# Patient Record
Sex: FEMALE | Race: WHITE | NOT HISPANIC OR LATINO | Employment: OTHER | URBAN - METROPOLITAN AREA
[De-identification: names, ages, dates, MRNs, and addresses within clinical notes are randomized per-mention and may not be internally consistent; named-entity substitution may affect disease eponyms.]

---

## 2017-03-30 ENCOUNTER — HOSPITAL ENCOUNTER (OUTPATIENT)
Dept: FAMILY MEDICINE CLINIC | Facility: CLINIC | Age: 67
Setting detail: SPECIMEN
Discharge: HOME | End: 2017-03-30
Attending: FAMILY MEDICINE | Admitting: FAMILY MEDICINE

## 2017-03-30 ENCOUNTER — HOSPITAL ENCOUNTER (OUTPATIENT)
Dept: RESPIRATORY THERAPY | Facility: HOSPITAL | Age: 67
Discharge: HOME | End: 2017-03-30
Attending: NURSE PRACTITIONER | Admitting: NURSE PRACTITIONER

## 2017-03-30 LAB
ALBUMIN SERPL-MCNC: 4 G/DL (ref 3.5–4.8)
ALBUMIN/GLOB SERPL: 1.3 {RATIO} (ref 1–1.7)
ALP SERPL-CCNC: 93 IU/L (ref 32–91)
ALT SERPL-CCNC: 33 IU/L (ref 14–54)
ANION GAP SERPL CALC-SCNC: 14.3 MMOL/L (ref 10–20)
AST SERPL-CCNC: 28 IU/L (ref 15–41)
BILIRUB SERPL-MCNC: 0.7 MG/DL (ref 0.3–1.2)
BUN SERPL-MCNC: 12 MG/DL (ref 8–20)
BUN/CREAT SERPL: 10 (ref 5.4–26.2)
CALCIUM SERPL-MCNC: 9.6 MG/DL (ref 8.9–10.3)
CHLORIDE SERPL-SCNC: 105 MMOL/L (ref 101–111)
CHOLEST SERPL-MCNC: 171 MG/DL
CHOLEST/HDLC SERPL: 2.5 {RATIO}
CONV CO2: 29 MMOL/L (ref 22–32)
CONV LDL CHOLESTEROL DIRECT: 86 MG/DL (ref 0–100)
CONV TOTAL PROTEIN: 7.1 G/DL (ref 6.1–7.9)
CREAT UR-MCNC: 1.2 MG/DL (ref 0.4–1)
GLOBULIN UR ELPH-MCNC: 3.1 G/DL (ref 2.5–3.8)
GLUCOSE SERPL-MCNC: 99 MG/DL (ref 65–99)
HDLC SERPL-MCNC: 69 MG/DL
LDLC/HDLC SERPL: 1.2 {RATIO}
LIPID INTERPRETATION: NORMAL
POTASSIUM SERPL-SCNC: 4.3 MMOL/L (ref 3.6–5.1)
SODIUM SERPL-SCNC: 144 MMOL/L (ref 136–144)
TRIGL SERPL-MCNC: 84 MG/DL
VLDLC SERPL CALC-MCNC: 16.2 MG/DL

## 2017-04-05 ENCOUNTER — HOSPITAL ENCOUNTER (OUTPATIENT)
Dept: MAMMOGRAPHY | Facility: HOSPITAL | Age: 67
Discharge: HOME | End: 2017-04-05
Attending: FAMILY MEDICINE | Admitting: FAMILY MEDICINE

## 2017-05-05 ENCOUNTER — ON CAMPUS - OUTPATIENT (AMBULATORY)
Dept: URBAN - METROPOLITAN AREA HOSPITAL 2 | Facility: HOSPITAL | Age: 67
End: 2017-05-05
Payer: COMMERCIAL

## 2017-05-05 ENCOUNTER — OFFICE (AMBULATORY)
Dept: URBAN - METROPOLITAN AREA CLINIC 64 | Facility: CLINIC | Age: 67
End: 2017-05-05
Payer: COMMERCIAL

## 2017-05-05 ENCOUNTER — HOSPITAL ENCOUNTER (OUTPATIENT)
Dept: OTHER | Facility: HOSPITAL | Age: 67
Setting detail: SPECIMEN
Discharge: HOME | End: 2017-05-05
Attending: INTERNAL MEDICINE | Admitting: INTERNAL MEDICINE

## 2017-05-05 VITALS
HEART RATE: 119 BPM | DIASTOLIC BLOOD PRESSURE: 54 MMHG | WEIGHT: 190 LBS | SYSTOLIC BLOOD PRESSURE: 96 MMHG | OXYGEN SATURATION: 100 % | DIASTOLIC BLOOD PRESSURE: 91 MMHG | SYSTOLIC BLOOD PRESSURE: 124 MMHG | OXYGEN SATURATION: 98 % | RESPIRATION RATE: 18 BRPM | DIASTOLIC BLOOD PRESSURE: 70 MMHG | HEART RATE: 92 BPM | OXYGEN SATURATION: 93 % | SYSTOLIC BLOOD PRESSURE: 131 MMHG | HEART RATE: 68 BPM | SYSTOLIC BLOOD PRESSURE: 144 MMHG | DIASTOLIC BLOOD PRESSURE: 83 MMHG | SYSTOLIC BLOOD PRESSURE: 125 MMHG | TEMPERATURE: 97.6 F | HEART RATE: 89 BPM | HEART RATE: 78 BPM | SYSTOLIC BLOOD PRESSURE: 149 MMHG | HEART RATE: 66 BPM | HEART RATE: 103 BPM | SYSTOLIC BLOOD PRESSURE: 129 MMHG | RESPIRATION RATE: 16 BRPM | HEART RATE: 121 BPM | SYSTOLIC BLOOD PRESSURE: 132 MMHG | RESPIRATION RATE: 15 BRPM | DIASTOLIC BLOOD PRESSURE: 68 MMHG | DIASTOLIC BLOOD PRESSURE: 102 MMHG | OXYGEN SATURATION: 95 % | SYSTOLIC BLOOD PRESSURE: 155 MMHG | DIASTOLIC BLOOD PRESSURE: 89 MMHG | DIASTOLIC BLOOD PRESSURE: 81 MMHG | HEART RATE: 101 BPM | HEIGHT: 59 IN | DIASTOLIC BLOOD PRESSURE: 115 MMHG | RESPIRATION RATE: 14 BRPM | OXYGEN SATURATION: 96 %

## 2017-05-05 DIAGNOSIS — D12.3 BENIGN NEOPLASM OF TRANSVERSE COLON: ICD-10-CM

## 2017-05-05 DIAGNOSIS — K64.1 SECOND DEGREE HEMORRHOIDS: ICD-10-CM

## 2017-05-05 DIAGNOSIS — R13.10 DYSPHAGIA, UNSPECIFIED: ICD-10-CM

## 2017-05-05 DIAGNOSIS — K22.2 ESOPHAGEAL OBSTRUCTION: ICD-10-CM

## 2017-05-05 DIAGNOSIS — Z12.11 ENCOUNTER FOR SCREENING FOR MALIGNANT NEOPLASM OF COLON: ICD-10-CM

## 2017-05-05 DIAGNOSIS — K25.9 GASTRIC ULCER, UNSPECIFIED AS ACUTE OR CHRONIC, WITHOUT HEMO: ICD-10-CM

## 2017-05-05 DIAGNOSIS — K44.9 DIAPHRAGMATIC HERNIA WITHOUT OBSTRUCTION OR GANGRENE: ICD-10-CM

## 2017-05-05 LAB
GI HISTOLOGY: A. UNSPECIFIED: (no result)
GI HISTOLOGY: PDF REPORT: (no result)

## 2017-05-05 PROCEDURE — 45385 COLONOSCOPY W/LESION REMOVAL: CPT | Mod: PT | Performed by: INTERNAL MEDICINE

## 2017-05-05 PROCEDURE — 43248 EGD GUIDE WIRE INSERTION: CPT | Performed by: INTERNAL MEDICINE

## 2017-05-05 PROCEDURE — 88305 TISSUE EXAM BY PATHOLOGIST: CPT | Mod: 26 | Performed by: INTERNAL MEDICINE

## 2017-05-05 RX ORDER — LANSOPRAZOLE 30 MG/1
CAPSULE, DELAYED RELEASE ORAL
Qty: 90 | Refills: 3 | Status: COMPLETED
End: 2022-12-06

## 2017-05-05 RX ADMIN — PROPOFOL: 10 INJECTION, EMULSION INTRAVENOUS at 14:45

## 2017-05-18 ENCOUNTER — HOSPITAL ENCOUNTER (OUTPATIENT)
Dept: CARDIOLOGY | Facility: HOSPITAL | Age: 67
Discharge: HOME | End: 2017-05-18
Attending: INTERNAL MEDICINE | Admitting: INTERNAL MEDICINE

## 2017-06-25 ENCOUNTER — HOSPITAL ENCOUNTER (OUTPATIENT)
Dept: OTHER | Facility: HOSPITAL | Age: 67
Setting detail: SPECIMEN
Discharge: HOME | End: 2017-06-25
Attending: ORTHOPAEDIC SURGERY | Admitting: ORTHOPAEDIC SURGERY

## 2017-06-26 LAB
ANION GAP SERPL CALC-SCNC: 12.3 MMOL/L (ref 10–20)
BUN SERPL-MCNC: 7 MG/DL (ref 8–20)
BUN/CREAT SERPL: 7 (ref 5.4–26.2)
CALCIUM SERPL-MCNC: 8.1 MG/DL (ref 8.9–10.3)
CHLORIDE SERPL-SCNC: 97 MMOL/L (ref 101–111)
CONV CO2: 28 MMOL/L (ref 22–32)
CREAT UR-MCNC: 1 MG/DL (ref 0.4–1)
GLUCOSE SERPL-MCNC: 114 MG/DL (ref 65–99)
POTASSIUM SERPL-SCNC: 4.3 MMOL/L (ref 3.6–5.1)
SODIUM SERPL-SCNC: 133 MMOL/L (ref 136–144)

## 2017-06-28 ENCOUNTER — HOSPITAL ENCOUNTER (OUTPATIENT)
Dept: LAB | Facility: HOSPITAL | Age: 67
Discharge: HOME | End: 2017-06-28
Attending: ORTHOPAEDIC SURGERY | Admitting: ORTHOPAEDIC SURGERY

## 2017-06-28 LAB
INR PPP: 3.7 (ref 2–3)
PROTHROMBIN TIME: 41.9 SEC (ref 19.4–28.5)

## 2017-07-19 ENCOUNTER — HOSPITAL ENCOUNTER (OUTPATIENT)
Dept: FAMILY MEDICINE CLINIC | Facility: CLINIC | Age: 67
Setting detail: SPECIMEN
Discharge: HOME | End: 2017-07-19
Attending: FAMILY MEDICINE | Admitting: FAMILY MEDICINE

## 2017-07-19 LAB
ALBUMIN SERPL-MCNC: 3.7 G/DL (ref 3.5–4.8)
ALBUMIN/GLOB SERPL: 1.2 {RATIO} (ref 1–1.7)
ALP SERPL-CCNC: 93 IU/L (ref 32–91)
ALT SERPL-CCNC: 14 IU/L (ref 14–54)
ANION GAP SERPL CALC-SCNC: 10.9 MMOL/L (ref 10–20)
AST SERPL-CCNC: 18 IU/L (ref 15–41)
BASOPHILS # BLD AUTO: 0 10*3/UL (ref 0–0.2)
BASOPHILS NFR BLD AUTO: 1 % (ref 0–2)
BILIRUB SERPL-MCNC: 0.7 MG/DL (ref 0.3–1.2)
BUN SERPL-MCNC: 9 MG/DL (ref 8–20)
BUN/CREAT SERPL: 10 (ref 5.4–26.2)
CALCIUM SERPL-MCNC: 9.2 MG/DL (ref 8.9–10.3)
CHLORIDE SERPL-SCNC: 103 MMOL/L (ref 101–111)
CONV CO2: 29 MMOL/L (ref 22–32)
CONV TOTAL PROTEIN: 6.7 G/DL (ref 6.1–7.9)
CREAT UR-MCNC: 0.9 MG/DL (ref 0.4–1)
DIFFERENTIAL METHOD BLD: (no result)
EOSINOPHIL # BLD AUTO: 0.2 10*3/UL (ref 0–0.3)
EOSINOPHIL # BLD AUTO: 3 % (ref 0–3)
ERYTHROCYTE [DISTWIDTH] IN BLOOD BY AUTOMATED COUNT: 12.9 % (ref 11.5–14.5)
GLOBULIN UR ELPH-MCNC: 3 G/DL (ref 2.5–3.8)
GLUCOSE SERPL-MCNC: 90 MG/DL (ref 65–99)
HCT VFR BLD AUTO: 37 % (ref 35–49)
HGB BLD-MCNC: 12.3 G/DL (ref 12–15)
LYMPHOCYTES # BLD AUTO: 1.7 10*3/UL (ref 0.8–4.8)
LYMPHOCYTES NFR BLD AUTO: 27 % (ref 18–42)
MCH RBC QN AUTO: 28.9 PG (ref 26–32)
MCHC RBC AUTO-ENTMCNC: 33.2 G/DL (ref 32–36)
MCV RBC AUTO: 87.2 FL (ref 80–94)
MONOCYTES # BLD AUTO: 0.4 10*3/UL (ref 0.1–1.3)
MONOCYTES NFR BLD AUTO: 6 % (ref 2–11)
NEUTROPHILS # BLD AUTO: 4 10*3/UL (ref 2.3–8.6)
NEUTROPHILS NFR BLD AUTO: 63 % (ref 50–75)
NRBC BLD AUTO-RTO: 0 /100{WBCS}
NRBC/RBC NFR BLD MANUAL: 0 10*3/UL
PLATELET # BLD AUTO: 190 10*3/UL (ref 150–450)
PMV BLD AUTO: 8.2 FL (ref 7.4–10.4)
POTASSIUM SERPL-SCNC: 3.9 MMOL/L (ref 3.6–5.1)
RBC # BLD AUTO: 4.24 10*6/UL (ref 4–5.4)
SODIUM SERPL-SCNC: 139 MMOL/L (ref 136–144)
TSH SERPL-ACNC: 2.16 UIU/ML (ref 0.34–5.6)
WBC # BLD AUTO: 6.4 10*3/UL (ref 4.5–11.5)

## 2017-08-10 ENCOUNTER — HOSPITAL ENCOUNTER (OUTPATIENT)
Dept: OTHER | Facility: HOSPITAL | Age: 67
Setting detail: RECURRING SERIES
Discharge: HOME | End: 2017-09-15
Attending: ORTHOPAEDIC SURGERY | Admitting: ORTHOPAEDIC SURGERY

## 2018-01-25 ENCOUNTER — HOSPITAL ENCOUNTER (OUTPATIENT)
Dept: GENERAL RADIOLOGY | Facility: HOSPITAL | Age: 68
Discharge: HOME | End: 2018-01-25
Attending: NURSE PRACTITIONER | Admitting: NURSE PRACTITIONER

## 2018-02-13 ENCOUNTER — CONVERSION ENCOUNTER (OUTPATIENT)
Dept: FAMILY MEDICINE CLINIC | Facility: CLINIC | Age: 68
End: 2018-02-13

## 2018-02-15 ENCOUNTER — HOSPITAL ENCOUNTER (OUTPATIENT)
Dept: ORTHOPEDIC SURGERY | Facility: CLINIC | Age: 68
Setting detail: SPECIMEN
Discharge: HOME | End: 2018-02-15
Attending: PHYSICIAN ASSISTANT | Admitting: PHYSICIAN ASSISTANT

## 2018-02-15 LAB
ALBUMIN SERPL-MCNC: 3.5 G/DL (ref 3.5–4.8)
ALBUMIN/GLOB SERPL: 1.5 {RATIO} (ref 1–1.7)
ALP SERPL-CCNC: 59 IU/L (ref 32–91)
ALT SERPL-CCNC: 14 IU/L (ref 14–54)
ANION GAP SERPL CALC-SCNC: 12.4 MMOL/L (ref 10–20)
AST SERPL-CCNC: 19 IU/L (ref 15–41)
BILIRUB SERPL-MCNC: 0.6 MG/DL (ref 0.3–1.2)
BUN SERPL-MCNC: 14 MG/DL (ref 8–20)
BUN/CREAT SERPL: 11.7 (ref 5.4–26.2)
CALCIUM SERPL-MCNC: 8.4 MG/DL (ref 8.9–10.3)
CHLORIDE SERPL-SCNC: 102 MMOL/L (ref 101–111)
CONV CO2: 28 MMOL/L (ref 22–32)
CONV TOTAL PROTEIN: 5.9 G/DL (ref 6.1–7.9)
CREAT UR-MCNC: 1.2 MG/DL (ref 0.4–1)
GLOBULIN UR ELPH-MCNC: 2.4 G/DL (ref 2.5–3.8)
GLUCOSE SERPL-MCNC: 99 MG/DL (ref 65–99)
POTASSIUM SERPL-SCNC: 3.4 MMOL/L (ref 3.6–5.1)
SODIUM SERPL-SCNC: 139 MMOL/L (ref 136–144)

## 2018-03-10 ENCOUNTER — HOSPITAL ENCOUNTER (OUTPATIENT)
Dept: LAB | Facility: HOSPITAL | Age: 68
Discharge: HOME | End: 2018-03-10
Attending: PHYSICIAN ASSISTANT | Admitting: PHYSICIAN ASSISTANT

## 2018-05-24 ENCOUNTER — HOSPITAL ENCOUNTER (OUTPATIENT)
Dept: FAMILY MEDICINE CLINIC | Facility: CLINIC | Age: 68
Setting detail: SPECIMEN
Discharge: HOME | End: 2018-05-24
Attending: FAMILY MEDICINE | Admitting: FAMILY MEDICINE

## 2018-05-24 LAB
BASOPHILS # BLD AUTO: 0 10*3/UL (ref 0–0.2)
BASOPHILS NFR BLD AUTO: 1 % (ref 0–2)
CHOLEST SERPL-MCNC: 271 MG/DL
CHOLEST/HDLC SERPL: 4.9 {RATIO}
CONV LDL CHOLESTEROL DIRECT: 195 MG/DL (ref 0–100)
DIFFERENTIAL METHOD BLD: (no result)
EOSINOPHIL # BLD AUTO: 0.2 10*3/UL (ref 0–0.3)
EOSINOPHIL # BLD AUTO: 3 % (ref 0–3)
ERYTHROCYTE [DISTWIDTH] IN BLOOD BY AUTOMATED COUNT: 13.1 % (ref 11.5–14.5)
HCT VFR BLD AUTO: 41.1 % (ref 35–49)
HDLC SERPL-MCNC: 56 MG/DL
HGB BLD-MCNC: 13.6 G/DL (ref 12–15)
LDLC/HDLC SERPL: 3.5 {RATIO}
LIPID INTERPRETATION: ABNORMAL
LYMPHOCYTES # BLD AUTO: 2.3 10*3/UL (ref 0.8–4.8)
LYMPHOCYTES NFR BLD AUTO: 37 % (ref 18–42)
MCH RBC QN AUTO: 28.1 PG (ref 26–32)
MCHC RBC AUTO-ENTMCNC: 33.1 G/DL (ref 32–36)
MCV RBC AUTO: 85 FL (ref 80–94)
MONOCYTES # BLD AUTO: 0.5 10*3/UL (ref 0.1–1.3)
MONOCYTES NFR BLD AUTO: 7 % (ref 2–11)
NEUTROPHILS # BLD AUTO: 3.2 10*3/UL (ref 2.3–8.6)
NEUTROPHILS NFR BLD AUTO: 52 % (ref 50–75)
NRBC BLD AUTO-RTO: 0 /100{WBCS}
NRBC/RBC NFR BLD MANUAL: 0 10*3/UL
PLATELET # BLD AUTO: 239 10*3/UL (ref 150–450)
PMV BLD AUTO: 8.1 FL (ref 7.4–10.4)
RBC # BLD AUTO: 4.83 10*6/UL (ref 4–5.4)
TRIGL SERPL-MCNC: 123 MG/DL
VLDLC SERPL CALC-MCNC: 20.1 MG/DL
WBC # BLD AUTO: 6.2 10*3/UL (ref 4.5–11.5)

## 2018-06-22 ENCOUNTER — HOSPITAL ENCOUNTER (OUTPATIENT)
Dept: LAB | Facility: HOSPITAL | Age: 68
Discharge: HOME | End: 2018-06-22
Attending: ORTHOPAEDIC SURGERY | Admitting: ORTHOPAEDIC SURGERY

## 2018-06-22 LAB
CRP SERPL-MCNC: 0.42 MG/DL (ref 0–0.7)
ERYTHROCYTE [SEDIMENTATION RATE] IN BLOOD BY WESTERGREN METHOD: 21 MM/HR (ref 0–30)

## 2018-08-16 ENCOUNTER — EPISODE CHANGES (OUTPATIENT)
Dept: CASE MANAGEMENT | Facility: OTHER | Age: 68
End: 2018-08-16

## 2018-09-11 ENCOUNTER — EPISODE CHANGES (OUTPATIENT)
Dept: CASE MANAGEMENT | Facility: OTHER | Age: 68
End: 2018-09-11

## 2018-11-14 ENCOUNTER — HOSPITAL ENCOUNTER (OUTPATIENT)
Dept: FAMILY MEDICINE CLINIC | Facility: CLINIC | Age: 68
Setting detail: SPECIMEN
Discharge: HOME | End: 2018-11-14
Attending: FAMILY MEDICINE | Admitting: FAMILY MEDICINE

## 2018-11-14 LAB
ALBUMIN SERPL-MCNC: 4.1 G/DL (ref 3.5–4.8)
ALBUMIN/GLOB SERPL: 1.4 {RATIO} (ref 1–1.7)
ALP SERPL-CCNC: 65 IU/L (ref 32–91)
ALT SERPL-CCNC: 17 IU/L (ref 14–54)
ANION GAP SERPL CALC-SCNC: 18.8 MMOL/L (ref 10–20)
AST SERPL-CCNC: 23 IU/L (ref 15–41)
BILIRUB SERPL-MCNC: 1 MG/DL (ref 0.3–1.2)
BUN SERPL-MCNC: 13 MG/DL (ref 8–20)
BUN/CREAT SERPL: 13 (ref 5.4–26.2)
CALCIUM SERPL-MCNC: 9.2 MG/DL (ref 8.9–10.3)
CHLORIDE SERPL-SCNC: 99 MMOL/L (ref 101–111)
CHOLEST SERPL-MCNC: 134 MG/DL
CHOLEST/HDLC SERPL: 2.6 {RATIO}
CONV CO2: 25 MMOL/L (ref 22–32)
CONV LDL CHOLESTEROL DIRECT: 74 MG/DL (ref 0–100)
CONV TOTAL PROTEIN: 7.1 G/DL (ref 6.1–7.9)
CREAT UR-MCNC: 1 MG/DL (ref 0.4–1)
GLOBULIN UR ELPH-MCNC: 3 G/DL (ref 2.5–3.8)
GLUCOSE SERPL-MCNC: 87 MG/DL (ref 65–99)
HDLC SERPL-MCNC: 51 MG/DL
LDLC/HDLC SERPL: 1.5 {RATIO}
LIPID INTERPRETATION: NORMAL
POTASSIUM SERPL-SCNC: 3.8 MMOL/L (ref 3.6–5.1)
SODIUM SERPL-SCNC: 139 MMOL/L (ref 136–144)
TRIGL SERPL-MCNC: 75 MG/DL
VLDLC SERPL CALC-MCNC: 9.2 MG/DL

## 2018-11-20 ENCOUNTER — CONVERSION ENCOUNTER (OUTPATIENT)
Dept: FAMILY MEDICINE CLINIC | Facility: CLINIC | Age: 68
End: 2018-11-20

## 2019-02-22 ENCOUNTER — HOSPITAL ENCOUNTER (OUTPATIENT)
Dept: MAMMOGRAPHY | Facility: HOSPITAL | Age: 69
Discharge: HOME | End: 2019-02-22
Attending: PHYSICIAN ASSISTANT | Admitting: PHYSICIAN ASSISTANT

## 2019-03-14 ENCOUNTER — HOSPITAL ENCOUNTER (OUTPATIENT)
Dept: ORTHOPEDIC SURGERY | Facility: CLINIC | Age: 69
Discharge: HOME | End: 2019-03-14
Attending: PHYSICIAN ASSISTANT | Admitting: PHYSICIAN ASSISTANT

## 2019-04-08 ENCOUNTER — HOSPITAL ENCOUNTER (OUTPATIENT)
Dept: MAMMOGRAPHY | Facility: HOSPITAL | Age: 69
Discharge: HOME | End: 2019-04-08
Attending: PHYSICIAN ASSISTANT | Admitting: PHYSICIAN ASSISTANT

## 2019-05-22 ENCOUNTER — CONVERSION ENCOUNTER (OUTPATIENT)
Dept: FAMILY MEDICINE CLINIC | Facility: CLINIC | Age: 69
End: 2019-05-22

## 2019-06-03 VITALS
HEIGHT: 60 IN | SYSTOLIC BLOOD PRESSURE: 165 MMHG | BODY MASS INDEX: 38.47 KG/M2 | DIASTOLIC BLOOD PRESSURE: 91 MMHG | SYSTOLIC BLOOD PRESSURE: 120 MMHG | DIASTOLIC BLOOD PRESSURE: 77 MMHG | HEART RATE: 66 BPM

## 2019-06-04 VITALS — HEART RATE: 59 BPM | DIASTOLIC BLOOD PRESSURE: 81 MMHG | SYSTOLIC BLOOD PRESSURE: 122 MMHG

## 2019-08-04 RX ORDER — OXYBUTYNIN CHLORIDE 5 MG/1
TABLET ORAL
Qty: 180 TABLET | Refills: 0 | Status: SHIPPED | OUTPATIENT
Start: 2019-08-04 | End: 2020-06-04

## 2019-09-16 RX ORDER — MONTELUKAST SODIUM 10 MG/1
TABLET ORAL
Qty: 90 TABLET | Refills: 0 | Status: SHIPPED | OUTPATIENT
Start: 2019-09-16 | End: 2019-12-27

## 2019-11-13 ENCOUNTER — TELEPHONE (OUTPATIENT)
Dept: ORTHOPEDIC SURGERY | Facility: CLINIC | Age: 69
End: 2019-11-13

## 2019-11-13 DIAGNOSIS — M81.0 AGE-RELATED OSTEOPOROSIS WITHOUT CURRENT PATHOLOGICAL FRACTURE: ICD-10-CM

## 2019-11-13 DIAGNOSIS — Z79.899 MEDICATION MANAGEMENT: Primary | ICD-10-CM

## 2019-11-13 NOTE — TELEPHONE ENCOUNTER
Called Pt and she has not gotten any labs  Done recently so order was put in for lab and pt was informed she is due for her Prolia injection on 11/22/2019

## 2019-11-18 ENCOUNTER — LAB (OUTPATIENT)
Dept: LAB | Facility: HOSPITAL | Age: 69
End: 2019-11-18

## 2019-11-18 DIAGNOSIS — Z79.899 MEDICATION MANAGEMENT: ICD-10-CM

## 2019-11-18 DIAGNOSIS — M81.0 AGE-RELATED OSTEOPOROSIS WITHOUT CURRENT PATHOLOGICAL FRACTURE: ICD-10-CM

## 2019-11-18 LAB
25(OH)D3 SERPL-MCNC: 53.9 NG/ML (ref 30–100)
ALBUMIN SERPL-MCNC: 4 G/DL (ref 3.5–5.2)
ALBUMIN/GLOB SERPL: 1.2 G/DL
ALP SERPL-CCNC: 51 U/L (ref 39–117)
ALT SERPL W P-5'-P-CCNC: 10 U/L (ref 1–33)
ANION GAP SERPL CALCULATED.3IONS-SCNC: 9.1 MMOL/L (ref 5–15)
AST SERPL-CCNC: 21 U/L (ref 1–32)
BILIRUB SERPL-MCNC: 0.3 MG/DL (ref 0.2–1.2)
BUN BLD-MCNC: 13 MG/DL (ref 8–23)
BUN/CREAT SERPL: 14.1 (ref 7–25)
CALCIUM SPEC-SCNC: 9.4 MG/DL (ref 8.6–10.5)
CHLORIDE SERPL-SCNC: 104 MMOL/L (ref 98–107)
CO2 SERPL-SCNC: 28.9 MMOL/L (ref 22–29)
CREAT BLD-MCNC: 0.92 MG/DL (ref 0.57–1)
GFR SERPL CREATININE-BSD FRML MDRD: 61 ML/MIN/1.73
GLOBULIN UR ELPH-MCNC: 3.4 GM/DL
GLUCOSE BLD-MCNC: 82 MG/DL (ref 65–99)
POTASSIUM BLD-SCNC: 5.2 MMOL/L (ref 3.5–5.2)
PROT SERPL-MCNC: 7.4 G/DL (ref 6–8.5)
SODIUM BLD-SCNC: 142 MMOL/L (ref 136–145)

## 2019-11-18 PROCEDURE — 80053 COMPREHEN METABOLIC PANEL: CPT

## 2019-11-18 PROCEDURE — 82306 VITAMIN D 25 HYDROXY: CPT

## 2019-11-18 PROCEDURE — 36415 COLL VENOUS BLD VENIPUNCTURE: CPT

## 2019-11-19 ENCOUNTER — TELEPHONE (OUTPATIENT)
Dept: NEUROSURGERY | Facility: CLINIC | Age: 69
End: 2019-11-19

## 2019-11-19 NOTE — TELEPHONE ENCOUNTER
Called Pt to let her know that her labs came back and she is ok to have her Prolia injection.  [employer] benefits ran and Benefits are at 100%. She is due for her injection on 11/22/2019 transferred to  to schedule appt

## 2019-11-24 RX ORDER — GABAPENTIN 400 MG/1
CAPSULE ORAL
Qty: 90 CAPSULE | Refills: 0 | Status: SHIPPED | OUTPATIENT
Start: 2019-11-24 | End: 2020-06-04

## 2019-11-25 ENCOUNTER — CLINICAL SUPPORT (OUTPATIENT)
Dept: ORTHOPEDIC SURGERY | Facility: CLINIC | Age: 69
End: 2019-11-25

## 2019-11-25 DIAGNOSIS — M81.0 AGE-RELATED OSTEOPOROSIS WITHOUT CURRENT PATHOLOGICAL FRACTURE: Primary | ICD-10-CM

## 2019-11-25 PROCEDURE — 96372 THER/PROPH/DIAG INJ SC/IM: CPT | Performed by: PHYSICIAN ASSISTANT

## 2019-12-27 RX ORDER — MONTELUKAST SODIUM 10 MG/1
TABLET ORAL
Qty: 90 TABLET | Refills: 0 | Status: SHIPPED | OUTPATIENT
Start: 2019-12-27 | End: 2020-06-04

## 2020-02-13 ENCOUNTER — OFFICE VISIT (OUTPATIENT)
Dept: ORTHOPEDIC SURGERY | Facility: CLINIC | Age: 70
End: 2020-02-13

## 2020-02-13 VITALS
BODY MASS INDEX: 37.7 KG/M2 | HEART RATE: 56 BPM | WEIGHT: 187 LBS | SYSTOLIC BLOOD PRESSURE: 178 MMHG | HEIGHT: 59 IN | DIASTOLIC BLOOD PRESSURE: 75 MMHG

## 2020-02-13 DIAGNOSIS — G47.33 OBSTRUCTIVE SLEEP APNEA: ICD-10-CM

## 2020-02-13 DIAGNOSIS — Z82.62 FAMILY HISTORY OF OSTEOPOROSIS: ICD-10-CM

## 2020-02-13 DIAGNOSIS — M81.0 AGE-RELATED OSTEOPOROSIS WITHOUT CURRENT PATHOLOGICAL FRACTURE: Primary | ICD-10-CM

## 2020-02-13 DIAGNOSIS — E55.9 VITAMIN D DEFICIENCY: ICD-10-CM

## 2020-02-13 DIAGNOSIS — Z51.81 ENCOUNTER FOR THERAPEUTIC DRUG MONITORING: ICD-10-CM

## 2020-02-13 DIAGNOSIS — K22.70 BARRETT'S ESOPHAGUS WITHOUT DYSPLASIA: ICD-10-CM

## 2020-02-13 PROBLEM — Z78.0 POSTMENOPAUSAL STATUS: Status: ACTIVE | Noted: 2017-03-30

## 2020-02-13 PROBLEM — R60.0 PEDAL EDEMA: Status: ACTIVE | Noted: 2018-10-22

## 2020-02-13 PROBLEM — J45.909 ASTHMA: Status: ACTIVE | Noted: 2020-02-13

## 2020-02-13 PROBLEM — N39.41 URGE INCONTINENCE: Status: ACTIVE | Noted: 2018-08-27

## 2020-02-13 PROBLEM — M19.90 ARTHRITIS: Status: ACTIVE | Noted: 2020-02-13

## 2020-02-13 PROBLEM — E78.5 HYPERLIPIDEMIA: Status: ACTIVE | Noted: 2017-03-30

## 2020-02-13 PROBLEM — Z96.652 PRESENCE OF LEFT ARTIFICIAL KNEE JOINT: Status: ACTIVE | Noted: 2019-03-14

## 2020-02-13 PROBLEM — F32.A DEPRESSION: Status: ACTIVE | Noted: 2018-08-27

## 2020-02-13 PROBLEM — I21.3 ST ELEVATION (STEMI) MYOCARDIAL INFARCTION: Status: ACTIVE | Noted: 2020-02-13

## 2020-02-13 PROBLEM — M10.9 GOUT: Status: ACTIVE | Noted: 2020-02-13

## 2020-02-13 PROCEDURE — 99214 OFFICE O/P EST MOD 30 MIN: CPT | Performed by: PHYSICIAN ASSISTANT

## 2020-02-13 RX ORDER — METOPROLOL SUCCINATE 25 MG/1
TABLET, EXTENDED RELEASE ORAL EVERY 24 HOURS
COMMUNITY
Start: 2018-10-04 | End: 2020-02-20 | Stop reason: SDUPTHER

## 2020-02-13 RX ORDER — BUSPIRONE HYDROCHLORIDE 7.5 MG/1
7.5 TABLET ORAL 3 TIMES DAILY
COMMUNITY
Start: 2015-01-20 | End: 2020-05-26 | Stop reason: ALTCHOICE

## 2020-02-13 RX ORDER — LANSOPRAZOLE 30 MG/1
30 CAPSULE, DELAYED RELEASE ORAL DAILY
COMMUNITY
Start: 2018-08-28 | End: 2020-10-29

## 2020-02-13 RX ORDER — DENOSUMAB 60 MG/ML
INJECTION SUBCUTANEOUS
COMMUNITY
Start: 2019-02-15 | End: 2021-06-24

## 2020-02-13 RX ORDER — ALBUTEROL SULFATE 90 UG/1
1 AEROSOL, METERED RESPIRATORY (INHALATION) EVERY 6 HOURS PRN
COMMUNITY
Start: 2014-10-02 | End: 2023-03-09 | Stop reason: SDUPTHER

## 2020-02-13 RX ORDER — NYSTATIN 100000 U/G
CREAM TOPICAL
COMMUNITY
Start: 2018-05-24 | End: 2020-12-10

## 2020-02-13 NOTE — PATIENT INSTRUCTIONS
Osteoporosis    Osteoporosis happens when your bones get thin and weak. This can cause your bones to break (fracture) more easily. You can do things at home to make your bones stronger.  Follow these instructions at home:    Activity  · Exercise as told by your doctor. Ask your doctor what activities are safe for you. You should do:  ? Exercises that make your muscles work to hold your body weight up (weight-bearing exercises). These include alba chi, yoga, and walking.  ? Exercises to make your muscles stronger. One example is lifting weights.  Lifestyle  · Limit alcohol intake to no more than 1 drink a day for nonpregnant women and 2 drinks a day for men. One drink equals 12 oz of beer, 5 oz of wine, or 1½ oz of hard liquor.  · Do not use any products that have nicotine or tobacco in them. These include cigarettes and e-cigarettes. If you need help quitting, ask your doctor.  Preventing falls  · Use tools to help you move around (mobility aids) as needed. These include canes, walkers, scooters, and crutches.  · Keep rooms well-lit and free of clutter.  · Put away things that could make you trip. These include cords and rugs.  · Install safety rails on stairs. Install grab bars in bathrooms.  · Use rubber mats in slippery areas, like bathrooms.  · Wear shoes that:  ? Fit you well.  ? Support your feet.  ? Have closed toes.  ? Have rubber soles or low heels.  · Tell your doctor about all of the medicines you are taking. Some medicines can make you more likely to fall.  General instructions  · Eat plenty of calcium and vitamin D. These nutrients are good for your bones. Good sources of calcium and vitamin D include:  ? Some fatty fish, such as salmon and tuna.  ? Foods that have calcium and vitamin D added to them (fortified foods). For example, some breakfast cereals are fortified with calcium and vitamin D.  ? Egg yolks.  ? Cheese.  ? Liver.  · Take over-the-counter and prescription medicines only as told by your  doctor.  · Keep all follow-up visits as told by your doctor. This is important.  Contact a doctor if:  · You have not been tested (screened) for osteoporosis and you are:  ? A woman who is age 65 or older.  ? A man who is age 70 or older.  Get help right away if:  · You fall.  · You get hurt.  Summary  · Osteoporosis happens when your bones get thin and weak.  · Weak bones can break (fracture) more easily.  · Eat plenty of calcium and vitamin D. These nutrients are good for your bones.  · Tell your doctor about all of the medicines that you take.  This information is not intended to replace advice given to you by your health care provider. Make sure you discuss any questions you have with your health care provider.  Document Released: 03/11/2013 Document Revised: 10/12/2018 Document Reviewed: 10/12/2018  [employer] Interactive Patient Education © 2019 [employer]

## 2020-02-14 RX ORDER — BUSPIRONE HYDROCHLORIDE 7.5 MG/1
TABLET ORAL
Qty: 30 TABLET | Refills: 0 | OUTPATIENT
Start: 2020-02-14

## 2020-02-14 NOTE — TELEPHONE ENCOUNTER
Pharmacy says she has not filled this (taking it once a day) since Nov 2018  She has not been since in office since then  She needs to be seen

## 2020-02-14 NOTE — PROGRESS NOTES
ORTHO FOLLOW UP       Subjective:    HPI:   Charleen Barnett is a 69 y.o. female who presents in follow-up for osteoporosis.  She is currently on Prolia and reports that she is doing well with no noticeable side effects.  She continues calcium through her diet, and also continues her vitamin D, although she is not sure how much.  She denies any new fractures since last office visit.  She is able to get some physical activity approximately 1 time per week.  She denies any falls since last office visit, although she is at risk for falling scoring 8 out of 14 on the STEADI risk for falling questionnaire.  She is having some increased left shoulder pain.  She does have cataracts.  Recent labs were reviewed.  She did have a DEXA scan on 4/8/2019 at [facility], which revealed a T score of -3.8 in the one third radius.  This did show an increase in bone density of 3.6% in the spine and a decrease in bone density of 4.3% in the hips when compared to previous imaging.    Past Medical History:   Diagnosis Date   • Anxiety    • GERD (gastroesophageal reflux disease)    • Joint pain    • Osteoporosis     DEXA 4/8/2019 ([facility])- fosamax(SE), on prolia(2018)   • Seasonal allergies    • Tachycardia        Past Surgical History:   Procedure Laterality Date   • CHOLECYSTECTOMY     • HYSTERECTOMY     • KNEE SURGERY         Social History     Occupational History   • Not on file   Tobacco Use   • Smoking status: Never Smoker   • Smokeless tobacco: Never Used   Substance and Sexual Activity   • Alcohol use: Not Currently   • Drug use: Never   • Sexual activity: Defer        Medications:    Current Outpatient Medications:   •  albuterol sulfate HFA (VENTOLIN HFA) 108 (90 Base) MCG/ACT inhaler, VENTOLIN  (90 Base) MCG/ACT AERS, Disp: , Rfl:   •  aspirin 81 MG tablet, Take 81 mg by mouth Daily., Disp: , Rfl:   •  busPIRone (BUSPAR) 7.5 MG tablet, Take 7.5 mg by mouth 3 (Three) Times a Day., Disp: , Rfl:   •  calcium  "citrate-vitamin d (CALCITRATE) 315-250 MG-UNIT tablet tablet, Take  by mouth., Disp: , Rfl:   •  denosumab (PROLIA) 60 MG/ML solution prefilled syringe syringe, PROLIA 60 MG/ML SOSY, Disp: , Rfl:   •  gabapentin (NEURONTIN) 400 MG capsule, TAKE ONE CAPSULE BY MOUTH AT BEDTIME, Disp: 90 capsule, Rfl: 0  •  lansoprazole (PREVACID) 30 MG capsule, Take 30 mg by mouth Daily., Disp: , Rfl:   •  metoprolol succinate XL (TOPROL XL) 25 MG 24 hr tablet, Daily., Disp: , Rfl:   •  montelukast (SINGULAIR) 10 MG tablet, TAKE ONE TABLET BY MOUTH EVERY DAY, Disp: 90 tablet, Rfl: 0  •  nystatin (MYCOSTATIN) 510593 UNIT/GM cream, NYSTATIN 187119 UNIT/GM CREA, Disp: , Rfl:   •  oxybutynin (DITROPAN) 5 MG tablet, TAKE ONE TABLET BY MOUTH TWICE DAILY, Disp: 180 tablet, Rfl: 0    Allergies:  Allergies   Allergen Reactions   • Chlorpheniramine-Phenylephrine Rash   • Penicillins Rash   • Sulfa Antibiotics Hives and Rash   • Tetracycline Rash   • Warfarin Rash       Review of Systems:  Gen -no fever, chills , sweats, headache   Eyes - no irritation or discharge   ENT -  no ear pain , runny nose , sore throat , difficulty swallowing   Resp - no cough , congestion , excessive expectoration   CVS - no chest pain , palpitations.   Abd - no pain , nausea , vomiting , diarrhea   Skin - no rash , lesions.   Neuro - no dizziness    Please see HPI for any other pertinent positives.  All other systems were reviewed and are negative.       Objective   Objective:    /75 (BP Location: Left arm, Patient Position: Sitting, Cuff Size: Adult)   Pulse 56   Ht 149.9 cm (59\")   Wt 84.8 kg (187 lb)   BMI 37.77 kg/m²     Physical Examination:  Obese individual in no acute distress, patient is alert and cooperative with the exam, appears to have normal mood and affect with a normal attention span and concentration, ambulating unassisted  normocephalic, atraumatic, extraocular movements intact, conjunctiva and sclera are clear without nystagmus, normal " canals with grossly normal hearing, no nasal deformity, discharge, inflammation, or lesions, no mouth deformity or lesions  no lymphadenopathy or thyromegaly  normal respiratory effort  abdomen is nontender, without guarding or rebound and nondistended  no gross joint abnormalities  pulses normal in all 4 extremities, no clubbing, cyanosis, or edema noted  cranial nerves II-XII grossly intact with no focal defects  skin intact without lesions or rashes visible             Imaging:  no diagnostic testing performed this visit            Assessment:  1. Age-related osteoporosis without current pathological fracture    2. Vitamin D deficiency    3. Family history of osteoporosis    4. Obstructive sleep apnea    5. Ramos's esophagus without dysplasia    6. Encounter for therapeutic drug monitoring       Currently on Prolia since 2018          Plan:  She should continue her calcium and vitamin D.  I am recommending that she continue her Prolia injections every 6 months.  She has only been on Prolia for 1 year and her decrease in bone density in bilateral hips may not be significant.  She will try to increase her physical activity and weightbearing exercise.  We have discussed ways to decrease her risk for falling, including using a cane or walker, seeing a podiatrist for decreased sensation in her feet, and medication usage.  She will be scheduled for next Prolia injection due on May 25, 2020, and I will plan to see her back in 1 year for recheck.  She should call with any questions or concerns.             LEVI Gee  02/14/20  11:25 AM

## 2020-02-19 ENCOUNTER — OFFICE VISIT (OUTPATIENT)
Dept: ORTHOPEDIC SURGERY | Facility: CLINIC | Age: 70
End: 2020-02-19

## 2020-02-19 VITALS — WEIGHT: 185 LBS | HEIGHT: 59 IN | BODY MASS INDEX: 37.29 KG/M2

## 2020-02-19 DIAGNOSIS — M25.512 ACUTE PAIN OF LEFT SHOULDER: Primary | ICD-10-CM

## 2020-02-19 PROCEDURE — 20610 DRAIN/INJ JOINT/BURSA W/O US: CPT | Performed by: ORTHOPAEDIC SURGERY

## 2020-02-19 PROCEDURE — 99213 OFFICE O/P EST LOW 20 MIN: CPT | Performed by: ORTHOPAEDIC SURGERY

## 2020-02-19 RX ORDER — TRIAMCINOLONE ACETONIDE 40 MG/ML
80 INJECTION, SUSPENSION INTRA-ARTICULAR; INTRAMUSCULAR ONCE
Status: COMPLETED | OUTPATIENT
Start: 2020-02-19 | End: 2020-02-19

## 2020-02-19 RX ADMIN — TRIAMCINOLONE ACETONIDE 80 MG: 40 INJECTION, SUSPENSION INTRA-ARTICULAR; INTRAMUSCULAR at 12:57

## 2020-02-19 NOTE — PROGRESS NOTES
"     Patient ID: Charleen Barnett is a 69 y.o. female.    Chief Complaint:    Chief Complaint   Patient presents with   • Left Shoulder - Consult       HPI:  Charleen is a 69-year-old female here with several months of left shoulder pain.  There is no injury.  She has pain deep in the shoulder that is worse when reaching out away from her body.  There is minimal pain at night.  Pain is dull and a 2/10.  No treatment to date  Past Medical History:   Diagnosis Date   • Anxiety    • GERD (gastroesophageal reflux disease)    • Joint pain    • Osteoporosis     DEXA 4/8/2019 ([facility])- fosamax(SE), on prolia(2018)   • Seasonal allergies    • Tachycardia        Past Surgical History:   Procedure Laterality Date   • BLADDER SURGERY     • CHOLECYSTECTOMY     • HYSTERECTOMY     • KNEE SURGERY     • OTHER SURGICAL HISTORY      STENT       Family History   Problem Relation Age of Onset   • Heart attack Father    • Cancer Father           Social History     Occupational History   • Not on file   Tobacco Use   • Smoking status: Never Smoker   • Smokeless tobacco: Never Used   Substance and Sexual Activity   • Alcohol use: Not Currently   • Drug use: Never   • Sexual activity: Defer      Review of Systems   Cardiovascular: Negative for chest pain.   Musculoskeletal: Positive for arthralgias.       Objective:    Ht 149.9 cm (59\")   Wt 83.9 kg (185 lb)   BMI 37.37 kg/m²     Physical Examination:  She is a pleasant female in no distress. She is alert and oriented x3 and appears her stated age.  Left shoulder demonstrates no scars and no atrophy.  There are no areas of tenderness.  Passive elevation 170 degrees abduction 130 degrees external rotation 40 degrees rotation is S1.  Speed, [place], and supraspinatus are negative.  Belly press and liftoff are 5/5 with mildly positive impingement signs.Sensory and motor exam are intact all distributions. Radial pulse is palpable and capillary refill is less than two seconds to all " digits    Imaging:  X-rays demonstrate mild degenerative joint disease of the shoulder    Assessment:  Mild degenerative joint disease  Plan:  Treatment options discussed.I recommend injection after today's evaluation. Risks and benefits of the injection were discussed. Under sterile technique and written consent I injected 80mg of Kenalog and 2cc of 1% Lidocaine in the shoulder and subacromial space. It was well tolerated  No better call to obtain MRI          Disclaimer: Please note that areas of this note were completed with computer voice recognition software.  Quite often unanticipated grammatical, syntax, homophones, and other interpretive errors are inadvertently transcribed by the computer software. Please excuse any errors that have escaped final proofreading.

## 2020-02-20 ENCOUNTER — OFFICE VISIT (OUTPATIENT)
Dept: CARDIOLOGY | Facility: CLINIC | Age: 70
End: 2020-02-20

## 2020-02-20 VITALS
OXYGEN SATURATION: 97 % | HEIGHT: 59 IN | BODY MASS INDEX: 37.5 KG/M2 | HEART RATE: 62 BPM | SYSTOLIC BLOOD PRESSURE: 150 MMHG | WEIGHT: 186 LBS | DIASTOLIC BLOOD PRESSURE: 88 MMHG

## 2020-02-20 DIAGNOSIS — I10 ESSENTIAL HYPERTENSION: ICD-10-CM

## 2020-02-20 DIAGNOSIS — Z98.61 STATUS POST PERCUTANEOUS TRANSLUMINAL CORONARY ANGIOPLASTY: ICD-10-CM

## 2020-02-20 DIAGNOSIS — E78.2 MIXED HYPERLIPIDEMIA: Primary | ICD-10-CM

## 2020-02-20 PROCEDURE — 93000 ELECTROCARDIOGRAM COMPLETE: CPT | Performed by: INTERNAL MEDICINE

## 2020-02-20 PROCEDURE — 99213 OFFICE O/P EST LOW 20 MIN: CPT | Performed by: INTERNAL MEDICINE

## 2020-02-20 RX ORDER — METOPROLOL SUCCINATE 25 MG/1
25 TABLET, EXTENDED RELEASE ORAL DAILY
Qty: 90 TABLET | Refills: 3 | Status: SHIPPED | OUTPATIENT
Start: 2020-02-20 | End: 2021-05-17

## 2020-02-20 NOTE — PROGRESS NOTES
Encounter Date:02/20/2020  Last seen 8/28/2018      Patient ID: Charleen Barnett is a 69 y.o. female.    Chief Complaint:  Status post stent  Hypertension  Dyslipidemia  History of palpitations    History of Present Illness  Occasional sharp substernal discomfort-nonexertional nonradiating.  Occasional lightheadedness  Since I have last seen, the patient has been without any, unusual shortness of breath, palpitations, dizziness or syncope.  Denies having any headache ,abdominal pain ,nausea, vomiting , diarrhea constipation, loss of weight or loss of appetite.  Denies having any excessive bruising ,hematuria or blood in the stool.    Review of all systems negative except as indicated    Assessment and Plan       ////////////////////////  Impression  ==================   - status post stent to LAD 06/10/2014     negative stress Cardiolite test at [facility] August 2018.    -palpitations likely SVT.  -improved on atenolol    Echocardiogram showed mild aortic regurgitation with normal left ventricular function.  5/18/2017     - hypertension dyslipidemia sleep apnea asthma fibromyalgia GERD and gout.     - family history of coronary artery disease     - status post subendocardial myocardial infarction prior to stent placement     - allergy to penicillin sulfa and tetracycline     ===========================  Plan  ==================  Patient is not having any angina pectoris or congestive heart failure  EKG showed sinus rhythm nonspecific ST-T wave changes  Palpitations-better.  Continue metoprolol succinate 25 mg a day  Medications were reviewed and updated.   followup in the office in 6 months.  Further plan will depend on patient's progress  //////////////////////////////            Diagnosis Plan   1. Mixed hyperlipidemia     2. Essential hypertension     3. Status post percutaneous transluminal coronary angioplasty     LAB RESULTS (LAST 7 DAYS)    CBC        BMP        CMP         BNP        TROPONIN         CoAg        Creatinine Clearance  CrCl cannot be calculated (Patient's most recent lab result is older than the maximum 30 days allowed.).    ABG        Radiology  Xr Shoulder 2+ View Left    Result Date: 2/19/2020  1.Mild glenohumeral joint arthritis. 2.Subchondral cystic change greater tuberosity. This is nonspecific but commonly associated with chronic rotator cuff pathology. 3.Severe cardiomegaly.  Electronically Signed By-Luzma Jessica MD On:2/19/2020 1:03 PM This report was finalized on [id number] by  Luzma Jessica MD.                  The following portions of the patient's history were reviewed and updated as appropriate: allergies, current medications, past family history, past medical history, past social history, past surgical history and problem list.    Review of Systems   Constitution: Negative for chills, fever and malaise/fatigue.   Cardiovascular: Negative for chest pain and syncope.   Skin: Negative for rash.   Neurological: Positive for dizziness and light-headedness. Negative for numbness.         Current Outpatient Medications:   •  albuterol sulfate HFA (VENTOLIN HFA) 108 (90 Base) MCG/ACT inhaler, VENTOLIN  (90 Base) MCG/ACT AERS, Disp: , Rfl:   •  aspirin 81 MG tablet, Take 81 mg by mouth Daily., Disp: , Rfl:   •  busPIRone (BUSPAR) 7.5 MG tablet, Take 7.5 mg by mouth 3 (Three) Times a Day., Disp: , Rfl:   •  calcium citrate-vitamin d (CALCITRATE) 315-250 MG-UNIT tablet tablet, Take  by mouth., Disp: , Rfl:   •  denosumab (PROLIA) 60 MG/ML solution prefilled syringe syringe, PROLIA 60 MG/ML SOSY, Disp: , Rfl:   •  gabapentin (NEURONTIN) 400 MG capsule, TAKE ONE CAPSULE BY MOUTH AT BEDTIME, Disp: 90 capsule, Rfl: 0  •  lansoprazole (PREVACID) 30 MG capsule, Take 30 mg by mouth Daily. 1/2 tablet daily, Disp: , Rfl:   •  metoprolol succinate XL (TOPROL XL) 25 MG 24 hr tablet, Take 1 tablet by mouth Daily., Disp: 90 tablet, Rfl: 3  •  montelukast (SINGULAIR) 10 MG tablet, TAKE ONE  "TABLET BY MOUTH EVERY DAY, Disp: 90 tablet, Rfl: 0  •  nystatin (MYCOSTATIN) 274319 UNIT/GM cream, NYSTATIN 104588 UNIT/GM CREA, Disp: , Rfl:   •  oxybutynin (DITROPAN) 5 MG tablet, TAKE ONE TABLET BY MOUTH TWICE DAILY, Disp: 180 tablet, Rfl: 0    Allergies   Allergen Reactions   • Chlorpheniramine-Phenylephrine Rash   • Penicillins Rash   • Sulfa Antibiotics Hives and Rash   • Tetracycline Rash   • Warfarin Rash       Family History   Problem Relation Age of Onset   • Heart attack Father    • Cancer Father        Past Surgical History:   Procedure Laterality Date   • BLADDER SURGERY     • CHOLECYSTECTOMY     • HYSTERECTOMY     • KNEE SURGERY     • OTHER SURGICAL HISTORY      STENT       Past Medical History:   Diagnosis Date   • Anxiety    • GERD (gastroesophageal reflux disease)    • Joint pain    • Osteoporosis     DEXA 4/8/2019 ([facility])- fosamax(), on prolia(2018)   • Seasonal allergies    • Tachycardia        Family History   Problem Relation Age of Onset   • Heart attack Father    • Cancer Father        Social History     Socioeconomic History   • Marital status:      Spouse name: Not on file   • Number of children: Not on file   • Years of education: Not on file   • Highest education level: Not on file   Tobacco Use   • Smoking status: Never Smoker   • Smokeless tobacco: Never Used   Substance and Sexual Activity   • Alcohol use: Not Currently   • Drug use: Never   • Sexual activity: Defer           ECG 12 Lead  Date/Time: 2/20/2020 4:18 PM  Performed by: Yazmin Matta MD  Authorized by: Yazmin Matta MD   Comparison: compared with previous ECG   Similar to previous ECG  Comments: Normal sinus rhythm nonspecific ST-T wave changes normal axis normal intervals no ectopy no change from 8/15/2018              Objective:       Physical Exam    /88 (BP Location: Left arm, Patient Position: Sitting, Cuff Size: Adult)   Pulse 62   Ht 149.9 cm (59\")   Wt 84.4 kg (186 lb)   SpO2 97%   BMI 37.57 " kg/m²   The patient is alert, oriented and in no distress.    Vital signs as noted above.    Head and neck revealed no carotid bruits or jugular venous distension.  No thyromegaly or lymphadenopathy is present.    Lungs clear.  No wheezing.  Breath sounds are normal bilaterally.    Heart normal first and second heart sounds.  No murmur..  No pericardial rub is present.  No gallop is present.    Abdomen soft and nontender.  No organomegaly is present.    Extremities revealed good peripheral pulses without any pedal edema.    Skin warm and dry.    Musculoskeletal system is grossly normal.    CNS grossly normal.

## 2020-03-21 ENCOUNTER — APPOINTMENT (OUTPATIENT)
Dept: GENERAL RADIOLOGY | Facility: HOSPITAL | Age: 70
End: 2020-03-21

## 2020-03-21 ENCOUNTER — HOSPITAL ENCOUNTER (EMERGENCY)
Facility: HOSPITAL | Age: 70
Discharge: HOME | End: 2020-03-21
Admitting: EMERGENCY MEDICINE

## 2020-03-21 VITALS
OXYGEN SATURATION: 98 % | TEMPERATURE: 98.4 F | BODY MASS INDEX: 37.64 KG/M2 | SYSTOLIC BLOOD PRESSURE: 152 MMHG | RESPIRATION RATE: 16 BRPM | DIASTOLIC BLOOD PRESSURE: 87 MMHG | HEIGHT: 59 IN | HEART RATE: 50 BPM | WEIGHT: 186.73 LBS

## 2020-03-21 DIAGNOSIS — S62.609A CLOSED NONDISPLACED FRACTURE OF PHALANX OF FINGER, UNSPECIFIED FINGER, UNSPECIFIED PHALANX, INITIAL ENCOUNTER: ICD-10-CM
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
## 2020-03-21 DIAGNOSIS — S00.81XA ABRASION OF FACE, INITIAL ENCOUNTER: ICD-10-CM

## 2020-03-21 PROCEDURE — 99283 EMERGENCY DEPT VISIT LOW MDM: CPT

## 2020-03-21 PROCEDURE — 73130 X-RAY EXAM OF HAND: CPT

## 2020-03-21 RX ORDER — ACETAMINOPHEN 500 MG
1000 TABLET ORAL ONCE
Status: COMPLETED | OUTPATIENT
Start: 2020-03-21 | End: 2020-03-21

## 2020-03-21 RX ADMIN — ACETAMINOPHEN 1000 MG: 500 TABLET, FILM COATED ORAL at 15:22

## 2020-05-26 ENCOUNTER — CLINICAL SUPPORT (OUTPATIENT)
Dept: ORTHOPEDIC SURGERY | Facility: CLINIC | Age: 70
End: 2020-05-26

## 2020-05-26 VITALS
WEIGHT: 191 LBS | SYSTOLIC BLOOD PRESSURE: 164 MMHG | HEIGHT: 59 IN | BODY MASS INDEX: 38.51 KG/M2 | HEART RATE: 62 BPM | DIASTOLIC BLOOD PRESSURE: 88 MMHG

## 2020-05-26 DIAGNOSIS — M81.0 AGE-RELATED OSTEOPOROSIS WITHOUT CURRENT PATHOLOGICAL FRACTURE: Primary | ICD-10-CM

## 2020-05-26 PROCEDURE — 96372 THER/PROPH/DIAG INJ SC/IM: CPT | Performed by: FAMILY MEDICINE

## 2020-05-26 RX ORDER — HYDROCODONE BITARTRATE AND ACETAMINOPHEN 5; 325 MG/1; MG/1
1 TABLET ORAL EVERY 6 HOURS PRN
COMMUNITY
Start: 2020-03-27 | End: 2020-10-29

## 2020-05-26 NOTE — PROGRESS NOTES
Patient presents for Prolia injection. Patient had no issues with the last Prolia injection. Patient last Calcium was 9.4mg/dL. Injection administered and patient tolerated without complication.

## 2020-06-04 RX ORDER — GABAPENTIN 400 MG/1
CAPSULE ORAL
Qty: 90 CAPSULE | Refills: 0 | Status: SHIPPED | OUTPATIENT
Start: 2020-06-04 | End: 2022-01-13 | Stop reason: SDUPTHER

## 2020-06-04 RX ORDER — OXYBUTYNIN CHLORIDE 5 MG/1
5 TABLET ORAL 2 TIMES DAILY
Qty: 180 TABLET | Refills: 0 | Status: SHIPPED | OUTPATIENT
Start: 2020-06-04 | End: 2020-10-11

## 2020-06-04 RX ORDER — MONTELUKAST SODIUM 10 MG/1
10 TABLET ORAL DAILY
Qty: 90 TABLET | Refills: 0 | Status: SHIPPED | OUTPATIENT
Start: 2020-06-04 | End: 2020-09-21

## 2020-06-10 ENCOUNTER — OFFICE VISIT (OUTPATIENT)
Dept: FAMILY MEDICINE CLINIC | Facility: CLINIC | Age: 70
End: 2020-06-10

## 2020-06-10 ENCOUNTER — LAB (OUTPATIENT)
Dept: FAMILY MEDICINE CLINIC | Facility: CLINIC | Age: 70
End: 2020-06-10

## 2020-06-10 ENCOUNTER — HOSPITAL ENCOUNTER (OUTPATIENT)
Dept: GENERAL RADIOLOGY | Facility: HOSPITAL | Age: 70
Discharge: HOME | End: 2020-06-10
Admitting: FAMILY MEDICINE

## 2020-06-10 VITALS
BODY MASS INDEX: 38.3 KG/M2 | WEIGHT: 190 LBS | OXYGEN SATURATION: 95 % | SYSTOLIC BLOOD PRESSURE: 110 MMHG | HEIGHT: 59 IN | HEART RATE: 68 BPM | DIASTOLIC BLOOD PRESSURE: 76 MMHG | TEMPERATURE: 98.4 F

## 2020-06-10 DIAGNOSIS — R53.83 FATIGUE, UNSPECIFIED TYPE: ICD-10-CM

## 2020-06-10 DIAGNOSIS — R73.9 HYPERGLYCEMIA: ICD-10-CM

## 2020-06-10 DIAGNOSIS — I10 ESSENTIAL HYPERTENSION: ICD-10-CM

## 2020-06-10 DIAGNOSIS — E55.9 VITAMIN D DEFICIENCY: ICD-10-CM

## 2020-06-10 DIAGNOSIS — R06.02 SHORTNESS OF BREATH: ICD-10-CM

## 2020-06-10 DIAGNOSIS — R60.0 PEDAL EDEMA: ICD-10-CM

## 2020-06-10 DIAGNOSIS — R42 DIZZINESS: ICD-10-CM

## 2020-06-10 DIAGNOSIS — F33.1 MODERATE EPISODE OF RECURRENT MAJOR DEPRESSIVE DISORDER (HCC): ICD-10-CM

## 2020-06-10 DIAGNOSIS — E78.2 MIXED HYPERLIPIDEMIA: ICD-10-CM

## 2020-06-10 DIAGNOSIS — E78.2 MIXED HYPERLIPIDEMIA: Primary | ICD-10-CM

## 2020-06-10 DIAGNOSIS — Z11.59 NEED FOR HEPATITIS C SCREENING TEST: ICD-10-CM

## 2020-06-10 DIAGNOSIS — R07.9 CHEST PAIN, UNSPECIFIED TYPE: ICD-10-CM

## 2020-06-10 DIAGNOSIS — E66.01 MORBIDLY OBESE (HCC): ICD-10-CM

## 2020-06-10 LAB
25(OH)D3 SERPL-MCNC: 27 NG/ML (ref 30–100)
ALBUMIN SERPL-MCNC: 4.2 G/DL (ref 3.5–5.2)
ALBUMIN/GLOB SERPL: 1.6 G/DL
ALP SERPL-CCNC: 58 U/L (ref 39–117)
ALT SERPL W P-5'-P-CCNC: 23 U/L (ref 1–33)
ANION GAP SERPL CALCULATED.3IONS-SCNC: 10.4 MMOL/L (ref 5–15)
ARTICHOKE IGE QN: 127 MG/DL (ref 0–100)
AST SERPL-CCNC: 24 U/L (ref 1–32)
BASOPHILS # BLD AUTO: 0.04 10*3/MM3 (ref 0–0.2)
BASOPHILS NFR BLD AUTO: 0.5 % (ref 0–1.5)
BILIRUB SERPL-MCNC: 0.3 MG/DL (ref 0.2–1.2)
BUN BLD-MCNC: 11 MG/DL (ref 8–23)
BUN/CREAT SERPL: 12.4 (ref 7–25)
CALCIUM SPEC-SCNC: 9.5 MG/DL (ref 8.6–10.5)
CHLORIDE SERPL-SCNC: 102 MMOL/L (ref 98–107)
CHOLEST SERPL-MCNC: 214 MG/DL (ref 0–200)
CO2 SERPL-SCNC: 27.6 MMOL/L (ref 22–29)
CREAT BLD-MCNC: 0.89 MG/DL (ref 0.57–1)
DEPRECATED RDW RBC AUTO: 40.2 FL (ref 37–54)
EOSINOPHIL # BLD AUTO: 0.22 10*3/MM3 (ref 0–0.4)
EOSINOPHIL NFR BLD AUTO: 2.9 % (ref 0.3–6.2)
ERYTHROCYTE [DISTWIDTH] IN BLOOD BY AUTOMATED COUNT: 12.5 % (ref 12.3–15.4)
GFR SERPL CREATININE-BSD FRML MDRD: 63 ML/MIN/1.73
GLOBULIN UR ELPH-MCNC: 2.7 GM/DL
GLUCOSE BLD-MCNC: 94 MG/DL (ref 65–99)
HCT VFR BLD AUTO: 40.1 % (ref 34–46.6)
HCV AB SER DONR QL: NORMAL
HDLC SERPL-MCNC: 48 MG/DL (ref 40–60)
HGB BLD-MCNC: 13.7 G/DL (ref 12–15.9)
IMM GRANULOCYTES # BLD AUTO: 0.03 10*3/MM3 (ref 0–0.05)
IMM GRANULOCYTES NFR BLD AUTO: 0.4 % (ref 0–0.5)
LDLC SERPL CALC-MCNC: ABNORMAL MG/DL
LDLC/HDLC SERPL: ABNORMAL {RATIO}
LYMPHOCYTES # BLD AUTO: 2.16 10*3/MM3 (ref 0.7–3.1)
LYMPHOCYTES NFR BLD AUTO: 28.1 % (ref 19.6–45.3)
MCH RBC QN AUTO: 30.1 PG (ref 26.6–33)
MCHC RBC AUTO-ENTMCNC: 34.2 G/DL (ref 31.5–35.7)
MCV RBC AUTO: 88.1 FL (ref 79–97)
MONOCYTES # BLD AUTO: 0.63 10*3/MM3 (ref 0.1–0.9)
MONOCYTES NFR BLD AUTO: 8.2 % (ref 5–12)
NEUTROPHILS # BLD AUTO: 4.62 10*3/MM3 (ref 1.7–7)
NEUTROPHILS NFR BLD AUTO: 59.9 % (ref 42.7–76)
NRBC BLD AUTO-RTO: 0 /100 WBC (ref 0–0.2)
PLATELET # BLD AUTO: 253 10*3/MM3 (ref 140–450)
PMV BLD AUTO: 10.5 FL (ref 6–12)
POTASSIUM BLD-SCNC: 4.6 MMOL/L (ref 3.5–5.2)
PROT SERPL-MCNC: 6.9 G/DL (ref 6–8.5)
RBC # BLD AUTO: 4.55 10*6/MM3 (ref 3.77–5.28)
SODIUM BLD-SCNC: 140 MMOL/L (ref 136–145)
TRIGL SERPL-MCNC: 492 MG/DL (ref 0–150)
TSH SERPL DL<=0.05 MIU/L-ACNC: 2.36 UIU/ML (ref 0.27–4.2)
VLDLC SERPL-MCNC: ABNORMAL MG/DL
WBC NRBC COR # BLD: 7.7 10*3/MM3 (ref 3.4–10.8)

## 2020-06-10 PROCEDURE — 99215 OFFICE O/P EST HI 40 MIN: CPT | Performed by: FAMILY MEDICINE

## 2020-06-10 PROCEDURE — 71046 X-RAY EXAM CHEST 2 VIEWS: CPT

## 2020-06-10 PROCEDURE — 80053 COMPREHEN METABOLIC PANEL: CPT | Performed by: FAMILY MEDICINE

## 2020-06-10 PROCEDURE — 84443 ASSAY THYROID STIM HORMONE: CPT | Performed by: FAMILY MEDICINE

## 2020-06-10 PROCEDURE — 82306 VITAMIN D 25 HYDROXY: CPT | Performed by: FAMILY MEDICINE

## 2020-06-10 PROCEDURE — 80061 LIPID PANEL: CPT | Performed by: FAMILY MEDICINE

## 2020-06-10 PROCEDURE — 86803 HEPATITIS C AB TEST: CPT | Performed by: FAMILY MEDICINE

## 2020-06-10 PROCEDURE — 85025 COMPLETE CBC W/AUTO DIFF WBC: CPT | Performed by: FAMILY MEDICINE

## 2020-06-10 PROCEDURE — 36415 COLL VENOUS BLD VENIPUNCTURE: CPT | Performed by: FAMILY MEDICINE

## 2020-06-10 PROCEDURE — 83721 ASSAY OF BLOOD LIPOPROTEIN: CPT

## 2020-06-10 RX ORDER — CITALOPRAM 20 MG/1
20 TABLET ORAL DAILY
Qty: 90 TABLET | Refills: 3 | Status: SHIPPED | OUTPATIENT
Start: 2020-06-10 | End: 2021-06-15

## 2020-06-10 RX ORDER — BUSPIRONE HYDROCHLORIDE 7.5 MG/1
7.5 TABLET ORAL 2 TIMES DAILY
Qty: 180 TABLET | Refills: 3 | Status: SHIPPED | OUTPATIENT
Start: 2020-06-10 | End: 2021-06-15
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
## 2020-06-10 NOTE — PATIENT INSTRUCTIONS
Go to ER if the symptoms increase in severity  No sudden movements  Drink more water  Limit salt  Elevate feet when possible  Make an appt and see Dr. Matta soon  Do labs today

## 2020-06-11 ENCOUNTER — TELEPHONE (OUTPATIENT)
Dept: CARDIOLOGY | Facility: CLINIC | Age: 70
End: 2020-06-11

## 2020-06-11 PROBLEM — E66.01 MORBIDLY OBESE (HCC): Status: ACTIVE | Noted: 2020-06-11

## 2020-06-11 PROCEDURE — 93000 ELECTROCARDIOGRAM COMPLETE: CPT | Performed by: FAMILY MEDICINE

## 2020-06-11 RX ORDER — ERGOCALCIFEROL 1.25 MG/1
50000 CAPSULE ORAL WEEKLY
Qty: 13 CAPSULE | Refills: 1 | Status: SHIPPED | OUTPATIENT
Start: 2020-06-11 | End: 2020-12-07

## 2020-06-11 RX ORDER — ATORVASTATIN CALCIUM 10 MG/1
10 TABLET, FILM COATED ORAL DAILY
Qty: 90 TABLET | Refills: 3 | Status: SHIPPED | OUTPATIENT
Start: 2020-06-11 | End: 2021-08-02

## 2020-06-11 RX ORDER — LEVOFLOXACIN 500 MG/1
500 TABLET, FILM COATED ORAL DAILY
Qty: 10 TABLET | Refills: 0 | Status: SHIPPED | OUTPATIENT
Start: 2020-06-11 | End: 2020-10-29

## 2020-06-11 NOTE — TELEPHONE ENCOUNTER
Spoke with pt - advised no need for sooner appointment.   Pt says she found out she has Pneumonia

## 2020-06-11 NOTE — PROGRESS NOTES
Patient notified. She has not been taking her atorvastatin. She will need you to send some in so she can start. She states she is only willing to take this short term.

## 2020-06-11 NOTE — TELEPHONE ENCOUNTER
PATIENT HAD EKG YESTERDAY @ DR. RUEDA OFFICE. IT SHOWED CHANGES FROM LAST EKG AND ADVISED TO CALL DR. VILLATORO. HAS APT 8/20. ASKING IF DR. VILLATORO CAN LOOK AT EKG, AND LET HER KNOW IF SHE NEEDS SOONER APT.

## 2020-09-21 RX ORDER — MONTELUKAST SODIUM 10 MG/1
10 TABLET ORAL DAILY
Qty: 90 TABLET | Refills: 2 | Status: SHIPPED | OUTPATIENT
Start: 2020-09-21 | End: 2021-08-02

## 2020-10-11 RX ORDER — OXYBUTYNIN CHLORIDE 5 MG/1
TABLET ORAL
Qty: 180 TABLET | Refills: 0 | Status: SHIPPED | OUTPATIENT
Start: 2020-10-11 | End: 2021-06-24

## 2020-10-29 ENCOUNTER — OFFICE VISIT (OUTPATIENT)
Dept: ORTHOPEDIC SURGERY | Facility: CLINIC | Age: 70
End: 2020-10-29

## 2020-10-29 VITALS
SYSTOLIC BLOOD PRESSURE: 137 MMHG | BODY MASS INDEX: 38.51 KG/M2 | WEIGHT: 191 LBS | HEART RATE: 53 BPM | HEIGHT: 59 IN | DIASTOLIC BLOOD PRESSURE: 82 MMHG

## 2020-10-29 DIAGNOSIS — M25.512 ACUTE PAIN OF LEFT SHOULDER: ICD-10-CM

## 2020-10-29 DIAGNOSIS — M25.511 ACUTE PAIN OF RIGHT SHOULDER: Primary | ICD-10-CM

## 2020-10-29 PROCEDURE — 20610 DRAIN/INJ JOINT/BURSA W/O US: CPT | Performed by: ORTHOPAEDIC SURGERY

## 2020-10-29 PROCEDURE — 99213 OFFICE O/P EST LOW 20 MIN: CPT | Performed by: ORTHOPAEDIC SURGERY

## 2020-10-29 RX ORDER — TRIAMCINOLONE ACETONIDE 40 MG/ML
80 INJECTION, SUSPENSION INTRA-ARTICULAR; INTRAMUSCULAR ONCE
Status: COMPLETED | OUTPATIENT
Start: 2020-10-29 | End: 2020-10-29

## 2020-10-29 RX ADMIN — TRIAMCINOLONE ACETONIDE 80 MG: 40 INJECTION, SUSPENSION INTRA-ARTICULAR; INTRAMUSCULAR at 10:23

## 2020-10-29 NOTE — PROGRESS NOTES
Patient ID: Charleen Barnett is a 70 y.o. female.  Left shoulder pain  Charleen is a 70-year-old female here with continued left shoulder pain.  I saw her in February and an injection was done.  She is a great results after that but has had some return of pain with work in the last couple weeks  She is also complaining of right shoulder pain.  This is also worse after going back to work.  Pain in both areas over the impingement area and top of the shoulder      Review of Systems:  Left shoulder pain  Denies chest pain      Objective:    There were no vitals taken for this visit.    Physical Examination:   She is a pleasant female in no distress. She is alert and oriented x3 and appears her stated age.  Left shoulder demonstrates no scars and no atrophy.  There are no areas of tenderness.  Passive elevation 170 degrees abduction 130 degrees external rotation 40 degrees rotation is S1.  Speed, [place], and supraspinatus are negative.  Belly press and liftoff are 5/5 with mildly positive impingement signs.Sensory and motor exam are intact all distributions. Radial pulse is palpable and capillary refill is less than two seconds to all digits    Right shoulder demonstrates no scars and no atrophy.  There are no areas of tenderness.  Passive elevation 180 degrees abduction 130 degrees external rotation 40 degrees internal rotation L5.  Speed, [place], supraspinatus are negative.  Belly press and liftoff are 5/5 with impingement signs that are positive  Sensory and motor exam are intact all distributions. Radial pulse is palpable and capillary refill is less than two seconds to all digits      Imaging:   X-rays of the right shoulder demonstrate well-maintained joint spaces    Assessment:    Bilateral shoulder pain     Plan:  treatment options discussed,  I recommend injection after todays evaluation.  Risks and benefits were discussed. Under sterile technique and written consent I injected 40mg of Kenalog and 1cc of 1%  Lidocaine plain into the subacromial space bilateral. It was well tolerated.          Disclaimer: Please note that areas of this note were completed with computer voice recognition software.  Quite often unanticipated grammatical, syntax, homophones, and other interpretive errors are inadvertently transcribed by the computer software. Please excuse any errors that have escaped final proofreading.

## 2020-12-01 ENCOUNTER — CLINICAL SUPPORT (OUTPATIENT)
Dept: ORTHOPEDIC SURGERY | Facility: CLINIC | Age: 70
End: 2020-12-01

## 2020-12-01 VITALS
BODY MASS INDEX: 37.21 KG/M2 | WEIGHT: 184.6 LBS | SYSTOLIC BLOOD PRESSURE: 132 MMHG | HEART RATE: 61 BPM | HEIGHT: 59 IN | DIASTOLIC BLOOD PRESSURE: 78 MMHG

## 2020-12-01 DIAGNOSIS — M81.0 AGE-RELATED OSTEOPOROSIS WITHOUT CURRENT PATHOLOGICAL FRACTURE: Primary | ICD-10-CM

## 2020-12-01 PROCEDURE — 96372 THER/PROPH/DIAG INJ SC/IM: CPT | Performed by: PHYSICIAN ASSISTANT

## 2020-12-01 NOTE — PROGRESS NOTES
Patient presents for Prolia injection. Patient had no issues with the last Prolia injection. Patient last Calcium was 9.5mg/dL. Injection administered and patient tolerated without complication.

## 2020-12-07 RX ORDER — ERGOCALCIFEROL 1.25 MG/1
50000 CAPSULE ORAL WEEKLY
Qty: 13 CAPSULE | Refills: 0 | Status: SHIPPED | OUTPATIENT
Start: 2020-12-07 | End: 2021-03-12

## 2020-12-10 ENCOUNTER — LAB (OUTPATIENT)
Dept: FAMILY MEDICINE CLINIC | Facility: CLINIC | Age: 70
End: 2020-12-10

## 2020-12-10 ENCOUNTER — OFFICE VISIT (OUTPATIENT)
Dept: FAMILY MEDICINE CLINIC | Facility: CLINIC | Age: 70
End: 2020-12-10

## 2020-12-10 VITALS
WEIGHT: 183 LBS | HEIGHT: 59 IN | HEART RATE: 44 BPM | TEMPERATURE: 98.7 F | SYSTOLIC BLOOD PRESSURE: 138 MMHG | DIASTOLIC BLOOD PRESSURE: 83 MMHG | BODY MASS INDEX: 36.89 KG/M2 | OXYGEN SATURATION: 98 %

## 2020-12-10 DIAGNOSIS — E03.9 ACQUIRED HYPOTHYROIDISM: ICD-10-CM

## 2020-12-10 DIAGNOSIS — I10 ESSENTIAL HYPERTENSION: ICD-10-CM

## 2020-12-10 DIAGNOSIS — F33.1 MODERATE EPISODE OF RECURRENT MAJOR DEPRESSIVE DISORDER (HCC): ICD-10-CM

## 2020-12-10 DIAGNOSIS — E55.9 VITAMIN D DEFICIENCY: ICD-10-CM

## 2020-12-10 DIAGNOSIS — E78.2 MIXED HYPERLIPIDEMIA: ICD-10-CM

## 2020-12-10 DIAGNOSIS — R53.83 FATIGUE, UNSPECIFIED TYPE: ICD-10-CM

## 2020-12-10 DIAGNOSIS — R73.9 HYPERGLYCEMIA: ICD-10-CM

## 2020-12-10 DIAGNOSIS — E78.2 MIXED HYPERLIPIDEMIA: Primary | ICD-10-CM

## 2020-12-10 DIAGNOSIS — Z23 NEED FOR VACCINATION: ICD-10-CM

## 2020-12-10 LAB
25(OH)D3 SERPL-MCNC: 51.1 NG/ML (ref 30–100)
ALBUMIN SERPL-MCNC: 4.1 G/DL (ref 3.5–5.2)
ALBUMIN/GLOB SERPL: 1.5 G/DL
ALP SERPL-CCNC: 57 U/L (ref 39–117)
ALT SERPL W P-5'-P-CCNC: 20 U/L (ref 1–33)
ANION GAP SERPL CALCULATED.3IONS-SCNC: 6.4 MMOL/L (ref 5–15)
AST SERPL-CCNC: 22 U/L (ref 1–32)
BASOPHILS # BLD AUTO: 0.05 10*3/MM3 (ref 0–0.2)
BASOPHILS NFR BLD AUTO: 0.6 % (ref 0–1.5)
BILIRUB SERPL-MCNC: 0.2 MG/DL (ref 0–1.2)
BUN SERPL-MCNC: 14 MG/DL (ref 8–23)
BUN/CREAT SERPL: 14.7 (ref 7–25)
CALCIUM SPEC-SCNC: 8.8 MG/DL (ref 8.6–10.5)
CHLORIDE SERPL-SCNC: 102 MMOL/L (ref 98–107)
CHOLEST SERPL-MCNC: 159 MG/DL (ref 0–200)
CO2 SERPL-SCNC: 30.6 MMOL/L (ref 22–29)
CREAT SERPL-MCNC: 0.95 MG/DL (ref 0.57–1)
DEPRECATED RDW RBC AUTO: 44.2 FL (ref 37–54)
EOSINOPHIL # BLD AUTO: 0.07 10*3/MM3 (ref 0–0.4)
EOSINOPHIL NFR BLD AUTO: 0.8 % (ref 0.3–6.2)
ERYTHROCYTE [DISTWIDTH] IN BLOOD BY AUTOMATED COUNT: 13.4 % (ref 12.3–15.4)
GFR SERPL CREATININE-BSD FRML MDRD: 58 ML/MIN/1.73
GLOBULIN UR ELPH-MCNC: 2.8 GM/DL
GLUCOSE SERPL-MCNC: 84 MG/DL (ref 65–99)
HCT VFR BLD AUTO: 41.2 % (ref 34–46.6)
HDLC SERPL-MCNC: 77 MG/DL (ref 40–60)
HGB BLD-MCNC: 13.5 G/DL (ref 12–15.9)
IMM GRANULOCYTES # BLD AUTO: 0.05 10*3/MM3 (ref 0–0.05)
IMM GRANULOCYTES NFR BLD AUTO: 0.6 % (ref 0–0.5)
LDLC SERPL CALC-MCNC: 70 MG/DL (ref 0–100)
LDLC/HDLC SERPL: 0.91 {RATIO}
LYMPHOCYTES # BLD AUTO: 2.28 10*3/MM3 (ref 0.7–3.1)
LYMPHOCYTES NFR BLD AUTO: 27 % (ref 19.6–45.3)
MCH RBC QN AUTO: 29.3 PG (ref 26.6–33)
MCHC RBC AUTO-ENTMCNC: 32.8 G/DL (ref 31.5–35.7)
MCV RBC AUTO: 89.6 FL (ref 79–97)
MONOCYTES # BLD AUTO: 0.58 10*3/MM3 (ref 0.1–0.9)
MONOCYTES NFR BLD AUTO: 6.9 % (ref 5–12)
NEUTROPHILS NFR BLD AUTO: 5.41 10*3/MM3 (ref 1.7–7)
NEUTROPHILS NFR BLD AUTO: 64.1 % (ref 42.7–76)
NRBC BLD AUTO-RTO: 0.1 /100 WBC (ref 0–0.2)
PLATELET # BLD AUTO: 219 10*3/MM3 (ref 140–450)
PMV BLD AUTO: 10.3 FL (ref 6–12)
POTASSIUM SERPL-SCNC: 5.1 MMOL/L (ref 3.5–5.2)
PROT SERPL-MCNC: 6.9 G/DL (ref 6–8.5)
RBC # BLD AUTO: 4.6 10*6/MM3 (ref 3.77–5.28)
SODIUM SERPL-SCNC: 139 MMOL/L (ref 136–145)
TRIGL SERPL-MCNC: 61 MG/DL (ref 0–150)
TSH SERPL DL<=0.05 MIU/L-ACNC: 1.86 UIU/ML (ref 0.27–4.2)
VLDLC SERPL-MCNC: 12 MG/DL (ref 5–40)
WBC # BLD AUTO: 8.44 10*3/MM3 (ref 3.4–10.8)

## 2020-12-10 PROCEDURE — 36415 COLL VENOUS BLD VENIPUNCTURE: CPT | Performed by: FAMILY MEDICINE

## 2020-12-10 PROCEDURE — 90694 VACC AIIV4 NO PRSRV 0.5ML IM: CPT | Performed by: FAMILY MEDICINE

## 2020-12-10 PROCEDURE — 84443 ASSAY THYROID STIM HORMONE: CPT | Performed by: FAMILY MEDICINE

## 2020-12-10 PROCEDURE — 90732 PPSV23 VACC 2 YRS+ SUBQ/IM: CPT | Performed by: FAMILY MEDICINE

## 2020-12-10 PROCEDURE — 85025 COMPLETE CBC W/AUTO DIFF WBC: CPT | Performed by: FAMILY MEDICINE

## 2020-12-10 PROCEDURE — 80061 LIPID PANEL: CPT | Performed by: FAMILY MEDICINE

## 2020-12-10 PROCEDURE — 80053 COMPREHEN METABOLIC PANEL: CPT | Performed by: FAMILY MEDICINE

## 2020-12-10 PROCEDURE — 82306 VITAMIN D 25 HYDROXY: CPT | Performed by: FAMILY MEDICINE

## 2020-12-10 PROCEDURE — G0009 ADMIN PNEUMOCOCCAL VACCINE: HCPCS | Performed by: FAMILY MEDICINE

## 2020-12-10 PROCEDURE — G0008 ADMIN INFLUENZA VIRUS VAC: HCPCS | Performed by: FAMILY MEDICINE

## 2020-12-10 PROCEDURE — 99214 OFFICE O/P EST MOD 30 MIN: CPT | Performed by: FAMILY MEDICINE

## 2020-12-10 RX ORDER — BUPROPION HYDROCHLORIDE 150 MG/1
150 TABLET ORAL EVERY MORNING
Qty: 90 TABLET | Refills: 3 | Status: SHIPPED | OUTPATIENT
Start: 2020-12-10 | End: 2022-03-07

## 2020-12-10 NOTE — PROGRESS NOTES
Subjective   Charleen Barnett is a 70 y.o. female.     Comes in for follow up on bp, chol, and bs  Right sided cp related to her gerd  Drinking lots of caffeine - pot of coffee daily  Tired   Stressed sec to her sister's health and the death of her nephew  Wonders if her citalopram needs to be increased  Denies SI/HI  Does not sleep well  Addicted to her phone and will stay on it for hours  Refuses mammogram       The following portions of the patient's history were reviewed and updated as appropriate: allergies, current medications, past family history, past medical history, past social history, past surgical history and problem list.  Past Medical History:   Diagnosis Date   • Anxiety    • GERD (gastroesophageal reflux disease)    • Joint pain    • Osteoporosis     DEXA 4/8/2019 ([facility])- fosamax(), on prolia(2018)   • Seasonal allergies    • Tachycardia      Past Surgical History:   Procedure Laterality Date   • BLADDER SURGERY     • CHOLECYSTECTOMY     • HYSTERECTOMY     • KNEE SURGERY     • OTHER SURGICAL HISTORY      STENT     Family History   Problem Relation Age of Onset   • Heart attack Father    • Cancer Father      Social History     Socioeconomic History   • Marital status:      Spouse name: Not on file   • Number of children: Not on file   • Years of education: Not on file   • Highest education level: Not on file   Tobacco Use   • Smoking status: Never Smoker   • Smokeless tobacco: Never Used   Substance and Sexual Activity   • Alcohol use: Not Currently   • Drug use: Never   • Sexual activity: Defer         Current Outpatient Medications:   •  albuterol sulfate HFA (VENTOLIN HFA) 108 (90 Base) MCG/ACT inhaler, VENTOLIN  (90 Base) MCG/ACT AERS, Disp: , Rfl:   •  aspirin 81 MG tablet, Take 81 mg by mouth Daily., Disp: , Rfl:   •  atorvastatin (LIPITOR) 10 MG tablet, Take 1 tablet by mouth Daily., Disp: 90 tablet, Rfl: 3  •  busPIRone (BUSPAR) 7.5 MG tablet, Take 1 tablet by mouth 2 (two)  "times a day., Disp: 180 tablet, Rfl: 3  •  citalopram (CeleXA) 20 MG tablet, Take 1 tablet by mouth Daily., Disp: 90 tablet, Rfl: 3  •  denosumab (PROLIA) 60 MG/ML solution prefilled syringe syringe, PROLIA 60 MG/ML SOSY, Disp: , Rfl:   •  gabapentin (NEURONTIN) 400 MG capsule, TAKE 1 CAPSULE BY MOUTH AT BEDTIME, Disp: 90 capsule, Rfl: 0  •  metoprolol succinate XL (TOPROL XL) 25 MG 24 hr tablet, Take 1 tablet by mouth Daily., Disp: 90 tablet, Rfl: 3  •  montelukast (SINGULAIR) 10 MG tablet, Take 1 tablet by mouth Daily., Disp: 90 tablet, Rfl: 2  •  OMEPRAZOLE PO, Take  by mouth., Disp: , Rfl:   •  oxybutynin (DITROPAN) 5 MG tablet, TAKE 1 TABLET BY MOUTH 2 TIMES A DAY. LAST REFILL UNTIL SEEN IN OFFICE, Disp: 180 tablet, Rfl: 0  •  vitamin D (ERGOCALCIFEROL) 1.25 MG (82738 UT) capsule capsule, TAKE 1 CAPSULE BY MOUTH 1 (ONE) TIME PER WEEK., Disp: 13 capsule, Rfl: 0  •  buPROPion XL (Wellbutrin XL) 150 MG 24 hr tablet, Take 1 tablet by mouth Every Morning., Disp: 90 tablet, Rfl: 3    Current Facility-Administered Medications:   •  denosumab (PROLIA) syringe 60 mg, 60 mg, Subcutaneous, Q6 Months, Dede Anders PA, 60 mg at 12/01/20 1104    Review of Systems   Constitutional: Positive for fatigue. Negative for diaphoresis, fever, unexpected weight gain and unexpected weight loss.   Respiratory: Negative for cough, chest tightness and shortness of breath.    Cardiovascular: Negative for chest pain, palpitations and leg swelling.   Gastrointestinal: Negative for anal bleeding, blood in stool, nausea and vomiting.   Neurological: Negative for dizziness, syncope and headache.   Hematological: Negative.    Psychiatric/Behavioral: Positive for depressed mood.     /83 (BP Location: Left arm, Patient Position: Sitting, Cuff Size: Large Adult)   Pulse (!) 44   Temp 98.7 °F (37.1 °C) (Temporal)   Ht 149.9 cm (59\")   Wt 83 kg (183 lb)   SpO2 98%   Breastfeeding No   BMI 36.96 kg/m²       Objective   Physical " Exam  Vitals signs and nursing note reviewed.   Constitutional:       General: She is not in acute distress.     Appearance: Normal appearance. She is well-developed. She is obese.   HENT:      Head: Normocephalic and atraumatic.   Neck:      Musculoskeletal: Neck supple.      Thyroid: No thyromegaly.      Vascular: No JVD.   Cardiovascular:      Rate and Rhythm: Normal rate and regular rhythm.      Heart sounds: Normal heart sounds. No murmur. No friction rub. No gallop.    Pulmonary:      Effort: Pulmonary effort is normal. No respiratory distress.      Breath sounds: Normal breath sounds. No wheezing or rales.   Musculoskeletal:      Right lower leg: No edema.      Left lower leg: No edema.   Lymphadenopathy:      Cervical: No cervical adenopathy.   Skin:     General: Skin is warm and dry.   Neurological:      Mental Status: She is alert.   Psychiatric:         Mood and Affect: Mood normal.         Behavior: Behavior is cooperative.           Assessment/Plan   Problems Addressed this Visit        Cardiovascular and Mediastinum    Hyperlipidemia - Primary    Relevant Orders    Comprehensive Metabolic Panel    Lipid Panel    Hypertension    Relevant Orders    Comprehensive Metabolic Panel    Lipid Panel       Digestive    Vitamin D deficiency    Relevant Orders    Vitamin D 25 Hydroxy       Other    Depression    Relevant Medications    buPROPion XL (Wellbutrin XL) 150 MG 24 hr tablet    Hyperglycemia    Relevant Orders    Comprehensive Metabolic Panel    Lipid Panel      Other Visit Diagnoses     Acquired hypothyroidism        Relevant Orders    TSH    Fatigue, unspecified type        Relevant Orders    CBC & Differential    Vitamin D 25 Hydroxy    Need for vaccination        Relevant Orders    Pneumococcal Polysaccharide Vaccine 23-Valent (PPSV23) Greater Than or Equal To 1yo Subcutaneous / IM (Completed)    Fluad Quad 65+ yrs (3478-5900) (Completed)      Diagnoses       Codes Comments    Mixed hyperlipidemia     -  Primary ICD-10-CM: E78.2  ICD-9-CM: 272.2     Essential hypertension     ICD-10-CM: I10  ICD-9-CM: 401.9     Hyperglycemia     ICD-10-CM: R73.9  ICD-9-CM: 790.29     Acquired hypothyroidism     ICD-10-CM: E03.9  ICD-9-CM: 244.9     Fatigue, unspecified type     ICD-10-CM: R53.83  ICD-9-CM: 780.79     Vitamin D deficiency     ICD-10-CM: E55.9  ICD-9-CM: 268.9     Need for vaccination     ICD-10-CM: Z23  ICD-9-CM: V05.9     Moderate episode of recurrent major depressive disorder (CMS/HCC)     ICD-10-CM: F33.1  ICD-9-CM: 296.32         Flu and pneumovax given  Counseled on the need for weight loss and caffeine reduction  Labs ordered  She refuses mammogram  rec shingrix and she will talk to pharmacist  Will leave her celexa at current dose  Will add wellbutrin (no h/o seizures)

## 2020-12-10 NOTE — PATIENT INSTRUCTIONS
Keep working to lose weight through healthy eating and exercise.    No fried foods and limit pasta, bread, and sweets.  Take the new medication along with the current medications  Talk to the pharmacist about the shingles vaccine (shingrix)

## 2021-03-04 ENCOUNTER — OFFICE VISIT (OUTPATIENT)
Dept: ORTHOPEDIC SURGERY | Facility: CLINIC | Age: 71
End: 2021-03-04

## 2021-03-04 VITALS — BODY MASS INDEX: 37.09 KG/M2 | WEIGHT: 184 LBS | HEIGHT: 59 IN

## 2021-03-04 DIAGNOSIS — G47.33 OBSTRUCTIVE SLEEP APNEA: ICD-10-CM

## 2021-03-04 DIAGNOSIS — M54.50 CHRONIC BILATERAL LOW BACK PAIN, UNSPECIFIED WHETHER SCIATICA PRESENT: ICD-10-CM

## 2021-03-04 DIAGNOSIS — M81.0 AGE-RELATED OSTEOPOROSIS WITHOUT CURRENT PATHOLOGICAL FRACTURE: Primary | ICD-10-CM

## 2021-03-04 DIAGNOSIS — E55.9 VITAMIN D DEFICIENCY: ICD-10-CM

## 2021-03-04 DIAGNOSIS — I21.3 ST ELEVATION MYOCARDIAL INFARCTION (STEMI), UNSPECIFIED ARTERY (HCC): ICD-10-CM

## 2021-03-04 DIAGNOSIS — G89.29 CHRONIC BILATERAL LOW BACK PAIN, UNSPECIFIED WHETHER SCIATICA PRESENT: ICD-10-CM

## 2021-03-04 DIAGNOSIS — K22.70 BARRETT'S ESOPHAGUS WITHOUT DYSPLASIA: ICD-10-CM

## 2021-03-04 DIAGNOSIS — Z82.62 FAMILY HX OSTEOPOROSIS: ICD-10-CM

## 2021-03-04 DIAGNOSIS — F33.1 MODERATE EPISODE OF RECURRENT MAJOR DEPRESSIVE DISORDER (HCC): ICD-10-CM

## 2021-03-04 DIAGNOSIS — J45.909 ASTHMA, UNSPECIFIED ASTHMA SEVERITY, UNSPECIFIED WHETHER COMPLICATED, UNSPECIFIED WHETHER PERSISTENT: ICD-10-CM

## 2021-03-04 DIAGNOSIS — M41.9 ACQUIRED SCOLIOSIS: ICD-10-CM

## 2021-03-04 DIAGNOSIS — Z51.81 MEDICATION MONITORING ENCOUNTER: ICD-10-CM

## 2021-03-04 PROCEDURE — 99214 OFFICE O/P EST MOD 30 MIN: CPT | Performed by: PHYSICIAN ASSISTANT

## 2021-03-04 RX ORDER — LANSOPRAZOLE 15 MG/1
15 CAPSULE, DELAYED RELEASE ORAL DAILY
COMMUNITY

## 2021-03-04 RX ORDER — MULTIVIT WITH MINERALS/LUTEIN
250 TABLET ORAL DAILY
COMMUNITY
End: 2021-06-24

## 2021-03-04 RX ORDER — MULTIVITAMIN WITH IRON
1 TABLET ORAL DAILY
COMMUNITY
End: 2022-08-03

## 2021-03-04 NOTE — PROGRESS NOTES
ORTHO FOLLOW UP       Subjective:    HPI:   Charleen Barnett is a 70 y.o. female who presents in follow-up for osteoporosis.  She is currently on Prolia reports that she is doing well with no noticeable side effects.  Her last injection was on 12/1/2020.  She continues a calcium rich diet and 50,000 international units of vitamin D weekly.  She denies any new fractures since last office visit.  She is not getting any significant physical activity at this time.  She has fallen in the last year, once in the tub, she does at times feel unsteady with walking, and is worried about falling.  She reports substantial back pain with walking and standing.  Recent labs reviewed done on 12/10/2020, which showed a vitamin D level of 51.1, serum calcium of 8.8, albumin 4.10, and alk phos of 57.  Her last DEXA scan was on 4/8/2019 at [facility].    STEADI Fall Risk Assessment was completed, and patient is at MODERATE risk for falls. Assessment completed on:3/4/2021    Past Medical History:   Diagnosis Date   • Anxiety    • Asthma 2/13/2020   • Ramos's esophagus 2/15/2018   • Depression 8/27/2018   • Fibromyalgia 12/14/2011   • GERD (gastroesophageal reflux disease)    • Gout 2/13/2020   • Joint pain    • Obstructive sleep apnea 10/16/2014   • Osteoporosis     fosamax(SE), on prolia(4/10/2018)   • Pedal edema 10/22/2018   • Presence of left artificial knee joint 3/14/2019   • Seasonal allergies    • ST elevation (STEMI) myocardial infarction (CMS/[facility]) 2/13/2020   • Status post percutaneous transluminal coronary angioplasty 6/10/2014   • Tachycardia    • Urge incontinence 8/27/2018   • Vitamin D deficiency 3/19/2015       Past Surgical History:   Procedure Laterality Date   • BLADDER SURGERY     • CHOLECYSTECTOMY     • HYSTERECTOMY     • KNEE SURGERY     • OTHER SURGICAL HISTORY      STENT       Social History     Occupational History   • Not on file   Tobacco Use   • Smoking status: Never Smoker   • Smokeless  tobacco: Never Used   Substance and Sexual Activity   • Alcohol use: Not Currently   • Drug use: Never   • Sexual activity: Defer      The following portions of the patient's history were reviewed and updated as appropriate: allergies, current medications, past family history, past medical history, past social history, past surgical history and problem list.    Medications:    Current Outpatient Medications:   •  albuterol sulfate HFA (VENTOLIN HFA) 108 (90 Base) MCG/ACT inhaler, VENTOLIN  (90 Base) MCG/ACT AERS, Disp: , Rfl:   •  aspirin 81 MG tablet, Take 81 mg by mouth Daily., Disp: , Rfl:   •  atorvastatin (LIPITOR) 10 MG tablet, Take 1 tablet by mouth Daily., Disp: 90 tablet, Rfl: 3  •  buPROPion XL (Wellbutrin XL) 150 MG 24 hr tablet, Take 1 tablet by mouth Every Morning., Disp: 90 tablet, Rfl: 3  •  busPIRone (BUSPAR) 7.5 MG tablet, Take 1 tablet by mouth 2 (two) times a day., Disp: 180 tablet, Rfl: 3  •  citalopram (CeleXA) 20 MG tablet, Take 1 tablet by mouth Daily., Disp: 90 tablet, Rfl: 3  •  denosumab (PROLIA) 60 MG/ML solution prefilled syringe syringe, PROLIA 60 MG/ML SOSY, Disp: , Rfl:   •  gabapentin (NEURONTIN) 400 MG capsule, TAKE 1 CAPSULE BY MOUTH AT BEDTIME, Disp: 90 capsule, Rfl: 0  •  lansoprazole (PREVACID) 15 MG capsule, Take 15 mg by mouth Daily., Disp: , Rfl:   •  Magnesium 250 MG tablet, Take  by mouth., Disp: , Rfl:   •  metoprolol succinate XL (TOPROL XL) 25 MG 24 hr tablet, Take 1 tablet by mouth Daily., Disp: 90 tablet, Rfl: 3  •  montelukast (SINGULAIR) 10 MG tablet, Take 1 tablet by mouth Daily., Disp: 90 tablet, Rfl: 2  •  oxybutynin (DITROPAN) 5 MG tablet, TAKE 1 TABLET BY MOUTH 2 TIMES A DAY. LAST REFILL UNTIL SEEN IN OFFICE, Disp: 180 tablet, Rfl: 0  •  vitamin C (ASCORBIC ACID) 250 MG tablet, Take 250 mg by mouth Daily., Disp: , Rfl:   •  vitamin D (ERGOCALCIFEROL) 1.25 MG (82190 UT) capsule capsule, TAKE 1 CAPSULE BY MOUTH 1 (ONE) TIME PER WEEK., Disp: 13 capsule, Rfl:  "0  •  vitamin E 100 UNIT capsule, Take 100 Units by mouth Daily., Disp: , Rfl:     Current Facility-Administered Medications:   •  denosumab (PROLIA) syringe 60 mg, 60 mg, Subcutaneous, Q6 Months, Dede Anders PA, 60 mg at 12/01/20 1104    Allergies:  Allergies   Allergen Reactions   • Chlorpheniramine-Phenylephrine Rash   • Penicillins Rash   • Sulfa Antibiotics Hives and Rash   • Tetracycline Rash   • Warfarin Rash       Review of Systems:  Gen -no fever, chills , sweats, headache   Eyes - no irritation or discharge   ENT -  no ear pain , runny nose , sore throat , difficulty swallowing   Resp - no cough , congestion , excessive expectoration   CVS - no chest pain , palpitations.   Abd - no pain , nausea , vomiting , diarrhea   Skin - no rash , lesions.   Neuro - no dizziness    Please see HPI for any other pertinent positives.  All other systems were reviewed and are negative.       Objective   Objective:    Ht 149.9 cm (59\")   Wt 83.5 kg (184 lb)   BMI 37.16 kg/m²     Physical Examination:  Short stature, obese individual in no acute distress, patient is alert and cooperative with the exam, appears to have normal mood and affect with a normal attention span and concentration, ambulating unassisted  normocephalic, atraumatic, extraocular movements intact, conjunctiva and sclera are clear without nystagmus, normal canals with grossly normal hearing, no nasal deformity, discharge, inflammation, or lesions  no lymphadenopathy or thyromegaly  normal respiratory effort  abdomen is nontender, without guarding or rebound and nondistended  Short torso with signs of scoliosis  pulses normal in all 4 extremities, no clubbing, cyanosis, or edema noted  cranial nerves II-XII grossly intact with no focal defects  skin intact without lesions or rashes visible      Imaging:  no diagnostic testing performed this visit   4/5/2017-DEXA scan at [facility], revealed a T score of -2.8 in the left femoral neck, FRAX " scores of 18.3% and 5.1%.  4/10/2018-patient started on Prolia  4/8/2019-DEXA scan at [facility], revealed a T score of -3.8 in the left one third radius.  When compared to 2017, this did show an increase in bone density of 3.6% in the spine, however due to her significant scoliosis this may not be accurate, and a decrease of 4.3% in the hips.            Assessment:  1. Age-related osteoporosis without current pathological fracture    2. Vitamin D deficiency    3. Family hx osteoporosis    4. Medication monitoring encounter    5. Ramos's esophagus without dysplasia    6. Obstructive sleep apnea    7. ST elevation myocardial infarction (STEMI), unspecified artery (CMS/HCC)    8. Moderate episode of recurrent major depressive disorder (CMS/HCC)    9. Asthma, unspecified asthma severity, unspecified whether complicated, unspecified whether persistent    10. Acquired scoliosis    11. Chronic bilateral low back pain, unspecified whether sciatica present         Currently on Prolia since April 2018        Plan:  Today, a new DEXA scan will be ordered for April of this year at [facility], I would also like for her to have a CMP and vitamin D done at that time which will also be ordered today.  She will also be referred for aquatic therapy for her back pain associated with her scoliosis.  She should continue her calcium rich diet and vitamin D.  I have encouraged her to see her primary care provider to discuss her lightheadedness/dizziness.  She voiced understanding.  She continues to be at very high risk for fractures, and I am recommending that she continue her Prolia injections every 6 months.  We did review weightbearing exercises and fall prevention today.  I will plan to see her back in 1 year and as needed, and she is scheduled for her next Prolia injection.  She should call with any questions or concerns.    Advance Care Planning   ACP discussion was held with the patient during this visit.  Patient does not have an advance directive, information provided.             LEVI Gee  03/04/21  10:21 EST    EMR Dragon/Transcription disclaimer:  Much of this encounter note is an electronic transcription/translation of spoken language to printed text. The electronic translation of spoken language may permit erroneous, or at times, nonsensical words or phrases to be inadvertently transcribed; Although I have reviewed the note for such errors, some may still exist.

## 2021-03-04 NOTE — PATIENT INSTRUCTIONS
Osteoporosis    Osteoporosis happens when your bones get thin and weak. This can cause your bones to break (fracture) more easily. You can do things at home to make your bones stronger.  Follow these instructions at home:    Activity  · Exercise as told by your doctor. Ask your doctor what activities are safe for you. You should do:  ? Exercises that make your muscles work to hold your body weight up (weight-bearing exercises). These include alba chi, yoga, and walking.  ? Exercises to make your muscles stronger. One example is lifting weights.  Lifestyle  · Limit alcohol intake to no more than 1 drink a day for nonpregnant women and 2 drinks a day for men. One drink equals 12 oz of beer, 5 oz of wine, or 1½ oz of hard liquor.  · Do not use any products that have nicotine or tobacco in them. These include cigarettes and e-cigarettes. If you need help quitting, ask your doctor.  Preventing falls  · Use tools to help you move around (mobility aids) as needed. These include canes, walkers, scooters, and crutches.  · Keep rooms well-lit and free of clutter.  · Put away things that could make you trip. These include cords and rugs.  · Install safety rails on stairs. Install grab bars in bathrooms.  · Use rubber mats in slippery areas, like bathrooms.  · Wear shoes that:  ? Fit you well.  ? Support your feet.  ? Have closed toes.  ? Have rubber soles or low heels.  · Tell your doctor about all of the medicines you are taking. Some medicines can make you more likely to fall.  General instructions  · Eat plenty of calcium and vitamin D. These nutrients are good for your bones. Good sources of calcium and vitamin D include:  ? Some fatty fish, such as salmon and tuna.  ? Foods that have calcium and vitamin D added to them (fortified foods). For example, some breakfast cereals are fortified with calcium and vitamin D.  ? Egg yolks.  ? Cheese.  ? Liver.  · Take over-the-counter and prescription medicines only as told by your  doctor.  · Keep all follow-up visits as told by your doctor. This is important.  Contact a doctor if:  · You have not been tested (screened) for osteoporosis and you are:  ? A woman who is age 65 or older.  ? A man who is age 70 or older.  Get help right away if:  · You fall.  · You get hurt.  Summary  · Osteoporosis happens when your bones get thin and weak.  · Weak bones can break (fracture) more easily.  · Eat plenty of calcium and vitamin D. These nutrients are good for your bones.  · Tell your doctor about all of the medicines that you take.  This information is not intended to replace advice given to you by your health care provider. Make sure you discuss any questions you have with your health care provider.  Document Revised: 11/30/2018 Document Reviewed: 10/12/2018  Elsevier Patient Education © 2020 [employer]      Fall Prevention in the Home, Adult  Falls can cause injuries. They can happen to people of all ages. There are many things you can do to make your home safe and to help prevent falls. Ask for help when making these changes, if needed.  What actions can I take to prevent falls?  General Instructions  · Use good lighting in all rooms. Replace any light bulbs that burn out.  · Turn on the lights when you go into a dark area. Use night-lights.  · Keep items that you use often in easy-to-reach places. Lower the shelves around your home if necessary.  · Set up your furniture so you have a clear path. Avoid moving your furniture around.  · Do not have throw rugs and other things on the floor that can make you trip.  · Avoid walking on wet floors.  · If any of your floors are uneven, fix them.  · Add color or contrast paint or tape to clearly ave and help you see:  ? Any grab bars or handrails.  ? First and last steps of stairways.  ? Where the edge of each step is.  · If you use a stepladder:  ? Make sure that it is fully opened. Do not climb a closed stepladder.  ? Make sure that both sides of the  stepladder are locked into place.  ? Ask someone to hold the stepladder for you while you use it.  · If there are any pets around you, be aware of where they are.  What can I do in the bathroom?         · Keep the floor dry. Clean up any water that spills onto the floor as soon as it happens.  · Remove soap buildup in the tub or shower regularly.  · Use non-skid mats or decals on the floor of the tub or shower.  · Attach bath mats securely with double-sided, non-slip rug tape.  · If you need to sit down in the shower, use a plastic, non-slip stool.  · Install grab bars by the toilet and in the tub and shower. Do not use towel bars as grab bars.  What can I do in the bedroom?  · Make sure that you have a light by your bed that is easy to reach.  · Do not use any sheets or blankets that are too big for your bed. They should not hang down onto the floor.  · Have a firm chair that has side arms. You can use this for support while you get dressed.  What can I do in the kitchen?  · Clean up any spills right away.  · If you need to reach something above you, use a strong step stool that has a grab bar.  · Keep electrical cords out of the way.  · Do not use floor polish or wax that makes floors slippery. If you must use wax, use non-skid floor wax.  What can I do with my stairs?  · Do not leave any items on the stairs.  · Make sure that you have a light switch at the top of the stairs and the bottom of the stairs. If you do not have them, ask someone to add them for you.  · Make sure that there are handrails on both sides of the stairs, and use them. Fix handrails that are broken or loose. Make sure that handrails are as long as the stairways.  · Install non-slip stair treads on all stairs in your home.  · Avoid having throw rugs at the top or bottom of the stairs. If you do have throw rugs, attach them to the floor with carpet tape.  · Choose a carpet that does not hide the edge of the steps on the stairway.  · Check any  carpeting to make sure that it is firmly attached to the stairs. Fix any carpet that is loose or worn.  What can I do on the outside of my home?  · Use bright outdoor lighting.  · Regularly fix the edges of walkways and driveways and fix any cracks.  · Remove anything that might make you trip as you walk through a door, such as a raised step or threshold.  · Trim any bushes or trees on the path to your home.  · Regularly check to see if handrails are loose or broken. Make sure that both sides of any steps have handrails.  · Install guardrails along the edges of any raised decks and porches.  · Clear walking paths of anything that might make someone trip, such as tools or rocks.  · Have any leaves, snow, or ice cleared regularly.  · Use sand or salt on walking paths during winter.  · Clean up any spills in your garage right away. This includes grease or oil spills.  What other actions can I take?  · Wear shoes that:  ? Have a low heel. Do not wear high heels.  ? Have rubber bottoms.  ? Are comfortable and fit you well.  ? Are closed at the toe. Do not wear open-toe sandals.  · Use tools that help you move around (mobility aids) if they are needed. These include:  ? Canes.  ? Walkers.  ? Scooters.  ? Crutches.  · Review your medicines with your doctor. Some medicines can make you feel dizzy. This can increase your chance of falling.  Ask your doctor what other things you can do to help prevent falls.  Where to find more information  · Centers for Disease Control and Prevention, STEAD[employer]: https://cdc.gov  · National [place] on Aging: https://kg6lmij.mau.nih.gov  Contact a doctor if:  · You are afraid of falling at home.  · You feel weak, drowsy, or dizzy at home.  · You fall at home.  Summary  · There are many simple things that you can do to make your home safe and to help prevent falls.  · Ways to make your home safe include removing tripping hazards and installing grab bars in the bathroom.  · Ask for help when  making these changes in your home.  This information is not intended to replace advice given to you by your health care provider. Make sure you discuss any questions you have with your health care provider.  Document Revised: 04/09/2020 Document Reviewed: 08/02/2018  Elsevier Patient Education © 2020 [employer]      Sit-to-Stand Exercise    The sit-to-stand exercise (also known as the chair stand or chair rise exercise) strengthens your lower body and helps you maintain or improve your mobility and independence. The goal is to do the sit-to-stand exercise without using your hands. This will be easier as you become stronger. You should always talk with your health care provider before starting any exercise program, especially if you have had recent surgery.  Do the exercise exactly as told by your health care provider and adjust it as directed. It is normal to feel mild stretching, pulling, tightness, or discomfort as you do this exercise, but you should stop right away if you feel sudden pain or your pain gets worse. Do not begin doing this exercise until told by your health care provider.  What the sit-to-stand exercise does  The sit-to-stand exercise helps to strengthen the muscles in your thighs and the muscles in the center of your body that give you stability (core muscles). This exercise is especially helpful if:  · You have had knee or hip surgery.  · You have trouble getting up from a chair, out of a car, or off the toilet.  How to do the sit-to-stand exercise  1. Sit toward the front edge of a sturdy chair without armrests. Your knees should be bent and your feet should be flat on the floor and shoulder-width apart.  2. Place your hands lightly on each side of the seat. Keep your back and neck as straight as possible, with your chest slightly forward.  3. Breathe in slowly. Lean forward and slightly shift your weight to the front of your feet.  4. Breathe out as you slowly stand up. Use your hands as  little as possible.  5. Stand and pause for a full breath in and out.  6. Breathe in as you sit down slowly. Tighten your core and abdominal muscles to control your lowering as much as possible.  7. Breathe out slowly.  8. Do this exercise 10-15 times. If needed, do it fewer times until you build up strength.  9. Rest for 1 minute, then do another set of 10-15 repetitions.  To change the difficulty of the sit-to-stand exercise  · If the exercise is too difficult, use a chair with sturdy armrests, and push off the armrests to help you come to the standing position. You can also use the armrests to help slowly lower yourself back to sitting. As this gets easier, try to use your arms less. You can also place a firm cushion or pillow on the chair to make the surface higher.  · If this exercise is too easy, do not use your arms to help raise or lower yourself. You can also wear a weighted vest, use hand weights, increase your repetitions, or try a lower chair.  General tips  · You may feel tired when starting an exercise routine. This is normal.  · You may have muscle soreness that lasts a few days. This is normal. As you get stronger, you may not feel muscle soreness.  · Use smooth, steady movements.  · Do not  hold your breath during strength exercises. This can cause unsafe changes in your blood pressure.  · Breathe in slowly through your nose, and breathe out slowly through your mouth.  Summary  · Strengthening your lower body is an important step to help you move safely and independently.  · The sit-to-stand exercise helps strengthen the muscles in your thighs and core.  · You should always talk with your health care provider before starting any exercise program, especially if you have had recent surgery.  This information is not intended to replace advice given to you by your health care provider. Make sure you discuss any questions you have with your health care provider.  Document Revised: 10/16/2019 Document  Reviewed: 02/08/2018  Elsevier Patient Education © 2020 [employer].      Preventing Health Risks of Being Overweight  Maintaining a healthy body weight is an important part of your overall health. Your healthy body weight depends on your age, gender, and height. Being overweight puts you at risk for many health problems, including:  · Heart disease.  · Diabetes.  · Problems sleeping.  · Joint problems.  You can make changes to your diet and lifestyle to prevent these risks. Consider working with a health care provider or a dietitian to make these changes.  What nutrition changes can be made?    · Eat only as much as your body needs. In most cases, this is about 2,000 calories a day, but the amount varies depending on your height, gender, and activity level. Ask your health care provider how many calories you should have each day. Eating more than your body needs on a regular basis can cause you to become overweight or obese.  · Eat slowly, and stop eating when you feel full.  · Choose healthy foods, including:  ? Fruits and vegetables.  ? Lean meats.  ? Low-fat dairy products.  ? High-fiber foods, such as whole grains and beans.  ? Healthy snacks like vegetable sticks, a piece of fruit, or a small amount of yogurt or cheese.  · Avoid foods and drinks that are high in sugar, salt (sodium), saturated fat, or trans fat. This includes:  ? Many desserts such as candy, cookies, and ice cream.  ? Soda.  ? Fried foods.  ? Processed meats such as hot dogs or lunch meats.  ? Prepackaged snack foods.  What lifestyle changes can be made?    · Exercise for at least 150 minutes a week to prevent weight gain, or as often as recommended by your health care provider. Do moderate-intensity exercise, such as brisk walking.  ? Spread it out by exercising for 30 minutes 5 days a week, or in short 10-minute bursts several times a day.  · Find other ways to stay active and burn calories, such as yard work or a hobby that involves  physical activity.  · Get at least 8 hours of sleep each night. When you are well-rested, you are more likely to be active and make healthy choices during the day. To sleep better:  ? Try to go to bed and wake up at about the same time every day.  ? Keep your bedroom dark, quiet, and cool.  ? Make sure that your bed is comfortable.  ? Avoid stimulating activities, such as watching television or exercising, for at least one hour before bedtime.  Why are these changes important?  Eating healthy and being active helps you lose weight and prevent health problems caused by being overweight. Making these changes can also help you manage stress, feel better mentally, and connect with friends and family.  What can happen if changes are not made?  Being overweight can affect you for your entire life. You may develop joint or bone problems that make it painful or difficult for you to play sports or do activities you enjoy. Being overweight puts stress on your heart and lungs and can lead to medical problems like diabetes, heart disease, and sleeping problems.  Where to find support  You can get support for preventing health risks of being overweight from:  · Your health care provider or a dietitian. They can provide guidance about healthy eating and healthy lifestyle choices.  · Weight loss support groups, online or in-person.  Where to find more information  · MyPlate: www.choosemyplate.gov  ? This an online tool that provides personalized recommendations about foods to eat each day.  · The Centers for Disease Control and Prevention: www.cdc.gov/healthyweight  ? This resource gives tips for managing weight and having an active lifestyle.  Summary  · To prevent unhealthy weight gain, it is important to maintain a healthy diet high in vegetables and whole grains, exercise regularly, and get at least 8 hours of sleep each night.  · Making these changes helps prevent many long-term (chronic) health conditions that can shorten  your life, such as diabetes, heart disease, and stroke.  This information is not intended to replace advice given to you by your health care provider. Make sure you discuss any questions you have with your health care provider.  Document Revised: 09/09/2020 Document Reviewed: 11/14/2018  Elsevier Patient Education © 2020 [employer]      Advance Care Planning and Advance Directives     You make decisions on a daily basis - decisions about where you want to live, your career, your home, your life. Perhaps one of the most important decisions you face is your choice for future medical care. Take time to talk with your family and your healthcare team and start planning today.  Advance Care Planning is a process that can help you:  · Understand possible future healthcare decisions in light of your own experiences  · Reflect on those decision in light of your goals and values  · Discuss your decisions with those closest to you and the healthcare professionals that care for you  · Make a plan by creating a document that reflects your wishes    Surrogate Decision Maker  In the event of a medical emergency, which has left you unable to communicate or to make your own decisions, you would need someone to make decisions for you.  It is important to discuss your preferences for medical treatment with this person while you are in good health.     Qualities of a surrogate decision maker:  • Willing to take on this role and responsibility  • Knows what you want for future medical care  • Willing to follow your wishes even if they don't agree with them  • Able to make difficult medical decisions under stressful circumstances    Advance Directives  These are legal documents you can create that will guide your healthcare team and decision maker(s) when needed. These documents can be stored in the electronic medical record.    · Living Will - a legal document to guide your care if you have a terminal condition or a serious illness  and are unable to communicate. States vary by statute in document names/types, but most forms may include one or more of the following:        -  Directions regarding life-prolonging treatments        -  Directions regarding artificially provided nutrition/hydration        -  Choosing a healthcare decision maker        -  Direction regarding organ/tissue donation    · Durable Power of  for Healthcare - this document names an -in-fact to make medical decisions for you, but it may also allow this person to make personal and financial decisions for you. Please seek the advice of an  if you need this type of document.    **Advance Directives are not required and no one may discriminate against you if you do not sign one.    Medical Orders  Many states allow specific forms/orders signed by your physician to record your wishes for medical treatment in your current state of health. This form, signed in personal communication with your physician, addresses resuscitation and other medical interventions that you may or may not want.      For more information or to schedule a time with a [facility] Advance Care Planning Facilitator contact: [email or web address] or call [phone] and someone will contact you directly.

## 2021-03-12 RX ORDER — ERGOCALCIFEROL 1.25 MG/1
CAPSULE ORAL
Qty: 13 CAPSULE | Refills: 2 | Status: SHIPPED | OUTPATIENT
Start: 2021-03-12 | End: 2022-01-06

## 2021-03-15 ENCOUNTER — TREATMENT (OUTPATIENT)
Dept: PHYSICAL THERAPY | Facility: CLINIC | Age: 71
End: 2021-03-15

## 2021-03-15 DIAGNOSIS — G89.29 CHRONIC LOW BACK PAIN, UNSPECIFIED BACK PAIN LATERALITY, UNSPECIFIED WHETHER SCIATICA PRESENT: ICD-10-CM

## 2021-03-15 DIAGNOSIS — M54.50 CHRONIC LOW BACK PAIN, UNSPECIFIED BACK PAIN LATERALITY, UNSPECIFIED WHETHER SCIATICA PRESENT: ICD-10-CM

## 2021-03-15 DIAGNOSIS — M41.122 ADOLESCENT IDIOPATHIC SCOLIOSIS OF CERVICAL SPINE: Primary | ICD-10-CM

## 2021-03-15 PROCEDURE — 97163 PT EVAL HIGH COMPLEX 45 MIN: CPT | Performed by: PHYSICAL THERAPIST

## 2021-03-15 PROCEDURE — 97116 GAIT TRAINING THERAPY: CPT | Performed by: PHYSICAL THERAPIST

## 2021-03-15 NOTE — PROGRESS NOTES
Physical Therapy Initial Evaluation and Plan of Care    Patient: Charleen Barnett   : 1950  Diagnosis/ICD-10 Code:  Adolescent idiopathic scoliosis of cervical spine [M41.122]  Referring practitioner: LEVI Gee  Date of Initial Visit: 3/15/2021  Today's Date: 3/15/2021  Patient seen for 1 sessions           Subjective Questionnaire: Oswestry: 17/50 + 34% disability      Subjective Evaluation    History of Present Illness  Mechanism of injury: Patient came in for Aquatic PT. Patient is having pain from her head to her tail bone. Pain fluctuates. Turning head Left and right and up and down causes pain. Upper back hurts when sitting longer, working neck and shoulders really hurt. Walking longer her LB hurts. By the time she gets from her car to work her back is hurting bad. Once she sits down it gets better. Can not walk through the grocery store without hanging on cart to relieve pain from her Left LBP/hip. Pain gets so intense it makes her sweat and  Want to pass out. Has resorted to sit on the floor to keep from passing out. Has left knee replacement since 4 to 5 years ago. Did not give her relief, knee catches all the time and has lateral leg nerve pain and her Left hip catches when she tries to roll over in bed. Most comfortable on her back. Has to change left and right side, just not for very long.  Has OA every where. CTS both hands(at times T/N/R hand lateral 3 fingers fx had surgery still limited flexion, gets shots for them, also shot in shoulders.   When she loads and unloads the dishes and sweeps the kitchen she has to sit down.   When she walks more than 1/2 block she has to sit down. Would take a wx but taking it in and out of the car would hurt her also. Carrying in groceries is very painful. Used to be very strong but feels like she has no more strength from sitting and lying around and not doing anything to relieve the pain.   Patient is on a weight loss program, [employer].      Subjective comment: 71 y/o w/f ref. with chronic LBP and scoliosis for aquatic PT.   Patient Occupation: Does work part time work 8 to 10 hours a day. 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 Pain  Current pain ratin (in sitting right now today no pain anywhere,.)  At best pain ratin  At worst pain rating: 10 (worst Left LBP hip:10, Neck pain/shoulder upper back pain 6/10; Left leg 9/10, both feet hx Gout and bunions neuropathy. )  Quality: burning and throbbing (Left LBP stabbing, burning, ice hot pain depending on how long she stands and walks. )  Relieving factors: relaxation, change in position, rest and medications (Lying down is best, sitting also helps; Only takes Tylenol, Gabapentin, is a recovered alcoholic.)  Aggravating factors: ambulation, movement and standing  Progression: worsening (More frequent pain, walk less distance before the pain starts. )    Social Support  Lives in: one-story house (3 steps in, laundry one step down)  Lives with: spouse    Hand dominance: right (Trouble due to R fingers after fx. )    Patient Goals  Patient goals for therapy: decreased edema, decreased pain, improved balance, increased motion, increased strength, independence with ADLs/IADLs, return to sport/leisure activities and return to work  Patient goal: Decrease pain, increase function, ability, strength core and improve balance.           Objective          Postural Observations  Seated posture: fair  Standing posture: fair (short, R shoulder lower than Left but not much, both waist and hips level.)        Active Range of Motion     Lumbar   Flexion: Active lumbar flexion: squat vs bend at wiast due to osteoporosis.   Extension: 22 (produces central LBP during) degrees   Left lateral flexion: 24 (feels more stiff than other side) degrees   Right lateral flexion: 22 degrees     Additional Active Range of Motion Details  Normal sensation both UE/LE today in  sitting  UE;shoulder WFL AROM, limited function R lateral 3 fingers since fall/Surgery 3 fingers.   MMT 5/5 no pain during testing.  LE WFL AROM.   MMT 5/5 legs except Left hip flexion 4/5 with left hip pain during testing.  SLR(-)  Tested: walking 6 min laps: walked at fast pace 4;30 min and had to sit, did 800 feet in that time. Started hurting 4/10 Left LB after 3 min walking, pain went up to 5 and 6/10 and needed to sit down. By then had Left LBP and R big toe worse than left(gout ) this time.Usually her Left big toe hurts worse.   Gave patient tour of pool/gym and bathroom so patient can take shower after.           Assessment & Plan     Assessment  Impairments: abnormal gait, abnormal or restricted ROM, activity intolerance, impaired balance, impaired physical strength, lacks appropriate home exercise program, pain with function, safety issue and weight-bearing intolerance  Assessment details: 71 y/o w/f with chronic mainly Left LBP and left leg pain, scoliosis, also neck, upper back and shoulder pain, OA everywhere. Also c/o endurance and balance.  Will benefit from skilled PT start in the pool per MD orders.   Barriers to therapy: hx. OA, MI, L TKA, Fibromyalgia, Osteoporosis...  Prognosis: good  Functional Limitations: carrying objects, lifting, sleeping, walking, pulling, pushing, uncomfortable because of pain, moving in bed, sitting, standing, stooping and reaching overhead  Goals  Plan Goals: Patient presents with significant impairments, including pain; decreased mobility; impaired gait; decreased ROM and strength. Based on the above impairments and the examination, this patient has the potential to benefit from Physical Therapy. Will initiate principles of aquatic therapy to offload spine/joints, thermal effects to reduce pain, and hydrostatic pressure to facilitate exercise with transition to land as tolerated.    STG's (2 - 4 weeks)  1. Tolerate 45 minutes aquatic therapy with reduction in pain.   2.  Able to ambulate x 10 min in POOL with improved gait pattern.  3. Able to tolerate 6 min walking test on land with decreased pain.  4. Able to do balance ex in the pool.    LTG's (by d/c)  1. Independent with HEP and able to manage condition independently.  2. Patient reports ability to walk longer with less pain, by 50%.   3. Significant improvement on outcome measure.      Plan  Therapy options: will be seen for skilled physical therapy services  Planned modality interventions: cryotherapy, electrical stimulation/Russian stimulation, TENS, thermotherapy (hydrocollator packs), traction and ultrasound  Other planned modality interventions: AQUATIC PT  Planned therapy interventions: abdominal trunk stabilization, body mechanics training, functional ROM exercises, gait training, home exercise program, joint mobilization, manual therapy, neuromuscular re-education, postural training, soft tissue mobilization, spinal/joint mobilization, strengthening, stretching and therapeutic activities  Frequency: 2x week  Duration in visits: 20        Timed:         Manual Therapy:         mins  99899;     Therapeutic Exercise:         mins  32369;     Neuromuscular Jeancarlos:        mins  91491;    Therapeutic Activity:          mins  97794;     Gait Training:      10     mins  25643;     Ultrasound:          mins  00568;    Ionto                                   mins   94335  Self Care                            mins   72228  Aquatic                               mins 04404      Un-Timed:  Electrical Stimulation:         mins  87481 ( );  Dry Needling          mins self-pay  Traction          mins 83927  Low Eval          Mins  19505  Mod Eval          Mins  36673  High Eval                       35     Mins  29771  Re-Eval                               mins  37109        Timed Treatment:   10   mins   Total Treatment:     45   mins    PT SIGNATURE: Nicole Guerra, JUDAH   DATE TREATMENT INITIATED: 3/15/2021    Medicare Initial  Certification  Certification Period: 6/13/2021  I certify that the therapy services are furnished while this patient is under my care.  The services outlined above are required by this patient, and will be reviewed every 90 days.     PHYSICIAN: Dede Anders PA      DATE:     Please sign and return via fax to [phone].. Thank you, [facility].

## 2021-03-19 ENCOUNTER — TREATMENT (OUTPATIENT)
Dept: PHYSICAL THERAPY | Facility: CLINIC | Age: 71
End: 2021-03-19

## 2021-03-19 DIAGNOSIS — G89.29 CHRONIC LOW BACK PAIN, UNSPECIFIED BACK PAIN LATERALITY, UNSPECIFIED WHETHER SCIATICA PRESENT: ICD-10-CM

## 2021-03-19 DIAGNOSIS — M41.122 ADOLESCENT IDIOPATHIC SCOLIOSIS OF CERVICAL SPINE: Primary | ICD-10-CM

## 2021-03-19 DIAGNOSIS — M54.50 CHRONIC LOW BACK PAIN, UNSPECIFIED BACK PAIN LATERALITY, UNSPECIFIED WHETHER SCIATICA PRESENT: ICD-10-CM

## 2021-03-19 PROCEDURE — 97113 AQUATIC THERAPY/EXERCISES: CPT | Performed by: PHYSICAL THERAPIST

## 2021-03-19 NOTE — PROGRESS NOTES
Physical Therapy Daily Progress Note  VISIT#: 2 POC 20    Subjective   Charleen Barnett reports: came in with 4 to 5/10 L LBP after getting dressed carrying clothes to the car. today.      Objective     See Exercise, Manual, and Modality Logs for complete treatment.     Patient Education: Pool program    Assessment/Plan  Loves the Pool: to be able to move without pain!  Wants to be able to walk from the car to her office without feeling like she is going to pass out.     Progress per Plan of Care and Progress strengthening /stabilization /functional activity            Timed:         Manual Therapy:         mins  70754;     Therapeutic Exercise:         mins  87955;     Neuromuscular Jeancarlos:        mins  99234;    Therapeutic Activity:          mins  65068;     Gait Training:           mins  66648;     Ultrasound:          mins  16282;    Ionto                                   mins   25189  Self Care                            mins   57337  Canalith Repos                   mins  4209  Aquatic                          45     mins 53642    Un-Timed:  Electrical Stimulation:         mins  10112 ( );  Dry Needling          mins self-pay  Traction          mins 52251    Timed Treatment:   45   mins   Total Treatment:     45   mins    Nicole Guerra PT  [place] License # [id number]  Physical Therapist

## 2021-03-25 ENCOUNTER — TREATMENT (OUTPATIENT)
Dept: PHYSICAL THERAPY | Facility: CLINIC | Age: 71
End: 2021-03-25

## 2021-03-25 DIAGNOSIS — M54.50 CHRONIC LOW BACK PAIN, UNSPECIFIED BACK PAIN LATERALITY, UNSPECIFIED WHETHER SCIATICA PRESENT: ICD-10-CM

## 2021-03-25 DIAGNOSIS — G89.29 CHRONIC LOW BACK PAIN, UNSPECIFIED BACK PAIN LATERALITY, UNSPECIFIED WHETHER SCIATICA PRESENT: ICD-10-CM

## 2021-03-25 DIAGNOSIS — M41.122 ADOLESCENT IDIOPATHIC SCOLIOSIS OF CERVICAL SPINE: Primary | ICD-10-CM

## 2021-03-25 PROCEDURE — 97113 AQUATIC THERAPY/EXERCISES: CPT | Performed by: PHYSICAL THERAPIST

## 2021-03-25 NOTE — PROGRESS NOTES
Physical Therapy Daily Progress Note  VISIT#: 3 POC 20    Subjective     Charleen Barnett reports: came in with 0/10  LBP. Had some muscle soreness after last pool session.    Objective       See Exercise, Manual, and Modality Logs for complete treatment.     Patient Education: Pool program    Answered questions. Talked about deep breathing and patient's diet.  Discussed interval walking.    Assessment/Plan    Loves the Pool.  Feels like she is getting stronger already.     Progress per Plan of Care and Progress strengthening /stabilization /functional activity            Timed:         Manual Therapy:         mins  81466;     Therapeutic Exercise:         mins  54168;     Neuromuscular Jeancarlos:        mins  74536;    Therapeutic Activity:          mins  53971;     Gait Training:           mins  28414;     Ultrasound:          mins  80604;    Ionto                                   mins   61025  Self Care                            mins   48099  Canalith Repos                   mins  4209  Aquatic                          45     mins 93688    Un-Timed:  Electrical Stimulation:         mins  12888 ( );  Dry Needling          mins self-pay  Traction          mins 99756    Timed Treatment:   45   mins   Total Treatment:     45   mins    Nicole Guerra PT  [place] License # [id number]  Physical Therapist

## 2021-03-30 ENCOUNTER — TREATMENT (OUTPATIENT)
Dept: PHYSICAL THERAPY | Facility: CLINIC | Age: 71
End: 2021-03-30

## 2021-03-30 DIAGNOSIS — M41.122 ADOLESCENT IDIOPATHIC SCOLIOSIS OF CERVICAL SPINE: Primary | ICD-10-CM

## 2021-03-30 PROCEDURE — 97113 AQUATIC THERAPY/EXERCISES: CPT | Performed by: PHYSICAL THERAPIST

## 2021-03-30 NOTE — PROGRESS NOTES
Physical Therapy Daily Progress Note  VISIT#: 4 POC 20    Subjective     Charleen Barnett reports: came in with 3/10  LBP.Did work all day yesterday. Took some Tylenol earlier.     Objective       See Exercise, Manual, and Modality Logs for complete treatment.     Patient Education: Pool program    Answered questions. Patient's R knee has been hurting: advised to first try repeat knee flexion, if no better try repeat R knee extension.   Try walking fwd/back and lateral in the kitchen. Mix it up with hand shake, walk on toes, Russian march....  Patient switched to visor in the pool, works better for her asthma.    Likes to rinse off after the pool.      Assessment/Plan    Feels like she is getting stronger already. Was able to walk to her cubicle with less problems, less winded.    Progress per Plan of Care and Progress strengthening /stabilization /functional activity            Timed:         Manual Therapy:         mins  77682;     Therapeutic Exercise:         mins  99810;     Neuromuscular Jeancarlos:        mins  80707;    Therapeutic Activity:          mins  91263;     Gait Training:           mins  31588;     Ultrasound:          mins  17997;    Ionto                                   mins   06309  Self Care                            mins   17154  Canalith Repos                   mins  4209  Aquatic                          45     mins 77987    Un-Timed:  Electrical Stimulation:         mins  68464 ( );  Dry Needling          mins self-pay  Traction          mins 62705    Timed Treatment:   45   mins   Total Treatment:     45   mins    Nicole Guerra PT  [place] License # [id number]  Physical Therapist

## 2021-04-06 ENCOUNTER — TREATMENT (OUTPATIENT)
Dept: PHYSICAL THERAPY | Facility: CLINIC | Age: 71
End: 2021-04-06

## 2021-04-06 DIAGNOSIS — M41.122 ADOLESCENT IDIOPATHIC SCOLIOSIS OF CERVICAL SPINE: Primary | ICD-10-CM

## 2021-04-06 PROCEDURE — 97113 AQUATIC THERAPY/EXERCISES: CPT | Performed by: PHYSICAL THERAPIST

## 2021-04-06 NOTE — PROGRESS NOTES
Physical Therapy Daily Progress Note  VISIT#: 5 POC 20    Subjective     Charleen Barnett reports: came in without LBP. Did not have to work today.     Objective       See Exercise, Manual, and Modality Logs for complete treatment.     Patient Education: Pool program    Answered questions. Patient's R knee is doing better after doing repeat end ROM flexion and Ext. PT demo supine decompression with legs on chair, at home patient plans on using dog step to elevate legs when she comes home to decompress.   Patient using visor in the pool, works better for her asthma.    Likes to rinse off after the pool.      Assessment/Plan    Feels like she is getting stronger and balance is improving a little.    Progress per Plan of Care and Progress strengthening /stabilization /functional activity            Timed:         Manual Therapy:         mins  81267;     Therapeutic Exercise:         mins  85312;     Neuromuscular Jeancarlos:        mins  52291;    Therapeutic Activity:          mins  22826;     Gait Training:           mins  14021;     Ultrasound:          mins  45790;    Ionto                                   mins   28320  Self Care                            mins   96233  Canalith Repos                   mins  4209  Aquatic                          45     mins 82027    Un-Timed:  Electrical Stimulation:         mins  11300 ( );  Dry Needling          mins self-pay  Traction          mins 01782    Timed Treatment:   45   mins   Total Treatment:     45   mins    Nicole Guerra PT  [place] License # [id number]  Physical Therapist

## 2021-04-08 ENCOUNTER — TREATMENT (OUTPATIENT)
Dept: PHYSICAL THERAPY | Facility: CLINIC | Age: 71
End: 2021-04-08

## 2021-04-08 DIAGNOSIS — M41.122 ADOLESCENT IDIOPATHIC SCOLIOSIS OF CERVICAL SPINE: Primary | ICD-10-CM

## 2021-04-08 PROCEDURE — 97113 AQUATIC THERAPY/EXERCISES: CPT | Performed by: PHYSICAL THERAPIST

## 2021-04-08 NOTE — PROGRESS NOTES
Physical Therapy Daily Progress Note  VISIT#: 6 POC 20    Subjective     Charleen Barnett reports: Did not have to work today. Was a little sore from therapy yesterday. All better today. Does have some allergies.    Objective       See Exercise, Manual, and Modality Logs for complete treatment.     Patient Education: Pool program    Answered questions.  R knee still doing better but left knee feels like her TKA is loose.   Advised patient to have surgeon check it out.     Patient using visor in the pool, works better for her asthma.    Likes to rinse off after the pool.      Assessment/Plan    Feels like she is getting stronger and balance is improving.    Progress per Plan of Care and Progress strengthening /stabilization /functional activity            Timed:         Manual Therapy:         mins  76114;     Therapeutic Exercise:         mins  78146;     Neuromuscular Jeancarlos:        mins  93581;    Therapeutic Activity:          mins  91988;     Gait Training:           mins  81509;     Ultrasound:          mins  44584;    Ionto                                   mins   31822  Self Care                            mins   44708  Canalith Repos                   mins  4209  Aquatic                          45     mins 10231    Un-Timed:  Electrical Stimulation:         mins  31612 ( );  Dry Needling          mins self-pay  Traction          mins 07663    Timed Treatment:   45   mins   Total Treatment:     45   mins    Nicole Guerra PT  [place] License # [id number]  Physical Therapist

## 2021-04-22 ENCOUNTER — HOSPITAL ENCOUNTER (OUTPATIENT)
Dept: BONE DENSITY | Facility: HOSPITAL | Age: 71
Discharge: HOME | End: 2021-04-22
Admitting: PHYSICIAN ASSISTANT

## 2021-04-22 ENCOUNTER — OFFICE VISIT (OUTPATIENT)
Dept: CARDIOLOGY | Facility: CLINIC | Age: 71
End: 2021-04-22

## 2021-04-22 ENCOUNTER — TELEPHONE (OUTPATIENT)
Dept: ORTHOPEDIC SURGERY | Facility: CLINIC | Age: 71
End: 2021-04-22

## 2021-04-22 VITALS
SYSTOLIC BLOOD PRESSURE: 136 MMHG | HEIGHT: 59 IN | DIASTOLIC BLOOD PRESSURE: 84 MMHG | HEART RATE: 56 BPM | WEIGHT: 187 LBS | BODY MASS INDEX: 37.7 KG/M2 | TEMPERATURE: 96.9 F | OXYGEN SATURATION: 94 %

## 2021-04-22 DIAGNOSIS — E78.2 MIXED HYPERLIPIDEMIA: ICD-10-CM

## 2021-04-22 DIAGNOSIS — M81.0 AGE-RELATED OSTEOPOROSIS WITHOUT CURRENT PATHOLOGICAL FRACTURE: ICD-10-CM

## 2021-04-22 DIAGNOSIS — Z98.61 STATUS POST PERCUTANEOUS TRANSLUMINAL CORONARY ANGIOPLASTY: ICD-10-CM

## 2021-04-22 DIAGNOSIS — I10 ESSENTIAL HYPERTENSION: ICD-10-CM

## 2021-04-22 DIAGNOSIS — E55.9 VITAMIN D DEFICIENCY: ICD-10-CM

## 2021-04-22 DIAGNOSIS — R07.89 CHEST DISCOMFORT: Primary | ICD-10-CM

## 2021-04-22 DIAGNOSIS — Z82.62 FAMILY HX OSTEOPOROSIS: ICD-10-CM

## 2021-04-22 DIAGNOSIS — Z51.81 MEDICATION MONITORING ENCOUNTER: ICD-10-CM

## 2021-04-22 PROCEDURE — 77080 DXA BONE DENSITY AXIAL: CPT

## 2021-04-22 PROCEDURE — 99214 OFFICE O/P EST MOD 30 MIN: CPT | Performed by: INTERNAL MEDICINE

## 2021-04-22 PROCEDURE — 93000 ELECTROCARDIOGRAM COMPLETE: CPT | Performed by: INTERNAL MEDICINE

## 2021-04-22 RX ORDER — UBIDECARENONE 75 MG
50 CAPSULE ORAL DAILY
COMMUNITY
End: 2021-06-24

## 2021-04-22 RX ORDER — BIOTIN 10 MG
10 TABLET ORAL DAILY
COMMUNITY

## 2021-04-22 NOTE — PROGRESS NOTES
Encounter Date:04/22/2021  Last seen 2/20/2020      Patient ID: Charleen Barnett is a 70 y.o. female.    Chief Complaint:  Status post stent  Hypertension  Dyslipidemia  History of palpitations  Chest discomfort-new problem     History of Present Illness  Lately patient has been having back and chest discomfort tightness heaviness sensation with exertion not associated with nausea or sweating.  Patient has feeling of dizziness and near syncopal feeling with it.    Since I have last seen, the patient has been without any shortness of breath, palpitations, dizziness or syncope.  Denies having any headache ,abdominal pain ,nausea, vomiting , diarrhea constipation, loss of weight or loss of appetite.  Denies having any excessive bruising ,hematuria or blood in the stool.    Review of all systems negative except as indicated.    Reviewed ROS.  Assessment and Plan         ////////////////////////  Impression  ==================  -Chest discomfort and back discomfort with exertion suggestive of angina pectoris.     - status post stent to LAD 06/10/2014      negative stress Cardiolite test at [facility] August 2018.     -palpitations likely SVT.  -improved on atenolol     Echocardiogram showed mild aortic regurgitation with normal left ventricular function.  5/18/2017     - hypertension dyslipidemia sleep apnea asthma fibromyalgia GERD and gout.     - family history of coronary artery disease     - status post subendocardial myocardial infarction prior to stent placement     - allergy to penicillin sulfa and tetracycline     =======  Plan  =======  -Recent chest discomfort and back discomfort with exertion suggestive of angina pectoris.  Status post stent.    EKG showed sinus rhythm without any ischemic changes  Stress Cardiolite test  Echocardiogram    Hypertension  Continue metoprolol    Palpitations-better.  Continue metoprolol.    Dyslipidemia-continue atorvastatin    Medications were reviewed and updated.   followup  in the office on the same day.  Patient may need cardiac catheterization and coronary actigraphy.  Further plan will depend on patient's progress  //////////////////////////////                         Diagnosis Plan   1. Chest discomfort  Adult Transthoracic Echo Complete W/ Cont if Necessary Per Protocol    Stress Test With Myocardial Perfusion One Day    ECG 12 Lead   2. Status post percutaneous transluminal coronary angioplasty  Adult Transthoracic Echo Complete W/ Cont if Necessary Per Protocol    Stress Test With Myocardial Perfusion One Day    ECG 12 Lead   3. Mixed hyperlipidemia  Adult Transthoracic Echo Complete W/ Cont if Necessary Per Protocol    Stress Test With Myocardial Perfusion One Day    ECG 12 Lead   4. Essential hypertension  Adult Transthoracic Echo Complete W/ Cont if Necessary Per Protocol    Stress Test With Myocardial Perfusion One Day    ECG 12 Lead   LAB RESULTS (LAST 7 DAYS)    CBC        BMP        CMP         BNP        TROPONIN        CoAg        Creatinine Clearance  CrCl cannot be calculated (Patient's most recent lab result is older than the maximum 30 days allowed.).    ABG        Radiology  No radiology results for the last day                The following portions of the patient's history were reviewed and updated as appropriate: allergies, current medications, past family history, past medical history, past social history, past surgical history and problem list.    Review of Systems   Constitutional: Negative for malaise/fatigue.   Cardiovascular: Positive for chest pain and leg swelling. Negative for palpitations and syncope.   Respiratory: Positive for shortness of breath.    Skin: Negative for rash.   Gastrointestinal: Negative for nausea and vomiting.   Neurological: Positive for dizziness. Negative for light-headedness and numbness.         Current Outpatient Medications:   •  albuterol sulfate HFA (VENTOLIN HFA) 108 (90 Base) MCG/ACT inhaler, VENTOLIN  (90 Base)  MCG/ACT AERS, Disp: , Rfl:   •  aspirin 81 MG tablet, Take 81 mg by mouth Daily., Disp: , Rfl:   •  atorvastatin (LIPITOR) 10 MG tablet, Take 1 tablet by mouth Daily., Disp: 90 tablet, Rfl: 3  •  Biotin 10 MG capsule, Take  by mouth., Disp: , Rfl:   •  buPROPion XL (Wellbutrin XL) 150 MG 24 hr tablet, Take 1 tablet by mouth Every Morning., Disp: 90 tablet, Rfl: 3  •  busPIRone (BUSPAR) 7.5 MG tablet, Take 1 tablet by mouth 2 (two) times a day., Disp: 180 tablet, Rfl: 3  •  citalopram (CeleXA) 20 MG tablet, Take 1 tablet by mouth Daily., Disp: 90 tablet, Rfl: 3  •  denosumab (PROLIA) 60 MG/ML solution prefilled syringe syringe, PROLIA 60 MG/ML SOSY, Disp: , Rfl:   •  gabapentin (NEURONTIN) 400 MG capsule, TAKE 1 CAPSULE BY MOUTH AT BEDTIME, Disp: 90 capsule, Rfl: 0  •  lansoprazole (PREVACID) 15 MG capsule, Take 15 mg by mouth Daily., Disp: , Rfl:   •  Magnesium 250 MG tablet, Take  by mouth., Disp: , Rfl:   •  metoprolol succinate XL (TOPROL XL) 25 MG 24 hr tablet, Take 1 tablet by mouth Daily., Disp: 90 tablet, Rfl: 3  •  montelukast (SINGULAIR) 10 MG tablet, Take 1 tablet by mouth Daily., Disp: 90 tablet, Rfl: 2  •  oxybutynin (DITROPAN) 5 MG tablet, TAKE 1 TABLET BY MOUTH 2 TIMES A DAY. LAST REFILL UNTIL SEEN IN OFFICE, Disp: 180 tablet, Rfl: 0  •  vitamin B-12 (CYANOCOBALAMIN) 100 MCG tablet, Take 50 mcg by mouth Daily., Disp: , Rfl:   •  vitamin C (ASCORBIC ACID) 250 MG tablet, Take 250 mg by mouth Daily., Disp: , Rfl:   •  vitamin D (ERGOCALCIFEROL) 1.25 MG (83448 UT) capsule capsule, TAKE 1 CAPSULE BY MOUTH EVERY WEEK, Disp: 13 capsule, Rfl: 2  •  vitamin E 100 UNIT capsule, Take 100 Units by mouth Daily., Disp: , Rfl:     Current Facility-Administered Medications:   •  denosumab (PROLIA) syringe 60 mg, 60 mg, Subcutaneous, Q6 Months, Dede Anders PA, 60 mg at 12/01/20 1104    Allergies   Allergen Reactions   • Chlorpheniramine-Phenylephrine Rash   • Penicillins Rash   • Sulfa Antibiotics Hives and Rash    • Tetracycline Rash   • Warfarin Rash       Family History   Problem Relation Age of Onset   • Heart attack Father    • Cancer Father        Past Surgical History:   Procedure Laterality Date   • BLADDER SURGERY     • CHOLECYSTECTOMY     • HYSTERECTOMY     • KNEE SURGERY     • OTHER SURGICAL HISTORY      STENT       Past Medical History:   Diagnosis Date   • Anxiety    • Asthma 2/13/2020   • Ramos's esophagus 2/15/2018   • Depression 8/27/2018   • Fibromyalgia 12/14/2011   • GERD (gastroesophageal reflux disease)    • Gout 2/13/2020   • Joint pain    • Obstructive sleep apnea 10/16/2014   • Osteoporosis     fosamax(SE), on prolia(4/10/2018)   • Pedal edema 10/22/2018   • Presence of left artificial knee joint 3/14/2019   • Seasonal allergies    • ST elevation (STEMI) myocardial infarction (CMS/[facility]) 2/13/2020   • Status post percutaneous transluminal coronary angioplasty 6/10/2014   • Tachycardia    • Urge incontinence 8/27/2018   • Vitamin D deficiency 3/19/2015       Family History   Problem Relation Age of Onset   • Heart attack Father    • Cancer Father        Social History     Socioeconomic History   • Marital status:      Spouse name: Not on file   • Number of children: Not on file   • Years of education: Not on file   • Highest education level: Not on file   Tobacco Use   • Smoking status: Never Smoker   • Smokeless tobacco: Never Used   Substance and Sexual Activity   • Alcohol use: Not Currently   • Drug use: Never   • Sexual activity: Defer           ECG 12 Lead    Date/Time: 4/22/2021 11:23 AM  Performed by: Yazmin Matta MD  Authorized by: Yazmin Matta MD   Comparison: compared with previous ECG   Similar to previous ECG  Comparison to previous ECG: Sinus bradycardia nonspecific ST-T wave changes 53/min normal axis normal intervals no ectopy no significant change from 6/10/2020                Objective:       Physical Exam    /84   Pulse 56   Temp 96.9 °F (36.1 °C)   Ht 149.9  "cm (59\")   Wt 84.8 kg (187 lb)   SpO2 94%   BMI 37.77 kg/m²   The patient is alert, oriented and in no distress.    Vital signs as noted above.    Head and neck revealed no carotid bruits or jugular venous distension.  No thyromegaly or lymphadenopathy is present.    Lungs clear.  No wheezing.  Breath sounds are normal bilaterally.    Heart normal first and second heart sounds.  No murmur..  No pericardial rub is present.  No gallop is present.    Abdomen soft and nontender.  No organomegaly is present.    Extremities revealed good peripheral pulses without any pedal edema.    Skin warm and dry.    Musculoskeletal system is grossly normal except for kyphoscoliosis.    CNS grossly normal.        "

## 2021-04-22 NOTE — TELEPHONE ENCOUNTER
Call placed to patient, advised as per Dede. Patient expressed understanding and thanked us for calling.

## 2021-04-22 NOTE — TELEPHONE ENCOUNTER
----- Message from LEVI Gee sent at 4/22/2021  2:22 PM EDT -----  Please let her know that her new DEXA scan shows a T score of -2.2 in the right femoral neck.  Her spine is not accurate for comparison, however when compared to 2019, this does show an increase in bone density of 7.3% in the right hip and 9.5% in the left hip.

## 2021-04-27 ENCOUNTER — DOCUMENTATION (OUTPATIENT)
Dept: PHYSICAL THERAPY | Facility: CLINIC | Age: 71
End: 2021-04-27

## 2021-04-27 NOTE — PROGRESS NOTES
Discharge Summary  Discharge Summary from Physical Therapy Report      RegGrace Barnett  1950  Number of Visits: 6       Goals: Partially Met    Discharge Plan: Continue with current home exercise program as instructed    Comments: patient called to cancel remaining PT aptms. She has to work a different work schedule and will not be able to come for PT. Plans on continuing with her HEP.     Date of Discharge: 21    Nicole Guerra, PT  Physical Therapist

## 2021-05-05 ENCOUNTER — TELEPHONE (OUTPATIENT)
Dept: ORTHOPEDIC SURGERY | Facility: CLINIC | Age: 71
End: 2021-05-05

## 2021-05-17 ENCOUNTER — HOSPITAL ENCOUNTER (OUTPATIENT)
Dept: CARDIOLOGY | Facility: HOSPITAL | Age: 71
Discharge: HOME | End: 2021-05-17

## 2021-05-17 ENCOUNTER — OFFICE VISIT (OUTPATIENT)
Dept: CARDIOLOGY | Facility: CLINIC | Age: 71
End: 2021-05-17

## 2021-05-17 VITALS
SYSTOLIC BLOOD PRESSURE: 122 MMHG | HEART RATE: 65 BPM | WEIGHT: 186 LBS | BODY MASS INDEX: 32.96 KG/M2 | DIASTOLIC BLOOD PRESSURE: 76 MMHG | HEIGHT: 63 IN

## 2021-05-17 VITALS
BODY MASS INDEX: 32.96 KG/M2 | DIASTOLIC BLOOD PRESSURE: 72 MMHG | HEIGHT: 63 IN | SYSTOLIC BLOOD PRESSURE: 145 MMHG | WEIGHT: 186 LBS

## 2021-05-17 DIAGNOSIS — I10 ESSENTIAL HYPERTENSION: ICD-10-CM

## 2021-05-17 DIAGNOSIS — R07.89 CHEST DISCOMFORT: ICD-10-CM

## 2021-05-17 DIAGNOSIS — E78.2 MIXED HYPERLIPIDEMIA: ICD-10-CM

## 2021-05-17 DIAGNOSIS — Z98.61 STATUS POST PERCUTANEOUS TRANSLUMINAL CORONARY ANGIOPLASTY: ICD-10-CM

## 2021-05-17 DIAGNOSIS — Z98.61 STATUS POST PERCUTANEOUS TRANSLUMINAL CORONARY ANGIOPLASTY: Primary | ICD-10-CM

## 2021-05-17 LAB
BH CV ECHO MEAS - ACS: 2 CM
BH CV ECHO MEAS - AO MAX PG (FULL): 4.7 MMHG
BH CV ECHO MEAS - AO MAX PG: 8.8 MMHG
BH CV ECHO MEAS - AO MEAN PG (FULL): 2.4 MMHG
BH CV ECHO MEAS - AO MEAN PG: 4.4 MMHG
BH CV ECHO MEAS - AO ROOT AREA (BSA CORRECTED): 1.5
BH CV ECHO MEAS - AO ROOT AREA: 5.8 CM^2
BH CV ECHO MEAS - AO ROOT DIAM: 2.7 CM
BH CV ECHO MEAS - AO V2 MAX: 148.7 CM/SEC
BH CV ECHO MEAS - AO V2 MEAN: 96.5 CM/SEC
BH CV ECHO MEAS - AO V2 VTI: 33.4 CM
BH CV ECHO MEAS - ASC AORTA: 3.5 CM
BH CV ECHO MEAS - AVA(I,A): 2.2 CM^2
BH CV ECHO MEAS - AVA(I,D): 2.2 CM^2
BH CV ECHO MEAS - AVA(V,A): 2.1 CM^2
BH CV ECHO MEAS - AVA(V,D): 2.1 CM^2
BH CV ECHO MEAS - BSA(HAYCOCK): 1.9 M^2
BH CV ECHO MEAS - BSA: 1.8 M^2
BH CV ECHO MEAS - BZI_BMI: 37.8 KILOGRAMS/M^2
BH CV ECHO MEAS - BZI_METRIC_HEIGHT: 149.9 CM
BH CV ECHO MEAS - BZI_METRIC_WEIGHT: 84.8 KG
BH CV ECHO MEAS - EDV(CUBED): 63.5 ML
BH CV ECHO MEAS - EDV(MOD-SP4): 65.8 ML
BH CV ECHO MEAS - EDV(TEICH): 69.6 ML
BH CV ECHO MEAS - EF(CUBED): 68.9 %
BH CV ECHO MEAS - EF(MOD-BP): 54 %
BH CV ECHO MEAS - EF(MOD-SP4): 54.3 %
BH CV ECHO MEAS - EF(TEICH): 61 %
BH CV ECHO MEAS - ESV(CUBED): 19.8 ML
BH CV ECHO MEAS - ESV(MOD-SP4): 30.1 ML
BH CV ECHO MEAS - ESV(TEICH): 27.1 ML
BH CV ECHO MEAS - FS: 32.2 %
BH CV ECHO MEAS - IVS/LVPW: 1.6
BH CV ECHO MEAS - IVSD: 1.4 CM
BH CV ECHO MEAS - LA DIMENSION(2D): 4.1 CM
BH CV ECHO MEAS - LA DIMENSION: 4 CM
BH CV ECHO MEAS - LA/AO: 1.5
BH CV ECHO MEAS - LV DIASTOLIC VOL/BSA (35-75): 36.7 ML/M^2
BH CV ECHO MEAS - LV MASS(C)D: 157.4 GRAMS
BH CV ECHO MEAS - LV MASS(C)DI: 87.9 GRAMS/M^2
BH CV ECHO MEAS - LV MAX PG: 4.1 MMHG
BH CV ECHO MEAS - LV MEAN PG: 2 MMHG
BH CV ECHO MEAS - LV SYSTOLIC VOL/BSA (12-30): 16.8 ML/M^2
BH CV ECHO MEAS - LV V1 MAX: 101.8 CM/SEC
BH CV ECHO MEAS - LV V1 MEAN: 66.5 CM/SEC
BH CV ECHO MEAS - LV V1 VTI: 23.7 CM
BH CV ECHO MEAS - LVIDD: 4 CM
BH CV ECHO MEAS - LVIDS: 2.7 CM
BH CV ECHO MEAS - LVOT AREA: 3.1 CM^2
BH CV ECHO MEAS - LVOT DIAM: 2 CM
BH CV ECHO MEAS - LVPWD: 0.9 CM
BH CV ECHO MEAS - MV A MAX VEL: 80.4 CM/SEC
BH CV ECHO MEAS - MV DEC SLOPE: 237.6 CM/SEC^2
BH CV ECHO MEAS - MV DEC TIME: 0.26 SEC
BH CV ECHO MEAS - MV E MAX VEL: 62.1 CM/SEC
BH CV ECHO MEAS - MV E/A: 0.77
BH CV ECHO MEAS - MV MAX PG: 3.2 MMHG
BH CV ECHO MEAS - MV MEAN PG: 1.3 MMHG
BH CV ECHO MEAS - MV V2 MAX: 88.9 CM/SEC
BH CV ECHO MEAS - MV V2 MEAN: 52.7 CM/SEC
BH CV ECHO MEAS - MV V2 VTI: 24.6 CM
BH CV ECHO MEAS - MVA(VTI): 3 CM^2
BH CV ECHO MEAS - PA MAX PG (FULL): 1.5 MMHG
BH CV ECHO MEAS - PA MAX PG: 3 MMHG
BH CV ECHO MEAS - PA MEAN PG (FULL): 0.78 MMHG
BH CV ECHO MEAS - PA MEAN PG: 1.7 MMHG
BH CV ECHO MEAS - PA V2 MAX: 87.3 CM/SEC
BH CV ECHO MEAS - PA V2 MEAN: 60.3 CM/SEC
BH CV ECHO MEAS - PA V2 VTI: 20 CM
BH CV ECHO MEAS - PI END-D VEL: 88.8 CM/SEC
BH CV ECHO MEAS - PULM DIAS VEL: 46.4 CM/SEC
BH CV ECHO MEAS - PULM S/D: 1.3
BH CV ECHO MEAS - PULM SYS VEL: 61 CM/SEC
BH CV ECHO MEAS - RAP SYSTOLE: 3 MMHG
BH CV ECHO MEAS - RV MAX PG: 1.6 MMHG
BH CV ECHO MEAS - RV MEAN PG: 0.88 MMHG
BH CV ECHO MEAS - RV V1 MAX: 63 CM/SEC
BH CV ECHO MEAS - RV V1 MEAN: 44.4 CM/SEC
BH CV ECHO MEAS - RV V1 VTI: 15.4 CM
BH CV ECHO MEAS - RVDD: 2.7 CM
BH CV ECHO MEAS - RVSP: 6.4 MMHG
BH CV ECHO MEAS - SI(AO): 107.9 ML/M^2
BH CV ECHO MEAS - SI(CUBED): 24.4 ML/M^2
BH CV ECHO MEAS - SI(LVOT): 41.3 ML/M^2
BH CV ECHO MEAS - SI(MOD-SP4): 19.9 ML/M^2
BH CV ECHO MEAS - SI(TEICH): 23.7 ML/M^2
BH CV ECHO MEAS - SV(AO): 193.4 ML
BH CV ECHO MEAS - SV(CUBED): 43.7 ML
BH CV ECHO MEAS - SV(LVOT): 74 ML
BH CV ECHO MEAS - SV(MOD-SP4): 35.8 ML
BH CV ECHO MEAS - SV(TEICH): 42.5 ML
BH CV ECHO MEAS - TR MAX VEL: 91.3 CM/SEC
BH CV REST NUCLEAR ISOTOPE DOSE: 10.4 MCI
BH CV STRESS COMMENTS STAGE 1: NORMAL
BH CV STRESS DOSE REGADENOSON STAGE 1: 0.4
BH CV STRESS DURATION MIN STAGE 1: 0
BH CV STRESS DURATION SEC STAGE 1: 10
BH CV STRESS NUCLEAR ISOTOPE DOSE: 32.4 MCI
BH CV STRESS PROTOCOL 1: NORMAL
BH CV STRESS RECOVERY BP: NORMAL MMHG
BH CV STRESS RECOVERY HR: 99 BPM
BH CV STRESS STAGE 1: 1
LV EF 2D ECHO EST: 60 %
MAXIMAL PREDICTED HEART RATE: 149 BPM
STRESS BASELINE BP: NORMAL MMHG
STRESS BASELINE HR: 65 BPM
STRESS TARGET HR: 127 BPM

## 2021-05-17 PROCEDURE — 25010000002 REGADENOSON 0.4 MG/5ML SOLUTION: Performed by: RADIOLOGY

## 2021-05-17 PROCEDURE — 99213 OFFICE O/P EST LOW 20 MIN: CPT | Performed by: INTERNAL MEDICINE

## 2021-05-17 PROCEDURE — 0 TECHNETIUM SESTAMIBI: Performed by: INTERNAL MEDICINE

## 2021-05-17 PROCEDURE — 78452 HT MUSCLE IMAGE SPECT MULT: CPT

## 2021-05-17 PROCEDURE — 93306 TTE W/DOPPLER COMPLETE: CPT | Performed by: INTERNAL MEDICINE

## 2021-05-17 PROCEDURE — A9500 TC99M SESTAMIBI: HCPCS | Performed by: INTERNAL MEDICINE

## 2021-05-17 PROCEDURE — 93017 CV STRESS TEST TRACING ONLY: CPT

## 2021-05-17 PROCEDURE — 93306 TTE W/DOPPLER COMPLETE: CPT

## 2021-05-17 PROCEDURE — 78452 HT MUSCLE IMAGE SPECT MULT: CPT | Performed by: INTERNAL MEDICINE

## 2021-05-17 PROCEDURE — 93018 CV STRESS TEST I&R ONLY: CPT | Performed by: INTERNAL MEDICINE

## 2021-05-17 RX ORDER — METOPROLOL SUCCINATE 25 MG/1
TABLET, EXTENDED RELEASE ORAL
Qty: 90 TABLET | Refills: 3 | Status: SHIPPED | OUTPATIENT
Start: 2021-05-17 | End: 2022-06-21

## 2021-05-17 RX ADMIN — TECHNETIUM TC 99M SESTAMIBI 1 DOSE: 1 INJECTION INTRAVENOUS at 13:45

## 2021-05-17 RX ADMIN — TECHNETIUM TC 99M SESTAMIBI 1 DOSE: 1 INJECTION INTRAVENOUS at 12:28

## 2021-05-17 RX ADMIN — REGADENOSON 0.4 MG: 0.08 INJECTION, SOLUTION INTRAVENOUS at 13:45

## 2021-05-17 NOTE — PROGRESS NOTES
Encounter Date:05/17/2021  Last seen 4/22/2021      Patient ID: Charleen Barnett is a 71 y.o. female.    Chief Complaint:    Status post stent  Hypertension  Dyslipidemia  History of palpitations  Chest discomfort-new problem     History of Present Illness  HPI  Assessment and Plan         ////////////////////////  Impression  ==================  -Chest discomfort and back discomfort with exertion suggestive of angina pectoris.-Improved.  Stress Cardiolite test-- 5/17/2021.  Echocardiogram-normal except for left atrial enlargement 5/17/2021.      - status post stent to LAD 06/10/2014      negative stress Cardiolite test at [facility] August 2018.     -palpitations likely SVT.  -improved on atenolol     Echocardiogram showed mild aortic regurgitation with normal left ventricular function.  5/18/2017     - hypertension dyslipidemia sleep apnea asthma fibromyalgia GERD and gout.     - family history of coronary artery disease     - status post subendocardial myocardial infarction prior to stent placement     - allergy to penicillin sulfa and tetracycline     =======  Plan  =======  -Recent chest discomfort and back discomfort with exertion suggestive of angina pectoris.-Improved  Status post stent.     EKG showed sinus rhythm without any ischemic changes  Stress Cardiolite test-normal as above  Echocardiogram-as above     Hypertension  Continue metoprolol     Palpitations-better.  Continue metoprolol XL 25 mg a day.     Dyslipidemia-continue atorvastatin     Medications were reviewed and updated.   followup in the office  in 6 months  Consider cardiac catheterization if patient has continued symptoms.  Further plan will depend on patient's progress  //////////////////////////////                    Diagnosis Plan   1. Status post percutaneous transluminal coronary angioplasty     2. Mixed hyperlipidemia     3. Essential hypertension     LAB RESULTS (LAST 7 DAYS)    CBC        BMP        CMP         BNP         TROPONIN        CoAg        Creatinine Clearance  CrCl cannot be calculated (Patient's most recent lab result is older than the maximum 30 days allowed.).    ABG        Radiology  No radiology results for the last day                The following portions of the patient's history were reviewed and updated as appropriate: allergies, current medications, past family history, past medical history, past social history, past surgical history and problem list.    Review of Systems   Constitutional: Negative for malaise/fatigue.   Cardiovascular: Negative for chest pain, leg swelling, palpitations and syncope.   Respiratory: Negative for shortness of breath.    Skin: Negative for rash.   Gastrointestinal: Negative for nausea and vomiting.   Neurological: Negative for dizziness, light-headedness and numbness.         Current Outpatient Medications:   •  albuterol sulfate HFA (VENTOLIN HFA) 108 (90 Base) MCG/ACT inhaler, VENTOLIN  (90 Base) MCG/ACT AERS, Disp: , Rfl:   •  aspirin 81 MG tablet, Take 81 mg by mouth Daily., Disp: , Rfl:   •  atorvastatin (LIPITOR) 10 MG tablet, Take 1 tablet by mouth Daily., Disp: 90 tablet, Rfl: 3  •  Biotin 10 MG capsule, Take  by mouth., Disp: , Rfl:   •  buPROPion XL (Wellbutrin XL) 150 MG 24 hr tablet, Take 1 tablet by mouth Every Morning., Disp: 90 tablet, Rfl: 3  •  busPIRone (BUSPAR) 7.5 MG tablet, Take 1 tablet by mouth 2 (two) times a day., Disp: 180 tablet, Rfl: 3  •  citalopram (CeleXA) 20 MG tablet, Take 1 tablet by mouth Daily., Disp: 90 tablet, Rfl: 3  •  denosumab (PROLIA) 60 MG/ML solution prefilled syringe syringe, PROLIA 60 MG/ML SOSY, Disp: , Rfl:   •  gabapentin (NEURONTIN) 400 MG capsule, TAKE 1 CAPSULE BY MOUTH AT BEDTIME, Disp: 90 capsule, Rfl: 0  •  lansoprazole (PREVACID) 15 MG capsule, Take 15 mg by mouth Daily., Disp: , Rfl:   •  Magnesium 250 MG tablet, Take  by mouth., Disp: , Rfl:   •  metoprolol succinate XL (TOPROL XL) 25 MG 24 hr tablet, Take 1  tablet by mouth Daily., Disp: 90 tablet, Rfl: 3  •  montelukast (SINGULAIR) 10 MG tablet, Take 1 tablet by mouth Daily., Disp: 90 tablet, Rfl: 2  •  oxybutynin (DITROPAN) 5 MG tablet, TAKE 1 TABLET BY MOUTH 2 TIMES A DAY. LAST REFILL UNTIL SEEN IN OFFICE, Disp: 180 tablet, Rfl: 0  •  vitamin B-12 (CYANOCOBALAMIN) 100 MCG tablet, Take 50 mcg by mouth Daily., Disp: , Rfl:   •  vitamin C (ASCORBIC ACID) 250 MG tablet, Take 250 mg by mouth Daily., Disp: , Rfl:   •  vitamin D (ERGOCALCIFEROL) 1.25 MG (81041 UT) capsule capsule, TAKE 1 CAPSULE BY MOUTH EVERY WEEK, Disp: 13 capsule, Rfl: 2  •  vitamin E 100 UNIT capsule, Take 100 Units by mouth Daily., Disp: , Rfl:     Current Facility-Administered Medications:   •  denosumab (PROLIA) syringe 60 mg, 60 mg, Subcutaneous, Q6 Months, Dede Anders PA, 60 mg at 12/01/20 1104    Allergies   Allergen Reactions   • Chlorpheniramine-Phenylephrine Rash   • Penicillins Rash   • Sulfa Antibiotics Hives and Rash   • Tetracycline Rash   • Warfarin Rash       Family History   Problem Relation Age of Onset   • Heart attack Father    • Cancer Father        Past Surgical History:   Procedure Laterality Date   • BLADDER SURGERY     • CHOLECYSTECTOMY     • HYSTERECTOMY     • KNEE SURGERY     • OTHER SURGICAL HISTORY      STENT       Past Medical History:   Diagnosis Date   • Anxiety    • Asthma 2/13/2020   • Ramos's esophagus 2/15/2018   • Depression 8/27/2018   • Fibromyalgia 12/14/2011   • GERD (gastroesophageal reflux disease)    • Gout 2/13/2020   • Joint pain    • Obstructive sleep apnea 10/16/2014   • Osteoporosis     fosamax(SE), on prolia(4/10/2018)   • Pedal edema 10/22/2018   • Presence of left artificial knee joint 3/14/2019   • Seasonal allergies    • ST elevation (STEMI) myocardial infarction (CMS/[facility]) 2/13/2020   • Status post percutaneous transluminal coronary angioplasty 6/10/2014   • Tachycardia    • Urge incontinence 8/27/2018   • Vitamin D deficiency 3/19/2015  "      Family History   Problem Relation Age of Onset   • Heart attack Father    • Cancer Father        Social History     Socioeconomic History   • Marital status:      Spouse name: Not on file   • Number of children: Not on file   • Years of education: Not on file   • Highest education level: Not on file   Tobacco Use   • Smoking status: Never Smoker   • Smokeless tobacco: Never Used   Substance and Sexual Activity   • Alcohol use: Not Currently   • Drug use: Never   • Sexual activity: Defer         Procedures      Objective:       Physical Exam    /76   Pulse 65   Ht 160 cm (63\")   Wt 84.4 kg (186 lb)   BMI 32.95 kg/m²   The patient is alert, oriented and in no distress.    Vital signs as noted above.    Head and neck revealed no carotid bruits or jugular venous distension.  No thyromegaly or lymphadenopathy is present.    Lungs clear.  No wheezing.  Breath sounds are normal bilaterally.    Heart normal first and second heart sounds.  No murmur..  No pericardial rub is present.  No gallop is present.    Abdomen soft and nontender.  No organomegaly is present.    Extremities revealed good peripheral pulses without any pedal edema.    Skin warm and dry.    Musculoskeletal system is grossly normal.    CNS grossly normal.    Reviewed and unchanged from last visit.        "

## 2021-05-28 ENCOUNTER — APPOINTMENT (OUTPATIENT)
Dept: CT IMAGING | Facility: HOSPITAL | Age: 71
End: 2021-05-28

## 2021-05-28 ENCOUNTER — HOSPITAL ENCOUNTER (OUTPATIENT)
Facility: HOSPITAL | Age: 71
Discharge: HOME | End: 2021-05-30
Attending: FAMILY MEDICINE | Admitting: EMERGENCY MEDICINE

## 2021-05-28 ENCOUNTER — APPOINTMENT (OUTPATIENT)
Dept: GENERAL RADIOLOGY | Facility: HOSPITAL | Age: 71
End: 2021-05-28

## 2021-05-28 DIAGNOSIS — R07.89 CHEST TIGHTNESS: ICD-10-CM

## 2021-05-28 DIAGNOSIS — I20.0 UNSTABLE ANGINA (HCC): Primary | ICD-10-CM

## 2021-05-28 DIAGNOSIS — R06.00 DYSPNEA, UNSPECIFIED TYPE: ICD-10-CM

## 2021-05-28 DIAGNOSIS — N39.0 ACUTE UTI: ICD-10-CM

## 2021-05-28 LAB
ALBUMIN SERPL-MCNC: 3.9 G/DL (ref 3.5–5.2)
ALBUMIN/GLOB SERPL: 2.1 G/DL
ALP SERPL-CCNC: 59 U/L (ref 39–117)
ALT SERPL W P-5'-P-CCNC: 10 U/L (ref 1–33)
ANION GAP SERPL CALCULATED.3IONS-SCNC: 12 MMOL/L (ref 5–15)
AST SERPL-CCNC: 15 U/L (ref 1–32)
B PARAPERT DNA SPEC QL NAA+PROBE: NOT DETECTED
B PERT DNA SPEC QL NAA+PROBE: NOT DETECTED
BACTERIA UR QL AUTO: ABNORMAL /HPF
BASOPHILS # BLD AUTO: 0.1 10*3/MM3 (ref 0–0.2)
BASOPHILS NFR BLD AUTO: 0.8 % (ref 0–1.5)
BILIRUB SERPL-MCNC: 0.2 MG/DL (ref 0–1.2)
BILIRUB UR QL STRIP: NEGATIVE
BUN SERPL-MCNC: 12 MG/DL (ref 8–23)
BUN/CREAT SERPL: 14.3 (ref 7–25)
C PNEUM DNA NPH QL NAA+NON-PROBE: NOT DETECTED
CALCIUM SPEC-SCNC: 8.9 MG/DL (ref 8.6–10.5)
CHLORIDE SERPL-SCNC: 103 MMOL/L (ref 98–107)
CLARITY UR: CLEAR
CO2 SERPL-SCNC: 26 MMOL/L (ref 22–29)
COLOR UR: YELLOW
CREAT SERPL-MCNC: 0.84 MG/DL (ref 0.57–1)
D DIMER PPP FEU-MCNC: 0.79 MG/L (FEU) (ref 0–0.59)
DEPRECATED RDW RBC AUTO: 40.7 FL (ref 37–54)
EOSINOPHIL # BLD AUTO: 0.2 10*3/MM3 (ref 0–0.4)
EOSINOPHIL NFR BLD AUTO: 2.2 % (ref 0.3–6.2)
ERYTHROCYTE [DISTWIDTH] IN BLOOD BY AUTOMATED COUNT: 13.5 % (ref 12.3–15.4)
FLUAV SUBTYP SPEC NAA+PROBE: NOT DETECTED
FLUBV RNA ISLT QL NAA+PROBE: NOT DETECTED
GFR SERPL CREATININE-BSD FRML MDRD: 67 ML/MIN/1.73
GLOBULIN UR ELPH-MCNC: 1.9 GM/DL
GLUCOSE SERPL-MCNC: 86 MG/DL (ref 65–99)
GLUCOSE UR STRIP-MCNC: NEGATIVE MG/DL
HADV DNA SPEC NAA+PROBE: NOT DETECTED
HCOV 229E RNA SPEC QL NAA+PROBE: NOT DETECTED
HCOV HKU1 RNA SPEC QL NAA+PROBE: NOT DETECTED
HCOV NL63 RNA SPEC QL NAA+PROBE: NOT DETECTED
HCOV OC43 RNA SPEC QL NAA+PROBE: NOT DETECTED
HCT VFR BLD AUTO: 36.4 % (ref 34–46.6)
HGB BLD-MCNC: 12.1 G/DL (ref 12–15.9)
HGB UR QL STRIP.AUTO: NEGATIVE
HMPV RNA NPH QL NAA+NON-PROBE: NOT DETECTED
HOLD SPECIMEN: NORMAL
HOLD SPECIMEN: NORMAL
HPIV1 RNA SPEC QL NAA+PROBE: NOT DETECTED
HPIV2 RNA SPEC QL NAA+PROBE: NOT DETECTED
HPIV3 RNA NPH QL NAA+PROBE: NOT DETECTED
HPIV4 P GENE NPH QL NAA+PROBE: NOT DETECTED
HYALINE CASTS UR QL AUTO: ABNORMAL /LPF
KETONES UR QL STRIP: NEGATIVE
LEUKOCYTE ESTERASE UR QL STRIP.AUTO: ABNORMAL
LYMPHOCYTES # BLD AUTO: 2.1 10*3/MM3 (ref 0.7–3.1)
LYMPHOCYTES NFR BLD AUTO: 29.8 % (ref 19.6–45.3)
M PNEUMO IGG SER IA-ACNC: NOT DETECTED
MCH RBC QN AUTO: 28.9 PG (ref 26.6–33)
MCHC RBC AUTO-ENTMCNC: 33.2 G/DL (ref 31.5–35.7)
MCV RBC AUTO: 87 FL (ref 79–97)
MONOCYTES # BLD AUTO: 0.5 10*3/MM3 (ref 0.1–0.9)
MONOCYTES NFR BLD AUTO: 7.3 % (ref 5–12)
NEUTROPHILS NFR BLD AUTO: 4.1 10*3/MM3 (ref 1.7–7)
NEUTROPHILS NFR BLD AUTO: 59.9 % (ref 42.7–76)
NITRITE UR QL STRIP: NEGATIVE
NRBC BLD AUTO-RTO: 0.1 /100 WBC (ref 0–0.2)
NT-PROBNP SERPL-MCNC: 682.9 PG/ML (ref 0–900)
PH UR STRIP.AUTO: 7.5 [PH] (ref 5–8)
PLATELET # BLD AUTO: 189 10*3/MM3 (ref 140–450)
PMV BLD AUTO: 8.8 FL (ref 6–12)
POTASSIUM SERPL-SCNC: 4 MMOL/L (ref 3.5–5.2)
PROT SERPL-MCNC: 5.8 G/DL (ref 6–8.5)
PROT UR QL STRIP: NEGATIVE
RBC # BLD AUTO: 4.19 10*6/MM3 (ref 3.77–5.28)
RBC # UR: ABNORMAL /HPF
REF LAB TEST METHOD: ABNORMAL
RHINOVIRUS RNA SPEC NAA+PROBE: NOT DETECTED
RSV RNA NPH QL NAA+NON-PROBE: NOT DETECTED
SARS-COV-2 RNA NPH QL NAA+NON-PROBE: NOT DETECTED
SODIUM SERPL-SCNC: 141 MMOL/L (ref 136–145)
SP GR UR STRIP: <=1.005 (ref 1–1.03)
SQUAMOUS #/AREA URNS HPF: ABNORMAL /HPF
TROPONIN T SERPL-MCNC: <0.01 NG/ML (ref 0–0.03)
TROPONIN T SERPL-MCNC: <0.01 NG/ML (ref 0–0.03)
UROBILINOGEN UR QL STRIP: ABNORMAL
WBC # BLD AUTO: 6.9 10*3/MM3 (ref 3.4–10.8)
WBC UR QL AUTO: ABNORMAL /HPF
WHOLE BLOOD HOLD SPECIMEN: NORMAL
WHOLE BLOOD HOLD SPECIMEN: NORMAL

## 2021-05-28 PROCEDURE — 71045 X-RAY EXAM CHEST 1 VIEW: CPT

## 2021-05-28 PROCEDURE — G0378 HOSPITAL OBSERVATION PER HR: HCPCS

## 2021-05-28 PROCEDURE — 93005 ELECTROCARDIOGRAM TRACING: CPT | Performed by: EMERGENCY MEDICINE

## 2021-05-28 PROCEDURE — 85379 FIBRIN DEGRADATION QUANT: CPT | Performed by: NURSE PRACTITIONER

## 2021-05-28 PROCEDURE — 84484 ASSAY OF TROPONIN QUANT: CPT | Performed by: PHYSICIAN ASSISTANT

## 2021-05-28 PROCEDURE — 85025 COMPLETE CBC W/AUTO DIFF WBC: CPT | Performed by: NURSE PRACTITIONER

## 2021-05-28 PROCEDURE — 93005 ELECTROCARDIOGRAM TRACING: CPT

## 2021-05-28 PROCEDURE — 25010000002 CEFTRIAXONE PER 250 MG: Performed by: PHYSICIAN ASSISTANT

## 2021-05-28 PROCEDURE — 83880 ASSAY OF NATRIURETIC PEPTIDE: CPT | Performed by: NURSE PRACTITIONER

## 2021-05-28 PROCEDURE — 0 IOPAMIDOL PER 1 ML: Performed by: NURSE PRACTITIONER

## 2021-05-28 PROCEDURE — 99284 EMERGENCY DEPT VISIT MOD MDM: CPT

## 2021-05-28 PROCEDURE — 81001 URINALYSIS AUTO W/SCOPE: CPT | Performed by: NURSE PRACTITIONER

## 2021-05-28 PROCEDURE — 80053 COMPREHEN METABOLIC PANEL: CPT | Performed by: NURSE PRACTITIONER

## 2021-05-28 PROCEDURE — 0202U NFCT DS 22 TRGT SARS-COV-2: CPT | Performed by: NURSE PRACTITIONER

## 2021-05-28 PROCEDURE — 71275 CT ANGIOGRAPHY CHEST: CPT

## 2021-05-28 PROCEDURE — 84484 ASSAY OF TROPONIN QUANT: CPT | Performed by: NURSE PRACTITIONER

## 2021-05-28 PROCEDURE — 96376 TX/PRO/DX INJ SAME DRUG ADON: CPT

## 2021-05-28 RX ORDER — ACETAMINOPHEN 650 MG/1
650 SUPPOSITORY RECTAL EVERY 4 HOURS PRN
Status: DISCONTINUED | OUTPATIENT
Start: 2021-05-28 | End: 2021-05-31 | Stop reason: HOSPADM

## 2021-05-28 RX ORDER — NITROGLYCERIN 0.4 MG/1
0.4 TABLET SUBLINGUAL
Status: DISCONTINUED | OUTPATIENT
Start: 2021-05-28 | End: 2021-05-31 | Stop reason: HOSPADM

## 2021-05-28 RX ORDER — MAGNESIUM SULFATE 1 G/100ML
1 INJECTION INTRAVENOUS AS NEEDED
Status: DISCONTINUED | OUTPATIENT
Start: 2021-05-28 | End: 2021-05-31 | Stop reason: HOSPADM

## 2021-05-28 RX ORDER — ONDANSETRON 2 MG/ML
4 INJECTION INTRAMUSCULAR; INTRAVENOUS EVERY 6 HOURS PRN
Status: DISCONTINUED | OUTPATIENT
Start: 2021-05-28 | End: 2021-05-31 | Stop reason: HOSPADM

## 2021-05-28 RX ORDER — GABAPENTIN 400 MG/1
400 CAPSULE ORAL NIGHTLY
Status: DISCONTINUED | OUTPATIENT
Start: 2021-05-29 | End: 2021-05-31 | Stop reason: HOSPADM

## 2021-05-28 RX ORDER — MAGNESIUM SULFATE HEPTAHYDRATE 40 MG/ML
2 INJECTION, SOLUTION INTRAVENOUS AS NEEDED
Status: DISCONTINUED | OUTPATIENT
Start: 2021-05-28 | End: 2021-05-31 | Stop reason: HOSPADM

## 2021-05-28 RX ORDER — ASPIRIN 81 MG/1
81 TABLET ORAL DAILY
Status: DISCONTINUED | OUTPATIENT
Start: 2021-05-29 | End: 2021-05-31 | Stop reason: HOSPADM

## 2021-05-28 RX ORDER — KETOTIFEN FUMARATE 0.35 MG/ML
1 SOLUTION/ DROPS OPHTHALMIC 2 TIMES DAILY
Status: DISCONTINUED | OUTPATIENT
Start: 2021-05-29 | End: 2021-05-31 | Stop reason: HOSPADM

## 2021-05-28 RX ORDER — OXYBUTYNIN CHLORIDE 5 MG/1
5 TABLET ORAL 2 TIMES DAILY
Status: DISCONTINUED | OUTPATIENT
Start: 2021-05-29 | End: 2021-05-31 | Stop reason: HOSPADM

## 2021-05-28 RX ORDER — ONDANSETRON 4 MG/1
4 TABLET, FILM COATED ORAL EVERY 6 HOURS PRN
Status: DISCONTINUED | OUTPATIENT
Start: 2021-05-28 | End: 2021-05-31 | Stop reason: HOSPADM

## 2021-05-28 RX ORDER — BUPROPION HYDROCHLORIDE 150 MG/1
150 TABLET ORAL EVERY MORNING
Status: DISCONTINUED | OUTPATIENT
Start: 2021-05-29 | End: 2021-05-31 | Stop reason: HOSPADM

## 2021-05-28 RX ORDER — CHOLECALCIFEROL (VITAMIN D3) 125 MCG
5 CAPSULE ORAL NIGHTLY PRN
Status: DISCONTINUED | OUTPATIENT
Start: 2021-05-28 | End: 2021-05-31 | Stop reason: HOSPADM

## 2021-05-28 RX ORDER — ACETAMINOPHEN 325 MG/1
650 TABLET ORAL EVERY 4 HOURS PRN
Status: DISCONTINUED | OUTPATIENT
Start: 2021-05-28 | End: 2021-05-31 | Stop reason: HOSPADM

## 2021-05-28 RX ORDER — MONTELUKAST SODIUM 10 MG/1
10 TABLET ORAL DAILY
Status: DISCONTINUED | OUTPATIENT
Start: 2021-05-29 | End: 2021-05-31 | Stop reason: HOSPADM

## 2021-05-28 RX ORDER — ALUMINA, MAGNESIA, AND SIMETHICONE 2400; 2400; 240 MG/30ML; MG/30ML; MG/30ML
15 SUSPENSION ORAL EVERY 6 HOURS PRN
Status: DISCONTINUED | OUTPATIENT
Start: 2021-05-28 | End: 2021-05-31 | Stop reason: HOSPADM

## 2021-05-28 RX ORDER — METOPROLOL SUCCINATE 25 MG/1
25 TABLET, EXTENDED RELEASE ORAL EVERY MORNING
Status: DISCONTINUED | OUTPATIENT
Start: 2021-05-29 | End: 2021-05-31 | Stop reason: HOSPADM

## 2021-05-28 RX ORDER — POTASSIUM CHLORIDE 20 MEQ/1
40 TABLET, EXTENDED RELEASE ORAL AS NEEDED
Status: DISCONTINUED | OUTPATIENT
Start: 2021-05-28 | End: 2021-05-31 | Stop reason: HOSPADM

## 2021-05-28 RX ORDER — CITALOPRAM 20 MG/1
20 TABLET ORAL DAILY
Status: DISCONTINUED | OUTPATIENT
Start: 2021-05-29 | End: 2021-05-31 | Stop reason: HOSPADM

## 2021-05-28 RX ORDER — SODIUM CHLORIDE 0.9 % (FLUSH) 0.9 %
10 SYRINGE (ML) INJECTION AS NEEDED
Status: DISCONTINUED | OUTPATIENT
Start: 2021-05-28 | End: 2021-05-31 | Stop reason: HOSPADM

## 2021-05-28 RX ORDER — PANTOPRAZOLE SODIUM 40 MG/1
40 TABLET, DELAYED RELEASE ORAL EVERY MORNING
Status: DISCONTINUED | OUTPATIENT
Start: 2021-05-29 | End: 2021-05-31 | Stop reason: HOSPADM

## 2021-05-28 RX ORDER — BUSPIRONE HYDROCHLORIDE 15 MG/1
7.5 TABLET ORAL EVERY 12 HOURS SCHEDULED
Status: DISCONTINUED | OUTPATIENT
Start: 2021-05-29 | End: 2021-05-31 | Stop reason: HOSPADM

## 2021-05-28 RX ORDER — SODIUM CHLORIDE 0.9 % (FLUSH) 0.9 %
10 SYRINGE (ML) INJECTION EVERY 12 HOURS SCHEDULED
Status: DISCONTINUED | OUTPATIENT
Start: 2021-05-28 | End: 2021-05-31 | Stop reason: HOSPADM

## 2021-05-28 RX ORDER — ACETAMINOPHEN 160 MG/5ML
650 SOLUTION ORAL EVERY 4 HOURS PRN
Status: DISCONTINUED | OUTPATIENT
Start: 2021-05-28 | End: 2021-05-31 | Stop reason: HOSPADM

## 2021-05-28 RX ORDER — UBIDECARENONE 75 MG
50 CAPSULE ORAL DAILY
Status: DISCONTINUED | OUTPATIENT
Start: 2021-05-29 | End: 2021-05-31 | Stop reason: HOSPADM

## 2021-05-28 RX ORDER — ALBUTEROL SULFATE 2.5 MG/3ML
2.5 SOLUTION RESPIRATORY (INHALATION) EVERY 6 HOURS PRN
Status: DISCONTINUED | OUTPATIENT
Start: 2021-05-28 | End: 2021-05-31 | Stop reason: HOSPADM

## 2021-05-28 RX ORDER — KETOTIFEN FUMARATE 0.35 MG/ML
1 SOLUTION/ DROPS OPHTHALMIC 2 TIMES DAILY
COMMUNITY
End: 2021-06-24

## 2021-05-28 RX ORDER — ATORVASTATIN CALCIUM 10 MG/1
10 TABLET, FILM COATED ORAL DAILY
Status: DISCONTINUED | OUTPATIENT
Start: 2021-05-29 | End: 2021-05-31 | Stop reason: HOSPADM

## 2021-05-28 RX ADMIN — OXYBUTYNIN CHLORIDE 5 MG: 5 TABLET ORAL at 23:48

## 2021-05-28 RX ADMIN — WATER 1 G: 100 INJECTION, SOLUTION INTRAVENOUS at 19:16

## 2021-05-28 RX ADMIN — Medication 10 ML: at 22:16

## 2021-05-28 RX ADMIN — GABAPENTIN 400 MG: 400 CAPSULE ORAL at 23:48

## 2021-05-28 RX ADMIN — BUSPIRONE HYDROCHLORIDE 7.5 MG: 15 TABLET ORAL at 23:48

## 2021-05-28 RX ADMIN — ACETAMINOPHEN 650 MG: 325 TABLET, FILM COATED ORAL at 22:16

## 2021-05-28 RX ADMIN — IOPAMIDOL 100 ML: 755 INJECTION, SOLUTION INTRAVENOUS at 17:38

## 2021-05-29 PROBLEM — I20.0 UNSTABLE ANGINA: Status: ACTIVE | Noted: 2021-05-28

## 2021-05-29 PROBLEM — I20.0 UNSTABLE ANGINA: Chronic | Status: ACTIVE | Noted: 2021-05-28

## 2021-05-29 PROBLEM — I25.700 CORONARY ARTERY DISEASE INVOLVING CORONARY BYPASS GRAFT OF NATIVE HEART WITH UNSTABLE ANGINA PECTORIS: Status: ACTIVE | Noted: 2021-05-29

## 2021-05-29 LAB
ACT BLD: 169 SECONDS (ref 89–137)
ACT BLD: 257 SECONDS (ref 89–137)
ANION GAP SERPL CALCULATED.3IONS-SCNC: 8 MMOL/L (ref 5–15)
BASOPHILS # BLD AUTO: 0 10*3/MM3 (ref 0–0.2)
BASOPHILS NFR BLD AUTO: 0.5 % (ref 0–1.5)
BUN SERPL-MCNC: 12 MG/DL (ref 8–23)
BUN/CREAT SERPL: 13.2 (ref 7–25)
CALCIUM SPEC-SCNC: 9.2 MG/DL (ref 8.6–10.5)
CHLORIDE SERPL-SCNC: 105 MMOL/L (ref 98–107)
CO2 SERPL-SCNC: 29 MMOL/L (ref 22–29)
CREAT SERPL-MCNC: 0.91 MG/DL (ref 0.57–1)
DEPRECATED RDW RBC AUTO: 38.9 FL (ref 37–54)
EOSINOPHIL # BLD AUTO: 0.2 10*3/MM3 (ref 0–0.4)
EOSINOPHIL NFR BLD AUTO: 3.2 % (ref 0.3–6.2)
ERYTHROCYTE [DISTWIDTH] IN BLOOD BY AUTOMATED COUNT: 13 % (ref 12.3–15.4)
GFR SERPL CREATININE-BSD FRML MDRD: 61 ML/MIN/1.73
GLUCOSE SERPL-MCNC: 85 MG/DL (ref 65–99)
HCT VFR BLD AUTO: 37.2 % (ref 34–46.6)
HGB BLD-MCNC: 12.3 G/DL (ref 12–15.9)
LYMPHOCYTES # BLD AUTO: 2.3 10*3/MM3 (ref 0.7–3.1)
LYMPHOCYTES NFR BLD AUTO: 41 % (ref 19.6–45.3)
MAGNESIUM SERPL-MCNC: 2 MG/DL (ref 1.6–2.4)
MCH RBC QN AUTO: 28.7 PG (ref 26.6–33)
MCHC RBC AUTO-ENTMCNC: 33.1 G/DL (ref 31.5–35.7)
MCV RBC AUTO: 86.8 FL (ref 79–97)
MONOCYTES # BLD AUTO: 0.4 10*3/MM3 (ref 0.1–0.9)
MONOCYTES NFR BLD AUTO: 7.6 % (ref 5–12)
NEUTROPHILS NFR BLD AUTO: 2.7 10*3/MM3 (ref 1.7–7)
NEUTROPHILS NFR BLD AUTO: 47.7 % (ref 42.7–76)
NRBC BLD AUTO-RTO: 0 /100 WBC (ref 0–0.2)
PLATELET # BLD AUTO: 188 10*3/MM3 (ref 140–450)
PMV BLD AUTO: 8.6 FL (ref 6–12)
POTASSIUM SERPL-SCNC: 4.1 MMOL/L (ref 3.5–5.2)
RBC # BLD AUTO: 4.28 10*6/MM3 (ref 3.77–5.28)
SODIUM SERPL-SCNC: 142 MMOL/L (ref 136–145)
TROPONIN T SERPL-MCNC: 0.01 NG/ML (ref 0–0.03)
TROPONIN T SERPL-MCNC: <0.01 NG/ML (ref 0–0.03)
TROPONIN T SERPL-MCNC: <0.01 NG/ML (ref 0–0.03)
WBC # BLD AUTO: 5.6 10*3/MM3 (ref 3.4–10.8)

## 2021-05-29 PROCEDURE — 85347 COAGULATION TIME ACTIVATED: CPT

## 2021-05-29 PROCEDURE — 25010000002 MIDAZOLAM PER 1 MG: Performed by: INTERNAL MEDICINE

## 2021-05-29 PROCEDURE — 63710000001 CLOPIDOGREL 300 MG TABLET: Performed by: INTERNAL MEDICINE

## 2021-05-29 PROCEDURE — 25010000002 HEPARIN (PORCINE) PER 1000 UNITS: Performed by: INTERNAL MEDICINE

## 2021-05-29 PROCEDURE — 0 IOPAMIDOL PER 1 ML: Performed by: INTERNAL MEDICINE

## 2021-05-29 PROCEDURE — A9270 NON-COVERED ITEM OR SERVICE: HCPCS | Performed by: INTERNAL MEDICINE

## 2021-05-29 PROCEDURE — C1894 INTRO/SHEATH, NON-LASER: HCPCS | Performed by: INTERNAL MEDICINE

## 2021-05-29 PROCEDURE — 93458 L HRT ARTERY/VENTRICLE ANGIO: CPT | Performed by: INTERNAL MEDICINE

## 2021-05-29 PROCEDURE — 84484 ASSAY OF TROPONIN QUANT: CPT | Performed by: PHYSICIAN ASSISTANT

## 2021-05-29 PROCEDURE — G0378 HOSPITAL OBSERVATION PER HR: HCPCS

## 2021-05-29 PROCEDURE — C1874 STENT, COATED/COV W/DEL SYS: HCPCS | Performed by: INTERNAL MEDICINE

## 2021-05-29 PROCEDURE — 25010000002 FENTANYL CITRATE (PF) 100 MCG/2ML SOLUTION: Performed by: INTERNAL MEDICINE

## 2021-05-29 PROCEDURE — C1887 CATHETER, GUIDING: HCPCS | Performed by: INTERNAL MEDICINE

## 2021-05-29 PROCEDURE — 80048 BASIC METABOLIC PNL TOTAL CA: CPT | Performed by: PHYSICIAN ASSISTANT

## 2021-05-29 PROCEDURE — 92928 PRQ TCAT PLMT NTRAC ST 1 LES: CPT | Performed by: INTERNAL MEDICINE

## 2021-05-29 PROCEDURE — 93005 ELECTROCARDIOGRAM TRACING: CPT | Performed by: INTERNAL MEDICINE

## 2021-05-29 PROCEDURE — 83735 ASSAY OF MAGNESIUM: CPT | Performed by: PHYSICIAN ASSISTANT

## 2021-05-29 PROCEDURE — 93571 IV DOP VEL&/PRESS C FLO 1ST: CPT | Performed by: INTERNAL MEDICINE

## 2021-05-29 PROCEDURE — C1725 CATH, TRANSLUMIN NON-LASER: HCPCS | Performed by: INTERNAL MEDICINE

## 2021-05-29 PROCEDURE — 63710000001 OXYBUTYNIN 5 MG TABLET: Performed by: INTERNAL MEDICINE

## 2021-05-29 PROCEDURE — 99215 OFFICE O/P EST HI 40 MIN: CPT | Performed by: INTERNAL MEDICINE

## 2021-05-29 PROCEDURE — 63710000001 BUSPIRONE 15 MG TABLET: Performed by: INTERNAL MEDICINE

## 2021-05-29 PROCEDURE — C1769 GUIDE WIRE: HCPCS | Performed by: INTERNAL MEDICINE

## 2021-05-29 PROCEDURE — 99152 MOD SED SAME PHYS/QHP 5/>YRS: CPT | Performed by: INTERNAL MEDICINE

## 2021-05-29 PROCEDURE — 25010000002 CEFTRIAXONE PER 250 MG: Performed by: INTERNAL MEDICINE

## 2021-05-29 PROCEDURE — 85025 COMPLETE CBC W/AUTO DIFF WBC: CPT | Performed by: PHYSICIAN ASSISTANT

## 2021-05-29 PROCEDURE — 99153 MOD SED SAME PHYS/QHP EA: CPT | Performed by: INTERNAL MEDICINE

## 2021-05-29 PROCEDURE — 93010 ELECTROCARDIOGRAM REPORT: CPT | Performed by: INTERNAL MEDICINE

## 2021-05-29 PROCEDURE — 63710000001 GABAPENTIN 400 MG CAPSULE: Performed by: INTERNAL MEDICINE

## 2021-05-29 PROCEDURE — C9600 PERC DRUG-EL COR STENT SING: HCPCS | Performed by: INTERNAL MEDICINE

## 2021-05-29 DEVICE — XIENCE SIERRA™ EVEROLIMUS ELUTING CORONARY STENT SYSTEM 3.00 MM X 28 MM / RAPID-EXCHANGE
Type: IMPLANTABLE DEVICE | Status: FUNCTIONAL
Brand: XIENCE SIERRA™

## 2021-05-29 RX ORDER — MIDAZOLAM HYDROCHLORIDE 1 MG/ML
INJECTION INTRAMUSCULAR; INTRAVENOUS AS NEEDED
Status: DISCONTINUED | OUTPATIENT
Start: 2021-05-29 | End: 2021-05-29 | Stop reason: HOSPADM

## 2021-05-29 RX ORDER — SODIUM CHLORIDE 9 MG/ML
100 INJECTION, SOLUTION INTRAVENOUS CONTINUOUS
Status: DISCONTINUED | OUTPATIENT
Start: 2021-05-29 | End: 2021-05-31 | Stop reason: HOSPADM

## 2021-05-29 RX ORDER — CLOPIDOGREL 300 MG/1
TABLET, FILM COATED ORAL AS NEEDED
Status: DISCONTINUED | OUTPATIENT
Start: 2021-05-29 | End: 2021-05-29 | Stop reason: HOSPADM

## 2021-05-29 RX ORDER — ACETAMINOPHEN 325 MG/1
650 TABLET ORAL EVERY 4 HOURS PRN
Status: DISCONTINUED | OUTPATIENT
Start: 2021-05-29 | End: 2021-05-31 | Stop reason: HOSPADM

## 2021-05-29 RX ORDER — SODIUM CHLORIDE 0.9 % (FLUSH) 0.9 %
3 SYRINGE (ML) INJECTION EVERY 12 HOURS SCHEDULED
Status: CANCELLED | OUTPATIENT
Start: 2021-05-29

## 2021-05-29 RX ORDER — LIDOCAINE HYDROCHLORIDE 20 MG/ML
INJECTION, SOLUTION INFILTRATION; PERINEURAL AS NEEDED
Status: DISCONTINUED | OUTPATIENT
Start: 2021-05-29 | End: 2021-05-29 | Stop reason: HOSPADM

## 2021-05-29 RX ORDER — CLOPIDOGREL BISULFATE 75 MG/1
75 TABLET ORAL DAILY
Status: DISCONTINUED | OUTPATIENT
Start: 2021-05-30 | End: 2021-05-31 | Stop reason: HOSPADM

## 2021-05-29 RX ORDER — FENTANYL CITRATE 50 UG/ML
INJECTION, SOLUTION INTRAMUSCULAR; INTRAVENOUS AS NEEDED
Status: DISCONTINUED | OUTPATIENT
Start: 2021-05-29 | End: 2021-05-29 | Stop reason: HOSPADM

## 2021-05-29 RX ORDER — HEPARIN SODIUM 1000 [USP'U]/ML
INJECTION, SOLUTION INTRAVENOUS; SUBCUTANEOUS AS NEEDED
Status: DISCONTINUED | OUTPATIENT
Start: 2021-05-29 | End: 2021-05-29 | Stop reason: HOSPADM

## 2021-05-29 RX ORDER — SODIUM CHLORIDE 0.9 % (FLUSH) 0.9 %
3-10 SYRINGE (ML) INJECTION AS NEEDED
Status: CANCELLED | OUTPATIENT
Start: 2021-05-29

## 2021-05-29 RX ORDER — NITROGLYCERIN 20 MG/100ML
5-200 INJECTION INTRAVENOUS
Status: DISCONTINUED | OUTPATIENT
Start: 2021-05-29 | End: 2021-05-31 | Stop reason: HOSPADM

## 2021-05-29 RX ADMIN — CEFTRIAXONE SODIUM 1 G: 1 INJECTION, POWDER, FOR SOLUTION INTRAMUSCULAR; INTRAVENOUS at 20:49

## 2021-05-29 RX ADMIN — BUSPIRONE HYDROCHLORIDE 7.5 MG: 15 TABLET ORAL at 09:39

## 2021-05-29 RX ADMIN — OXYBUTYNIN CHLORIDE 5 MG: 5 TABLET ORAL at 20:53

## 2021-05-29 RX ADMIN — Medication 10 ML: at 09:39

## 2021-05-29 RX ADMIN — ATORVASTATIN CALCIUM 10 MG: 10 TABLET, FILM COATED ORAL at 09:39

## 2021-05-29 RX ADMIN — ASPIRIN 81 MG: 81 TABLET, COATED ORAL at 09:39

## 2021-05-29 RX ADMIN — CITALOPRAM HYDROBROMIDE 20 MG: 20 TABLET ORAL at 09:39

## 2021-05-29 RX ADMIN — METOPROLOL SUCCINATE 25 MG: 25 TABLET, EXTENDED RELEASE ORAL at 06:14

## 2021-05-29 RX ADMIN — Medication 10 ML: at 21:11

## 2021-05-29 RX ADMIN — MONTELUKAST 10 MG: 10 TABLET, FILM COATED ORAL at 09:39

## 2021-05-29 RX ADMIN — KETOTIFEN FUMARATE 1 DROP: 0.35 SOLUTION/ DROPS OPHTHALMIC at 23:30

## 2021-05-29 RX ADMIN — KETOTIFEN FUMARATE 1 DROP: 0.35 SOLUTION/ DROPS OPHTHALMIC at 09:39

## 2021-05-29 RX ADMIN — OXYBUTYNIN CHLORIDE 5 MG: 5 TABLET ORAL at 09:39

## 2021-05-29 RX ADMIN — NITROGLYCERIN 5 MCG/MIN: 20 INJECTION INTRAVENOUS at 13:26

## 2021-05-29 RX ADMIN — PANTOPRAZOLE SODIUM 40 MG: 40 TABLET, DELAYED RELEASE ORAL at 06:14

## 2021-05-29 RX ADMIN — BUPROPION HYDROCHLORIDE 150 MG: 150 TABLET, EXTENDED RELEASE ORAL at 06:14

## 2021-05-29 RX ADMIN — GABAPENTIN 400 MG: 400 CAPSULE ORAL at 21:06

## 2021-05-29 RX ADMIN — BUSPIRONE HYDROCHLORIDE 7.5 MG: 15 TABLET ORAL at 20:50

## 2021-05-30 ENCOUNTER — READMISSION MANAGEMENT (OUTPATIENT)
Dept: CALL CENTER | Facility: HOSPITAL | Age: 71
End: 2021-05-30

## 2021-05-30 VITALS
BODY MASS INDEX: 39.66 KG/M2 | WEIGHT: 188.93 LBS | RESPIRATION RATE: 20 BRPM | HEART RATE: 57 BPM | TEMPERATURE: 98 F | SYSTOLIC BLOOD PRESSURE: 119 MMHG | OXYGEN SATURATION: 95 % | DIASTOLIC BLOOD PRESSURE: 59 MMHG | HEIGHT: 58 IN

## 2021-05-30 LAB
ANION GAP SERPL CALCULATED.3IONS-SCNC: 11 MMOL/L (ref 5–15)
BASOPHILS # BLD AUTO: 0 10*3/MM3 (ref 0–0.2)
BASOPHILS NFR BLD AUTO: 0.3 % (ref 0–1.5)
BUN SERPL-MCNC: 19 MG/DL (ref 8–23)
BUN/CREAT SERPL: 20.4 (ref 7–25)
CALCIUM SPEC-SCNC: 8.3 MG/DL (ref 8.6–10.5)
CHLORIDE SERPL-SCNC: 103 MMOL/L (ref 98–107)
CO2 SERPL-SCNC: 25 MMOL/L (ref 22–29)
CREAT SERPL-MCNC: 0.93 MG/DL (ref 0.57–1)
DEPRECATED RDW RBC AUTO: 38.9 FL (ref 37–54)
EOSINOPHIL # BLD AUTO: 0.2 10*3/MM3 (ref 0–0.4)
EOSINOPHIL NFR BLD AUTO: 2.6 % (ref 0.3–6.2)
ERYTHROCYTE [DISTWIDTH] IN BLOOD BY AUTOMATED COUNT: 13 % (ref 12.3–15.4)
GFR SERPL CREATININE-BSD FRML MDRD: 59 ML/MIN/1.73
GLUCOSE SERPL-MCNC: 98 MG/DL (ref 65–99)
HCT VFR BLD AUTO: 33.5 % (ref 34–46.6)
HGB BLD-MCNC: 11.2 G/DL (ref 12–15.9)
INR PPP: <0.93 (ref 0.93–1.1)
LYMPHOCYTES # BLD AUTO: 1.5 10*3/MM3 (ref 0.7–3.1)
LYMPHOCYTES NFR BLD AUTO: 21.3 % (ref 19.6–45.3)
MAGNESIUM SERPL-MCNC: 2 MG/DL (ref 1.6–2.4)
MCH RBC QN AUTO: 29.1 PG (ref 26.6–33)
MCHC RBC AUTO-ENTMCNC: 33.5 G/DL (ref 31.5–35.7)
MCV RBC AUTO: 86.9 FL (ref 79–97)
MONOCYTES # BLD AUTO: 0.5 10*3/MM3 (ref 0.1–0.9)
MONOCYTES NFR BLD AUTO: 7.8 % (ref 5–12)
NEUTROPHILS NFR BLD AUTO: 4.6 10*3/MM3 (ref 1.7–7)
NEUTROPHILS NFR BLD AUTO: 68 % (ref 42.7–76)
NRBC BLD AUTO-RTO: 0.1 /100 WBC (ref 0–0.2)
PLATELET # BLD AUTO: 166 10*3/MM3 (ref 140–450)
PMV BLD AUTO: 8.5 FL (ref 6–12)
POTASSIUM SERPL-SCNC: 4 MMOL/L (ref 3.5–5.2)
PROTHROMBIN TIME: 10.2 SECONDS (ref 9.6–11.7)
QT INTERVAL: 452 MS
QT INTERVAL: 461 MS
RBC # BLD AUTO: 3.86 10*6/MM3 (ref 3.77–5.28)
SODIUM SERPL-SCNC: 139 MMOL/L (ref 136–145)
TROPONIN T SERPL-MCNC: <0.01 NG/ML (ref 0–0.03)
WBC # BLD AUTO: 6.8 10*3/MM3 (ref 3.4–10.8)

## 2021-05-30 PROCEDURE — 63710000001 CLOPIDOGREL 75 MG TABLET: Performed by: INTERNAL MEDICINE

## 2021-05-30 PROCEDURE — 63710000001 ASPIRIN 81 MG TABLET DELAYED-RELEASE: Performed by: INTERNAL MEDICINE

## 2021-05-30 PROCEDURE — 63710000001 BUSPIRONE 15 MG TABLET: Performed by: INTERNAL MEDICINE

## 2021-05-30 PROCEDURE — 63710000001 ACETAMINOPHEN 325 MG TABLET: Performed by: INTERNAL MEDICINE

## 2021-05-30 PROCEDURE — 63710000001 MONTELUKAST 10 MG TABLET: Performed by: INTERNAL MEDICINE

## 2021-05-30 PROCEDURE — 93010 ELECTROCARDIOGRAM REPORT: CPT | Performed by: INTERNAL MEDICINE

## 2021-05-30 PROCEDURE — 93005 ELECTROCARDIOGRAM TRACING: CPT | Performed by: INTERNAL MEDICINE

## 2021-05-30 PROCEDURE — G0378 HOSPITAL OBSERVATION PER HR: HCPCS

## 2021-05-30 PROCEDURE — A9270 NON-COVERED ITEM OR SERVICE: HCPCS | Performed by: INTERNAL MEDICINE

## 2021-05-30 PROCEDURE — 63710000001 METOPROLOL SUCCINATE XL 25 MG TABLET SUSTAINED-RELEASE 24 HOUR: Performed by: INTERNAL MEDICINE

## 2021-05-30 PROCEDURE — 99214 OFFICE O/P EST MOD 30 MIN: CPT | Performed by: INTERNAL MEDICINE

## 2021-05-30 PROCEDURE — 83735 ASSAY OF MAGNESIUM: CPT | Performed by: INTERNAL MEDICINE

## 2021-05-30 PROCEDURE — 85610 PROTHROMBIN TIME: CPT | Performed by: INTERNAL MEDICINE

## 2021-05-30 PROCEDURE — 84484 ASSAY OF TROPONIN QUANT: CPT | Performed by: PHYSICIAN ASSISTANT

## 2021-05-30 PROCEDURE — 63710000001 PANTOPRAZOLE 40 MG TABLET DELAYED-RELEASE: Performed by: INTERNAL MEDICINE

## 2021-05-30 PROCEDURE — 63710000001 ATORVASTATIN 10 MG TABLET: Performed by: INTERNAL MEDICINE

## 2021-05-30 PROCEDURE — 80048 BASIC METABOLIC PNL TOTAL CA: CPT | Performed by: INTERNAL MEDICINE

## 2021-05-30 PROCEDURE — 63710000001 CITALOPRAM 20 MG TABLET: Performed by: INTERNAL MEDICINE

## 2021-05-30 PROCEDURE — 63710000001 BUPROPION XL 150 MG TABLET SUSTAINED-RELEASE 24 HOUR: Performed by: INTERNAL MEDICINE

## 2021-05-30 PROCEDURE — 85025 COMPLETE CBC W/AUTO DIFF WBC: CPT | Performed by: INTERNAL MEDICINE

## 2021-05-30 PROCEDURE — 63710000001 OXYBUTYNIN 5 MG TABLET: Performed by: INTERNAL MEDICINE

## 2021-05-30 RX ORDER — CLOPIDOGREL BISULFATE 75 MG/1
75 TABLET ORAL DAILY
Qty: 30 TABLET | Refills: 0 | Status: SHIPPED | OUTPATIENT
Start: 2021-05-31 | End: 2021-07-09

## 2021-05-30 RX ORDER — CEPHALEXIN 500 MG/1
500 CAPSULE ORAL 2 TIMES DAILY
Qty: 8 CAPSULE | Refills: 0 | Status: SHIPPED | OUTPATIENT
Start: 2021-05-30 | End: 2021-06-03

## 2021-05-30 RX ADMIN — BUPROPION HYDROCHLORIDE 150 MG: 150 TABLET, EXTENDED RELEASE ORAL at 06:32

## 2021-05-30 RX ADMIN — ACETAMINOPHEN 650 MG: 325 TABLET, FILM COATED ORAL at 00:52

## 2021-05-30 RX ADMIN — MONTELUKAST 10 MG: 10 TABLET, FILM COATED ORAL at 08:01

## 2021-05-30 RX ADMIN — BUSPIRONE HYDROCHLORIDE 7.5 MG: 15 TABLET ORAL at 08:01

## 2021-05-30 RX ADMIN — PANTOPRAZOLE SODIUM 40 MG: 40 TABLET, DELAYED RELEASE ORAL at 06:31

## 2021-05-30 RX ADMIN — OXYBUTYNIN CHLORIDE 5 MG: 5 TABLET ORAL at 08:07

## 2021-05-30 RX ADMIN — ASPIRIN 81 MG: 81 TABLET, COATED ORAL at 08:01

## 2021-05-30 RX ADMIN — CLOPIDOGREL BISULFATE 75 MG: 75 TABLET ORAL at 08:00

## 2021-05-30 RX ADMIN — Medication 10 ML: at 08:01

## 2021-05-30 RX ADMIN — METOPROLOL SUCCINATE 25 MG: 25 TABLET, EXTENDED RELEASE ORAL at 06:32

## 2021-05-30 RX ADMIN — ATORVASTATIN CALCIUM 10 MG: 10 TABLET, FILM COATED ORAL at 08:00

## 2021-05-30 RX ADMIN — CITALOPRAM HYDROBROMIDE 20 MG: 20 TABLET ORAL at 08:01

## 2021-05-31 NOTE — OUTREACH NOTE
Prep Survey      Responses   [facility] discharged [facility]?  [facility]   Is LACE score < 7 ?  Yes   Emergency Room discharge w/ pulse ox?  No   Eligibility  TCM   Hospital  [facility]   Date of Admission  05/28/21   Date of Discharge  05/30/21   Discharge Disposition  Home or Self Care   Discharge diagnosis  chest pain, DEL ROSARIO, heart cath & stent   Does the patient have one of the following disease processes/diagnoses(primary or secondary)?  Other   Does the patient have Home health ordered?  No   Is there a DME ordered?  No   Prep survey completed?  Yes          Lillian Howell RN

## 2021-06-01 ENCOUNTER — TRANSITIONAL CARE MANAGEMENT TELEPHONE ENCOUNTER (OUTPATIENT)
Dept: CALL CENTER | Facility: HOSPITAL | Age: 71
End: 2021-06-01

## 2021-06-01 NOTE — OUTREACH NOTE
Call Center TCM Note      Responses   [facility] patient discharged from?  [facility]   Does the patient have one of the following disease processes/diagnoses(primary or secondary)?  Other   TCM attempt successful?  No   Unsuccessful attempts  Attempt 1          Hannah Sandhu RN    6/1/2021, 09:21 EDT

## 2021-06-01 NOTE — OUTREACH NOTE
Call Center TCM Note      Responses   [facility] patient discharged from?  [facility]   Does the patient have one of the following disease processes/diagnoses(primary or secondary)?  Other   TCM attempt successful?  Yes   Call start time  1345   Call end time  1347   Discharge diagnosis  chest pain, DEL ROSARIO, heart cath & stent   Person spoke with today (if not patient) and relationship  Hannah(Spouse)   Meds reviewed with patient/caregiver?  Yes   Is the patient having any side effects they believe may be caused by any medication additions or changes?  No   Does the patient have all medications ordered at discharge?  Yes   Is the patient taking all medications as directed (includes completed medication regime)?  Yes   Does the patient have a primary care provider?   Yes   Does the patient have an appointment with their PCP within 7 days of discharge?  No   What is preventing the patient from scheduling follow up appointments within 7 days of discharge?  Haven't had time   Nursing Interventions  Educated patient on importance of making appointment, Advised patient to make appointment   Has the patient kept scheduled appointments due by today?  N/A   Has home health visited the patient within 72 hours of discharge?  N/A   Psychosocial issues?  No   Did the patient receive a copy of their discharge instructions?  Yes   Nursing interventions  Reviewed instructions with patient   What is the patient's perception of their health status since discharge?  Improving   Is the patient/caregiver able to teach back signs and symptoms related to disease process for when to call PCP?  Yes   Is the patient/caregiver able to teach back signs and symptoms related to disease process for when to call 911?  Yes   Is the patient/caregiver able to teach back the hierarchy of who to call/visit for symptoms/problems? PCP, Specialist, Home health nurse, Urgent Care, ED, 911  Yes   If the patient is a current smoker, are they able to teach back  resources for cessation?  Not a smoker   TCM call completed?  Yes          Hannah Sandhu RN    6/1/2021, 13:49 EDT

## 2021-06-01 NOTE — CASE MANAGEMENT/SOCIAL WORK
Case Management Discharge Note                Selected Continued Care - Discharged on 5/30/2021 Admission date: 5/28/2021 - Discharge disposition: Home or Self Care     Final Discharge Disposition Code: 01 - home or self-care

## 2021-06-02 ENCOUNTER — TELEPHONE (OUTPATIENT)
Dept: ORTHOPEDIC SURGERY | Facility: CLINIC | Age: 71
End: 2021-06-02

## 2021-06-02 ENCOUNTER — TELEPHONE (OUTPATIENT)
Dept: FAMILY MEDICINE CLINIC | Facility: CLINIC | Age: 71
End: 2021-06-02

## 2021-06-02 DIAGNOSIS — Z51.81 MEDICATION MONITORING ENCOUNTER: Primary | ICD-10-CM

## 2021-06-02 DIAGNOSIS — M81.0 AGE-RELATED OSTEOPOROSIS WITHOUT CURRENT PATHOLOGICAL FRACTURE: ICD-10-CM

## 2021-06-02 NOTE — TELEPHONE ENCOUNTER
Called patient to schedule TCM but no answer and phone just rings until it hangs up. I would need to schedule patient by 6/13 for TCM guideline. So reschedule the wellness 6 month out and get patient in next week for TCM

## 2021-06-02 NOTE — TELEPHONE ENCOUNTER
Call placed to patient, advised that her calcium level from being in the hospital was too low for us to give her the Prolia tomorrow. Advised that I would need her to get labs before her injection or we can move her injection. Patient prefers to move her appt. States she would be seeing her PCP on 6/15/2021 and having labs done at her office then; would like to have them done there. Advised that was okay and we can see her that same week for her injection. Patient requesting 6/18/2021. Appt given.

## 2021-06-04 LAB — QT INTERVAL: 475 MS

## 2021-06-15 ENCOUNTER — LAB (OUTPATIENT)
Dept: FAMILY MEDICINE CLINIC | Facility: CLINIC | Age: 71
End: 2021-06-15

## 2021-06-15 ENCOUNTER — OFFICE VISIT (OUTPATIENT)
Dept: FAMILY MEDICINE CLINIC | Facility: CLINIC | Age: 71
End: 2021-06-15

## 2021-06-15 VITALS
DIASTOLIC BLOOD PRESSURE: 82 MMHG | WEIGHT: 184 LBS | TEMPERATURE: 98.4 F | SYSTOLIC BLOOD PRESSURE: 124 MMHG | OXYGEN SATURATION: 97 % | BODY MASS INDEX: 38.62 KG/M2 | HEART RATE: 57 BPM | HEIGHT: 58 IN

## 2021-06-15 DIAGNOSIS — G47.33 OBSTRUCTIVE SLEEP APNEA: ICD-10-CM

## 2021-06-15 DIAGNOSIS — I10 ESSENTIAL HYPERTENSION: ICD-10-CM

## 2021-06-15 DIAGNOSIS — I95.1 ORTHOSTASIS: ICD-10-CM

## 2021-06-15 DIAGNOSIS — Z51.81 MEDICATION MONITORING ENCOUNTER: ICD-10-CM
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
## 2021-06-15 DIAGNOSIS — E66.9 OBESITY (BMI 30-39.9): ICD-10-CM

## 2021-06-15 DIAGNOSIS — E78.2 MIXED HYPERLIPIDEMIA: ICD-10-CM

## 2021-06-15 DIAGNOSIS — R55 NEAR SYNCOPE: ICD-10-CM

## 2021-06-15 DIAGNOSIS — Z00.01 ENCOUNTER FOR ROUTINE ADULT HEALTH EXAMINATION WITH ABNORMAL FINDINGS: Primary | ICD-10-CM

## 2021-06-15 DIAGNOSIS — D64.9 ANEMIA, UNSPECIFIED TYPE: ICD-10-CM

## 2021-06-15 DIAGNOSIS — M81.0 AGE-RELATED OSTEOPOROSIS WITHOUT CURRENT PATHOLOGICAL FRACTURE: ICD-10-CM

## 2021-06-15 LAB
ALBUMIN SERPL-MCNC: 4.4 G/DL (ref 3.5–5.2)
ALBUMIN/GLOB SERPL: 1.8 G/DL
ALP SERPL-CCNC: 70 U/L (ref 39–117)
ALT SERPL W P-5'-P-CCNC: 9 U/L (ref 1–33)
ANION GAP SERPL CALCULATED.3IONS-SCNC: 9.2 MMOL/L (ref 5–15)
AST SERPL-CCNC: 16 U/L (ref 1–32)
BASOPHILS # BLD AUTO: 0.05 10*3/MM3 (ref 0–0.2)
BASOPHILS NFR BLD AUTO: 0.8 % (ref 0–1.5)
BILIRUB SERPL-MCNC: 0.2 MG/DL (ref 0–1.2)
BUN SERPL-MCNC: 20 MG/DL (ref 8–23)
BUN/CREAT SERPL: 16.3 (ref 7–25)
CALCIUM SPEC-SCNC: 9.2 MG/DL (ref 8.6–10.5)
CHLORIDE SERPL-SCNC: 101 MMOL/L (ref 98–107)
CO2 SERPL-SCNC: 27.8 MMOL/L (ref 22–29)
CREAT SERPL-MCNC: 1.23 MG/DL (ref 0.57–1)
DEPRECATED RDW RBC AUTO: 40.7 FL (ref 37–54)
EOSINOPHIL # BLD AUTO: 0.19 10*3/MM3 (ref 0–0.4)
EOSINOPHIL NFR BLD AUTO: 3 % (ref 0.3–6.2)
ERYTHROCYTE [DISTWIDTH] IN BLOOD BY AUTOMATED COUNT: 12.4 % (ref 12.3–15.4)
GFR SERPL CREATININE-BSD FRML MDRD: 43 ML/MIN/1.73
GLOBULIN UR ELPH-MCNC: 2.5 GM/DL
GLUCOSE SERPL-MCNC: 84 MG/DL (ref 65–99)
HCT VFR BLD AUTO: 41.6 % (ref 34–46.6)
HGB BLD-MCNC: 13.8 G/DL (ref 12–15.9)
IMM GRANULOCYTES # BLD AUTO: 0.02 10*3/MM3 (ref 0–0.05)
IMM GRANULOCYTES NFR BLD AUTO: 0.3 % (ref 0–0.5)
LYMPHOCYTES # BLD AUTO: 2.18 10*3/MM3 (ref 0.7–3.1)
LYMPHOCYTES NFR BLD AUTO: 34.9 % (ref 19.6–45.3)
MCH RBC QN AUTO: 29.8 PG (ref 26.6–33)
MCHC RBC AUTO-ENTMCNC: 33.2 G/DL (ref 31.5–35.7)
MCV RBC AUTO: 89.8 FL (ref 79–97)
MONOCYTES # BLD AUTO: 0.55 10*3/MM3 (ref 0.1–0.9)
MONOCYTES NFR BLD AUTO: 8.8 % (ref 5–12)
NEUTROPHILS NFR BLD AUTO: 3.25 10*3/MM3 (ref 1.7–7)
NEUTROPHILS NFR BLD AUTO: 52.2 % (ref 42.7–76)
NRBC BLD AUTO-RTO: 0 /100 WBC (ref 0–0.2)
PLATELET # BLD AUTO: 218 10*3/MM3 (ref 140–450)
PMV BLD AUTO: 10.7 FL (ref 6–12)
POTASSIUM SERPL-SCNC: 4.6 MMOL/L (ref 3.5–5.2)
PROT SERPL-MCNC: 6.9 G/DL (ref 6–8.5)
RBC # BLD AUTO: 4.63 10*6/MM3 (ref 3.77–5.28)
SODIUM SERPL-SCNC: 138 MMOL/L (ref 136–145)
WBC # BLD AUTO: 6.24 10*3/MM3 (ref 3.4–10.8)

## 2021-06-15 PROCEDURE — 99214 OFFICE O/P EST MOD 30 MIN: CPT | Performed by: FAMILY MEDICINE

## 2021-06-15 PROCEDURE — 36415 COLL VENOUS BLD VENIPUNCTURE: CPT

## 2021-06-15 PROCEDURE — G0439 PPPS, SUBSEQ VISIT: HCPCS | Performed by: FAMILY MEDICINE

## 2021-06-15 PROCEDURE — 80053 COMPREHEN METABOLIC PANEL: CPT | Performed by: PHYSICIAN ASSISTANT

## 2021-06-15 PROCEDURE — 85025 COMPLETE CBC W/AUTO DIFF WBC: CPT | Performed by: FAMILY MEDICINE

## 2021-06-15 RX ORDER — CITALOPRAM 20 MG/1
TABLET ORAL
Qty: 90 TABLET | Refills: 1 | Status: SHIPPED | OUTPATIENT
Start: 2021-06-15 | End: 2022-01-26

## 2021-06-15 RX ORDER — BUSPIRONE HYDROCHLORIDE 7.5 MG/1
TABLET ORAL
Qty: 180 TABLET | Refills: 1 | Status: SHIPPED | OUTPATIENT
Start: 2021-06-15 | End: 2022-06-21

## 2021-06-15 NOTE — PATIENT INSTRUCTIONS
No sudden movements  Try not to spend long periods of time looking down at your cell phone  Take a multivitamin daily  Keep working to lose weight through healthy eating and exercise.

## 2021-06-15 NOTE — PROGRESS NOTES
The ABCs of the Annual Wellness Visit  Subsequent Medicare Wellness Visit    Chief Complaint   Patient presents with   • Medicare Wellness-subsequent     says her scale says 179lb ours is 184.    • Dizziness     when laying down, sitting up, walks towards the right. near syncope, but has not passed out. several times. before and after the stent. not sure if she has stenosis-her sister had it.    • Fall     falls often. and sister does also. she has severe back issues.    • Sleep Apnea     not using her cpap, unfortable.       Subjective   History of Present Illness:  Charleen Barnett is a 71 y.o. female who presents for a Subsequent Medicare Wellness Visit.  Also needs follow-up on blood pressure, cholesterol, and blood sugar  She also has multiple other complaints as well   She says she has been losing weight since her stent  Dizziness - intermittent; worse; can appreciate nystagmus; near syncope; changes in position; seems to move to one side (right);   Wonders if she has stenosis - spinal stenosis  Not using her cpap as she feels like she cannot breathe and is dying  She has had flu and pneumovax injections but has not had her covid vaccine  Ringing in ears  Dizzy  Hearing loss  Recent cardiac stent    HEALTH RISK ASSESSMENT    Recent Hospitalizations:  Recently treated at the following:  [facility]     Current Medical Providers:  Patient Care Team:  Ilsa Her MD as PCP - Yazmin Bridges MD as Consulting Physician (Cardiology)    Smoking Status:  Social History     Tobacco Use   Smoking Status Never Smoker   Smokeless Tobacco Never Used       Alcohol Consumption:  Social History     Substance and Sexual Activity   Alcohol Use Not Currently    Comment: NO DRINK 1994       Depression Screen:   PHQ-2/PHQ-9 Depression Screening 6/15/2021   Little interest or pleasure in doing things 0   Feeling down, depressed, or hopeless 1   Trouble falling or staying asleep, or sleeping too  much -   Feeling tired or having little energy -   Poor appetite or overeating -   Feeling bad about yourself - or that you are a failure or have let yourself or your family down -   Trouble concentrating on things, such as reading the newspaper or watching television -   Moving or speaking so slowly that other people could have noticed. Or the opposite - being so fidgety or restless that you have been moving around a lot more than usual -   Thoughts that you would be better off dead, or of hurting yourself in some way -   Total Score 1   If you checked off any problems, how difficult have these problems made it for you to do your work, take care of things at home, or get along with other people? -       Fall Risk Screen:  MERYADI Fall Risk Assessment was completed, and patient is at MODERATE risk for falls. Assessment completed on:6/15/2021    Health Habits and Functional and Cognitive Screening:  Functional & Cognitive Status 6/15/2021   Do you have difficulty preparing food and eating? Yes   Do you have difficulty bathing yourself, getting dressed or grooming yourself? Yes   Do you have difficulty using the toilet? No   Do you have difficulty moving around from place to place? No   Do you have trouble with steps or getting out of a bed or a chair? Yes   Current Diet Well Balanced Diet   Dental Exam Up to date   Eye Exam Up to date   Exercise (times per week) 0 times per week   Current Exercise Activities Include No Regular Exercise   Do you need help using the phone?  No   Are you deaf or do you have serious difficulty hearing?  No   Do you need help with transportation? Yes   Do you need help shopping? Yes   Do you need help preparing meals?  Yes   Do you need help with housework?  Yes   Do you need help with laundry? No   Do you need help taking your medications? No   Do you need help managing money? No   Have you felt unusual stress, anger or loneliness in the last month? Yes   Who do you live with? Spouse   If  you need help, do you have trouble finding someone available to you? No   Have you been bothered in the last four weeks by sexual problems? No   Do you have difficulty concentrating, remembering or making decisions? Yes         Does the patient have evidence of cognitive impairment? No   MMSE done 29/30    Asprin use counseling:Taking ASA appropriately as indicated    Age-appropriate Screening Schedule:  Refer to the list below for future screening recommendations based on patient's age, sex and/or medical conditions. Orders for these recommended tests are listed in the plan section. The patient has been provided with a written plan.    Health Maintenance   Topic Date Due   • TDAP/TD VACCINES (1 - Tdap) Never done   • ZOSTER VACCINE (1 of 2) Never done   • MAMMOGRAM  12/10/2021 (Originally 1950)   • INFLUENZA VACCINE  08/01/2021   • LIPID PANEL  12/10/2021   • DXA SCAN  04/22/2023          The following portions of the patient's history were reviewed and updated as appropriate: allergies, current medications, past family history, past medical history, past social history, past surgical history and problem list.    Outpatient Medications Prior to Visit   Medication Sig Dispense Refill   • albuterol sulfate HFA (VENTOLIN HFA) 108 (90 Base) MCG/ACT inhaler VENTOLIN  (90 Base) MCG/ACT AERS     • aspirin 81 MG tablet Take 81 mg by mouth Daily.     • atorvastatin (LIPITOR) 10 MG tablet Take 1 tablet by mouth Daily. 90 tablet 3   • Biotin 10 MG capsule Take  by mouth.     • buPROPion XL (Wellbutrin XL) 150 MG 24 hr tablet Take 1 tablet by mouth Every Morning. 90 tablet 3   • busPIRone (BUSPAR) 7.5 MG tablet TAKE ONE TABLET BY MOUTH TWICE DAILY 180 tablet 1   • citalopram (CeleXA) 20 MG tablet TAKE ONE TABLET BY MOUTH EVERY DAY 90 tablet 1   • clopidogrel (PLAVIX) 75 MG tablet Take 1 tablet by mouth Daily. 30 tablet 0   • denosumab (PROLIA) 60 MG/ML solution prefilled syringe syringe PROLIA 60 MG/ML SOSY     •  gabapentin (NEURONTIN) 400 MG capsule TAKE 1 CAPSULE BY MOUTH AT BEDTIME 90 capsule 0   • ketotifen (ZADITOR) 0.025 % ophthalmic solution 1 drop 2 (Two) Times a Day.     • lansoprazole (PREVACID) 15 MG capsule Take 15 mg by mouth Daily.     • Magnesium 250 MG tablet Take  by mouth.     • metoprolol succinate XL (TOPROL-XL) 25 MG 24 hr tablet TAKE ONE TABLET BY MOUTH EVERY MORNING 90 tablet 3   • montelukast (SINGULAIR) 10 MG tablet Take 1 tablet by mouth Daily. 90 tablet 2   • oxybutynin (DITROPAN) 5 MG tablet TAKE 1 TABLET BY MOUTH 2 TIMES A DAY. LAST REFILL UNTIL SEEN IN OFFICE 180 tablet 0   • vitamin B-12 (CYANOCOBALAMIN) 100 MCG tablet Take 50 mcg by mouth Daily.     • vitamin C (ASCORBIC ACID) 250 MG tablet Take 250 mg by mouth Daily.     • vitamin D (ERGOCALCIFEROL) 1.25 MG (24551 UT) capsule capsule TAKE 1 CAPSULE BY MOUTH EVERY WEEK 13 capsule 2   • vitamin E 100 UNIT capsule Take 100 Units by mouth Daily.       Facility-Administered Medications Prior to Visit   Medication Dose Route Frequency Provider Last Rate Last Admin   • denosumab (PROLIA) syringe 60 mg  60 mg Subcutaneous Q6 Months Dede Anders PA   60 mg at 12/01/20 1104       Patient Active Problem List   Diagnosis   • Anxiety   • Arthritis   • Asthma   • Depression   • Encounter for therapeutic drug monitoring   • Family hx osteoporosis   • Fibromyalgia   • Ramos's esophagus   • Gastroesophageal reflux disease   • Gout   • Hyperglycemia   • Hyperlipidemia   • Hypertension   • Obstructive sleep apnea   • Osteoporosis   • Pedal edema   • Postmenopausal status   • Presence of left artificial knee joint   • ST elevation (STEMI) myocardial infarction (CMS/HCC)   • Status post percutaneous transluminal coronary angioplasty   • Urge incontinence   • Vitamin D deficiency   • Morbidly obese (CMS/HCC)   • Dyspnea   • Unstable angina (CMS/HCC)   • Coronary artery disease involving coronary bypass graft of native heart with unstable angina pectoris  "(CMS/[facility])   • Encounter for routine adult health examination with abnormal findings   • Obesity (BMI 30-39.9)       Advanced Care Planning:  ACP discussion was held with the patient during this visit. Patient does not have an advance directive, information provided.    Review of Systems   Constitutional: Positive for unexpected weight change. Negative for chills, diaphoresis and fatigue.   HENT: Positive for hearing loss and tinnitus. Negative for congestion and sinus pain.    Eyes: Positive for visual disturbance.   Respiratory: Negative for apnea, cough, chest tightness, shortness of breath and wheezing.    Cardiovascular: Negative.    Gastrointestinal: Negative for nausea and vomiting.   Endocrine: Negative.    Musculoskeletal: Positive for arthralgias and back pain.   Neurological: Positive for dizziness, syncope (near syncope) and light-headedness. Negative for weakness, numbness and headaches.   Hematological: Negative.    Psychiatric/Behavioral: Negative.        Compared to one year ago, the patient feels her physical health is worse.  Compared to one year ago, the patient feels her mental health is better.    Reviewed chart for potential of high risk medication in the elderly: yes  Reviewed chart for potential of harmful drug interactions in the elderly:yes    Objective         Vitals:    06/15/21 1233   BP: 124/82   BP Location: Left arm   Patient Position: Sitting   Cuff Size: Adult   Pulse: 57   Temp: 98.4 °F (36.9 °C)   TempSrc: Temporal   SpO2: 97%   Weight: 83.5 kg (184 lb)   Height: 147.3 cm (58\")       Body mass index is 38.46 kg/m².  Discussed the patient's BMI with her. The BMI is above average; BMI management plan is completed.    Physical Exam  Vitals and nursing note reviewed.   Constitutional:       Appearance: Normal appearance. She is well-developed. She is obese.   HENT:      Head: Normocephalic and atraumatic.      Right Ear: Tympanic membrane and ear canal normal.      Left Ear: Tympanic " membrane and ear canal normal.      Mouth/Throat:      Mouth: Mucous membranes are moist.      Pharynx: Oropharynx is clear.   Neck:      Vascular: No carotid bruit.   Cardiovascular:      Rate and Rhythm: Normal rate and regular rhythm.      Heart sounds: Normal heart sounds.   Pulmonary:      Effort: Pulmonary effort is normal. No respiratory distress.      Breath sounds: Normal breath sounds.   Musculoskeletal:      Cervical back: Neck supple.      Right lower leg: No edema.      Left lower leg: No edema.      Comments: No vertebral tenderness.     Lymphadenopathy:      Cervical: No cervical adenopathy.   Skin:     General: Skin is warm and dry.   Neurological:      Mental Status: She is alert and oriented to person, place, and time.      Cranial Nerves: Cranial nerves are intact.   Psychiatric:         Behavior: Behavior is cooperative.       Lab Results   Component Value Date    CHOL 159 12/10/2020    TRIG 61 12/10/2020    HDL 77 (H) 12/10/2020    LDL 70 12/10/2020     Vitals:    06/15/21 1233 06/15/21 1326 06/15/21 1327 06/15/21 1329   Orthostatic BP:  134/84 117/79 129/84   Orthostatic Pulse:  54 52 64   Patient Position: Sitting Lying Sitting Standing             Assessment/Plan   Medicare Risks and Personalized Health Plan  CMS Preventative Services Quick Reference  Obesity/Overweight     The above risks/problems have been discussed with the patient.  Pertinent information has been shared with the patient in the After Visit Summary.  Follow up plans and orders are seen below in the Assessment/Plan Section.    Diagnoses and all orders for this visit:    1. Encounter for routine adult health examination with abnormal findings (Primary)    2. Near syncope  -     Duplex Carotid - Left Ultrasound CAR; Future  -     Duplex Carotid - Right Ultrasound CAR; Future  -     CBC & Differential; Future    3. Anemia, unspecified type  -     CBC & Differential; Future    4. Orthostasis    5. Fall, initial encounter    6.  Mixed hyperlipidemia    7. Essential hypertension    8. Obstructive sleep apnea    9. Obesity (BMI 30-39.9)      Follow Up:  Return in about 6 months (around 12/15/2021) for Recheck.     An After Visit Summary and PPPS were given to the patient.       She was counseled on the need for weight loss  She is UTD on colonoscopy  She is refusing mammogram  She is UTD on pneumovax  She was counseled on the need for weight loss  Will check cbc to help eval the orthosis and fatigue  Will check carotid dopplers to help eval near syncope  I have explained to her the potential effects on her health sec to her noncompliance with her cpap  I have ordered cmp  Counseled her on avoiding sudden moves  May need to consider PT  She is concerned her symptoms relate to her cardiac stent but has had follow up with cardiology

## 2021-06-17 ENCOUNTER — OFFICE VISIT (OUTPATIENT)
Dept: CARDIOLOGY | Facility: CLINIC | Age: 71
End: 2021-06-17

## 2021-06-17 VITALS
OXYGEN SATURATION: 97 % | HEIGHT: 58 IN | HEART RATE: 59 BPM | WEIGHT: 183 LBS | DIASTOLIC BLOOD PRESSURE: 83 MMHG | BODY MASS INDEX: 38.41 KG/M2 | SYSTOLIC BLOOD PRESSURE: 130 MMHG

## 2021-06-17 DIAGNOSIS — R07.89 CHEST DISCOMFORT: ICD-10-CM

## 2021-06-17 DIAGNOSIS — Z98.61 STATUS POST PERCUTANEOUS TRANSLUMINAL CORONARY ANGIOPLASTY: Primary | ICD-10-CM

## 2021-06-17 DIAGNOSIS — E78.2 MIXED HYPERLIPIDEMIA: ICD-10-CM

## 2021-06-17 PROBLEM — Z00.01 ENCOUNTER FOR ROUTINE ADULT HEALTH EXAMINATION WITH ABNORMAL FINDINGS: Status: ACTIVE | Noted: 2021-06-17

## 2021-06-17 PROBLEM — E66.9 OBESITY (BMI 30-39.9): Status: ACTIVE | Noted: 2021-06-17

## 2021-06-17 PROCEDURE — 99214 OFFICE O/P EST MOD 30 MIN: CPT | Performed by: INTERNAL MEDICINE

## 2021-06-17 NOTE — PROGRESS NOTES
Encounter Date:06/17/2021  Recent hospitalization follow-up      Patient ID: Charleen Barnett is a 71 y.o. female.    Chief Complaint:  Status post stent  Hypertension  Dyslipidemia    History of Present Illness  Patient recently was admitted to [facility] with symptoms that are concerning for unstable angina.  Patient had cardiac catheterization and subsequently stent placement to left anterior descending artery and was released home.    Occasional palpitations.    Since I have last seen, the patient has been without any chest discomfort ,shortness of breath, dizziness or syncope.  Denies having any headache ,abdominal pain ,nausea, vomiting , diarrhea constipation, loss of weight or loss of appetite.  Denies having any excessive bruising ,hematuria or blood in the stool.    Review of all systems negative except as indicated.    Reviewed ROS.    Assessment and Plan     ////////////////////////  Impression  ==================      - status post stent to LAD 06/10/2014   status post subendocardial myocardial infarction prior to stent placement  Status post stent to LAD 5/29/2021.     Cardiac catheterization 5/29/2021 revealed  Left ventricle size and contractility normal with ejection fraction of 60%.  Left main coronary artery normal.  Left anterior descending artery has previously placed stent.  Left anterior descending artery has 80 to 90% disease just distal to the diagonal branch.  (Patient to have FFR).  IFR of 0.86  Diagonal branch has diffuse 50 to 60% disease.  Circumflex coronary artery is normal except for 30 to 40% marginal branch disease.  Right coronary artery is a large and dominant vessel and is normal.  Right common iliac external iliac and femoral arteries are normal.     Stress Cardiolite test-- 5/17/2021.  Echocardiogram-normal except for left atrial enlargement 5/17/2021.      -palpitations likely SVT.  -improved on atenolol     Echocardiogram showed mild aortic regurgitation with  normal left ventricular function.  5/18/2017     - hypertension dyslipidemia sleep apnea asthma fibromyalgia GERD and gout.     - family history of coronary artery disease     - allergy to penicillin sulfa and tetracycline     =======  Plan  =======  Recent unstable angina-improved after stent placement.    Status post stent to LAD 5/29/2021 (abnormal IFR)  Patient is not having any angina pectoris or congestive heart failure.  Patient was asked to take Plavix on a regular basis.  Patient may need tooth extraction.  Okay with the procedure as long as Plavix does not need to be stopped and this was discussed with patient.    Hypertension  Continue metoprolol     Palpitations-better but present occasionally..  Continue metoprolol XL 25 mg a day.     Dyslipidemia-continue atorvastatin     Medications were reviewed and updated.     Follow-up in the office in 6 weeks with EKG.     Further plan will depend on patient's progress  //////////////////////////////          Diagnosis Plan   1. Status post percutaneous transluminal coronary angioplasty     2. Mixed hyperlipidemia     3. Chest discomfort     LAB RESULTS (LAST 7 DAYS)    CBC  Results from last 7 days   Lab Units 06/15/21  1410   WBC 10*3/mm3 6.24   RBC 10*6/mm3 4.63   HEMOGLOBIN g/dL 13.8   HEMATOCRIT % 41.6   MCV fL 89.8   PLATELETS 10*3/mm3 218       BMP  Results from last 7 days   Lab Units 06/15/21  1410   SODIUM mmol/L 138   POTASSIUM mmol/L 4.6   CHLORIDE mmol/L 101   CO2 mmol/L 27.8   BUN mg/dL 20   CREATININE mg/dL 1.23*   GLUCOSE mg/dL 84       CMP   Results from last 7 days   Lab Units 06/15/21  1410   SODIUM mmol/L 138   POTASSIUM mmol/L 4.6   CHLORIDE mmol/L 101   CO2 mmol/L 27.8   BUN mg/dL 20   CREATININE mg/dL 1.23*   GLUCOSE mg/dL 84   ALBUMIN g/dL 4.40   BILIRUBIN mg/dL 0.2   ALK PHOS U/L 70   AST (SGOT) U/L 16   ALT (SGPT) U/L 9         BNP        TROPONIN        CoAg        Creatinine Clearance  Estimated Creatinine Clearance: 40.7 mL/min (A)  (by C-G formula based on SCr of 1.23 mg/dL (H)).    ABG        Radiology  No radiology results for the last day                The following portions of the patient's history were reviewed and updated as appropriate: allergies, current medications, past family history, past medical history, past social history, past surgical history and problem list.    Review of Systems   Constitutional: Negative for malaise/fatigue.   Cardiovascular: Positive for leg swelling (at times). Negative for chest pain, palpitations and syncope.   Respiratory: Negative for shortness of breath.    Skin: Negative for rash.   Gastrointestinal: Negative for nausea and vomiting.   Neurological: Positive for dizziness and light-headedness. Negative for numbness.         Current Outpatient Medications:   •  albuterol sulfate HFA (VENTOLIN HFA) 108 (90 Base) MCG/ACT inhaler, VENTOLIN  (90 Base) MCG/ACT AERS, Disp: , Rfl:   •  aspirin 81 MG tablet, Take 81 mg by mouth Daily., Disp: , Rfl:   •  atorvastatin (LIPITOR) 10 MG tablet, Take 1 tablet by mouth Daily., Disp: 90 tablet, Rfl: 3  •  Biotin 10 MG capsule, Take  by mouth., Disp: , Rfl:   •  buPROPion XL (Wellbutrin XL) 150 MG 24 hr tablet, Take 1 tablet by mouth Every Morning., Disp: 90 tablet, Rfl: 3  •  busPIRone (BUSPAR) 7.5 MG tablet, TAKE ONE TABLET BY MOUTH TWICE DAILY, Disp: 180 tablet, Rfl: 1  •  citalopram (CeleXA) 20 MG tablet, TAKE ONE TABLET BY MOUTH EVERY DAY, Disp: 90 tablet, Rfl: 1  •  clopidogrel (PLAVIX) 75 MG tablet, Take 1 tablet by mouth Daily., Disp: 30 tablet, Rfl: 0  •  denosumab (PROLIA) 60 MG/ML solution prefilled syringe syringe, PROLIA 60 MG/ML SOSY, Disp: , Rfl:   •  gabapentin (NEURONTIN) 400 MG capsule, TAKE 1 CAPSULE BY MOUTH AT BEDTIME, Disp: 90 capsule, Rfl: 0  •  ketotifen (ZADITOR) 0.025 % ophthalmic solution, 1 drop 2 (Two) Times a Day., Disp: , Rfl:   •  lansoprazole (PREVACID) 15 MG capsule, Take 15 mg by mouth Daily., Disp: , Rfl:   •  Magnesium 250  MG tablet, Take  by mouth., Disp: , Rfl:   •  metoprolol succinate XL (TOPROL-XL) 25 MG 24 hr tablet, TAKE ONE TABLET BY MOUTH EVERY MORNING, Disp: 90 tablet, Rfl: 3  •  montelukast (SINGULAIR) 10 MG tablet, Take 1 tablet by mouth Daily., Disp: 90 tablet, Rfl: 2  •  oxybutynin (DITROPAN) 5 MG tablet, TAKE 1 TABLET BY MOUTH 2 TIMES A DAY. LAST REFILL UNTIL SEEN IN OFFICE, Disp: 180 tablet, Rfl: 0  •  vitamin B-12 (CYANOCOBALAMIN) 100 MCG tablet, Take 50 mcg by mouth Daily., Disp: , Rfl:   •  vitamin C (ASCORBIC ACID) 250 MG tablet, Take 250 mg by mouth Daily., Disp: , Rfl:   •  vitamin D (ERGOCALCIFEROL) 1.25 MG (92077 UT) capsule capsule, TAKE 1 CAPSULE BY MOUTH EVERY WEEK, Disp: 13 capsule, Rfl: 2  •  vitamin E 100 UNIT capsule, Take 100 Units by mouth Daily., Disp: , Rfl:     Current Facility-Administered Medications:   •  denosumab (PROLIA) syringe 60 mg, 60 mg, Subcutaneous, Q6 Months, Dede Anders PA, 60 mg at 12/01/20 1104    Allergies   Allergen Reactions   • Chlorpheniramine-Phenylephrine Rash   • Penicillins Rash   • Sulfa Antibiotics Hives and Rash   • Tetracycline Rash   • Warfarin Rash       Family History   Problem Relation Age of Onset   • Heart attack Father    • Cancer Father        Past Surgical History:   Procedure Laterality Date   • BLADDER SURGERY     • CARDIAC CATHETERIZATION N/A 5/29/2021    Procedure: Left Heart Cath and coronary angiogram;  Surgeon: Yazmin Matta MD;  Location: [facility] CATH INVASIVE LOCATION;  Service: Cardiovascular;  Laterality: N/A;   • CARDIAC CATHETERIZATION  5/29/2021    Procedure: Functional Flow Reserve (iFR);  Surgeon: Bryan Patel MD;  Location: [facility] CATH INVASIVE LOCATION;  Service: Cardiology;;   • CARDIAC CATHETERIZATION N/A 5/29/2021    Procedure: Percutaneous Coronary Intervention;  Surgeon: Bryan Patel MD;  Location: [facility] CATH INVASIVE LOCATION;  Service: Cardiology;  Laterality: N/A;   • CARDIAC CATHETERIZATION N/A  "5/29/2021    Procedure: Stent FRANCOISE coronary;  Surgeon: Bryan Patel MD;  Location: [facility] INVASIVE LOCATION;  Service: Cardiology;  Laterality: N/A;   • CHOLECYSTECTOMY     • HYSTERECTOMY     • KNEE SURGERY     • OTHER SURGICAL HISTORY      STENT       Past Medical History:   Diagnosis Date   • Anxiety    • Asthma 2/13/2020   • Ramos's esophagus 2/15/2018   • Depression 8/27/2018   • Fibromyalgia 12/14/2011   • GERD (gastroesophageal reflux disease)    • Gout 2/13/2020   • Hypertension    • Joint pain    • Obstructive sleep apnea 10/16/2014   • Osteoporosis     fosamax(SE), on prolia(4/10/2018)   • Pedal edema 10/22/2018   • Presence of left artificial knee joint 3/14/2019   • Seasonal allergies    • ST elevation (STEMI) myocardial infarction (CMS/[facility]) 2/13/2020   • Status post percutaneous transluminal coronary angioplasty 6/10/2014   • Tachycardia    • Urge incontinence 8/27/2018   • Vitamin D deficiency 3/19/2015       Family History   Problem Relation Age of Onset   • Heart attack Father    • Cancer Father        Social History     Socioeconomic History   • Marital status:      Spouse name: Not on file   • Number of children: Not on file   • Years of education: Not on file   • Highest education level: Not on file   Tobacco Use   • Smoking status: Never Smoker   • Smokeless tobacco: Never Used   Vaping Use   • Vaping Use: Never used   Substance and Sexual Activity   • Alcohol use: Not Currently     Comment: NO DRINK 1994   • Drug use: Never   • Sexual activity: Defer         Procedures      Objective:       Physical Exam    /83   Pulse 59   Ht 147.3 cm (58\")   Wt 83 kg (183 lb)   SpO2 97%   BMI 38.25 kg/m²   The patient is alert, oriented and in no distress.    Vital signs as noted above.    Head and neck revealed no carotid bruits or jugular venous distension.  No thyromegaly or lymphadenopathy is present.    Lungs clear.  No wheezing.  Breath sounds are normal " bilaterally.    Heart normal first and second heart sounds.  No murmur..  No pericardial rub is present.  No gallop is present.    Abdomen soft and nontender.  No organomegaly is present.    Extremities revealed good peripheral pulses without any pedal edema.    Skin warm and dry.    Musculoskeletal system-kyphoscoliosis.    CNS grossly normal.

## 2021-06-17 NOTE — PROGRESS NOTES
"Encounter Date:06/17/2021      Patient ID: Charleen Barnett is a 71 y.o. female.    Chief Complaint:      History of Present Illness      Assessment and Plan               No diagnosis found.LAB RESULTS (LAST 7 DAYS)    CBC  Results from last 7 days   Lab Units 06/15/21  1410   WBC 10*3/mm3 6.24   RBC 10*6/mm3 4.63   HEMOGLOBIN g/dL 13.8   HEMATOCRIT % 41.6   MCV fL 89.8   PLATELETS 10*3/mm3 218       BMP  Results from last 7 days   Lab Units 06/15/21  1410   SODIUM mmol/L 138   POTASSIUM mmol/L 4.6   CHLORIDE mmol/L 101   CO2 mmol/L 27.8   BUN mg/dL 20   CREATININE mg/dL 1.23*   GLUCOSE mg/dL 84       CMP   Results from last 7 days   Lab Units 06/15/21  1410   SODIUM mmol/L 138   POTASSIUM mmol/L 4.6   CHLORIDE mmol/L 101   CO2 mmol/L 27.8   BUN mg/dL 20   CREATININE mg/dL 1.23*   GLUCOSE mg/dL 84   ALBUMIN g/dL 4.40   BILIRUBIN mg/dL 0.2   ALK PHOS U/L 70   AST (SGOT) U/L 16   ALT (SGPT) U/L 9         BNP        TROPONIN        CoAg        Creatinine Clearance  Estimated Creatinine Clearance: 40.9 mL/min (A) (by C-G formula based on SCr of 1.23 mg/dL (H)).    ABG        Radiology  No radiology results for the last day                The following portions of the patient's history were reviewed and updated as appropriate: {history reviewed:20406::\"allergies\",\"current medications\",\"past family history\",\"past medical history\",\"past social history\",\"past surgical history\",\"problem list\"}.    Review of Systems   Constitutional: Negative for fever and malaise/fatigue.   Cardiovascular: Negative for chest pain, dyspnea on exertion and palpitations.   Respiratory: Negative for cough and shortness of breath.    Skin: Negative for rash.   Gastrointestinal: Negative for abdominal pain, nausea and vomiting.   Neurological: Negative for focal weakness and headaches.   All other systems reviewed and are negative.        Current Outpatient Medications:   •  albuterol sulfate HFA (VENTOLIN HFA) 108 (90 Base) MCG/ACT inhaler, " VENTOLIN  (90 Base) MCG/ACT AERS, Disp: , Rfl:   •  aspirin 81 MG tablet, Take 81 mg by mouth Daily., Disp: , Rfl:   •  atorvastatin (LIPITOR) 10 MG tablet, Take 1 tablet by mouth Daily., Disp: 90 tablet, Rfl: 3  •  Biotin 10 MG capsule, Take  by mouth., Disp: , Rfl:   •  buPROPion XL (Wellbutrin XL) 150 MG 24 hr tablet, Take 1 tablet by mouth Every Morning., Disp: 90 tablet, Rfl: 3  •  busPIRone (BUSPAR) 7.5 MG tablet, TAKE ONE TABLET BY MOUTH TWICE DAILY, Disp: 180 tablet, Rfl: 1  •  citalopram (CeleXA) 20 MG tablet, TAKE ONE TABLET BY MOUTH EVERY DAY, Disp: 90 tablet, Rfl: 1  •  clopidogrel (PLAVIX) 75 MG tablet, Take 1 tablet by mouth Daily., Disp: 30 tablet, Rfl: 0  •  denosumab (PROLIA) 60 MG/ML solution prefilled syringe syringe, PROLIA 60 MG/ML SOSY, Disp: , Rfl:   •  gabapentin (NEURONTIN) 400 MG capsule, TAKE 1 CAPSULE BY MOUTH AT BEDTIME, Disp: 90 capsule, Rfl: 0  •  ketotifen (ZADITOR) 0.025 % ophthalmic solution, 1 drop 2 (Two) Times a Day., Disp: , Rfl:   •  lansoprazole (PREVACID) 15 MG capsule, Take 15 mg by mouth Daily., Disp: , Rfl:   •  Magnesium 250 MG tablet, Take  by mouth., Disp: , Rfl:   •  metoprolol succinate XL (TOPROL-XL) 25 MG 24 hr tablet, TAKE ONE TABLET BY MOUTH EVERY MORNING, Disp: 90 tablet, Rfl: 3  •  montelukast (SINGULAIR) 10 MG tablet, Take 1 tablet by mouth Daily., Disp: 90 tablet, Rfl: 2  •  oxybutynin (DITROPAN) 5 MG tablet, TAKE 1 TABLET BY MOUTH 2 TIMES A DAY. LAST REFILL UNTIL SEEN IN OFFICE, Disp: 180 tablet, Rfl: 0  •  vitamin B-12 (CYANOCOBALAMIN) 100 MCG tablet, Take 50 mcg by mouth Daily., Disp: , Rfl:   •  vitamin C (ASCORBIC ACID) 250 MG tablet, Take 250 mg by mouth Daily., Disp: , Rfl:   •  vitamin D (ERGOCALCIFEROL) 1.25 MG (51804 UT) capsule capsule, TAKE 1 CAPSULE BY MOUTH EVERY WEEK, Disp: 13 capsule, Rfl: 2  •  vitamin E 100 UNIT capsule, Take 100 Units by mouth Daily., Disp: , Rfl:     Current Facility-Administered Medications:   •  denosumab (PROLIA)  syringe 60 mg, 60 mg, Subcutaneous, Q6 Months, Dede Anders PA, 60 mg at 12/01/20 1104    Allergies   Allergen Reactions   • Chlorpheniramine-Phenylephrine Rash   • Penicillins Rash   • Sulfa Antibiotics Hives and Rash   • Tetracycline Rash   • Warfarin Rash       Family History   Problem Relation Age of Onset   • Heart attack Father    • Cancer Father        Past Surgical History:   Procedure Laterality Date   • BLADDER SURGERY     • CARDIAC CATHETERIZATION N/A 5/29/2021    Procedure: Left Heart Cath and coronary angiogram;  Surgeon: Yazmin Matta MD;  Location:  [facility] CATH INVASIVE LOCATION;  Service: Cardiovascular;  Laterality: N/A;   • CARDIAC CATHETERIZATION  5/29/2021    Procedure: Functional Flow Reserve (iFR);  Surgeon: Bryan Patel MD;  Location:  [facility] CATH INVASIVE LOCATION;  Service: Cardiology;;   • CARDIAC CATHETERIZATION N/A 5/29/2021    Procedure: Percutaneous Coronary Intervention;  Surgeon: Bryan Patel MD;  Location: [facility] CATH INVASIVE LOCATION;  Service: Cardiology;  Laterality: N/A;   • CARDIAC CATHETERIZATION N/A 5/29/2021    Procedure: Stent FRANCOISE coronary;  Surgeon: Bryan Patel MD;  Location: [facility] CATH INVASIVE LOCATION;  Service: Cardiology;  Laterality: N/A;   • CHOLECYSTECTOMY     • HYSTERECTOMY     • KNEE SURGERY     • OTHER SURGICAL HISTORY      STENT       Past Medical History:   Diagnosis Date   • Anxiety    • Asthma 2/13/2020   • Ramos's esophagus 2/15/2018   • Depression 8/27/2018   • Fibromyalgia 12/14/2011   • GERD (gastroesophageal reflux disease)    • Gout 2/13/2020   • Hypertension    • Joint pain    • Obstructive sleep apnea 10/16/2014   • Osteoporosis     fosamax(SE), on prolia(4/10/2018)   • Pedal edema 10/22/2018   • Presence of left artificial knee joint 3/14/2019   • Seasonal allergies    • ST elevation (STEMI) myocardial infarction (CMS/[facility]) 2/13/2020   • Status post percutaneous transluminal coronary angioplasty  6/10/2014   • Tachycardia    • Urge incontinence 8/27/2018   • Vitamin D deficiency 3/19/2015       Family History   Problem Relation Age of Onset   • Heart attack Father    • Cancer Father        Social History     Socioeconomic History   • Marital status:      Spouse name: Not on file   • Number of children: Not on file   • Years of education: Not on file   • Highest education level: Not on file   Tobacco Use   • Smoking status: Never Smoker   • Smokeless tobacco: Never Used   Vaping Use   • Vaping Use: Never used   Substance and Sexual Activity   • Alcohol use: Not Currently     Comment: NO DRINK 1994   • Drug use: Never   • Sexual activity: Defer         Procedures      Objective:       Physical Exam    There were no vitals taken for this visit.  The patient is alert, oriented and in no distress.    Vital signs as noted above.    Head and neck revealed no carotid bruits or jugular venous distension.  No thyromegaly or lymphadenopathy is present.    Lungs clear.  No wheezing.  Breath sounds are normal bilaterally.    Heart normal first and second heart sounds.  No murmur..  No pericardial rub is present.  No gallop is present.    Abdomen soft and nontender.  No organomegaly is present.    Extremities revealed good peripheral pulses without any pedal edema.    Skin warm and dry.    Musculoskeletal system is grossly normal.    CNS grossly normal.

## 2021-06-18 ENCOUNTER — CLINICAL SUPPORT (OUTPATIENT)
Dept: ORTHOPEDIC SURGERY | Facility: CLINIC | Age: 71
End: 2021-06-18

## 2021-06-18 VITALS
SYSTOLIC BLOOD PRESSURE: 128 MMHG | DIASTOLIC BLOOD PRESSURE: 77 MMHG | HEIGHT: 58 IN | BODY MASS INDEX: 38.41 KG/M2 | HEART RATE: 62 BPM | WEIGHT: 183 LBS

## 2021-06-18 DIAGNOSIS — M81.0 AGE-RELATED OSTEOPOROSIS WITHOUT CURRENT PATHOLOGICAL FRACTURE: ICD-10-CM

## 2021-06-18 PROCEDURE — 96372 THER/PROPH/DIAG INJ SC/IM: CPT | Performed by: PHYSICIAN ASSISTANT

## 2021-06-18 NOTE — PROGRESS NOTES
Patient presents for Prolia injection. Patient had no issues with the last Prolia injection. Patient last Calcium was 9.2mg/dL. Injection administered and patient tolerated without complication.

## 2021-06-21 ENCOUNTER — TRANSCRIBE ORDERS (OUTPATIENT)
Dept: FAMILY MEDICINE CLINIC | Facility: CLINIC | Age: 71
End: 2021-06-21

## 2021-06-24 ENCOUNTER — APPOINTMENT (OUTPATIENT)
Dept: GENERAL RADIOLOGY | Facility: HOSPITAL | Age: 71
End: 2021-06-24

## 2021-06-24 ENCOUNTER — APPOINTMENT (OUTPATIENT)
Dept: CT IMAGING | Facility: HOSPITAL | Age: 71
End: 2021-06-24

## 2021-06-24 ENCOUNTER — HOSPITAL ENCOUNTER (OUTPATIENT)
Facility: HOSPITAL | Age: 71
Setting detail: OBSERVATION
Discharge: HOME | End: 2021-06-25
Attending: EMERGENCY MEDICINE | Admitting: EMERGENCY MEDICINE

## 2021-06-24 DIAGNOSIS — R07.9 CHEST PAIN, UNSPECIFIED TYPE: Primary | ICD-10-CM

## 2021-06-24 LAB
ALBUMIN SERPL-MCNC: 3.8 G/DL (ref 3.5–5.2)
ALBUMIN/GLOB SERPL: 1.7 G/DL
ALP SERPL-CCNC: 70 U/L (ref 39–117)
ALT SERPL W P-5'-P-CCNC: 9 U/L (ref 1–33)
ANION GAP SERPL CALCULATED.3IONS-SCNC: 9 MMOL/L (ref 5–15)
APTT PPP: 23.2 SECONDS (ref 24–31)
AST SERPL-CCNC: 13 U/L (ref 1–32)
B PARAPERT DNA SPEC QL NAA+PROBE: NOT DETECTED
B PERT DNA SPEC QL NAA+PROBE: NOT DETECTED
BASOPHILS # BLD AUTO: 0.1 10*3/MM3 (ref 0–0.2)
BASOPHILS NFR BLD AUTO: 1 % (ref 0–1.5)
BILIRUB SERPL-MCNC: 0.2 MG/DL (ref 0–1.2)
BUN SERPL-MCNC: 12 MG/DL (ref 8–23)
BUN/CREAT SERPL: 12.6 (ref 7–25)
C PNEUM DNA NPH QL NAA+NON-PROBE: NOT DETECTED
CALCIUM SPEC-SCNC: 8.2 MG/DL (ref 8.6–10.5)
CHLORIDE SERPL-SCNC: 107 MMOL/L (ref 98–107)
CO2 SERPL-SCNC: 25 MMOL/L (ref 22–29)
CREAT SERPL-MCNC: 0.95 MG/DL (ref 0.57–1)
D DIMER PPP FEU-MCNC: 0.86 MG/L (FEU) (ref 0–0.59)
DEPRECATED RDW RBC AUTO: 39.8 FL (ref 37–54)
EOSINOPHIL # BLD AUTO: 0.2 10*3/MM3 (ref 0–0.4)
EOSINOPHIL NFR BLD AUTO: 3.2 % (ref 0.3–6.2)
ERYTHROCYTE [DISTWIDTH] IN BLOOD BY AUTOMATED COUNT: 13.1 % (ref 12.3–15.4)
FLUAV SUBTYP SPEC NAA+PROBE: NOT DETECTED
FLUBV RNA ISLT QL NAA+PROBE: NOT DETECTED
GFR SERPL CREATININE-BSD FRML MDRD: 58 ML/MIN/1.73
GLOBULIN UR ELPH-MCNC: 2.2 GM/DL
GLUCOSE SERPL-MCNC: 89 MG/DL (ref 65–99)
HADV DNA SPEC NAA+PROBE: NOT DETECTED
HCOV 229E RNA SPEC QL NAA+PROBE: NOT DETECTED
HCOV HKU1 RNA SPEC QL NAA+PROBE: NOT DETECTED
HCOV NL63 RNA SPEC QL NAA+PROBE: NOT DETECTED
HCOV OC43 RNA SPEC QL NAA+PROBE: NOT DETECTED
HCT VFR BLD AUTO: 38.3 % (ref 34–46.6)
HGB BLD-MCNC: 12.7 G/DL (ref 12–15.9)
HMPV RNA NPH QL NAA+NON-PROBE: NOT DETECTED
HPIV1 RNA SPEC QL NAA+PROBE: NOT DETECTED
HPIV2 RNA SPEC QL NAA+PROBE: NOT DETECTED
HPIV3 RNA NPH QL NAA+PROBE: NOT DETECTED
HPIV4 P GENE NPH QL NAA+PROBE: NOT DETECTED
INR PPP: <0.93 (ref 0.93–1.1)
LYMPHOCYTES # BLD AUTO: 2.2 10*3/MM3 (ref 0.7–3.1)
LYMPHOCYTES NFR BLD AUTO: 36.7 % (ref 19.6–45.3)
M PNEUMO IGG SER IA-ACNC: NOT DETECTED
MCH RBC QN AUTO: 28.9 PG (ref 26.6–33)
MCHC RBC AUTO-ENTMCNC: 33.1 G/DL (ref 31.5–35.7)
MCV RBC AUTO: 87.4 FL (ref 79–97)
MONOCYTES # BLD AUTO: 0.4 10*3/MM3 (ref 0.1–0.9)
MONOCYTES NFR BLD AUTO: 7.4 % (ref 5–12)
NEUTROPHILS NFR BLD AUTO: 3.1 10*3/MM3 (ref 1.7–7)
NEUTROPHILS NFR BLD AUTO: 51.7 % (ref 42.7–76)
NRBC BLD AUTO-RTO: 0 /100 WBC (ref 0–0.2)
PLATELET # BLD AUTO: 181 10*3/MM3 (ref 140–450)
PMV BLD AUTO: 8.4 FL (ref 6–12)
POTASSIUM SERPL-SCNC: 4.3 MMOL/L (ref 3.5–5.2)
PROT SERPL-MCNC: 6 G/DL (ref 6–8.5)
PROTHROMBIN TIME: 10.3 SECONDS (ref 9.6–11.7)
QT INTERVAL: 453 MS
RBC # BLD AUTO: 4.38 10*6/MM3 (ref 3.77–5.28)
RHINOVIRUS RNA SPEC NAA+PROBE: NOT DETECTED
RSV RNA NPH QL NAA+NON-PROBE: NOT DETECTED
SARS-COV-2 RNA NPH QL NAA+NON-PROBE: NOT DETECTED
SODIUM SERPL-SCNC: 141 MMOL/L (ref 136–145)
TROPONIN T SERPL-MCNC: <0.01 NG/ML (ref 0–0.03)
TROPONIN T SERPL-MCNC: <0.01 NG/ML (ref 0–0.03)
WBC # BLD AUTO: 5.9 10*3/MM3 (ref 3.4–10.8)

## 2021-06-24 PROCEDURE — G0378 HOSPITAL OBSERVATION PER HR: HCPCS

## 2021-06-24 PROCEDURE — 85730 THROMBOPLASTIN TIME PARTIAL: CPT | Performed by: EMERGENCY MEDICINE

## 2021-06-24 PROCEDURE — 85610 PROTHROMBIN TIME: CPT | Performed by: EMERGENCY MEDICINE

## 2021-06-24 PROCEDURE — 85025 COMPLETE CBC W/AUTO DIFF WBC: CPT | Performed by: EMERGENCY MEDICINE

## 2021-06-24 PROCEDURE — 93005 ELECTROCARDIOGRAM TRACING: CPT | Performed by: EMERGENCY MEDICINE

## 2021-06-24 PROCEDURE — 25010000002 ENOXAPARIN PER 10 MG: Performed by: NURSE PRACTITIONER

## 2021-06-24 PROCEDURE — 96372 THER/PROPH/DIAG INJ SC/IM: CPT

## 2021-06-24 PROCEDURE — 85379 FIBRIN DEGRADATION QUANT: CPT | Performed by: EMERGENCY MEDICINE

## 2021-06-24 PROCEDURE — 0 IOPAMIDOL PER 1 ML: Performed by: EMERGENCY MEDICINE

## 2021-06-24 PROCEDURE — 0202U NFCT DS 22 TRGT SARS-COV-2: CPT | Performed by: INTERNAL MEDICINE

## 2021-06-24 PROCEDURE — 99213 OFFICE O/P EST LOW 20 MIN: CPT | Performed by: INTERNAL MEDICINE

## 2021-06-24 PROCEDURE — 84484 ASSAY OF TROPONIN QUANT: CPT | Performed by: NURSE PRACTITIONER

## 2021-06-24 PROCEDURE — 71045 X-RAY EXAM CHEST 1 VIEW: CPT

## 2021-06-24 PROCEDURE — 71275 CT ANGIOGRAPHY CHEST: CPT

## 2021-06-24 PROCEDURE — 99285 EMERGENCY DEPT VISIT HI MDM: CPT

## 2021-06-24 PROCEDURE — 80053 COMPREHEN METABOLIC PANEL: CPT | Performed by: EMERGENCY MEDICINE

## 2021-06-24 PROCEDURE — 84484 ASSAY OF TROPONIN QUANT: CPT | Performed by: EMERGENCY MEDICINE

## 2021-06-24 RX ORDER — SODIUM CHLORIDE 0.9 % (FLUSH) 0.9 %
10 SYRINGE (ML) INJECTION AS NEEDED
Status: DISCONTINUED | OUTPATIENT
Start: 2021-06-24 | End: 2021-06-25 | Stop reason: HOSPADM

## 2021-06-24 RX ORDER — ACETAMINOPHEN 325 MG/1
650 TABLET ORAL EVERY 4 HOURS PRN
Status: DISCONTINUED | OUTPATIENT
Start: 2021-06-24 | End: 2021-06-25 | Stop reason: HOSPADM

## 2021-06-24 RX ORDER — ONDANSETRON 4 MG/1
4 TABLET, FILM COATED ORAL EVERY 6 HOURS PRN
Status: DISCONTINUED | OUTPATIENT
Start: 2021-06-24 | End: 2021-06-25 | Stop reason: HOSPADM

## 2021-06-24 RX ORDER — PANTOPRAZOLE SODIUM 40 MG/1
40 TABLET, DELAYED RELEASE ORAL EVERY MORNING
Status: DISCONTINUED | OUTPATIENT
Start: 2021-06-25 | End: 2021-06-25 | Stop reason: HOSPADM

## 2021-06-24 RX ORDER — ACETAMINOPHEN 160 MG/5ML
650 SOLUTION ORAL EVERY 4 HOURS PRN
Status: DISCONTINUED | OUTPATIENT
Start: 2021-06-24 | End: 2021-06-25 | Stop reason: HOSPADM

## 2021-06-24 RX ORDER — ACETAMINOPHEN 650 MG/1
650 SUPPOSITORY RECTAL EVERY 4 HOURS PRN
Status: DISCONTINUED | OUTPATIENT
Start: 2021-06-24 | End: 2021-06-25 | Stop reason: HOSPADM

## 2021-06-24 RX ORDER — BUPROPION HYDROCHLORIDE 150 MG/1
150 TABLET ORAL EVERY MORNING
Status: DISCONTINUED | OUTPATIENT
Start: 2021-06-25 | End: 2021-06-25 | Stop reason: HOSPADM

## 2021-06-24 RX ORDER — METOPROLOL SUCCINATE 25 MG/1
25 TABLET, EXTENDED RELEASE ORAL EVERY MORNING
Status: DISCONTINUED | OUTPATIENT
Start: 2021-06-25 | End: 2021-06-25 | Stop reason: HOSPADM

## 2021-06-24 RX ORDER — OXYBUTYNIN CHLORIDE 5 MG/1
5 TABLET ORAL 2 TIMES DAILY
Status: DISCONTINUED | OUTPATIENT
Start: 2021-06-24 | End: 2021-06-25 | Stop reason: HOSPADM

## 2021-06-24 RX ORDER — BUSPIRONE HYDROCHLORIDE 15 MG/1
7.5 TABLET ORAL 2 TIMES DAILY
Status: DISCONTINUED | OUTPATIENT
Start: 2021-06-24 | End: 2021-06-25 | Stop reason: HOSPADM

## 2021-06-24 RX ORDER — OXYBUTYNIN CHLORIDE 5 MG/1
5 TABLET ORAL 2 TIMES DAILY
COMMUNITY
End: 2021-08-02

## 2021-06-24 RX ORDER — GABAPENTIN 400 MG/1
400 CAPSULE ORAL NIGHTLY
Status: DISCONTINUED | OUTPATIENT
Start: 2021-06-24 | End: 2021-06-25 | Stop reason: HOSPADM

## 2021-06-24 RX ORDER — CLOPIDOGREL BISULFATE 75 MG/1
75 TABLET ORAL ONCE
Status: COMPLETED | OUTPATIENT
Start: 2021-06-24 | End: 2021-06-24

## 2021-06-24 RX ORDER — CITALOPRAM 20 MG/1
20 TABLET ORAL DAILY
Status: DISCONTINUED | OUTPATIENT
Start: 2021-06-24 | End: 2021-06-25 | Stop reason: HOSPADM

## 2021-06-24 RX ORDER — CLOPIDOGREL BISULFATE 75 MG/1
75 TABLET ORAL DAILY
Status: DISCONTINUED | OUTPATIENT
Start: 2021-06-25 | End: 2021-06-25 | Stop reason: HOSPADM

## 2021-06-24 RX ORDER — ASPIRIN 81 MG/1
81 TABLET ORAL DAILY
Status: DISCONTINUED | OUTPATIENT
Start: 2021-06-25 | End: 2021-06-25 | Stop reason: HOSPADM

## 2021-06-24 RX ORDER — ATORVASTATIN CALCIUM 10 MG/1
10 TABLET, FILM COATED ORAL DAILY
Status: DISCONTINUED | OUTPATIENT
Start: 2021-06-24 | End: 2021-06-25 | Stop reason: HOSPADM

## 2021-06-24 RX ORDER — SODIUM CHLORIDE 0.9 % (FLUSH) 0.9 %
10 SYRINGE (ML) INJECTION EVERY 12 HOURS SCHEDULED
Status: DISCONTINUED | OUTPATIENT
Start: 2021-06-24 | End: 2021-06-25 | Stop reason: HOSPADM

## 2021-06-24 RX ORDER — MONTELUKAST SODIUM 10 MG/1
10 TABLET ORAL DAILY
Status: DISCONTINUED | OUTPATIENT
Start: 2021-06-24 | End: 2021-06-25 | Stop reason: HOSPADM

## 2021-06-24 RX ORDER — ONDANSETRON 2 MG/ML
4 INJECTION INTRAMUSCULAR; INTRAVENOUS EVERY 6 HOURS PRN
Status: DISCONTINUED | OUTPATIENT
Start: 2021-06-24 | End: 2021-06-25 | Stop reason: HOSPADM

## 2021-06-24 RX ORDER — ASPIRIN 325 MG
325 TABLET ORAL ONCE
Status: COMPLETED | OUTPATIENT
Start: 2021-06-24 | End: 2021-06-24

## 2021-06-24 RX ADMIN — ATORVASTATIN CALCIUM 10 MG: 10 TABLET, FILM COATED ORAL at 14:09

## 2021-06-24 RX ADMIN — OXYBUTYNIN CHLORIDE 5 MG: 5 TABLET ORAL at 20:55

## 2021-06-24 RX ADMIN — CITALOPRAM HYDROBROMIDE 20 MG: 20 TABLET ORAL at 14:09

## 2021-06-24 RX ADMIN — ENOXAPARIN SODIUM 40 MG: 40 INJECTION SUBCUTANEOUS at 15:56

## 2021-06-24 RX ADMIN — IOPAMIDOL 100 ML: 755 INJECTION, SOLUTION INTRAVENOUS at 07:04

## 2021-06-24 RX ADMIN — CLOPIDOGREL BISULFATE 75 MG: 75 TABLET ORAL at 08:50

## 2021-06-24 RX ADMIN — ACETAMINOPHEN 650 MG: 325 TABLET, FILM COATED ORAL at 14:09

## 2021-06-24 RX ADMIN — MONTELUKAST 10 MG: 10 TABLET, FILM COATED ORAL at 14:09

## 2021-06-24 RX ADMIN — NITROGLYCERIN 1 INCH: 20 OINTMENT TOPICAL at 08:50

## 2021-06-24 RX ADMIN — Medication 10 ML: at 20:55

## 2021-06-24 RX ADMIN — BUSPIRONE HYDROCHLORIDE 7.5 MG: 15 TABLET ORAL at 20:55

## 2021-06-24 RX ADMIN — ASPIRIN 325 MG ORAL TABLET 325 MG: 325 PILL ORAL at 06:30

## 2021-06-24 RX ADMIN — GABAPENTIN 400 MG: 400 CAPSULE ORAL at 20:55

## 2021-06-25 ENCOUNTER — READMISSION MANAGEMENT (OUTPATIENT)
Dept: CALL CENTER | Facility: HOSPITAL | Age: 71
End: 2021-06-25

## 2021-06-25 ENCOUNTER — APPOINTMENT (OUTPATIENT)
Dept: NUCLEAR MEDICINE | Facility: HOSPITAL | Age: 71
End: 2021-06-25

## 2021-06-25 ENCOUNTER — APPOINTMENT (OUTPATIENT)
Dept: CARDIOLOGY | Facility: HOSPITAL | Age: 71
End: 2021-06-25

## 2021-06-25 VITALS
HEIGHT: 58 IN | DIASTOLIC BLOOD PRESSURE: 69 MMHG | SYSTOLIC BLOOD PRESSURE: 146 MMHG | OXYGEN SATURATION: 98 % | TEMPERATURE: 97.8 F | WEIGHT: 192 LBS | HEART RATE: 52 BPM | RESPIRATION RATE: 17 BRPM | BODY MASS INDEX: 40.3 KG/M2

## 2021-06-25 LAB
BH CV ECHO MEAS - ACS: 1.9 CM
BH CV ECHO MEAS - AO MAX PG (FULL): 3.5 MMHG
BH CV ECHO MEAS - AO MAX PG: 6.5 MMHG
BH CV ECHO MEAS - AO MEAN PG (FULL): 1.8 MMHG
BH CV ECHO MEAS - AO MEAN PG: 3.2 MMHG
BH CV ECHO MEAS - AO ROOT AREA (BSA CORRECTED): 1.9
BH CV ECHO MEAS - AO ROOT AREA: 9.3 CM^2
BH CV ECHO MEAS - AO ROOT DIAM: 3.4 CM
BH CV ECHO MEAS - AO V2 MAX: 127.9 CM/SEC
BH CV ECHO MEAS - AO V2 MEAN: 83.3 CM/SEC
BH CV ECHO MEAS - AO V2 VTI: 33.8 CM
BH CV ECHO MEAS - AVA(I,A): 3 CM^2
BH CV ECHO MEAS - AVA(I,D): 3 CM^2
BH CV ECHO MEAS - AVA(V,A): 2.6 CM^2
BH CV ECHO MEAS - AVA(V,D): 2.6 CM^2
BH CV ECHO MEAS - BSA(HAYCOCK): 1.9 M^2
BH CV ECHO MEAS - BSA: 1.8 M^2
BH CV ECHO MEAS - BZI_BMI: 40.1 KILOGRAMS/M^2
BH CV ECHO MEAS - BZI_METRIC_HEIGHT: 147.3 CM
BH CV ECHO MEAS - BZI_METRIC_WEIGHT: 87.1 KG
BH CV ECHO MEAS - EDV(CUBED): 105 ML
BH CV ECHO MEAS - EDV(MOD-SP4): 91.4 ML
BH CV ECHO MEAS - EDV(TEICH): 103.3 ML
BH CV ECHO MEAS - EF(CUBED): 50.9 %
BH CV ECHO MEAS - EF(MOD-BP): 58 %
BH CV ECHO MEAS - EF(MOD-SP4): 58.4 %
BH CV ECHO MEAS - EF(TEICH): 42.9 %
BH CV ECHO MEAS - ESV(CUBED): 51.6 ML
BH CV ECHO MEAS - ESV(MOD-SP4): 38.1 ML
BH CV ECHO MEAS - ESV(TEICH): 59 ML
BH CV ECHO MEAS - FS: 21.1 %
BH CV ECHO MEAS - IVS/LVPW: 1.1
BH CV ECHO MEAS - IVSD: 1.1 CM
BH CV ECHO MEAS - LA DIMENSION(2D): 4.4 CM
BH CV ECHO MEAS - LV DIASTOLIC VOL/BSA (35-75): 51.1 ML/M^2
BH CV ECHO MEAS - LV MASS(C)D: 178.3 GRAMS
BH CV ECHO MEAS - LV MASS(C)DI: 99.6 GRAMS/M^2
BH CV ECHO MEAS - LV MAX PG: 3.1 MMHG
BH CV ECHO MEAS - LV MEAN PG: 1.5 MMHG
BH CV ECHO MEAS - LV SYSTOLIC VOL/BSA (12-30): 21.3 ML/M^2
BH CV ECHO MEAS - LV V1 MAX: 87.8 CM/SEC
BH CV ECHO MEAS - LV V1 MEAN: 57.5 CM/SEC
BH CV ECHO MEAS - LV V1 VTI: 27.1 CM
BH CV ECHO MEAS - LVIDD: 4.7 CM
BH CV ECHO MEAS - LVIDS: 3.7 CM
BH CV ECHO MEAS - LVOT AREA: 3.8 CM^2
BH CV ECHO MEAS - LVOT DIAM: 2.2 CM
BH CV ECHO MEAS - LVPWD: 1 CM
BH CV ECHO MEAS - MR MAX PG: 32.2 MMHG
BH CV ECHO MEAS - MR MAX VEL: 283.7 CM/SEC
BH CV ECHO MEAS - MV A MAX VEL: 108.5 CM/SEC
BH CV ECHO MEAS - MV DEC SLOPE: 392.8 CM/SEC^2
BH CV ECHO MEAS - MV DEC TIME: 0.23 SEC
BH CV ECHO MEAS - MV E MAX VEL: 89.1 CM/SEC
BH CV ECHO MEAS - MV E/A: 0.82
BH CV ECHO MEAS - MV MAX PG: 3.8 MMHG
BH CV ECHO MEAS - MV MEAN PG: 1.9 MMHG
BH CV ECHO MEAS - MV V2 MAX: 97.5 CM/SEC
BH CV ECHO MEAS - MV V2 MEAN: 64.6 CM/SEC
BH CV ECHO MEAS - MV V2 VTI: 25.6 CM
BH CV ECHO MEAS - MVA(VTI): 4 CM^2
BH CV ECHO MEAS - PA MAX PG (FULL): 1.1 MMHG
BH CV ECHO MEAS - PA MAX PG: 3.3 MMHG
BH CV ECHO MEAS - PA V2 MAX: 91.5 CM/SEC
BH CV ECHO MEAS - PULM A REVS DUR: 0.08 SEC
BH CV ECHO MEAS - PULM A REVS VEL: 27.9 CM/SEC
BH CV ECHO MEAS - PULM DIAS VEL: 57.8 CM/SEC
BH CV ECHO MEAS - PULM S/D: 1.4
BH CV ECHO MEAS - PULM SYS VEL: 80.6 CM/SEC
BH CV ECHO MEAS - RV MAX PG: 2.2 MMHG
BH CV ECHO MEAS - RV MEAN PG: 1.4 MMHG
BH CV ECHO MEAS - RV V1 MAX: 74.3 CM/SEC
BH CV ECHO MEAS - RV V1 MEAN: 58 CM/SEC
BH CV ECHO MEAS - RV V1 VTI: 19 CM
BH CV ECHO MEAS - RVDD: 3 CM
BH CV ECHO MEAS - SI(AO): 175.3 ML/M^2
BH CV ECHO MEAS - SI(CUBED): 29.8 ML/M^2
BH CV ECHO MEAS - SI(LVOT): 57.2 ML/M^2
BH CV ECHO MEAS - SI(MOD-SP4): 29.8 ML/M^2
BH CV ECHO MEAS - SI(TEICH): 24.7 ML/M^2
BH CV ECHO MEAS - SV(AO): 313.7 ML
BH CV ECHO MEAS - SV(CUBED): 53.4 ML
BH CV ECHO MEAS - SV(LVOT): 102.5 ML
BH CV ECHO MEAS - SV(MOD-SP4): 53.4 ML
BH CV ECHO MEAS - SV(TEICH): 44.3 ML
BH CV REST NUCLEAR ISOTOPE DOSE: 7.9 MCI
BH CV STRESS BP STAGE 1: NORMAL
BH CV STRESS BP STAGE 2: NORMAL
BH CV STRESS BP STAGE 3: NORMAL
BH CV STRESS BP STAGE 4: NORMAL
BH CV STRESS COMMENTS STAGE 1: NORMAL
BH CV STRESS DOSE REGADENOSON STAGE 1: 0.4
BH CV STRESS DURATION MIN STAGE 1: 1
BH CV STRESS DURATION MIN STAGE 2: 1
BH CV STRESS DURATION MIN STAGE 3: 1
BH CV STRESS DURATION MIN STAGE 4: 1
BH CV STRESS DURATION SEC STAGE 1: 10
BH CV STRESS DURATION SEC STAGE 2: 0
BH CV STRESS HR STAGE 1: 86
BH CV STRESS HR STAGE 2: 93
BH CV STRESS HR STAGE 3: 96
BH CV STRESS HR STAGE 4: 96
BH CV STRESS NUCLEAR ISOTOPE DOSE: 21.2 MCI
BH CV STRESS PROTOCOL 1: NORMAL
BH CV STRESS RECOVERY BP: NORMAL MMHG
BH CV STRESS RECOVERY HR: 92 BPM
BH CV STRESS STAGE 1: 1
BH CV STRESS STAGE 2: 2
BH CV STRESS STAGE 3: 3
BH CV STRESS STAGE 4: 4
LV EF 2D ECHO EST: 60 %
MAXIMAL PREDICTED HEART RATE: 149 BPM
PERCENT MAX PREDICTED HR: 66.44 %
STRESS BASELINE BP: NORMAL MMHG
STRESS BASELINE HR: 58 BPM
STRESS PERCENT HR: 78 %
STRESS POST PEAK BP: NORMAL MMHG
STRESS POST PEAK HR: 99 BPM
STRESS TARGET HR: 127 BPM

## 2021-06-25 PROCEDURE — 25010000002 REGADENOSON 0.4 MG/5ML SOLUTION: Performed by: EMERGENCY MEDICINE

## 2021-06-25 PROCEDURE — 99214 OFFICE O/P EST MOD 30 MIN: CPT | Performed by: INTERNAL MEDICINE

## 2021-06-25 PROCEDURE — G0378 HOSPITAL OBSERVATION PER HR: HCPCS

## 2021-06-25 PROCEDURE — 78452 HT MUSCLE IMAGE SPECT MULT: CPT | Performed by: INTERNAL MEDICINE

## 2021-06-25 PROCEDURE — A9500 TC99M SESTAMIBI: HCPCS | Performed by: EMERGENCY MEDICINE

## 2021-06-25 PROCEDURE — 93306 TTE W/DOPPLER COMPLETE: CPT | Performed by: INTERNAL MEDICINE

## 2021-06-25 PROCEDURE — 0 TECHNETIUM SESTAMIBI: Performed by: EMERGENCY MEDICINE

## 2021-06-25 PROCEDURE — 93306 TTE W/DOPPLER COMPLETE: CPT

## 2021-06-25 PROCEDURE — 93016 CV STRESS TEST SUPVJ ONLY: CPT | Performed by: INTERNAL MEDICINE

## 2021-06-25 PROCEDURE — 93018 CV STRESS TEST I&R ONLY: CPT | Performed by: INTERNAL MEDICINE

## 2021-06-25 PROCEDURE — 93017 CV STRESS TEST TRACING ONLY: CPT

## 2021-06-25 PROCEDURE — 78452 HT MUSCLE IMAGE SPECT MULT: CPT

## 2021-06-25 RX ORDER — CYCLOBENZAPRINE HCL 5 MG
5 TABLET ORAL 3 TIMES DAILY PRN
Qty: 30 TABLET | Refills: 0 | Status: SHIPPED | OUTPATIENT
Start: 2021-06-25 | End: 2021-12-14

## 2021-06-25 RX ADMIN — CITALOPRAM HYDROBROMIDE 20 MG: 20 TABLET ORAL at 14:08

## 2021-06-25 RX ADMIN — TECHNETIUM TC 99M SESTAMIBI 1 DOSE: 1 INJECTION INTRAVENOUS at 11:50

## 2021-06-25 RX ADMIN — METOPROLOL SUCCINATE 25 MG: 25 TABLET, EXTENDED RELEASE ORAL at 14:08

## 2021-06-25 RX ADMIN — OXYBUTYNIN CHLORIDE 5 MG: 5 TABLET ORAL at 14:12

## 2021-06-25 RX ADMIN — Medication 10 ML: at 14:08

## 2021-06-25 RX ADMIN — TECHNETIUM TC 99M SESTAMIBI 1 DOSE: 1 INJECTION INTRAVENOUS at 09:18

## 2021-06-25 RX ADMIN — PANTOPRAZOLE SODIUM 40 MG: 40 TABLET, DELAYED RELEASE ORAL at 06:08

## 2021-06-25 RX ADMIN — BUPROPION HYDROCHLORIDE 150 MG: 150 TABLET, EXTENDED RELEASE ORAL at 06:08

## 2021-06-25 RX ADMIN — ASPIRIN 81 MG: 81 TABLET, COATED ORAL at 14:08

## 2021-06-25 RX ADMIN — ATORVASTATIN CALCIUM 10 MG: 10 TABLET, FILM COATED ORAL at 14:08

## 2021-06-25 RX ADMIN — REGADENOSON 0.4 MG: 0.08 INJECTION, SOLUTION INTRAVENOUS at 11:50

## 2021-06-25 RX ADMIN — CLOPIDOGREL BISULFATE 75 MG: 75 TABLET ORAL at 14:08

## 2021-06-25 RX ADMIN — MONTELUKAST 10 MG: 10 TABLET, FILM COATED ORAL at 14:08

## 2021-06-25 NOTE — OUTREACH NOTE
Prep Survey      Responses   [facility] facility patient discharged from?  [facility]   Is LACE score < 7 ?  Yes   Emergency Room discharge w/ pulse ox?  No   Eligibility  TCM   Hospital  [facility]   Date of Admission  06/24/21   Date of Discharge  06/25/21   Discharge Disposition  Home or Self Care   Discharge diagnosis  chest pain, stress test low probability of cardiac ischemia   Does the patient have one of the following disease processes/diagnoses(primary or secondary)?  Other   Does the patient have Home health ordered?  No   Is there a DME ordered?  No   Prep survey completed?  Yes          Lillian Howell RN

## 2021-06-28 ENCOUNTER — TRANSITIONAL CARE MANAGEMENT TELEPHONE ENCOUNTER (OUTPATIENT)
Dept: CALL CENTER | Facility: HOSPITAL | Age: 71
End: 2021-06-28

## 2021-06-28 ENCOUNTER — APPOINTMENT (OUTPATIENT)
Dept: CARDIOLOGY | Facility: HOSPITAL | Age: 71
End: 2021-06-28

## 2021-06-28 ENCOUNTER — HOSPITAL ENCOUNTER (OUTPATIENT)
Dept: CARDIOLOGY | Facility: HOSPITAL | Age: 71
Discharge: HOME | End: 2021-06-28
Admitting: FAMILY MEDICINE

## 2021-06-28 DIAGNOSIS — R55 NEAR SYNCOPE: ICD-10-CM

## 2021-06-28 LAB
BH CV XLRA MEAS LEFT BULB EDV: -11.4 CM/SEC
BH CV XLRA MEAS LEFT BULB PSV: -42.7 CM/SEC
BH CV XLRA MEAS LEFT CCA RATIO VEL: -86.2 CM/SEC
BH CV XLRA MEAS LEFT DIST CCA EDV: -20.4 CM/SEC
BH CV XLRA MEAS LEFT DIST CCA PSV: -86.2 CM/SEC
BH CV XLRA MEAS LEFT DIST ICA EDV: 19.7 CM/SEC
BH CV XLRA MEAS LEFT DIST ICA PSV: 49.1 CM/SEC
BH CV XLRA MEAS LEFT ICA RATIO VEL: -79.2 CM/SEC
BH CV XLRA MEAS LEFT ICA/CCA RATIO: 0.92
BH CV XLRA MEAS LEFT PROX CCA EDV: 11.8 CM/SEC
BH CV XLRA MEAS LEFT PROX CCA PSV: 59.2 CM/SEC
BH CV XLRA MEAS LEFT PROX ECA EDV: -7 CM/SEC
BH CV XLRA MEAS LEFT PROX ECA PSV: -94.6 CM/SEC
BH CV XLRA MEAS LEFT PROX ICA EDV: -28 CM/SEC
BH CV XLRA MEAS LEFT PROX ICA PSV: -79.2 CM/SEC
BH CV XLRA MEAS LEFT PROX SCLA PSV: 132 CM/SEC
BH CV XLRA MEAS LEFT VERTEBRAL A EDV: -12.8 CM/SEC
BH CV XLRA MEAS LEFT VERTEBRAL A PSV: -45.2 CM/SEC
BH CV XLRA MEAS RIGHT BULB EDV: -18.2 CM/SEC
BH CV XLRA MEAS RIGHT BULB PSV: -68 CM/SEC
BH CV XLRA MEAS RIGHT CCA RATIO VEL: 72.7 CM/SEC
BH CV XLRA MEAS RIGHT DIST CCA EDV: -12.8 CM/SEC
BH CV XLRA MEAS RIGHT DIST CCA PSV: -62.4 CM/SEC
BH CV XLRA MEAS RIGHT DIST ICA EDV: -17.5 CM/SEC
BH CV XLRA MEAS RIGHT DIST ICA PSV: -43.4 CM/SEC
BH CV XLRA MEAS RIGHT ICA RATIO VEL: -51.8 CM/SEC
BH CV XLRA MEAS RIGHT ICA/CCA RATIO: -0.71
BH CV XLRA MEAS RIGHT PROX CCA EDV: 12.3 CM/SEC
BH CV XLRA MEAS RIGHT PROX CCA PSV: 72.7 CM/SEC
BH CV XLRA MEAS RIGHT PROX ECA EDV: 10.5 CM/SEC
BH CV XLRA MEAS RIGHT PROX ECA PSV: 81.3 CM/SEC
BH CV XLRA MEAS RIGHT PROX ICA EDV: -21 CM/SEC
BH CV XLRA MEAS RIGHT PROX ICA PSV: -51.8 CM/SEC
BH CV XLRA MEAS RIGHT PROX SCLA PSV: 107 CM/SEC
BH CV XLRA MEAS RIGHT VERTEBRAL A EDV: 10.3 CM/SEC
BH CV XLRA MEAS RIGHT VERTEBRAL A PSV: 49.6 CM/SEC
LEFT ARM BP: NORMAL MMHG
MAXIMAL PREDICTED HEART RATE: 149 BPM
RIGHT ARM BP: NORMAL MMHG
STRESS TARGET HR: 127 BPM

## 2021-06-28 PROCEDURE — 93880 EXTRACRANIAL BILAT STUDY: CPT

## 2021-06-28 NOTE — OUTREACH NOTE
Call Center TCM Note      Responses   [facility] patient discharged from?  [facility]   Does the patient have one of the following disease processes/diagnoses(primary or secondary)?  Other   TCM attempt successful?  Yes   Call start time  1256   Call end time  1259   Discharge diagnosis  chest pain, stress test low probability of cardiac ischemia   Meds reviewed with patient/caregiver?  Yes   Is the patient having any side effects they believe may be caused by any medication additions or changes?  No   Does the patient have all medications ordered at discharge?  Yes   Is the patient taking all medications as directed (includes completed medication regime)?  Yes   Does the patient have a primary care provider?   Yes   Does the patient have an appointment with their PCP within 7 days of discharge?  No   Comments regarding PCP  PATIENT SCHEDULED FOR FIRST AVAILABLE APPOINTMENT WITH DR. DAT CARRERA, WHICH IS 7/21/21   What is preventing the patient from scheduling follow up appointments within 7 days of discharge?  Haven't had time   Nursing Interventions  Educated patient on importance of making appointment   Urgent appointment interventions  Facilitated patient appointment   Has the patient kept scheduled appointments due by today?  N/A   Has home health visited the patient within 72 hours of discharge?  N/A   Psychosocial issues?  No   Did the patient receive a copy of their discharge instructions?  Yes   Nursing interventions  Reviewed instructions with patient   What is the patient's perception of their health status since discharge?  Improving   Is the patient/caregiver able to teach back signs and symptoms related to disease process for when to call PCP?  Yes   Is the patient/caregiver able to teach back signs and symptoms related to disease process for when to call 911?  Yes   Is the patient/caregiver able to teach back the hierarchy of who to call/visit for symptoms/problems? PCP, Specialist, Home health  nurse, Urgent Care, ED, 911  Yes   If the patient is a current smoker, are they able to teach back resources for cessation?  Not a smoker   TCM call completed?  Yes          Nicole Prakash LPN    6/28/2021, 12:59 EDT

## 2021-06-30 ENCOUNTER — OFFICE VISIT (OUTPATIENT)
Dept: FAMILY MEDICINE CLINIC | Facility: CLINIC | Age: 71
End: 2021-06-30

## 2021-06-30 VITALS
SYSTOLIC BLOOD PRESSURE: 148 MMHG | HEART RATE: 58 BPM | WEIGHT: 189 LBS | TEMPERATURE: 98.2 F | HEIGHT: 58 IN | OXYGEN SATURATION: 99 % | DIASTOLIC BLOOD PRESSURE: 79 MMHG | BODY MASS INDEX: 39.67 KG/M2

## 2021-06-30 DIAGNOSIS — I80.8 PHLEBITIS OF RIGHT ARM: ICD-10-CM

## 2021-06-30 DIAGNOSIS — R07.9 CHEST PAIN, UNSPECIFIED TYPE: Primary | ICD-10-CM

## 2021-06-30 DIAGNOSIS — R06.02 SHORTNESS OF BREATH: ICD-10-CM

## 2021-06-30 PROCEDURE — 1111F DSCHRG MED/CURRENT MED MERGE: CPT | Performed by: FAMILY MEDICINE

## 2021-06-30 PROCEDURE — 99496 TRANSJ CARE MGMT HIGH F2F 7D: CPT | Performed by: FAMILY MEDICINE

## 2021-06-30 NOTE — PATIENT INSTRUCTIONS
Continue current meds  Keep the follow up appt with DR. Matta  Stop the flexeril  Warm compresses to your forearm

## 2021-06-30 NOTE — PROGRESS NOTES
Transitional Care Follow Up Visit  Subjective     Charleen Barnett is a 71 y.o. female who presents for a transitional care management visit.    Within 48 business hours after discharge our office contacted her via telephone to coordinate her care and needs.      I reviewed and discussed the details of that call along with the discharge summary, hospital problems, inpatient lab results, inpatient diagnostic studies, and consultation reports with Charleen.     Current outpatient and discharge medications have been reconciled for the patient.  Reviewed by: Ilsa Her MD      Date of TCM Phone Call 6/25/2021   Hospital [facility]   Date of Admission 6/24/2021   Date of Discharge 6/25/2021   Discharge Disposition Home or Self Care     Risk for Readmission (LACE) Score: 5 (6/25/2021  6:01 AM)      Here for follow-up after she was admitted to the hospital from the ER on June 24 and discharged on June 25  She presented with complaints of chest pain  She was worried that it was cardiac in origin as she just had a cardiac stent placed in May  Pt says the pain was under right breast and occurred as she rolled over and this led her to go to the ER  C/O some discomfort in her right forearm near where her IV was in the hosp         Course During Hospital Stay:  She presented to ER with c/o right sided cp that occurred while rolling over in bed.  She described it as a sharp squeezing sensation that was assoc with some SOA.  CXR and CT PE protocol were unchanged.  Cardiac enzymes remained neg.  Stress myoview was negative.  She had no further cp and was d/c home.     The following portions of the patient's history were reviewed and updated as appropriate: allergies, current medications, past family history, past medical history, past social history, past surgical history and problem list.    Review of Systems   Constitutional: Negative.    Respiratory: Positive for shortness of breath. Negative for cough and chest  tightness.    Cardiovascular: Positive for chest pain (no further episodes since she went to the ER). Negative for palpitations and leg swelling.   Gastrointestinal: Negative for nausea and vomiting.   Neurological: Negative for dizziness, light-headedness and headaches.       Objective   Physical Exam  Vitals and nursing note reviewed.   Constitutional:       Appearance: Normal appearance. She is obese.   HENT:      Head: Normocephalic and atraumatic.   Cardiovascular:      Rate and Rhythm: Normal rate and regular rhythm.      Heart sounds: Normal heart sounds.   Pulmonary:      Effort: Pulmonary effort is normal.      Breath sounds: Normal breath sounds.   Musculoskeletal:      Right forearm: Tenderness present. No swelling, edema or bony tenderness.        Arms:       Right lower leg: No edema.      Left lower leg: No edema.   Skin:     General: Skin is warm and dry.   Neurological:      Mental Status: She is alert.   Psychiatric:         Mood and Affect: Mood normal.       Lab Results   Component Value Date    GLUCOSE 89 06/24/2021    BUN 12 06/24/2021    CREATININE 0.95 06/24/2021    EGFRIFNONA 58 (L) 06/24/2021    BCR 12.6 06/24/2021    K 4.3 06/24/2021    CO2 25.0 06/24/2021    CALCIUM 8.2 (L) 06/24/2021    ALBUMIN 3.80 06/24/2021    LABIL2 1.4 11/14/2018    AST 13 06/24/2021    ALT 9 06/24/2021     Lab Results   Component Value Date    WBC 5.90 06/24/2021    HGB 12.7 06/24/2021    HCT 38.3 06/24/2021    MCV 87.4 06/24/2021     06/24/2021     Lab Results   Component Value Date    TROPONINT <0.010 06/24/2021   PROCEDURE:  CT CHEST PULMONARY EMBOLISM-     INDICATIONS:   Chest pain, elevated d-dimer level, heart disease.     COMPARISON:   5/28/2021.     TECHNIQUE:  Routine transaxial slices were obtained through the chest after the  intravenous administration of 100 mL of Isovue 370. Reconstructed  coronal and sagittal images were also obtained. Automated exposure  control and iterative construction  methods were used.        FINDINGS:  There are no filling defects within the pulmonary arteries to indicate  acute pulmonary embolic disease. The heart is mildly enlarged. There are  atherosclerotic vascular calcifications which includes involvement of  the coronary arteries. There are no enlarged hilar or mediastinal lymph  nodes. There is mild scarring in the medial aspect of the anterior  segment of the right upper lobe. There is also mild scarring within the  left lower lobe and lingula. There is no airspace disease to indicate  pneumonia. There is no pneumothorax or pleural effusion. There is a  benign cyst in the upper pole of the left kidney. The gallbladder is  surgically absent. There are no acute findings beneath the  hemidiaphragms. There are no suspicious osteolytic or sclerotic lesions  within the bony structures. The patient has a S-shaped scoliotic  curvature of the thoracolumbar spine which is probably congenital. There  appear to be posterior fusion anomalies.      IMPRESSION:     1. No acute pulmonary embolic disease.  2. Cardiomegaly with coronary artery atherosclerotic vascular disease.  3. Stable mild scarring in the lungs as described.  4. Benign left renal cyst.  5. Scoliosis.     Electronically Signed By-Blue Malone MD On:6/24/2021 7:52 AM  This report was finalized on [id number] by  Blue Malone MD.    Resting ECG  Normal sinus rhythm normal ECG.     The patient was injected with Lexiscan intravenously while constantly monitoring electrocardiogram and vital signs.  Patient did not have any chest discomfort ST abnormalities or ectopy with injection of Lexiscan.     Cardiolite was used as an imaging agent.     Cardiolite images showed uniform distribution of radionuclide without any evidence for myocardial ischemia.     Gated SPECT images revealed normal left ventricle size and contractility with ejection fraction of 68%.     Impression  ========  Lexiscan Cardiolite test is negative for  myocardial ischemia.     Gated SPECT images revealed normal left ventricular size and contractility with ejection fraction of 68%.       Assessment/Plan   Problems Addressed this Visit        Cardiac and Vasculature    Chest pain - Primary       Symptoms and Signs    Dyspnea      Other Visit Diagnoses     Phlebitis of right arm          Diagnoses       Codes Comments    Chest pain, unspecified type    -  Primary ICD-10-CM: R07.9  ICD-9-CM: 786.50     Shortness of breath     ICD-10-CM: R06.02  ICD-9-CM: 786.05     Phlebitis of right arm     ICD-10-CM: I80.8  ICD-9-CM: 451.84         Chest pain was felt to be musculoskeletal in origin  She has had no further episodes  SOA is at its baseline  Phlebitis is mild; she was reassured and instructed to apply warm compresses

## 2021-07-09 RX ORDER — CLOPIDOGREL BISULFATE 75 MG/1
75 TABLET ORAL DAILY
Qty: 90 TABLET | Refills: 3 | Status: SHIPPED | OUTPATIENT
Start: 2021-07-09 | End: 2022-07-25

## 2021-07-27 ENCOUNTER — OFFICE VISIT (OUTPATIENT)
Dept: CARDIOLOGY | Facility: CLINIC | Age: 71
End: 2021-07-27

## 2021-07-27 VITALS
HEART RATE: 66 BPM | OXYGEN SATURATION: 98 % | SYSTOLIC BLOOD PRESSURE: 119 MMHG | WEIGHT: 182 LBS | DIASTOLIC BLOOD PRESSURE: 79 MMHG | HEIGHT: 58 IN | BODY MASS INDEX: 38.2 KG/M2

## 2021-07-27 DIAGNOSIS — R07.89 CHEST DISCOMFORT: ICD-10-CM

## 2021-07-27 DIAGNOSIS — Z98.61 STATUS POST PERCUTANEOUS TRANSLUMINAL CORONARY ANGIOPLASTY: Primary | ICD-10-CM

## 2021-07-27 DIAGNOSIS — R55 NEAR SYNCOPE: ICD-10-CM

## 2021-07-27 DIAGNOSIS — I10 ESSENTIAL HYPERTENSION: ICD-10-CM

## 2021-07-27 DIAGNOSIS — E78.2 MIXED HYPERLIPIDEMIA: ICD-10-CM

## 2021-07-27 PROCEDURE — 99214 OFFICE O/P EST MOD 30 MIN: CPT | Performed by: INTERNAL MEDICINE

## 2021-07-27 RX ORDER — FLUTICASONE PROPIONATE 50 MCG
1 SPRAY, SUSPENSION (ML) NASAL DAILY
COMMUNITY
Start: 2021-07-14 | End: 2021-12-14

## 2021-07-27 RX ORDER — ZINC SULFATE 50(220)MG
1 CAPSULE ORAL DAILY
COMMUNITY
End: 2022-08-03

## 2021-07-27 NOTE — PROGRESS NOTES
Encounter Date:07/27/2021  Last seen 6/25/2021      Patient ID: Charleen Barnett is a 71 y.o. female.    Chief Complaint:  Status post stent  Hypertension  Dyslipidemia  History of SVT.    History of present illness  Patient recently was admitted to the hospital with right-sided chest pain.  Troponin levels were negative.  EKG was normal.  CT scan was negative for pulmonary embolus.  Patient had stress Cardiolite test and echocardiogram and was released home.    Since I have last seen, the patient has been without any chest discomfort ,shortness of breath, palpitations, dizziness or syncope.  Denies having any headache ,abdominal pain ,nausea, vomiting , diarrhea constipation, loss of weight or loss of appetite.  Denies having any excessive bruising ,hematuria or blood in the stool.    Review of all systems negative except as indicated.    Reviewed ROS.      Assessment and plan  ////////////////////////  Impression  ==================    - status post stent to LAD 06/10/2014   status post subendocardial myocardial infarction prior to stent placement  Status post stent to LAD 5/29/2021.     Cardiac catheterization 5/29/2021 revealed  Left ventricle size and contractility normal with ejection fraction of 60%.  Left main coronary artery normal.  Left anterior descending artery has previously placed stent.  Left anterior descending artery has 80 to 90% disease just distal to the diagonal branch.  (Patient to have FFR).  IFR of 0.86  Diagonal branch has diffuse 50 to 60% disease.  Circumflex coronary artery is normal except for 30 to 40% marginal branch disease.  Right coronary artery is a large and dominant vessel and is normal.  Right common iliac external iliac and femoral arteries are normal.    -Right-sided chest pain-atypical.-Improved  EKG showed no acute changes.  Troponin levels are negative.     -Elevated D-dimer  CT scan of the chest is negative for pulmonary embolus.    Stress Cardiolite test--  5/17/2021.  Echocardiogram-normal except for left atrial enlargement 5/17/2021.      -palpitations likely SVT.  -improved on atenolol     Echocardiogram showed mild aortic regurgitation with normal left ventricular function.  5/18/2017     - hypertension dyslipidemia sleep apnea asthma fibromyalgia GERD and gout.     - family history of coronary artery disease     - allergy to penicillin sulfa and tetracycline     =======  Plan  =======  Recent right-sided chest pain-improved  EKG showed no acute changes.  Troponin levels are negative.  Trend troponin levels.  Stress Cardiolite test-normal as above  Echocardiogram--as above.    -Elevated D-dimer  CT scan of the chest is negative for pulmonary embolus.     Status post stent to LAD 5/29/2021 (abnormal IFR).  Patient is not having any angina pectoris or congestive heart failure.     Hypertension-well-controlled-119/79  Continue metoprolol     Dyslipidemia-continue atorvastatin     Medications were reviewed and updated.    Follow-up in the office in 3 months.     Further plan will depend on patient's progress  //////////////////////////////                   Diagnosis Plan   1. Status post percutaneous transluminal coronary angioplasty     2. Mixed hyperlipidemia     3. Essential hypertension     4. Near syncope     5. Chest discomfort     LAB RESULTS (LAST 7 DAYS)    CBC        BMP        CMP         BNP        TROPONIN        CoAg        Creatinine Clearance  CrCl cannot be calculated (Patient's most recent lab result is older than the maximum 30 days allowed.).    ABG        Radiology  No radiology results for the last day                The following portions of the patient's history were reviewed and updated as appropriate: allergies, current medications, past family history, past medical history, past social history, past surgical history and problem list.    Review of Systems   Constitutional: Negative for fever and malaise/fatigue.   HENT: Negative for  congestion and hearing loss.    Eyes: Negative for double vision and visual disturbance.   Cardiovascular: Negative for chest pain, claudication, dyspnea on exertion, leg swelling and syncope.   Respiratory: Negative for cough and shortness of breath.    Endocrine: Negative for cold intolerance.   Skin: Negative for color change and rash.   Musculoskeletal: Negative for arthritis and joint pain.   Gastrointestinal: Negative for abdominal pain and heartburn.   Genitourinary: Negative for hematuria.   Neurological: Positive for dizziness and light-headedness. Negative for excessive daytime sleepiness.   Psychiatric/Behavioral: Negative for depression. The patient is not nervous/anxious.    All other systems reviewed and are negative.        Current Outpatient Medications:   •  albuterol sulfate HFA (VENTOLIN HFA) 108 (90 Base) MCG/ACT inhaler, Inhale 1 puff Every 6 (Six) Hours As Needed for Wheezing or Shortness of Air., Disp: , Rfl:   •  aspirin 81 MG tablet, Take 81 mg by mouth Daily., Disp: , Rfl:   •  atorvastatin (LIPITOR) 10 MG tablet, Take 1 tablet by mouth Daily., Disp: 90 tablet, Rfl: 3  •  Biotin 10 MG tablet, Take 10 mg by mouth Daily., Disp: , Rfl:   •  buPROPion XL (Wellbutrin XL) 150 MG 24 hr tablet, Take 1 tablet by mouth Every Morning., Disp: 90 tablet, Rfl: 3  •  busPIRone (BUSPAR) 7.5 MG tablet, TAKE ONE TABLET BY MOUTH TWICE DAILY, Disp: 180 tablet, Rfl: 1  •  citalopram (CeleXA) 20 MG tablet, TAKE ONE TABLET BY MOUTH EVERY DAY, Disp: 90 tablet, Rfl: 1  •  clopidogrel (PLAVIX) 75 MG tablet, TAKE 1 TABLET BY MOUTH DAILY., Disp: 90 tablet, Rfl: 3  •  cyclobenzaprine (FLEXERIL) 5 MG tablet, Take 1 tablet by mouth 3 (Three) Times a Day As Needed for Muscle Spasms., Disp: 30 tablet, Rfl: 0  •  fluticasone (FLONASE) 50 MCG/ACT nasal spray, 1 spray into the nostril(s) as directed by provider Daily., Disp: , Rfl:   •  gabapentin (NEURONTIN) 400 MG capsule, TAKE 1 CAPSULE BY MOUTH AT BEDTIME, Disp: 90  capsule, Rfl: 0  •  lansoprazole (PREVACID) 15 MG capsule, Take 15 mg by mouth Daily., Disp: , Rfl:   •  Magnesium 250 MG tablet, Take 1 tablet by mouth Daily., Disp: , Rfl:   •  metoprolol succinate XL (TOPROL-XL) 25 MG 24 hr tablet, TAKE ONE TABLET BY MOUTH EVERY MORNING, Disp: 90 tablet, Rfl: 3  •  montelukast (SINGULAIR) 10 MG tablet, Take 1 tablet by mouth Daily., Disp: 90 tablet, Rfl: 2  •  oxybutynin (DITROPAN) 5 MG tablet, Take 5 mg by mouth 2 (Two) Times a Day., Disp: , Rfl:   •  vitamin D (ERGOCALCIFEROL) 1.25 MG (14931 UT) capsule capsule, TAKE 1 CAPSULE BY MOUTH EVERY WEEK, Disp: 13 capsule, Rfl: 2  •  VITAMIN E PO, Take 1 tablet by mouth Daily., Disp: , Rfl:   •  zinc sulfate (ZINCATE) 220 (50 Zn) MG capsule, Take 1 capsule by mouth Daily., Disp: , Rfl:     Current Facility-Administered Medications:   •  denosumab (PROLIA) syringe 60 mg, 60 mg, Subcutaneous, Q6 Months, Dede Anders PA, 60 mg at 06/18/21 0834    Allergies   Allergen Reactions   • Chlorpheniramine-Phenylephrine Rash   • Penicillins Rash   • Sulfa Antibiotics Hives and Rash   • Tetracycline Rash   • Warfarin Rash       Family History   Problem Relation Age of Onset   • Heart attack Father    • Cancer Father        Past Surgical History:   Procedure Laterality Date   • BLADDER SURGERY     • CARDIAC CATHETERIZATION N/A 5/29/2021    Procedure: Left Heart Cath and coronary angiogram;  Surgeon: Yazmin Matta MD;  Location: [facility] CATH INVASIVE LOCATION;  Service: Cardiovascular;  Laterality: N/A;   • CARDIAC CATHETERIZATION  5/29/2021    Procedure: Functional Flow Reserve (iFR);  Surgeon: Bryan Patel MD;  Location: [facility] CATH INVASIVE LOCATION;  Service: Cardiology;;   • CARDIAC CATHETERIZATION N/A 5/29/2021    Procedure: Percutaneous Coronary Intervention;  Surgeon: Bryan Patel MD;  Location: [facility] CATH INVASIVE LOCATION;  Service: Cardiology;  Laterality: N/A;   • CARDIAC CATHETERIZATION N/A 5/29/2021  "   Procedure: Stent FRANCOISE coronary;  Surgeon: Bryan Patel MD;  Location: [facility] INVASIVE LOCATION;  Service: Cardiology;  Laterality: N/A;   • CHOLECYSTECTOMY     • HYSTERECTOMY     • KNEE SURGERY     • OTHER SURGICAL HISTORY      STENT       Past Medical History:   Diagnosis Date   • Anxiety    • Asthma 2/13/2020   • Ramos's esophagus 2/15/2018   • Depression 8/27/2018   • Fibromyalgia 12/14/2011   • GERD (gastroesophageal reflux disease)    • Gout 2/13/2020   • Hypertension    • Joint pain    • Obstructive sleep apnea 10/16/2014   • Osteoporosis     fosamax(SE), on prolia(4/10/2018)   • Otitis media    • Pedal edema 10/22/2018   • Presence of left artificial knee joint 3/14/2019   • Seasonal allergies    • ST elevation (STEMI) myocardial infarction (CMS/[facility]) 2/13/2020   • Status post percutaneous transluminal coronary angioplasty 6/10/2014   • Tachycardia    • Urge incontinence 8/27/2018   • Vitamin D deficiency 3/19/2015       Family History   Problem Relation Age of Onset   • Heart attack Father    • Cancer Father        Social History     Socioeconomic History   • Marital status:      Spouse name: Not on file   • Number of children: Not on file   • Years of education: Not on file   • Highest education level: Not on file   Tobacco Use   • Smoking status: Never Smoker   • Smokeless tobacco: Never Used   Vaping Use   • Vaping Use: Never used   Substance and Sexual Activity   • Alcohol use: Not Currently     Comment: NO DRINK 1994   • Drug use: Never   • Sexual activity: Defer         Procedures      Objective:       Physical Exam    /79 (BP Location: Left arm, Patient Position: Sitting, Cuff Size: Large Adult)   Pulse 66   Ht 147.3 cm (58\")   Wt 82.6 kg (182 lb)   SpO2 98%   BMI 38.04 kg/m²   The patient is alert, oriented and in no distress.    Vital signs as noted above.    Head and neck revealed no carotid bruits or jugular venous distension.  No thyromegaly or " lymphadenopathy is present.    Lungs clear.  No wheezing.  Breath sounds are normal bilaterally.    Heart normal first and second heart sounds.  No murmur..  No pericardial rub is present.  No gallop is present.    Abdomen soft and nontender.  No organomegaly is present.    Extremities revealed good peripheral pulses without any pedal edema.    Skin warm and dry.    Musculoskeletal system-kyphoscoliosis    CNS grossly normal.    Reviewed and unchanged from last visit.

## 2021-08-02 RX ORDER — MONTELUKAST SODIUM 10 MG/1
TABLET ORAL
Qty: 90 TABLET | Refills: 2 | Status: SHIPPED | OUTPATIENT
Start: 2021-08-02 | End: 2022-06-02

## 2021-08-02 RX ORDER — ATORVASTATIN CALCIUM 10 MG/1
10 TABLET, FILM COATED ORAL DAILY
Qty: 90 TABLET | Refills: 2 | Status: SHIPPED | OUTPATIENT
Start: 2021-08-02 | End: 2022-05-18

## 2021-08-02 RX ORDER — OXYBUTYNIN CHLORIDE 5 MG/1
5 TABLET ORAL 2 TIMES DAILY
Qty: 180 TABLET | Refills: 2 | Status: SHIPPED | OUTPATIENT
Start: 2021-08-02 | End: 2022-10-12

## 2021-09-14 NOTE — PROGRESS NOTES
Subjective   Charleen Barnett is a 71 y.o. female.       HPI   Pt. is here today with concern of back pain and decreased urination.  Symptoms started 1-2 weeks ago.  No blood seen in urine.  Denies any frequency and urgency.    She has increased fluids.  Back pain is improving.      Also having left ear pain for a couple of months.  Seen at [facility] [place] few months ago and given steroid and abx.  Takes Flonase, Zyrtec and Singulair daily.    Feels drainage in her throat.  No fevers.      Sweating more and different odor for several months.        The following portions of the patient's history were reviewed and updated as appropriate: allergies, current medications, past family history, past medical history, past social history, past surgical history and problem list.    Review of Systems   Constitutional: Positive for diaphoresis. Negative for activity change, appetite change, chills, fatigue, fever, unexpected weight gain and unexpected weight loss.   HENT: Positive for ear pain (left), postnasal drip and sore throat. Negative for ear discharge, hearing loss, rhinorrhea, sinus pressure, sneezing and swollen glands.    Eyes: Negative for visual disturbance.   Respiratory: Negative for cough, chest tightness, shortness of breath and wheezing.    Cardiovascular: Negative for chest pain, palpitations and leg swelling.   Gastrointestinal: Negative for abdominal distention, abdominal pain, blood in stool, constipation, diarrhea, nausea, vomiting and indigestion.   Endocrine: Negative for cold intolerance, heat intolerance, polydipsia, polyphagia and polyuria.   Genitourinary: Positive for decreased urine volume and dysuria. Negative for difficulty urinating, flank pain, frequency, hematuria and urgency.   Skin: Negative for rash.   Neurological: Negative for dizziness, weakness, light-headedness, headache and confusion.   Psychiatric/Behavioral: Negative for depressed mood. The patient is not nervous/anxious.        Objective    Physical Exam  Vitals reviewed.   Constitutional:       General: She is not in acute distress.     Appearance: Normal appearance.   HENT:      Head: Normocephalic and atraumatic.      Right Ear: Hearing, tympanic membrane, ear canal and external ear normal.      Left Ear: Hearing, tympanic membrane, ear canal and external ear normal.      Nose: No rhinorrhea.      Right Sinus: No maxillary sinus tenderness or frontal sinus tenderness.      Left Sinus: No maxillary sinus tenderness or frontal sinus tenderness.      Mouth/Throat:      Lips: Pink.      Mouth: Mucous membranes are moist.      Pharynx: Posterior oropharyngeal erythema present. No pharyngeal swelling, oropharyngeal exudate or uvula swelling.      Comments: Postnasal drip noted  Eyes:      General:         Right eye: No discharge.         Left eye: No discharge.      Conjunctiva/sclera: Conjunctivae normal.   Cardiovascular:      Rate and Rhythm: Normal rate and regular rhythm.      Pulses: Normal pulses.      Heart sounds: Normal heart sounds. No murmur heard.     Pulmonary:      Effort: Pulmonary effort is normal. No respiratory distress.      Breath sounds: Normal breath sounds. No wheezing or rhonchi.   Chest:      Chest wall: No tenderness.   Abdominal:      General: Bowel sounds are normal. There is no distension.      Palpations: Abdomen is soft.      Tenderness: There is no abdominal tenderness. There is no right CVA tenderness or left CVA tenderness.   Musculoskeletal:      Cervical back: Normal range of motion and neck supple. No tenderness.   Skin:     General: Skin is warm and dry.      Findings: No erythema.   Neurological:      General: No focal deficit present.      Mental Status: She is alert and oriented to person, place, and time.   Psychiatric:         Mood and Affect: Mood normal.           Assessment/Plan   Diagnoses and all orders for this visit:    1. Dysuria (Primary)  Comments:   has trace blood and leuks.   Sent for cx.    Given Keflex; pt. has tolerated this med in the past.   Increase fluids.   Orders:  -     Urine Culture - Urine, Urine, Clean Catch  -     POC Urinalysis Dipstick, Automated  -     CBC w AUTO Differential; Future  -     Comprehensive metabolic panel; Future  -     Hemoglobin A1c; Future  -     Thyroid Panel With TSH  -     cephalexin (Keflex) 500 MG capsule; Take 1 capsule by mouth 2 (Two) Times a Day for 5 days.  Dispense: 10 capsule; Refill: 0    2. Eustachian tube dysfunction, left  Comments:  Referral to ENT  Orders:  -     Ambulatory Referral to ENT (Otolaryngology)    3. Excessive sweating  Comments:  Labs today  Orders:  -     CBC w AUTO Differential; Future  -     Comprehensive metabolic panel; Future  -     Hemoglobin A1c; Future  -     Thyroid Panel With TSH    4. Hypoglycemia  Comments:  Labs today  Orders:  -     CBC w AUTO Differential; Future  -     Comprehensive metabolic panel; Future  -     Hemoglobin A1c; Future  -     Thyroid Panel With TSH

## 2021-09-15 ENCOUNTER — OFFICE VISIT (OUTPATIENT)
Dept: FAMILY MEDICINE CLINIC | Facility: CLINIC | Age: 71
End: 2021-09-15

## 2021-09-15 ENCOUNTER — LAB (OUTPATIENT)
Dept: FAMILY MEDICINE CLINIC | Facility: CLINIC | Age: 71
End: 2021-09-15

## 2021-09-15 VITALS
DIASTOLIC BLOOD PRESSURE: 82 MMHG | WEIGHT: 188 LBS | SYSTOLIC BLOOD PRESSURE: 144 MMHG | HEART RATE: 76 BPM | HEIGHT: 58 IN | OXYGEN SATURATION: 96 % | BODY MASS INDEX: 39.47 KG/M2

## 2021-09-15 DIAGNOSIS — E16.2 HYPOGLYCEMIA: ICD-10-CM

## 2021-09-15 DIAGNOSIS — R61 EXCESSIVE SWEATING: ICD-10-CM

## 2021-09-15 DIAGNOSIS — R30.0 DYSURIA: ICD-10-CM

## 2021-09-15 DIAGNOSIS — H69.82 EUSTACHIAN TUBE DYSFUNCTION, LEFT: ICD-10-CM

## 2021-09-15 DIAGNOSIS — R30.0 DYSURIA: Primary | ICD-10-CM

## 2021-09-15 LAB
ALBUMIN SERPL-MCNC: 3.9 G/DL (ref 3.5–5.2)
ALBUMIN/GLOB SERPL: 1.9 G/DL
ALP SERPL-CCNC: 62 U/L (ref 39–117)
ALT SERPL W P-5'-P-CCNC: 11 U/L (ref 1–33)
ANION GAP SERPL CALCULATED.3IONS-SCNC: 7.4 MMOL/L (ref 5–15)
AST SERPL-CCNC: 13 U/L (ref 1–32)
BASOPHILS # BLD AUTO: 0.04 10*3/MM3 (ref 0–0.2)
BASOPHILS NFR BLD AUTO: 0.7 % (ref 0–1.5)
BILIRUB BLD-MCNC: NEGATIVE MG/DL
BILIRUB SERPL-MCNC: 0.2 MG/DL (ref 0–1.2)
BUN SERPL-MCNC: 14 MG/DL (ref 8–23)
BUN/CREAT SERPL: 11.7 (ref 7–25)
CALCIUM SPEC-SCNC: 8.8 MG/DL (ref 8.6–10.5)
CHLORIDE SERPL-SCNC: 107 MMOL/L (ref 98–107)
CLARITY, POC: CLEAR
CO2 SERPL-SCNC: 27.6 MMOL/L (ref 22–29)
COLOR UR: YELLOW
CREAT SERPL-MCNC: 1.2 MG/DL (ref 0.57–1)
DEPRECATED RDW RBC AUTO: 40 FL (ref 37–54)
EOSINOPHIL # BLD AUTO: 0.29 10*3/MM3 (ref 0–0.4)
EOSINOPHIL NFR BLD AUTO: 5.1 % (ref 0.3–6.2)
ERYTHROCYTE [DISTWIDTH] IN BLOOD BY AUTOMATED COUNT: 12.5 % (ref 12.3–15.4)
GFR SERPL CREATININE-BSD FRML MDRD: 44 ML/MIN/1.73
GLOBULIN UR ELPH-MCNC: 2.1 GM/DL
GLUCOSE SERPL-MCNC: 69 MG/DL (ref 65–99)
GLUCOSE UR STRIP-MCNC: NEGATIVE MG/DL
HBA1C MFR BLD: 5.5 % (ref 3.5–5.6)
HCT VFR BLD AUTO: 38.9 % (ref 34–46.6)
HGB BLD-MCNC: 12.5 G/DL (ref 12–15.9)
IMM GRANULOCYTES # BLD AUTO: 0.01 10*3/MM3 (ref 0–0.05)
IMM GRANULOCYTES NFR BLD AUTO: 0.2 % (ref 0–0.5)
KETONES UR QL: NEGATIVE
LEUKOCYTE EST, POC: ABNORMAL
LYMPHOCYTES # BLD AUTO: 1.92 10*3/MM3 (ref 0.7–3.1)
LYMPHOCYTES NFR BLD AUTO: 33.9 % (ref 19.6–45.3)
MCH RBC QN AUTO: 28.3 PG (ref 26.6–33)
MCHC RBC AUTO-ENTMCNC: 32.1 G/DL (ref 31.5–35.7)
MCV RBC AUTO: 88 FL (ref 79–97)
MONOCYTES # BLD AUTO: 0.57 10*3/MM3 (ref 0.1–0.9)
MONOCYTES NFR BLD AUTO: 10.1 % (ref 5–12)
NEUTROPHILS NFR BLD AUTO: 2.83 10*3/MM3 (ref 1.7–7)
NEUTROPHILS NFR BLD AUTO: 50 % (ref 42.7–76)
NITRITE UR-MCNC: NEGATIVE MG/ML
NRBC BLD AUTO-RTO: 0 /100 WBC (ref 0–0.2)
PH UR: 5 [PH] (ref 5–8)
PLATELET # BLD AUTO: 213 10*3/MM3 (ref 140–450)
PMV BLD AUTO: 10 FL (ref 6–12)
POTASSIUM SERPL-SCNC: 4.1 MMOL/L (ref 3.5–5.2)
PROT SERPL-MCNC: 6 G/DL (ref 6–8.5)
PROT UR STRIP-MCNC: NEGATIVE MG/DL
RBC # BLD AUTO: 4.42 10*6/MM3 (ref 3.77–5.28)
RBC # UR STRIP: ABNORMAL /UL
SODIUM SERPL-SCNC: 142 MMOL/L (ref 136–145)
SP GR UR: 1.02 (ref 1–1.03)
T-UPTAKE NFR SERPL: 1.04 TBI (ref 0.8–1.3)
T4 SERPL-MCNC: 8.18 MCG/DL (ref 4.5–11.7)
TSH SERPL DL<=0.05 MIU/L-ACNC: 5.92 UIU/ML (ref 0.27–4.2)
UROBILINOGEN UR QL: NORMAL
WBC # BLD AUTO: 5.66 10*3/MM3 (ref 3.4–10.8)

## 2021-09-15 PROCEDURE — 85025 COMPLETE CBC W/AUTO DIFF WBC: CPT | Performed by: NURSE PRACTITIONER

## 2021-09-15 PROCEDURE — 99214 OFFICE O/P EST MOD 30 MIN: CPT | Performed by: NURSE PRACTITIONER

## 2021-09-15 PROCEDURE — 84443 ASSAY THYROID STIM HORMONE: CPT | Performed by: NURSE PRACTITIONER

## 2021-09-15 PROCEDURE — 87086 URINE CULTURE/COLONY COUNT: CPT | Performed by: NURSE PRACTITIONER

## 2021-09-15 PROCEDURE — 36415 COLL VENOUS BLD VENIPUNCTURE: CPT

## 2021-09-15 PROCEDURE — 81003 URINALYSIS AUTO W/O SCOPE: CPT | Performed by: NURSE PRACTITIONER

## 2021-09-15 PROCEDURE — 84436 ASSAY OF TOTAL THYROXINE: CPT | Performed by: NURSE PRACTITIONER

## 2021-09-15 PROCEDURE — 83036 HEMOGLOBIN GLYCOSYLATED A1C: CPT | Performed by: NURSE PRACTITIONER

## 2021-09-15 PROCEDURE — 84479 ASSAY OF THYROID (T3 OR T4): CPT | Performed by: NURSE PRACTITIONER

## 2021-09-15 PROCEDURE — 80053 COMPREHEN METABOLIC PANEL: CPT | Performed by: NURSE PRACTITIONER

## 2021-09-15 RX ORDER — CEPHALEXIN 500 MG/1
500 CAPSULE ORAL 2 TIMES DAILY
Qty: 10 CAPSULE | Refills: 0 | Status: SHIPPED | OUTPATIENT
Start: 2021-09-15 | End: 2021-09-20

## 2021-09-16 DIAGNOSIS — E03.9 ACQUIRED HYPOTHYROIDISM: Primary | ICD-10-CM

## 2021-09-16 LAB — BACTERIA SPEC AEROBE CULT: NORMAL

## 2021-09-16 RX ORDER — LEVOTHYROXINE SODIUM 0.03 MG/1
25 TABLET ORAL
Qty: 30 TABLET | Refills: 1 | Status: SHIPPED | OUTPATIENT
Start: 2021-09-16 | End: 2021-11-17

## 2021-11-01 ENCOUNTER — OFFICE VISIT (OUTPATIENT)
Dept: CARDIOLOGY | Facility: CLINIC | Age: 71
End: 2021-11-01

## 2021-11-01 VITALS — BODY MASS INDEX: 40.09 KG/M2 | HEIGHT: 58 IN | WEIGHT: 191 LBS

## 2021-11-01 DIAGNOSIS — R07.89 CHEST DISCOMFORT: ICD-10-CM

## 2021-11-01 DIAGNOSIS — E78.2 MIXED HYPERLIPIDEMIA: ICD-10-CM

## 2021-11-01 DIAGNOSIS — I10 ESSENTIAL HYPERTENSION: ICD-10-CM

## 2021-11-01 DIAGNOSIS — Z98.61 STATUS POST PERCUTANEOUS TRANSLUMINAL CORONARY ANGIOPLASTY: Primary | ICD-10-CM

## 2021-11-01 DIAGNOSIS — R55 NEAR SYNCOPE: ICD-10-CM

## 2021-11-01 PROCEDURE — 99214 OFFICE O/P EST MOD 30 MIN: CPT | Performed by: INTERNAL MEDICINE

## 2021-11-01 PROCEDURE — 93000 ELECTROCARDIOGRAM COMPLETE: CPT | Performed by: INTERNAL MEDICINE

## 2021-11-01 NOTE — PROGRESS NOTES
Encounter Date:11/01/2021  Last seen 7/27/2021      Patient ID: Charleen Barnett is a 71 y.o. female.    Chief Complaint:  Status post stent  Hypertension  Dyslipidemia  History of SVT.     History of present illness  Since I have last seen, the patient has been without any chest discomfort ,shortness of breath, palpitations, dizziness or syncope.  Denies having any headache ,abdominal pain ,nausea, vomiting , diarrhea constipation, loss of weight or loss of appetite.  Denies having any excessive bruising ,hematuria or blood in the stool.    Review of all systems negative except as indicated.    Reviewed ROS.      Assessment and plan  ////////////////////////  Impression  ==================    - status post stent to LAD 06/10/2014   status post subendocardial myocardial infarction prior to stent placement  Status post stent to LAD 5/29/2021.     Cardiac catheterization 5/29/2021 revealed  Left ventricle size and contractility normal with ejection fraction of 60%.  Left main coronary artery normal.  Left anterior descending artery has previously placed stent.  Left anterior descending artery has 80 to 90% disease just distal to the diagonal branch.  (Patient to have FFR).  IFR of 0.86  Diagonal branch has diffuse 50 to 60% disease.  Circumflex coronary artery is normal except for 30 to 40% marginal branch disease.  Right coronary artery is a large and dominant vessel and is normal.  Right common iliac external iliac and femoral arteries are normal.     -Right-sided chest pain-atypical.-Improved  EKG showed no acute changes.  Troponin levels are negative.     -Elevated D-dimer  CT scan of the chest is negative for pulmonary embolus.     Stress Cardiolite test-- 5/17/2021.  Echocardiogram-normal except for left atrial enlargement 5/17/2021.      -palpitations likely SVT.  -improved on atenolol     Echocardiogram showed mild aortic regurgitation with normal left ventricular function.  5/18/2017     - hypertension  dyslipidemia sleep apnea asthma fibromyalgia GERD and gout.     - family history of coronary artery disease     - allergy to penicillin sulfa and tetracycline     =======  Plan  =======   Status post stent to LAD 5/29/2021 (abnormal IFR).  Patient is not having any angina pectoris or congestive heart failure.  EKG showed sinus rhythm without any ischemic changes-11/1/2021    Hypertension-well-controlled-112/72  Continue metoprolol     Dyslipidemia-continue atorvastatin    Patient needs teeth extraction.  Okay to have it with stopping Plavix for 2 to 3 days.     Medications were reviewed and updated.     Follow-up in the office in 6 months.     Further plan will depend on patient's progress  //////////////////////////////               Diagnosis Plan   1. Status post percutaneous transluminal coronary angioplasty  ECG 12 Lead   2. Mixed hyperlipidemia  ECG 12 Lead   3. Essential hypertension     4. Near syncope     5. Chest discomfort     LAB RESULTS (LAST 7 DAYS)    CBC        BMP        CMP         BNP        TROPONIN        CoAg        Creatinine Clearance  CrCl cannot be calculated (Patient's most recent lab result is older than the maximum 30 days allowed.).    ABG        Radiology  No radiology results for the last day                The following portions of the patient's history were reviewed and updated as appropriate: allergies, current medications, past family history, past medical history, past social history, past surgical history and problem list.    Review of Systems   Constitutional: Negative for malaise/fatigue.   Cardiovascular: Negative for chest pain, leg swelling, palpitations and syncope.   Respiratory: Negative for shortness of breath.    Skin: Negative for rash.   Gastrointestinal: Negative for nausea and vomiting.   Neurological: Negative for dizziness, light-headedness and numbness.   All other systems reviewed and are negative.        Current Outpatient Medications:   •  albuterol sulfate  HFA (VENTOLIN HFA) 108 (90 Base) MCG/ACT inhaler, Inhale 1 puff Every 6 (Six) Hours As Needed for Wheezing or Shortness of Air., Disp: , Rfl:   •  aspirin 81 MG tablet, Take 81 mg by mouth Daily., Disp: , Rfl:   •  atorvastatin (LIPITOR) 10 MG tablet, TAKE 1 TABLET BY MOUTH DAILY., Disp: 90 tablet, Rfl: 2  •  Biotin 10 MG tablet, Take 10 mg by mouth Daily., Disp: , Rfl:   •  buPROPion XL (Wellbutrin XL) 150 MG 24 hr tablet, Take 1 tablet by mouth Every Morning., Disp: 90 tablet, Rfl: 3  •  busPIRone (BUSPAR) 7.5 MG tablet, TAKE ONE TABLET BY MOUTH TWICE DAILY, Disp: 180 tablet, Rfl: 1  •  citalopram (CeleXA) 20 MG tablet, TAKE ONE TABLET BY MOUTH EVERY DAY, Disp: 90 tablet, Rfl: 1  •  clopidogrel (PLAVIX) 75 MG tablet, TAKE 1 TABLET BY MOUTH DAILY., Disp: 90 tablet, Rfl: 3  •  gabapentin (NEURONTIN) 400 MG capsule, TAKE 1 CAPSULE BY MOUTH AT BEDTIME, Disp: 90 capsule, Rfl: 0  •  lansoprazole (PREVACID) 15 MG capsule, Take 15 mg by mouth Daily., Disp: , Rfl:   •  levothyroxine (SYNTHROID, LEVOTHROID) 25 MCG tablet, Take 1 tablet by mouth Every Morning., Disp: 30 tablet, Rfl: 1  •  metoprolol succinate XL (TOPROL-XL) 25 MG 24 hr tablet, TAKE ONE TABLET BY MOUTH EVERY MORNING, Disp: 90 tablet, Rfl: 3  •  montelukast (SINGULAIR) 10 MG tablet, TAKE ONE TABLET BY MOUTH EVERY DAY, Disp: 90 tablet, Rfl: 2  •  oxybutynin (DITROPAN) 5 MG tablet, Take 1 tablet by mouth 2 (Two) Times a Day., Disp: 180 tablet, Rfl: 2  •  vitamin D (ERGOCALCIFEROL) 1.25 MG (05626 UT) capsule capsule, TAKE 1 CAPSULE BY MOUTH EVERY WEEK, Disp: 13 capsule, Rfl: 2  •  VITAMIN E PO, Take 1 tablet by mouth Daily., Disp: , Rfl:   •  cyclobenzaprine (FLEXERIL) 5 MG tablet, Take 1 tablet by mouth 3 (Three) Times a Day As Needed for Muscle Spasms., Disp: 30 tablet, Rfl: 0  •  fluticasone (FLONASE) 50 MCG/ACT nasal spray, 1 spray into the nostril(s) as directed by provider Daily., Disp: , Rfl:   •  Magnesium 250 MG tablet, Take 1 tablet by mouth Daily.,  Disp: , Rfl:   •  zinc sulfate (ZINCATE) 220 (50 Zn) MG capsule, Take 1 capsule by mouth Daily., Disp: , Rfl:     Current Facility-Administered Medications:   •  denosumab (PROLIA) syringe 60 mg, 60 mg, Subcutaneous, Q6 Months, Dede Anders PA, 60 mg at 06/18/21 0834    Allergies   Allergen Reactions   • Chlorpheniramine-Phenylephrine Rash   • Penicillins Rash   • Sulfa Antibiotics Hives and Rash   • Tetracycline Rash   • Warfarin Rash       Family History   Problem Relation Age of Onset   • Heart attack Father    • Cancer Father        Past Surgical History:   Procedure Laterality Date   • BLADDER SURGERY     • CARDIAC CATHETERIZATION N/A 5/29/2021    Procedure: Left Heart Cath and coronary angiogram;  Surgeon: Yazmin Matta MD;  Location: [facility] CATH INVASIVE LOCATION;  Service: Cardiovascular;  Laterality: N/A;   • CARDIAC CATHETERIZATION  5/29/2021    Procedure: Functional Flow Reserve (iFR);  Surgeon: Bryan Patel MD;  Location: [facility] CATH INVASIVE LOCATION;  Service: Cardiology;;   • CARDIAC CATHETERIZATION N/A 5/29/2021    Procedure: Percutaneous Coronary Intervention;  Surgeon: Bryan Patel MD;  Location: [facility] CATH INVASIVE LOCATION;  Service: Cardiology;  Laterality: N/A;   • CARDIAC CATHETERIZATION N/A 5/29/2021    Procedure: Stent FRANCOISE coronary;  Surgeon: Bryan Patel MD;  Location: [facility] CATH INVASIVE LOCATION;  Service: Cardiology;  Laterality: N/A;   • CHOLECYSTECTOMY     • HYSTERECTOMY     • KNEE SURGERY     • OTHER SURGICAL HISTORY      STENT       Past Medical History:   Diagnosis Date   • Anxiety    • Asthma 2/13/2020   • Ramos's esophagus 2/15/2018   • Depression 8/27/2018   • Fibromyalgia 12/14/2011   • GERD (gastroesophageal reflux disease)    • Gout 2/13/2020   • Hypertension    • Joint pain    • Obstructive sleep apnea 10/16/2014   • Osteoporosis     fosamax(SE), on prolia(4/10/2018)   • Otitis media    • Pedal edema 10/22/2018   • Presence  "of left artificial knee joint 3/14/2019   • Seasonal allergies    • ST elevation (STEMI) myocardial infarction (HCC) 2/13/2020   • Status post percutaneous transluminal coronary angioplasty 6/10/2014   • Tachycardia    • Urge incontinence 8/27/2018   • Vitamin D deficiency 3/19/2015       Family History   Problem Relation Age of Onset   • Heart attack Father    • Cancer Father        Social History     Socioeconomic History   • Marital status:    Tobacco Use   • Smoking status: Never Smoker   • Smokeless tobacco: Never Used   Vaping Use   • Vaping Use: Never used   Substance and Sexual Activity   • Alcohol use: Not Currently     Comment: NO DRINK 1994   • Drug use: Never   • Sexual activity: Defer           ECG 12 Lead    Date/Time: 11/1/2021 12:07 PM  Performed by: Yazmin Matta MD  Authorized by: Yazmin Matta MD   Comparison: compared with previous ECG   Similar to previous ECG  Comparison to previous ECG: Sinus bradycardia 58/min nonspecific ST-T wave changes normal axis normal intervals no ectopy no significant change from 6/24/2021                Objective:       Physical Exam    Ht 147.3 cm (58\")   Wt 86.6 kg (191 lb)   BMI 39.92 kg/m²   The patient is alert, oriented and in no distress.    Vital signs as noted above.    Head and neck revealed no carotid bruits or jugular venous distension.  No thyromegaly or lymphadenopathy is present.    Lungs clear.  No wheezing.  Breath sounds are normal bilaterally.    Heart normal first and second heart sounds.  No murmur..  No pericardial rub is present.  No gallop is present.    Abdomen soft and nontender.  No organomegaly is present.    Extremities revealed good peripheral pulses without any pedal edema.    Skin warm and dry.    Musculoskeletal system is grossly normal.    CNS grossly normal.    Reviewed and unchanged from last visit.        "

## 2021-11-15 DIAGNOSIS — E03.9 ACQUIRED HYPOTHYROIDISM: ICD-10-CM

## 2021-11-17 RX ORDER — LEVOTHYROXINE SODIUM 0.03 MG/1
25 TABLET ORAL
Qty: 90 TABLET | Refills: 1 | Status: SHIPPED | OUTPATIENT
Start: 2021-11-17 | End: 2022-07-19

## 2021-12-07 ENCOUNTER — TELEPHONE (OUTPATIENT)
Dept: ORTHOPEDIC SURGERY | Facility: CLINIC | Age: 71
End: 2021-12-07

## 2021-12-07 DIAGNOSIS — E55.9 VITAMIN D DEFICIENCY: ICD-10-CM

## 2021-12-07 DIAGNOSIS — M81.0 AGE-RELATED OSTEOPOROSIS WITHOUT CURRENT PATHOLOGICAL FRACTURE: ICD-10-CM

## 2021-12-07 DIAGNOSIS — Z51.81 MEDICATION MONITORING ENCOUNTER: Primary | ICD-10-CM

## 2021-12-07 NOTE — TELEPHONE ENCOUNTER
Caller: Charleen Barnett    Relationship to patient: Self    Best call back number: [phone]    Patient is needing: PATIENT CALLED IN AND STATED SHE WOULD GO GET THE LABS IF WE SEND THE ORDER. THANK YOU!  
Patient is scheduled for next Prolia injection 12/22/2021, updated labs needed prior to injection.       Call placed to patient, left message on machine advised of need for updated labs for Prolia injection coming up. Advised need to go to [facility] no later than 12/15/2021. Advised to call office with any questions.  
no

## 2021-12-14 ENCOUNTER — LAB (OUTPATIENT)
Dept: FAMILY MEDICINE CLINIC | Facility: CLINIC | Age: 71
End: 2021-12-14

## 2021-12-14 ENCOUNTER — OFFICE VISIT (OUTPATIENT)
Dept: FAMILY MEDICINE CLINIC | Facility: CLINIC | Age: 71
End: 2021-12-14

## 2021-12-14 VITALS
SYSTOLIC BLOOD PRESSURE: 126 MMHG | HEART RATE: 57 BPM | DIASTOLIC BLOOD PRESSURE: 84 MMHG | BODY MASS INDEX: 40.51 KG/M2 | OXYGEN SATURATION: 96 % | HEIGHT: 58 IN | WEIGHT: 193 LBS | TEMPERATURE: 98 F

## 2021-12-14 DIAGNOSIS — E55.9 VITAMIN D DEFICIENCY: ICD-10-CM

## 2021-12-14 DIAGNOSIS — E03.9 ACQUIRED HYPOTHYROIDISM: ICD-10-CM

## 2021-12-14 DIAGNOSIS — R13.10 DYSPHAGIA, UNSPECIFIED TYPE: ICD-10-CM

## 2021-12-14 DIAGNOSIS — E78.2 MIXED HYPERLIPIDEMIA: Primary | ICD-10-CM

## 2021-12-14 DIAGNOSIS — R21 RASH: ICD-10-CM

## 2021-12-14 DIAGNOSIS — R73.9 HYPERGLYCEMIA: ICD-10-CM

## 2021-12-14 DIAGNOSIS — Z51.81 MEDICATION MONITORING ENCOUNTER: ICD-10-CM

## 2021-12-14 DIAGNOSIS — M81.0 AGE-RELATED OSTEOPOROSIS WITHOUT CURRENT PATHOLOGICAL FRACTURE: ICD-10-CM

## 2021-12-14 DIAGNOSIS — E78.2 MIXED HYPERLIPIDEMIA: ICD-10-CM

## 2021-12-14 PROBLEM — M54.16 LUMBAR RADICULOPATHY: Status: ACTIVE | Noted: 2021-12-14

## 2021-12-14 PROBLEM — M17.9 OSTEOARTHRITIS OF KNEE: Status: ACTIVE | Noted: 2021-12-14

## 2021-12-14 PROBLEM — Z96.659 ARTIFICIAL KNEE JOINT PRESENT: Status: ACTIVE | Noted: 2021-12-14

## 2021-12-14 PROBLEM — Z47.1 AFTERCARE FOLLOWING JOINT REPLACEMENT SURGERY: Status: ACTIVE | Noted: 2021-12-14

## 2021-12-14 PROBLEM — M19.91 LOCALIZED, PRIMARY OSTEOARTHRITIS: Status: ACTIVE | Noted: 2021-12-14

## 2021-12-14 PROBLEM — M19.90 LOCALIZED OSTEOARTHROSIS: Status: ACTIVE | Noted: 2021-12-14

## 2021-12-14 LAB
25(OH)D3 SERPL-MCNC: 45.1 NG/ML
ALBUMIN SERPL-MCNC: 4.1 G/DL (ref 3.5–5.2)
ALBUMIN/GLOB SERPL: 1.5 G/DL
ALP SERPL-CCNC: 73 U/L (ref 39–117)
ALT SERPL W P-5'-P-CCNC: 9 U/L (ref 1–33)
ANION GAP SERPL CALCULATED.3IONS-SCNC: 9.1 MMOL/L (ref 5–15)
AST SERPL-CCNC: 14 U/L (ref 1–32)
BILIRUB SERPL-MCNC: 0.5 MG/DL (ref 0–1.2)
BUN SERPL-MCNC: 11 MG/DL (ref 8–23)
BUN/CREAT SERPL: 11.7 (ref 7–25)
CALCIUM SPEC-SCNC: 9.6 MG/DL (ref 8.6–10.5)
CHLORIDE SERPL-SCNC: 103 MMOL/L (ref 98–107)
CHOLEST SERPL-MCNC: 140 MG/DL (ref 0–200)
CO2 SERPL-SCNC: 27.9 MMOL/L (ref 22–29)
CREAT SERPL-MCNC: 0.94 MG/DL (ref 0.57–1)
GFR SERPL CREATININE-BSD FRML MDRD: 59 ML/MIN/1.73
GLOBULIN UR ELPH-MCNC: 2.7 GM/DL
GLUCOSE SERPL-MCNC: 94 MG/DL (ref 65–99)
HDLC SERPL-MCNC: 68 MG/DL (ref 40–60)
LDLC SERPL CALC-MCNC: 58 MG/DL (ref 0–100)
LDLC/HDLC SERPL: 0.85 {RATIO}
POTASSIUM SERPL-SCNC: 4.8 MMOL/L (ref 3.5–5.2)
PROT SERPL-MCNC: 6.8 G/DL (ref 6–8.5)
SODIUM SERPL-SCNC: 140 MMOL/L (ref 136–145)
T-UPTAKE NFR SERPL: 1 TBI (ref 0.8–1.3)
T4 SERPL-MCNC: 8.27 MCG/DL (ref 4.5–11.7)
TRIGL SERPL-MCNC: 70 MG/DL (ref 0–150)
TSH SERPL DL<=0.05 MIU/L-ACNC: 1.9 UIU/ML (ref 0.27–4.2)
VLDLC SERPL-MCNC: 14 MG/DL (ref 5–40)

## 2021-12-14 PROCEDURE — 84479 ASSAY OF THYROID (T3 OR T4): CPT | Performed by: NURSE PRACTITIONER

## 2021-12-14 PROCEDURE — 80061 LIPID PANEL: CPT | Performed by: NURSE PRACTITIONER

## 2021-12-14 PROCEDURE — 84443 ASSAY THYROID STIM HORMONE: CPT | Performed by: NURSE PRACTITIONER

## 2021-12-14 PROCEDURE — 82306 VITAMIN D 25 HYDROXY: CPT | Performed by: PHYSICIAN ASSISTANT

## 2021-12-14 PROCEDURE — 36415 COLL VENOUS BLD VENIPUNCTURE: CPT

## 2021-12-14 PROCEDURE — 80053 COMPREHEN METABOLIC PANEL: CPT | Performed by: NURSE PRACTITIONER

## 2021-12-14 PROCEDURE — 99213 OFFICE O/P EST LOW 20 MIN: CPT | Performed by: FAMILY MEDICINE

## 2021-12-14 PROCEDURE — 84436 ASSAY OF TOTAL THYROXINE: CPT | Performed by: NURSE PRACTITIONER

## 2021-12-14 NOTE — PROGRESS NOTES
Subjective   Charleen Barnett is a 71 y.o. female.     Here for follow-up on blood pressure, cholesterol, blood sugar  Is due to see Fabiola Anders soon and needs labs  Feels like something on the right side of her neck hurts when she swallows  Denies feeling of food being stuck  Rash ed on legs  (but can be anywhere) - blisters followed by scabs  Says prev she had hives  Denies change in meds or supplements or skin products  Says the only  Rash she has right now is on her lower abd  Needs a flu shot       The following portions of the patient's history were reviewed and updated as appropriate: allergies, current medications, past family history, past medical history, past social history, past surgical history, and problem list.  Past Medical History:   Diagnosis Date   • Anxiety    • Asthma 2/13/2020   • Ramos's esophagus 2/15/2018   • Depression 8/27/2018   • Fibromyalgia 12/14/2011   • GERD (gastroesophageal reflux disease)    • Gout 2/13/2020   • Hypertension    • Joint pain    • Obstructive sleep apnea 10/16/2014   • Osteoporosis     fosamax(SE), on prolia(4/10/2018)   • Otitis media    • Pedal edema 10/22/2018   • Presence of left artificial knee joint 3/14/2019   • Seasonal allergies    • ST elevation (STEMI) myocardial infarction (HCC) 2/13/2020   • Status post percutaneous transluminal coronary angioplasty 6/10/2014   • Tachycardia    • Urge incontinence 8/27/2018   • Vitamin D deficiency 3/19/2015     Past Surgical History:   Procedure Laterality Date   • BLADDER SURGERY     • CARDIAC CATHETERIZATION N/A 5/29/2021    Procedure: Left Heart Cath and coronary angiogram;  Surgeon: Yazmin Matta MD;  Location: [facility] CATH INVASIVE LOCATION;  Service: Cardiovascular;  Laterality: N/A;   • CARDIAC CATHETERIZATION  5/29/2021    Procedure: Functional Flow Reserve (iFR);  Surgeon: Bryan Patel MD;  Location: [facility] CATH INVASIVE LOCATION;  Service: Cardiology;;   • CARDIAC CATHETERIZATION N/A  5/29/2021    Procedure: Percutaneous Coronary Intervention;  Surgeon: Bryan Patel MD;  Location: [facility] CATH INVASIVE LOCATION;  Service: Cardiology;  Laterality: N/A;   • CARDIAC CATHETERIZATION N/A 5/29/2021    Procedure: Stent FRANCOISE coronary;  Surgeon: Bryan Patel MD;  Location: [facility] CATH INVASIVE LOCATION;  Service: Cardiology;  Laterality: N/A;   • CHOLECYSTECTOMY     • HYSTERECTOMY     • KNEE SURGERY     • OTHER SURGICAL HISTORY      STENT     Family History   Problem Relation Age of Onset   • Heart attack Father    • Cancer Father      Social History     Socioeconomic History   • Marital status:    Tobacco Use   • Smoking status: Never Smoker   • Smokeless tobacco: Never Used   Vaping Use   • Vaping Use: Never used   Substance and Sexual Activity   • Alcohol use: Not Currently     Comment: NO DRINK 1994   • Drug use: Never   • Sexual activity: Defer         Current Outpatient Medications:   •  albuterol sulfate HFA (VENTOLIN HFA) 108 (90 Base) MCG/ACT inhaler, Inhale 1 puff Every 6 (Six) Hours As Needed for Wheezing or Shortness of Air., Disp: , Rfl:   •  aspirin 81 MG tablet, Take 81 mg by mouth Daily., Disp: , Rfl:   •  atorvastatin (LIPITOR) 10 MG tablet, TAKE 1 TABLET BY MOUTH DAILY., Disp: 90 tablet, Rfl: 2  •  Biotin 10 MG tablet, Take 10 mg by mouth Daily., Disp: , Rfl:   •  buPROPion XL (Wellbutrin XL) 150 MG 24 hr tablet, Take 1 tablet by mouth Every Morning., Disp: 90 tablet, Rfl: 3  •  busPIRone (BUSPAR) 7.5 MG tablet, TAKE ONE TABLET BY MOUTH TWICE DAILY, Disp: 180 tablet, Rfl: 1  •  citalopram (CeleXA) 20 MG tablet, TAKE ONE TABLET BY MOUTH EVERY DAY, Disp: 90 tablet, Rfl: 1  •  clopidogrel (PLAVIX) 75 MG tablet, TAKE 1 TABLET BY MOUTH DAILY., Disp: 90 tablet, Rfl: 3  •  gabapentin (NEURONTIN) 400 MG capsule, TAKE 1 CAPSULE BY MOUTH AT BEDTIME, Disp: 90 capsule, Rfl: 0  •  lansoprazole (PREVACID) 15 MG capsule, Take 15 mg by mouth Daily., Disp: , Rfl:   •   "levothyroxine (SYNTHROID, LEVOTHROID) 25 MCG tablet, TAKE 1 TABLET BY MOUTH EVERY MORNING, Disp: 90 tablet, Rfl: 1  •  Magnesium 250 MG tablet, Take 1 tablet by mouth Daily., Disp: , Rfl:   •  metoprolol succinate XL (TOPROL-XL) 25 MG 24 hr tablet, TAKE ONE TABLET BY MOUTH EVERY MORNING, Disp: 90 tablet, Rfl: 3  •  montelukast (SINGULAIR) 10 MG tablet, TAKE ONE TABLET BY MOUTH EVERY DAY, Disp: 90 tablet, Rfl: 2  •  oxybutynin (DITROPAN) 5 MG tablet, Take 1 tablet by mouth 2 (Two) Times a Day., Disp: 180 tablet, Rfl: 2  •  vitamin D (ERGOCALCIFEROL) 1.25 MG (97426 UT) capsule capsule, TAKE 1 CAPSULE BY MOUTH EVERY WEEK, Disp: 13 capsule, Rfl: 2  •  VITAMIN E PO, Take 1 tablet by mouth Daily., Disp: , Rfl:   •  zinc sulfate (ZINCATE) 220 (50 Zn) MG capsule, Take 1 capsule by mouth Daily., Disp: , Rfl:     Current Facility-Administered Medications:   •  denosumab (PROLIA) syringe 60 mg, 60 mg, Subcutaneous, Q6 Months, Dede Anders PA, 60 mg at 06/18/21 0834    Review of Systems   Constitutional: Negative.    HENT: Positive for trouble swallowing.    Respiratory: Negative.    Cardiovascular: Negative.    Gastrointestinal: Negative for nausea and vomiting.   Skin: Positive for rash.   Neurological: Negative for dizziness, syncope, light-headedness and headache.     /84 (BP Location: Right arm, Patient Position: Sitting, Cuff Size: Large Adult)   Pulse 57   Temp 98 °F (36.7 °C) (Temporal)   Ht 147.3 cm (58\")   Wt 87.5 kg (193 lb)   SpO2 96%   Breastfeeding No   BMI 40.34 kg/m²       Objective   Physical Exam  Vitals and nursing note reviewed.   Constitutional:       General: She is not in acute distress.     Appearance: Normal appearance. She is well-developed. She is morbidly obese.   HENT:      Head: Normocephalic and atraumatic.   Neck:      Thyroid: No thyromegaly.   Cardiovascular:      Rate and Rhythm: Normal rate and regular rhythm.      Heart sounds: Normal heart sounds. No murmur heard.  No " friction rub. No gallop.    Pulmonary:      Effort: Pulmonary effort is normal. No respiratory distress.      Breath sounds: Normal breath sounds. No wheezing or rales.   Musculoskeletal:      Cervical back: Neck supple.      Right lower leg: No edema.      Left lower leg: No edema.   Lymphadenopathy:      Cervical: No cervical adenopathy.   Skin:     General: Skin is warm and dry.      Findings: No rash.   Neurological:      Mental Status: She is alert.   Psychiatric:         Mood and Affect: Mood is anxious.         Behavior: Behavior is cooperative.           Assessment/Plan   Problems Addressed this Visit        Cardiac and Vasculature    Hyperlipidemia - Primary    Relevant Orders    Lipid Panel       Endocrine and Metabolic    Hyperglycemia    Acquired hypothyroidism      Other Visit Diagnoses     Dysphagia, unspecified type        Relevant Orders    Ambulatory Referral to Gastroenterology (Completed)    Rash          Diagnoses       Codes Comments    Mixed hyperlipidemia    -  Primary ICD-10-CM: E78.2  ICD-9-CM: 272.2     Dysphagia, unspecified type     ICD-10-CM: R13.10  ICD-9-CM: 787.20     Hyperglycemia     ICD-10-CM: R73.9  ICD-9-CM: 790.29     Rash     ICD-10-CM: R21  ICD-9-CM: 782.1     Acquired hypothyroidism     ICD-10-CM: E03.9  ICD-9-CM: 244.9         She was counseled on the need for weight loss  She has labs ordered including the follow-up TSH from her recent diagnosis of hypothyroidism  No abnormality was appreciated in her neck  I will get her into see GI for evaluation for dysphagia  No rash appreciated  I recommended that she take a picture of her rash the next time it appears  She will continue her current medications  Recommended that she improve her diet  Counseled on the need for a flu shot now and a Covid booster in March  I will see her back in 6 months for follow-up and Medicare wellness

## 2021-12-14 NOTE — PATIENT INSTRUCTIONS
Keep working to lose weight through healthy eating and exercise.  Get a flu shot   Get your covid booster in March

## 2021-12-22 ENCOUNTER — TELEPHONE (OUTPATIENT)
Dept: ORTHOPEDIC SURGERY | Facility: CLINIC | Age: 71
End: 2021-12-22

## 2021-12-22 NOTE — TELEPHONE ENCOUNTER
HUB STAFF ATTEMPTED NON-CLINICAL WARM TRANSFER - NO ANSWER     Caller: Charleen Barnett    Relationship to patient: Self    Best call back number: [phone]    Chief complaint: NEEDS PROLIA APPT RESCHEDULED FROM TODAY 12/22/21     Type of visit: FOLLOW UP / OSTEOPOROSIS - PROLIA INJECTION / NO NEW INJURIES / LABS DONE 12/14/21     Requested date: ANY DAY ANY TIME     If rescheduling, when is the original appointment: TODAY 12/22/21 @0830    Additional notes: PATIENT IS NOT FEELING WELL BUT DENIES ANY COVID SYMPTOMS     PATIENT's Best call back number: [phone]    THANKS

## 2022-01-06 RX ORDER — ERGOCALCIFEROL 1.25 MG/1
CAPSULE ORAL
Qty: 13 CAPSULE | Refills: 1 | Status: SHIPPED | OUTPATIENT
Start: 2022-01-06 | End: 2022-06-02

## 2022-01-13 ENCOUNTER — OFFICE VISIT (OUTPATIENT)
Dept: FAMILY MEDICINE CLINIC | Facility: CLINIC | Age: 72
End: 2022-01-13

## 2022-01-13 ENCOUNTER — LAB (OUTPATIENT)
Dept: FAMILY MEDICINE CLINIC | Facility: CLINIC | Age: 72
End: 2022-01-13

## 2022-01-13 VITALS
OXYGEN SATURATION: 93 % | WEIGHT: 194 LBS | HEART RATE: 77 BPM | TEMPERATURE: 97.7 F | BODY MASS INDEX: 40.72 KG/M2 | HEIGHT: 58 IN | SYSTOLIC BLOOD PRESSURE: 119 MMHG | DIASTOLIC BLOOD PRESSURE: 83 MMHG

## 2022-01-13 DIAGNOSIS — R05.9 COUGH: Primary | ICD-10-CM

## 2022-01-13 DIAGNOSIS — R68.83 CHILLS: ICD-10-CM

## 2022-01-13 DIAGNOSIS — R51.9 NONINTRACTABLE HEADACHE, UNSPECIFIED CHRONICITY PATTERN, UNSPECIFIED HEADACHE TYPE: ICD-10-CM

## 2022-01-13 DIAGNOSIS — J02.9 SORE THROAT: ICD-10-CM

## 2022-01-13 DIAGNOSIS — R11.0 NAUSEA: ICD-10-CM

## 2022-01-13 LAB — SARS-COV-2 ORF1AB RESP QL NAA+PROBE: DETECTED

## 2022-01-13 PROCEDURE — 99213 OFFICE O/P EST LOW 20 MIN: CPT | Performed by: FAMILY MEDICINE

## 2022-01-13 PROCEDURE — U0004 COV-19 TEST NON-CDC HGH THRU: HCPCS | Performed by: FAMILY MEDICINE

## 2022-01-13 PROCEDURE — U0005 INFEC AGEN DETEC AMPLI PROBE: HCPCS | Performed by: FAMILY MEDICINE

## 2022-01-13 RX ORDER — GABAPENTIN 400 MG/1
400 CAPSULE ORAL
Qty: 90 CAPSULE | Refills: 3 | Status: SHIPPED | OUTPATIENT
Start: 2022-01-13 | End: 2022-12-29

## 2022-01-13 RX ORDER — BENZONATATE 200 MG/1
200 CAPSULE ORAL 3 TIMES DAILY PRN
Qty: 30 CAPSULE | Refills: 0 | Status: SHIPPED | OUTPATIENT
Start: 2022-01-13 | End: 2022-05-03

## 2022-01-13 NOTE — PROGRESS NOTES
Subjective   Charleen Barnett is a 71 y.o. female.     Incident of headache, nausea, sore throat, cough  She has had 2 COVID vaccines but has not been yet  Symptoms for 2 days  Croupy cough   is with her  Decreased appetite  Chills for 2 days  No night sweats       The following portions of the patient's history were reviewed and updated as appropriate: allergies, current medications, past family history, past medical history, past social history, past surgical history, and problem list.  Past Medical History:   Diagnosis Date   • Anxiety    • Asthma 2/13/2020   • Ramos's esophagus 2/15/2018   • Depression 8/27/2018   • Fibromyalgia 12/14/2011   • GERD (gastroesophageal reflux disease)    • Gout 2/13/2020   • Hypertension    • Joint pain    • Obstructive sleep apnea 10/16/2014   • Osteoporosis     fosamax(SE), on prolia(4/10/2018)   • Otitis media    • Pedal edema 10/22/2018   • Presence of left artificial knee joint 3/14/2019   • Seasonal allergies    • ST elevation (STEMI) myocardial infarction (HCC) 2/13/2020   • Status post percutaneous transluminal coronary angioplasty 6/10/2014   • Tachycardia    • Urge incontinence 8/27/2018   • Vitamin D deficiency 3/19/2015     Past Surgical History:   Procedure Laterality Date   • BLADDER SURGERY     • CARDIAC CATHETERIZATION N/A 5/29/2021    Procedure: Left Heart Cath and coronary angiogram;  Surgeon: Yazmin Matta MD;  Location: [facility] CATH INVASIVE LOCATION;  Service: Cardiovascular;  Laterality: N/A;   • CARDIAC CATHETERIZATION  5/29/2021    Procedure: Functional Flow Reserve (iFR);  Surgeon: Bryan Patel MD;  Location: [facility] CATH INVASIVE LOCATION;  Service: Cardiology;;   • CARDIAC CATHETERIZATION N/A 5/29/2021    Procedure: Percutaneous Coronary Intervention;  Surgeon: Bryan Patel MD;  Location: [facility] CATH INVASIVE LOCATION;  Service: Cardiology;  Laterality: N/A;   • CARDIAC CATHETERIZATION N/A 5/29/2021    Procedure: Stent  FRANCOISE coronary;  Surgeon: Bryan Patel MD;  Location: [facility] INVASIVE LOCATION;  Service: Cardiology;  Laterality: N/A;   • CHOLECYSTECTOMY     • HYSTERECTOMY     • KNEE SURGERY     • OTHER SURGICAL HISTORY      STENT     Family History   Problem Relation Age of Onset   • Heart attack Father    • Cancer Father      Social History     Socioeconomic History   • Marital status:    Tobacco Use   • Smoking status: Never Smoker   • Smokeless tobacco: Never Used   Vaping Use   • Vaping Use: Never used   Substance and Sexual Activity   • Alcohol use: Not Currently     Comment: NO DRINK 1994   • Drug use: Never   • Sexual activity: Defer         Current Outpatient Medications:   •  albuterol sulfate HFA (VENTOLIN HFA) 108 (90 Base) MCG/ACT inhaler, Inhale 1 puff Every 6 (Six) Hours As Needed for Wheezing or Shortness of Air., Disp: , Rfl:   •  aspirin 81 MG tablet, Take 81 mg by mouth Daily., Disp: , Rfl:   •  atorvastatin (LIPITOR) 10 MG tablet, TAKE 1 TABLET BY MOUTH DAILY., Disp: 90 tablet, Rfl: 2  •  Biotin 10 MG tablet, Take 10 mg by mouth Daily., Disp: , Rfl:   •  buPROPion XL (Wellbutrin XL) 150 MG 24 hr tablet, Take 1 tablet by mouth Every Morning., Disp: 90 tablet, Rfl: 3  •  busPIRone (BUSPAR) 7.5 MG tablet, TAKE ONE TABLET BY MOUTH TWICE DAILY, Disp: 180 tablet, Rfl: 1  •  citalopram (CeleXA) 20 MG tablet, TAKE ONE TABLET BY MOUTH EVERY DAY, Disp: 90 tablet, Rfl: 1  •  clopidogrel (PLAVIX) 75 MG tablet, TAKE 1 TABLET BY MOUTH DAILY., Disp: 90 tablet, Rfl: 3  •  gabapentin (NEURONTIN) 400 MG capsule, Take 1 capsule by mouth every night at bedtime., Disp: 90 capsule, Rfl: 3  •  lansoprazole (PREVACID) 15 MG capsule, Take 15 mg by mouth Daily., Disp: , Rfl:   •  levothyroxine (SYNTHROID, LEVOTHROID) 25 MCG tablet, TAKE 1 TABLET BY MOUTH EVERY MORNING, Disp: 90 tablet, Rfl: 1  •  Magnesium 250 MG tablet, Take 1 tablet by mouth Daily., Disp: , Rfl:   •  metoprolol succinate XL (TOPROL-XL) 25 MG  "24 hr tablet, TAKE ONE TABLET BY MOUTH EVERY MORNING, Disp: 90 tablet, Rfl: 3  •  montelukast (SINGULAIR) 10 MG tablet, TAKE ONE TABLET BY MOUTH EVERY DAY, Disp: 90 tablet, Rfl: 2  •  oxybutynin (DITROPAN) 5 MG tablet, Take 1 tablet by mouth 2 (Two) Times a Day., Disp: 180 tablet, Rfl: 2  •  vitamin D (ERGOCALCIFEROL) 1.25 MG (73044 UT) capsule capsule, TAKE 1 CAPSULE BY MOUTH EVERY WEEK, Disp: 13 capsule, Rfl: 1  •  VITAMIN E PO, Take 1 tablet by mouth Daily., Disp: , Rfl:   •  zinc sulfate (ZINCATE) 220 (50 Zn) MG capsule, Take 1 capsule by mouth Daily., Disp: , Rfl:   •  benzonatate (TESSALON) 200 MG capsule, Take 1 capsule by mouth 3 (Three) Times a Day As Needed for Cough., Disp: 30 capsule, Rfl: 0    Current Facility-Administered Medications:   •  denosumab (PROLIA) syringe 60 mg, 60 mg, Subcutaneous, Q6 Months, Dede Anders PA, 60 mg at 06/18/21 0834    Review of Systems   Constitutional: Positive for chills. Negative for diaphoresis, fatigue, fever, unexpected weight gain and unexpected weight loss.   HENT: Positive for congestion and sore throat.    Respiratory: Positive for cough. Negative for chest tightness and shortness of breath.    Cardiovascular: Negative for chest pain, palpitations and leg swelling.   Gastrointestinal: Positive for nausea. Negative for vomiting.   Neurological: Positive for headache. Negative for dizziness and syncope.     /83 (BP Location: Left arm, Patient Position: Sitting, Cuff Size: Large Adult)   Pulse 77   Temp 97.7 °F (36.5 °C) (Temporal)   Ht 147.3 cm (58\")   Wt 88 kg (194 lb)   SpO2 93%   Breastfeeding No   BMI 40.55 kg/m²       Objective   Physical Exam  Vitals and nursing note reviewed.   Constitutional:       General: She is not in acute distress.     Appearance: Normal appearance. She is well-developed and well-groomed.   HENT:      Head: Normocephalic and atraumatic.      Right Ear: Tympanic membrane, ear canal and external ear normal.      Left " Ear: Tympanic membrane, ear canal and external ear normal.      Mouth/Throat:      Mouth: Mucous membranes are moist.      Pharynx: Posterior oropharyngeal erythema present.   Cardiovascular:      Rate and Rhythm: Normal rate and regular rhythm.      Heart sounds: Normal heart sounds. No murmur heard.  No friction rub. No gallop.    Pulmonary:      Effort: Pulmonary effort is normal. No respiratory distress.      Breath sounds: Normal breath sounds. No wheezing or rales.   Musculoskeletal:      Cervical back: Neck supple.      Right lower leg: No edema.      Left lower leg: No edema.   Lymphadenopathy:      Cervical: No cervical adenopathy.   Skin:     General: Skin is warm and dry.      Findings: No rash.   Neurological:      Mental Status: She is alert.   Psychiatric:         Behavior: Behavior is cooperative.           Assessment/Plan   Problems Addressed this Visit     None      Visit Diagnoses     Cough    -  Primary    Relevant Orders    COVID-19,APTIMA PANTHER([place]),[facility], NP/OP SWAB IN UTM/VTM/SALINE TRANSPORT MEDIA,24 HR TAT - Swab, Nasal Cavity    Nonintractable headache, unspecified chronicity pattern, unspecified headache type        Relevant Orders    COVID-19,APTIMA PANTHER([place]),[facility], NP/OP SWAB IN UTM/VTM/SALINE TRANSPORT MEDIA,24 HR TAT - Swab, Nasal Cavity    Sore throat        Relevant Orders    COVID-19,APTIMA PANTHER([place]),[facility], NP/OP SWAB IN UTM/VTM/SALINE TRANSPORT MEDIA,24 HR TAT - Swab, Nasal Cavity    Nausea        Relevant Orders    COVID-19,APTIMA PANTHER([place]),[facility], NP/OP SWAB IN UTM/VTM/SALINE TRANSPORT MEDIA,24 HR TAT - Swab, Nasal Cavity    Chills        Relevant Orders    COVID-19,APTIMA PANTHER([place]),[facility], NP/OP SWAB IN UTM/VTM/SALINE TRANSPORT MEDIA,24 HR TAT - Swab, Nasal Cavity      Diagnoses       Codes Comments    Cough    -  Primary ICD-10-CM: R05.9  ICD-9-CM: 786.2     Nonintractable headache, unspecified chronicity pattern, unspecified  headache type     ICD-10-CM: R51.9  ICD-9-CM: 784.0     Sore throat     ICD-10-CM: J02.9  ICD-9-CM: 462     Nausea     ICD-10-CM: R11.0  ICD-9-CM: 787.02     Chills     ICD-10-CM: R68.83  ICD-9-CM: 780.64           Think she has COVID and we will go ahead and do the nasal swab  She has been encouraged to push fluids and rest and continue to quarantine  I have given her prescription for Tessalon Perles

## 2022-01-19 ENCOUNTER — TELEPHONE (OUTPATIENT)
Dept: ORTHOPEDIC SURGERY | Facility: CLINIC | Age: 72
End: 2022-01-19

## 2022-01-19 NOTE — TELEPHONE ENCOUNTER
Caller: MARIBETH LYONS    Relationship to patient: SELF    Best call back number:     Type of visit: PROLIA INJECTION    Requested date:     If rescheduling, when is the original appointment: 1/21/22    Additional notes: THE PATIENT IS POSITIVE FOR COVID

## 2022-01-26 ENCOUNTER — TELEPHONE (OUTPATIENT)
Dept: ORTHOPEDIC SURGERY | Facility: CLINIC | Age: 72
End: 2022-01-26

## 2022-01-26 RX ORDER — CITALOPRAM 20 MG/1
TABLET ORAL
Qty: 90 TABLET | Refills: 0 | Status: SHIPPED | OUTPATIENT
Start: 2022-01-26 | End: 2022-05-17

## 2022-01-26 NOTE — TELEPHONE ENCOUNTER
Caller: MARIBETH LYONS     Relationship: SELF     Best call back number: [phone]    What was the call regarding: PATIENT IS REQUESTING TO SCHEDULE PROLIA INJECTION     Do you require a callback: YES

## 2022-02-01 ENCOUNTER — CLINICAL SUPPORT (OUTPATIENT)
Dept: ORTHOPEDIC SURGERY | Facility: CLINIC | Age: 72
End: 2022-02-01

## 2022-02-01 VITALS
HEART RATE: 84 BPM | DIASTOLIC BLOOD PRESSURE: 83 MMHG | HEIGHT: 58 IN | WEIGHT: 191.2 LBS | SYSTOLIC BLOOD PRESSURE: 147 MMHG | BODY MASS INDEX: 40.14 KG/M2

## 2022-02-01 DIAGNOSIS — M81.0 AGE-RELATED OSTEOPOROSIS WITHOUT CURRENT PATHOLOGICAL FRACTURE: Primary | ICD-10-CM

## 2022-02-01 PROCEDURE — 96372 THER/PROPH/DIAG INJ SC/IM: CPT | Performed by: PHYSICIAN ASSISTANT

## 2022-02-01 NOTE — PROGRESS NOTES
Patient presents for Prolia injection. Patient had no issues with the last Prolia injection. Patient last Calcium was 9.6mg/dL. Injection administered and patient tolerated without complication.    4/5/2017-DEXA scan at [facility], revealed a T score of -2.8 in the left femoral neck, FRAX scores of 18.3% and 5.1%.  4/10/2018-patient started on Prolia  4/8/2019-DEXA scan at [facility], revealed a T score of -3.8 in the left one third radius.  When compared to 2017, this did show an increase in bone density of 3.6% in the spine, however due to her significant scoliosis this may not be accurate, and a decrease of 4.3% in the hips.  6/18/2021-Prolia injection  2/1/2022-Prolia injection, greater than 6 weeks late

## 2022-03-07 RX ORDER — BUPROPION HYDROCHLORIDE 150 MG/1
150 TABLET ORAL EVERY MORNING
Qty: 90 TABLET | Refills: 2 | Status: SHIPPED | OUTPATIENT
Start: 2022-03-07 | End: 2022-12-07

## 2022-03-30 ENCOUNTER — TELEPHONE (OUTPATIENT)
Dept: FAMILY MEDICINE CLINIC | Facility: CLINIC | Age: 72
End: 2022-03-30

## 2022-03-30 ENCOUNTER — OFFICE VISIT (OUTPATIENT)
Dept: FAMILY MEDICINE CLINIC | Facility: CLINIC | Age: 72
End: 2022-03-30

## 2022-03-30 VITALS
DIASTOLIC BLOOD PRESSURE: 83 MMHG | BODY MASS INDEX: 39.88 KG/M2 | TEMPERATURE: 98 F | WEIGHT: 190 LBS | HEART RATE: 68 BPM | OXYGEN SATURATION: 96 % | HEIGHT: 58 IN | SYSTOLIC BLOOD PRESSURE: 138 MMHG

## 2022-03-30 DIAGNOSIS — L30.9 ECZEMA, UNSPECIFIED TYPE: Primary | ICD-10-CM

## 2022-03-30 PROBLEM — R21 RASH: Status: ACTIVE | Noted: 2022-03-30

## 2022-03-30 PROCEDURE — 99213 OFFICE O/P EST LOW 20 MIN: CPT | Performed by: FAMILY MEDICINE

## 2022-03-30 RX ORDER — TRIAMCINOLONE ACETONIDE 1 MG/G
1 CREAM TOPICAL 2 TIMES DAILY
Qty: 30 G | Refills: 0 | Status: SHIPPED | OUTPATIENT
Start: 2022-03-30

## 2022-03-30 NOTE — TELEPHONE ENCOUNTER
PATIENT CALLED TO UPDATE DR MARC CARRERA THAT THE NAME OF THE CREAM IS NEEDS IS: TRIAMCINOLONE ACETONIDE CREAM USP 0.1%    CALL BACK IF NEEDED:  [phone]

## 2022-05-03 ENCOUNTER — OFFICE VISIT (OUTPATIENT)
Dept: CARDIOLOGY | Facility: CLINIC | Age: 72
End: 2022-05-03

## 2022-05-03 VITALS
WEIGHT: 186 LBS | BODY MASS INDEX: 39.04 KG/M2 | SYSTOLIC BLOOD PRESSURE: 139 MMHG | DIASTOLIC BLOOD PRESSURE: 83 MMHG | HEIGHT: 58 IN | OXYGEN SATURATION: 99 % | HEART RATE: 50 BPM

## 2022-05-03 DIAGNOSIS — R55 NEAR SYNCOPE: ICD-10-CM

## 2022-05-03 DIAGNOSIS — E78.2 MIXED HYPERLIPIDEMIA: ICD-10-CM

## 2022-05-03 DIAGNOSIS — R07.89 CHEST DISCOMFORT: ICD-10-CM

## 2022-05-03 DIAGNOSIS — Z98.61 STATUS POST PERCUTANEOUS TRANSLUMINAL CORONARY ANGIOPLASTY: Primary | ICD-10-CM

## 2022-05-03 DIAGNOSIS — I10 ESSENTIAL HYPERTENSION: ICD-10-CM

## 2022-05-03 PROCEDURE — 93000 ELECTROCARDIOGRAM COMPLETE: CPT | Performed by: INTERNAL MEDICINE

## 2022-05-03 PROCEDURE — 99214 OFFICE O/P EST MOD 30 MIN: CPT | Performed by: INTERNAL MEDICINE

## 2022-05-03 RX ORDER — HYDROXYZINE HYDROCHLORIDE 10 MG/1
TABLET, FILM COATED ORAL
COMMUNITY
Start: 2022-04-19 | End: 2022-12-07

## 2022-05-03 NOTE — PROGRESS NOTES
Encounter Date:05/03/2022  Last seen 11/1/2021      Patient ID: Charleen Barnett is a 71 y.o. female.    Chief Complaint:  Status post stent  Hypertension  Dyslipidemia  History of SVT.     History of present illness  Occasional dizziness and nausea.  Since I have last seen, the patient has been without any chest discomfort ,shortness of breath, palpitations, or syncope.  Denies having any headache ,abdominal pain ,, vomiting , diarrhea constipation, loss of weight or loss of appetite.  Denies having any excessive bruising ,hematuria or blood in the stool.    Review of all systems negative except as indicated.    Reviewed ROS.     Assessment and plan  ////////////////////////  Impression  ==================    - status post stent to LAD 06/10/2014   status post subendocardial myocardial infarction prior to stent placement  Status post stent to LAD 5/29/2021.     Cardiac catheterization 5/29/2021 revealed  Left ventricle size and contractility normal with ejection fraction of 60%.  Left main coronary artery normal.  Left anterior descending artery has previously placed stent.  Left anterior descending artery has 80 to 90% disease just distal to the diagonal branch.  (Patient to have FFR).  IFR of 0.86  Diagonal branch has diffuse 50 to 60% disease.  Circumflex coronary artery is normal except for 30 to 40% marginal branch disease.  Right coronary artery is a large and dominant vessel and is normal.  Right common iliac external iliac and femoral arteries are normal.     -Right-sided chest pain-atypical.-Improved  EKG showed no acute changes.  Troponin levels are negative.     -Elevated D-dimer  CT scan of the chest is negative for pulmonary embolus.     Stress Cardiolite test-- 5/17/2021.  Echocardiogram-normal except for left atrial enlargement 5/17/2021.      -palpitations likely SVT.  -improved on atenolol     Echocardiogram showed mild aortic regurgitation with normal left ventricular function.  5/18/2017     -  hypertension dyslipidemia sleep apnea asthma fibromyalgia GERD and gout.     - family history of coronary artery disease     - allergy to penicillin sulfa and tetracycline     =======  Plan  =======   Status post stent to LAD 5/29/2021 (abnormal IFR).  Patient is not having any angina pectoris or congestive heart failure.  EKG showed sinus rhythm without any ischemic changes- 5/3/2022  Discontinue Plavix since patient had stent placement in May 2021    Hypertension-well-controlled- 139/83  Continue metoprolol     Dyslipidemia-continue atorvastatin      Medications were reviewed and updated.     Follow-up in the office in 6 months.     Further plan will depend on patient's progress  //////////////////////////////                    Diagnosis Plan   1. Status post percutaneous transluminal coronary angioplasty  ECG 12 Lead   2. Mixed hyperlipidemia  ECG 12 Lead   3. Essential hypertension  ECG 12 Lead   4. Near syncope  ECG 12 Lead   5. Chest discomfort  ECG 12 Lead   LAB RESULTS (LAST 7 DAYS)    CBC        BMP        CMP         BNP        TROPONIN        CoAg        Creatinine Clearance  CrCl cannot be calculated (Patient's most recent lab result is older than the maximum 30 days allowed.).    ABG        Radiology  No radiology results for the last day                The following portions of the patient's history were reviewed and updated as appropriate: allergies, current medications, past family history, past medical history, past social history, past surgical history and problem list.    Review of Systems   Constitutional: Negative for fever and malaise/fatigue.   Cardiovascular: Negative for chest pain, dyspnea on exertion and palpitations.   Respiratory: Negative for cough and shortness of breath.    Skin: Negative for rash.   Gastrointestinal: Positive for nausea. Negative for abdominal pain and vomiting.   Neurological: Positive for dizziness. Negative for focal weakness and headaches.   All other systems  reviewed and are negative.        Current Outpatient Medications:   •  albuterol sulfate  (90 Base) MCG/ACT inhaler, Inhale 1 puff Every 6 (Six) Hours As Needed for Wheezing or Shortness of Air., Disp: , Rfl:   •  aspirin 81 MG tablet, Take 81 mg by mouth Daily., Disp: , Rfl:   •  atorvastatin (LIPITOR) 10 MG tablet, TAKE 1 TABLET BY MOUTH DAILY., Disp: 90 tablet, Rfl: 2  •  Biotin 10 MG tablet, Take 10 mg by mouth Daily., Disp: , Rfl:   •  buPROPion XL (WELLBUTRIN XL) 150 MG 24 hr tablet, TAKE 1 TABLET BY MOUTH EVERY MORNING, Disp: 90 tablet, Rfl: 2  •  busPIRone (BUSPAR) 7.5 MG tablet, TAKE ONE TABLET BY MOUTH TWICE DAILY, Disp: 180 tablet, Rfl: 1  •  citalopram (CeleXA) 20 MG tablet, TAKE 1 TABLET BY MOUTH EVERY DAY, Disp: 90 tablet, Rfl: 0  •  clopidogrel (PLAVIX) 75 MG tablet, TAKE 1 TABLET BY MOUTH DAILY., Disp: 90 tablet, Rfl: 3  •  gabapentin (NEURONTIN) 400 MG capsule, Take 1 capsule by mouth every night at bedtime., Disp: 90 capsule, Rfl: 3  •  hydrOXYzine (ATARAX) 10 MG tablet, , Disp: , Rfl:   •  lansoprazole (PREVACID) 15 MG capsule, Take 15 mg by mouth Daily., Disp: , Rfl:   •  levothyroxine (SYNTHROID, LEVOTHROID) 25 MCG tablet, TAKE 1 TABLET BY MOUTH EVERY MORNING, Disp: 90 tablet, Rfl: 1  •  Magnesium 250 MG tablet, Take 1 tablet by mouth Daily., Disp: , Rfl:   •  metoprolol succinate XL (TOPROL-XL) 25 MG 24 hr tablet, TAKE ONE TABLET BY MOUTH EVERY MORNING, Disp: 90 tablet, Rfl: 3  •  montelukast (SINGULAIR) 10 MG tablet, TAKE ONE TABLET BY MOUTH EVERY DAY, Disp: 90 tablet, Rfl: 2  •  oxybutynin (DITROPAN) 5 MG tablet, Take 1 tablet by mouth 2 (Two) Times a Day., Disp: 180 tablet, Rfl: 2  •  triamcinolone (KENALOG) 0.1 % cream, Apply 1 application topically to the appropriate area as directed 2 (Two) Times a Day., Disp: 30 g, Rfl: 0  •  vitamin D (ERGOCALCIFEROL) 1.25 MG (02483 UT) capsule capsule, TAKE 1 CAPSULE BY MOUTH EVERY WEEK, Disp: 13 capsule, Rfl: 1  •  VITAMIN E PO, Take 1 tablet  by mouth Daily., Disp: , Rfl:   •  zinc sulfate (ZINCATE) 220 (50 Zn) MG capsule, Take 1 capsule by mouth Daily., Disp: , Rfl:     Current Facility-Administered Medications:   •  denosumab (PROLIA) syringe 60 mg, 60 mg, Subcutaneous, Q6 Months, Dede Anders PA, 60 mg at 02/01/22 1342    Allergies   Allergen Reactions   • Hydrocodone Hives   • Chlorpheniramine-Phenylephrine Rash   • Penicillins Rash   • Sulfa Antibiotics Hives and Rash   • Tetracycline Rash   • Warfarin Rash       Family History   Problem Relation Age of Onset   • No Known Problems Mother    • Heart attack Father    • Cancer Father    • No Known Problems Sister    • No Known Problems Brother    • No Known Problems Maternal Aunt    • No Known Problems Maternal Uncle    • No Known Problems Paternal Aunt    • No Known Problems Paternal Uncle    • No Known Problems Maternal Grandmother    • No Known Problems Maternal Grandfather    • No Known Problems Paternal Grandmother    • No Known Problems Paternal Grandfather    • Anemia Neg Hx    • Arrhythmia Neg Hx    • Asthma Neg Hx    • Clotting disorder Neg Hx    • Fainting Neg Hx    • Heart disease Neg Hx    • Heart failure Neg Hx    • Hyperlipidemia Neg Hx    • Hypertension Neg Hx        Past Surgical History:   Procedure Laterality Date   • BLADDER SURGERY     • CARDIAC CATHETERIZATION N/A 5/29/2021    Procedure: Left Heart Cath and coronary angiogram;  Surgeon: Yazmin Matta MD;  Location: [facility] CATH INVASIVE LOCATION;  Service: Cardiovascular;  Laterality: N/A;   • CARDIAC CATHETERIZATION  5/29/2021    Procedure: Functional Flow Reserve (iFR);  Surgeon: Bryan Patel MD;  Location: [facility] CATH INVASIVE LOCATION;  Service: Cardiology;;   • CARDIAC CATHETERIZATION N/A 5/29/2021    Procedure: Percutaneous Coronary Intervention;  Surgeon: Bryan Patel MD;  Location:  [facility] CATH INVASIVE LOCATION;  Service: Cardiology;  Laterality: N/A;   • CARDIAC CATHETERIZATION N/A  5/29/2021    Procedure: Stent FRANCOISE coronary;  Surgeon: Bryan Patel MD;  Location: [facility] INVASIVE LOCATION;  Service: Cardiology;  Laterality: N/A;   • CHOLECYSTECTOMY     • HYSTERECTOMY     • KNEE SURGERY     • OTHER SURGICAL HISTORY      STENT       Past Medical History:   Diagnosis Date   • Anxiety    • Asthma 2/13/2020   • Ramos's esophagus 2/15/2018   • Depression 8/27/2018   • Fibromyalgia 12/14/2011   • GERD (gastroesophageal reflux disease)    • Gout 2/13/2020   • Hypertension    • Joint pain    • Obstructive sleep apnea 10/16/2014   • Osteoporosis     fosamax(SE), on prolia(4/10/2018)   • Otitis media    • Pedal edema 10/22/2018   • Presence of left artificial knee joint 3/14/2019   • Seasonal allergies    • ST elevation (STEMI) myocardial infarction (HCC) 2/13/2020   • Status post percutaneous transluminal coronary angioplasty 6/10/2014   • Tachycardia    • Urge incontinence 8/27/2018   • Vitamin D deficiency 3/19/2015       Family History   Problem Relation Age of Onset   • No Known Problems Mother    • Heart attack Father    • Cancer Father    • No Known Problems Sister    • No Known Problems Brother    • No Known Problems Maternal Aunt    • No Known Problems Maternal Uncle    • No Known Problems Paternal Aunt    • No Known Problems Paternal Uncle    • No Known Problems Maternal Grandmother    • No Known Problems Maternal Grandfather    • No Known Problems Paternal Grandmother    • No Known Problems Paternal Grandfather    • Anemia Neg Hx    • Arrhythmia Neg Hx    • Asthma Neg Hx    • Clotting disorder Neg Hx    • Fainting Neg Hx    • Heart disease Neg Hx    • Heart failure Neg Hx    • Hyperlipidemia Neg Hx    • Hypertension Neg Hx        Social History     Socioeconomic History   • Marital status:    Tobacco Use   • Smoking status: Never Smoker   • Smokeless tobacco: Never Used   Vaping Use   • Vaping Use: Never used   Substance and Sexual Activity   • Alcohol use: Not  "Currently     Comment: NO DRINK 1994   • Drug use: Never   • Sexual activity: Defer           ECG 12 Lead    Date/Time: 5/3/2022 3:39 PM  Performed by: Yazmin Matta MD  Authorized by: Yazmin Matta MD   Comparison: compared with previous ECG   Similar to previous ECG  Comparison to previous ECG: Sinus bradycardia 52/min nonspecific ST-T wave changes left anterior fascicular block no ectopy no significant change from 11/1/2021                Objective:       Physical Exam    /83 (BP Location: Left arm, Patient Position: Sitting, Cuff Size: Adult)   Pulse 50   Ht 147.3 cm (58\")   Wt 84.4 kg (186 lb)   SpO2 99%   BMI 38.87 kg/m²   The patient is alert, oriented and in no distress.    Vital signs as noted above.    Head and neck revealed no carotid bruits or jugular venous distension.  No thyromegaly or lymphadenopathy is present.    Lungs clear.  No wheezing.  Breath sounds are normal bilaterally.    Heart normal first and second heart sounds.  No murmur..  No pericardial rub is present.  No gallop is present.    Abdomen soft and nontender.  No organomegaly is present.    Extremities revealed good peripheral pulses without any pedal edema.    Skin warm and dry.    Musculoskeletal system is grossly normal.    CNS grossly normal.    Reviewed and updated.        "

## 2022-05-17 RX ORDER — CITALOPRAM 20 MG/1
TABLET ORAL
Qty: 90 TABLET | Refills: 2 | Status: SHIPPED | OUTPATIENT
Start: 2022-05-17 | End: 2023-01-26

## 2022-05-18 RX ORDER — ATORVASTATIN CALCIUM 10 MG/1
10 TABLET, FILM COATED ORAL DAILY
Qty: 90 TABLET | Refills: 1 | Status: SHIPPED | OUTPATIENT
Start: 2022-05-18 | End: 2022-11-09

## 2022-06-02 RX ORDER — ERGOCALCIFEROL 1.25 MG/1
CAPSULE ORAL
Qty: 13 CAPSULE | Refills: 1 | Status: SHIPPED | OUTPATIENT
Start: 2022-06-02 | End: 2022-12-29

## 2022-06-02 RX ORDER — MONTELUKAST SODIUM 10 MG/1
TABLET ORAL
Qty: 90 TABLET | Refills: 1 | Status: SHIPPED | OUTPATIENT
Start: 2022-06-02 | End: 2022-11-09

## 2022-06-21 RX ORDER — METOPROLOL SUCCINATE 25 MG/1
TABLET, EXTENDED RELEASE ORAL
Qty: 90 TABLET | Refills: 2 | Status: SHIPPED | OUTPATIENT
Start: 2022-06-21 | End: 2023-02-28

## 2022-06-21 RX ORDER — BUSPIRONE HYDROCHLORIDE 7.5 MG/1
TABLET ORAL
Qty: 180 TABLET | Refills: 0 | Status: SHIPPED | OUTPATIENT
Start: 2022-06-21 | End: 2022-09-15

## 2022-06-21 NOTE — TELEPHONE ENCOUNTER
Rx Refill Note  Requested Prescriptions     Pending Prescriptions Disp Refills   • metoprolol succinate XL (TOPROL-XL) 25 MG 24 hr tablet [Pharmacy Med Name: METOPROLOL SUC 25MG ER^] 90 tablet 2     Sig: TAKE 1 TABLET BY MOUTH EVERY MORNING      Last office visit with prescribing clinician: 5/3/2022      Next office visit with prescribing clinician: 11/3/2022            Lauren Jeronimo MA  06/21/22, 16:26 EDT

## 2022-07-19 ENCOUNTER — LAB (OUTPATIENT)
Dept: FAMILY MEDICINE CLINIC | Facility: CLINIC | Age: 72
End: 2022-07-19

## 2022-07-19 ENCOUNTER — OFFICE VISIT (OUTPATIENT)
Dept: FAMILY MEDICINE CLINIC | Facility: CLINIC | Age: 72
End: 2022-07-19

## 2022-07-19 VITALS
OXYGEN SATURATION: 94 % | SYSTOLIC BLOOD PRESSURE: 122 MMHG | HEIGHT: 58 IN | BODY MASS INDEX: 39.47 KG/M2 | TEMPERATURE: 97.8 F | WEIGHT: 188 LBS | DIASTOLIC BLOOD PRESSURE: 78 MMHG | HEART RATE: 51 BPM

## 2022-07-19 DIAGNOSIS — R11.0 NAUSEA: ICD-10-CM

## 2022-07-19 DIAGNOSIS — R73.9 HYPERGLYCEMIA: ICD-10-CM

## 2022-07-19 DIAGNOSIS — R42 DIZZINESS: ICD-10-CM

## 2022-07-19 DIAGNOSIS — E03.9 ACQUIRED HYPOTHYROIDISM: ICD-10-CM

## 2022-07-19 DIAGNOSIS — K59.00 CONSTIPATION, UNSPECIFIED CONSTIPATION TYPE: ICD-10-CM

## 2022-07-19 DIAGNOSIS — E78.2 MIXED HYPERLIPIDEMIA: ICD-10-CM

## 2022-07-19 DIAGNOSIS — I10 PRIMARY HYPERTENSION: ICD-10-CM

## 2022-07-19 DIAGNOSIS — E78.2 MIXED HYPERLIPIDEMIA: Primary | ICD-10-CM

## 2022-07-19 DIAGNOSIS — N64.4 BILATERAL MASTODYNIA: ICD-10-CM

## 2022-07-19 DIAGNOSIS — R41.3 MEMORY DIFFICULTIES: ICD-10-CM

## 2022-07-19 LAB
ALBUMIN SERPL-MCNC: 4 G/DL (ref 3.5–5.2)
ALBUMIN/GLOB SERPL: 1.6 G/DL
ALP SERPL-CCNC: 68 U/L (ref 39–117)
ALT SERPL W P-5'-P-CCNC: 15 U/L (ref 1–33)
ANION GAP SERPL CALCULATED.3IONS-SCNC: 6.7 MMOL/L (ref 5–15)
AST SERPL-CCNC: 18 U/L (ref 1–32)
BASOPHILS # BLD AUTO: 0.04 10*3/MM3 (ref 0–0.2)
BASOPHILS NFR BLD AUTO: 0.5 % (ref 0–1.5)
BILIRUB SERPL-MCNC: 0.4 MG/DL (ref 0–1.2)
BUN SERPL-MCNC: 17 MG/DL (ref 8–23)
BUN/CREAT SERPL: 15.6 (ref 7–25)
CALCIUM SPEC-SCNC: 9.1 MG/DL (ref 8.6–10.5)
CHLORIDE SERPL-SCNC: 105 MMOL/L (ref 98–107)
CHOLEST SERPL-MCNC: 170 MG/DL (ref 0–200)
CO2 SERPL-SCNC: 28.3 MMOL/L (ref 22–29)
CREAT SERPL-MCNC: 1.09 MG/DL (ref 0.57–1)
DEPRECATED RDW RBC AUTO: 41.5 FL (ref 37–54)
EGFRCR SERPLBLD CKD-EPI 2021: 54.1 ML/MIN/1.73
EOSINOPHIL # BLD AUTO: 0.2 10*3/MM3 (ref 0–0.4)
EOSINOPHIL NFR BLD AUTO: 2.6 % (ref 0.3–6.2)
ERYTHROCYTE [DISTWIDTH] IN BLOOD BY AUTOMATED COUNT: 13.1 % (ref 12.3–15.4)
GLOBULIN UR ELPH-MCNC: 2.5 GM/DL
GLUCOSE SERPL-MCNC: 87 MG/DL (ref 65–99)
HBA1C MFR BLD: 5.4 % (ref 3.5–5.6)
HCT VFR BLD AUTO: 40 % (ref 34–46.6)
HDLC SERPL-MCNC: 73 MG/DL (ref 40–60)
HGB BLD-MCNC: 13.3 G/DL (ref 12–15.9)
IMM GRANULOCYTES # BLD AUTO: 0.03 10*3/MM3 (ref 0–0.05)
IMM GRANULOCYTES NFR BLD AUTO: 0.4 % (ref 0–0.5)
LDLC SERPL CALC-MCNC: 87 MG/DL (ref 0–100)
LDLC/HDLC SERPL: 1.19 {RATIO}
LYMPHOCYTES # BLD AUTO: 2.06 10*3/MM3 (ref 0.7–3.1)
LYMPHOCYTES NFR BLD AUTO: 26.5 % (ref 19.6–45.3)
MAGNESIUM SERPL-MCNC: 2.3 MG/DL (ref 1.6–2.4)
MCH RBC QN AUTO: 29.2 PG (ref 26.6–33)
MCHC RBC AUTO-ENTMCNC: 33.3 G/DL (ref 31.5–35.7)
MCV RBC AUTO: 87.7 FL (ref 79–97)
MONOCYTES # BLD AUTO: 0.56 10*3/MM3 (ref 0.1–0.9)
MONOCYTES NFR BLD AUTO: 7.2 % (ref 5–12)
NEUTROPHILS NFR BLD AUTO: 4.87 10*3/MM3 (ref 1.7–7)
NEUTROPHILS NFR BLD AUTO: 62.8 % (ref 42.7–76)
NRBC BLD AUTO-RTO: 0 /100 WBC (ref 0–0.2)
PLATELET # BLD AUTO: 232 10*3/MM3 (ref 140–450)
PMV BLD AUTO: 10 FL (ref 6–12)
POTASSIUM SERPL-SCNC: 5.6 MMOL/L (ref 3.5–5.2)
PROT SERPL-MCNC: 6.5 G/DL (ref 6–8.5)
RBC # BLD AUTO: 4.56 10*6/MM3 (ref 3.77–5.28)
SODIUM SERPL-SCNC: 140 MMOL/L (ref 136–145)
TRIGL SERPL-MCNC: 50 MG/DL (ref 0–150)
TSH SERPL DL<=0.05 MIU/L-ACNC: 3.79 UIU/ML (ref 0.27–4.2)
VLDLC SERPL-MCNC: 10 MG/DL (ref 5–40)
WBC NRBC COR # BLD: 7.76 10*3/MM3 (ref 3.4–10.8)

## 2022-07-19 PROCEDURE — 99214 OFFICE O/P EST MOD 30 MIN: CPT | Performed by: FAMILY MEDICINE

## 2022-07-19 PROCEDURE — 83036 HEMOGLOBIN GLYCOSYLATED A1C: CPT | Performed by: FAMILY MEDICINE

## 2022-07-19 PROCEDURE — 85025 COMPLETE CBC W/AUTO DIFF WBC: CPT | Performed by: FAMILY MEDICINE

## 2022-07-19 PROCEDURE — 84443 ASSAY THYROID STIM HORMONE: CPT | Performed by: FAMILY MEDICINE

## 2022-07-19 PROCEDURE — 80061 LIPID PANEL: CPT | Performed by: FAMILY MEDICINE

## 2022-07-19 PROCEDURE — 83735 ASSAY OF MAGNESIUM: CPT | Performed by: FAMILY MEDICINE

## 2022-07-19 PROCEDURE — 36415 COLL VENOUS BLD VENIPUNCTURE: CPT | Performed by: FAMILY MEDICINE

## 2022-07-19 PROCEDURE — 80053 COMPREHEN METABOLIC PANEL: CPT | Performed by: FAMILY MEDICINE

## 2022-07-19 RX ORDER — LEVOTHYROXINE SODIUM 0.03 MG/1
25 TABLET ORAL
Qty: 90 TABLET | Refills: 0 | Status: SHIPPED | OUTPATIENT
Start: 2022-07-19 | End: 2022-10-12

## 2022-07-19 NOTE — PATIENT INSTRUCTIONS
Keep working to lose weight through healthy eating and exercise.   TAKE YOUR THYROID PILL FIRST THING IN THE MORNING 30 MINUTES BEFORE YOU EAT  Reschedule the appt with the gastroenterologist  Try to limit time in the bed during the day  Stop the magnesium citrate  Decrease caffeine consumption  Always use your cane  No  sudden movements  I would recommend you stop driving

## 2022-07-19 NOTE — PROGRESS NOTES
Subjective   Charleen Barnett is a 72 y.o. female.     History of Present Illness     The patient comes in with complaints of abdominal discomfort and constipation. She says that she has to use magnesium citrate (pills) every day in order to have a bowel movement. She is still complaining of being dizzy and unsteady on her feet. She says this makes her feel nauseated. She was having the same complaints over a year ago. She was concerned about carotid stenosis at that time. Carotid dopplers were done. She had minimal plaquing on one side, but no stenosis on either side.    Abdominal discomfort and constipation  The patient reports that she has been severely constipated since she was out of her magnesium citrate. She mentions that she takes 1 magnesium citrate pill a day. She reports that she does not feel like she is supposed to have a bowel movement every day. She reports that if she does not go every day, she gets abdominal pain like she delivers a baby when she does go. She mentions that she was due for a colonoscopy, but the gastroenterologist wanted her to come in and have a sit down conversation with him because of her medications she is on. She reports that she had COVID-19 and missed the appointment, and she has not rescheduled. She mentions that she is concerned about her gut. She reports that she has not taken MiraLAX on a daily basis. She mentions that she does not like drinking stuff because it is a gag reflex. She reports that she has always had IBS symptoms. She mentions that she has either runny diarrhea or constipated all of her life. She reports that she has never had a normal bowel movement. She mentions that 5 to 10 times in her life, she sat down and evacuate her bowels. She mentions that it was clean, and she got up afterwards. She reports that normally it is either mush or hard as a rock or diarrhea. She mentions that it does not matter what she eats. She reports that she thinks with age, it is  getting worse. She mentions that she does bright line eating, but she is not 100 percent faithful. She reports that she has been on it for 4 years. She mentions that she tries to eat her meals appropriately. She mentions that sometimes her sweet tooth gets the best to her. She admits that she takes water with her pills. She admits that she drinks several cups of coffee a day occasionally. She mentions that she has tried going off it completely, and the next thing she knew she was drinking coffee again. She mentions that it is her regimen in the morning that she sits at the table and talks and drinks coffee. She reports that she does not watch TV or read. She mentions that she can do tea while at work. She admits that she is still taking oxybutynin twice a day.    Dizziness  She reports that she is unstable on her feet. She mentions that lately she has had a couple of bouts where she thought she was going to pass out. She reports that she was so lightheaded and today her pulse was low. She mentions that she is on metoprolol. She admits when she came in one year ago she was concerned with her carotid arteries being blocked all testing came back normal. She denies using a cane or walker. She reports she does have a cane in the car but she forgets it in the car a lot. She denies any ringing in her ears. She admits that she spends most of the day laying flat in her bed watching YouTube.    The following portions of the patient's history were reviewed and updated as appropriate: allergies, current medications, past family history, past medical history, past social history, past surgical history, and problem list.  Past Medical History:   Diagnosis Date   • Anxiety    • Asthma 2/13/2020   • Ramos's esophagus 2/15/2018   • Depression 8/27/2018   • Fibromyalgia 12/14/2011   • GERD (gastroesophageal reflux disease)    • Gout 2/13/2020   • Hypertension    • Joint pain    • Obstructive sleep apnea 10/16/2014   • Osteoporosis      fosamax(SE), on prolia(4/10/2018)   • Otitis media    • Pedal edema 10/22/2018   • Presence of left artificial knee joint 3/14/2019   • Seasonal allergies    • ST elevation (STEMI) myocardial infarction (HCC) 2/13/2020   • Status post percutaneous transluminal coronary angioplasty 6/10/2014   • Tachycardia    • Urge incontinence 8/27/2018   • Vitamin D deficiency 3/19/2015     Past Surgical History:   Procedure Laterality Date   • BLADDER SURGERY     • CARDIAC CATHETERIZATION N/A 5/29/2021    Procedure: Left Heart Cath and coronary angiogram;  Surgeon: Yazmin Matta MD;  Location: [facility] CATH INVASIVE LOCATION;  Service: Cardiovascular;  Laterality: N/A;   • CARDIAC CATHETERIZATION  5/29/2021    Procedure: Functional Flow Reserve (iFR);  Surgeon: Bryan Patel MD;  Location: [facility] CATH INVASIVE LOCATION;  Service: Cardiology;;   • CARDIAC CATHETERIZATION N/A 5/29/2021    Procedure: Percutaneous Coronary Intervention;  Surgeon: Bryan Patel MD;  Location: [facility] CATH INVASIVE LOCATION;  Service: Cardiology;  Laterality: N/A;   • CARDIAC CATHETERIZATION N/A 5/29/2021    Procedure: Stent FRANCOISE coronary;  Surgeon: Bryan Patel MD;  Location: [facility] CATH INVASIVE LOCATION;  Service: Cardiology;  Laterality: N/A;   • CHOLECYSTECTOMY     • HYSTERECTOMY     • KNEE SURGERY     • OTHER SURGICAL HISTORY      STENT     Family History   Problem Relation Age of Onset   • No Known Problems Mother    • Heart attack Father    • Cancer Father    • No Known Problems Sister    • No Known Problems Brother    • No Known Problems Maternal Aunt    • No Known Problems Maternal Uncle    • No Known Problems Paternal Aunt    • No Known Problems Paternal Uncle    • No Known Problems Maternal Grandmother    • No Known Problems Maternal Grandfather    • No Known Problems Paternal Grandmother    • No Known Problems Paternal Grandfather    • Anemia Neg Hx    • Arrhythmia Neg Hx    • Asthma Neg Hx    •  Clotting disorder Neg Hx    • Fainting Neg Hx    • Heart disease Neg Hx    • Heart failure Neg Hx    • Hyperlipidemia Neg Hx    • Hypertension Neg Hx      Social History     Socioeconomic History   • Marital status:    Tobacco Use   • Smoking status: Never Smoker   • Smokeless tobacco: Never Used   Vaping Use   • Vaping Use: Never used   Substance and Sexual Activity   • Alcohol use: Not Currently     Comment: NO DRINK 1994   • Drug use: Never   • Sexual activity: Defer         Current Outpatient Medications:   •  albuterol sulfate  (90 Base) MCG/ACT inhaler, Inhale 1 puff Every 6 (Six) Hours As Needed for Wheezing or Shortness of Air., Disp: , Rfl:   •  aspirin 81 MG tablet, Take 81 mg by mouth Daily., Disp: , Rfl:   •  atorvastatin (LIPITOR) 10 MG tablet, TAKE 1 TABLET BY MOUTH DAILY., Disp: 90 tablet, Rfl: 1  •  Biotin 10 MG tablet, Take 10 mg by mouth Daily., Disp: , Rfl:   •  buPROPion XL (WELLBUTRIN XL) 150 MG 24 hr tablet, TAKE 1 TABLET BY MOUTH EVERY MORNING, Disp: 90 tablet, Rfl: 2  •  busPIRone (BUSPAR) 7.5 MG tablet, TAKE 1 TABLET BY MOUTH TWICE DAILY, Disp: 180 tablet, Rfl: 0  •  citalopram (CeleXA) 20 MG tablet, TAKE 1 TABLET BY MOUTH EVERY DAY, Disp: 90 tablet, Rfl: 2  •  gabapentin (NEURONTIN) 400 MG capsule, Take 1 capsule by mouth every night at bedtime., Disp: 90 capsule, Rfl: 3  •  hydrOXYzine (ATARAX) 10 MG tablet, , Disp: , Rfl:   •  lansoprazole (PREVACID) 15 MG capsule, Take 15 mg by mouth Daily., Disp: , Rfl:   •  levothyroxine (SYNTHROID, LEVOTHROID) 25 MCG tablet, TAKE 1 TABLET BY MOUTH EVERY MORNING, Disp: 90 tablet, Rfl: 1  •  Magnesium 250 MG tablet, Take 1 tablet by mouth Daily., Disp: , Rfl:   •  metoprolol succinate XL (TOPROL-XL) 25 MG 24 hr tablet, TAKE 1 TABLET BY MOUTH EVERY MORNING, Disp: 90 tablet, Rfl: 2  •  montelukast (SINGULAIR) 10 MG tablet, TAKE 1 TABLET BY MOUTH EVERY DAY, Disp: 90 tablet, Rfl: 1  •  oxybutynin (DITROPAN) 5 MG tablet, Take 1 tablet by mouth  "2 (Two) Times a Day., Disp: 180 tablet, Rfl: 2  •  triamcinolone (KENALOG) 0.1 % cream, Apply 1 application topically to the appropriate area as directed 2 (Two) Times a Day., Disp: 30 g, Rfl: 0  •  vitamin D (ERGOCALCIFEROL) 1.25 MG (33921 UT) capsule capsule, TAKE 1 CAPSULE BY MOUTH EVERY WEEK, Disp: 13 capsule, Rfl: 1  •  VITAMIN E PO, Take 1 tablet by mouth Daily., Disp: , Rfl:   •  zinc sulfate (ZINCATE) 220 (50 Zn) MG capsule, Take 1 capsule by mouth Daily., Disp: , Rfl:   •  clopidogrel (PLAVIX) 75 MG tablet, TAKE 1 TABLET BY MOUTH DAILY., Disp: 90 tablet, Rfl: 3    Current Facility-Administered Medications:   •  denosumab (PROLIA) syringe 60 mg, 60 mg, Subcutaneous, Q6 Months, Dede Anders PA, 60 mg at 02/01/22 1342    Review of Systems   ROS done and noted in HPI    /78 (BP Location: Left arm, Patient Position: Sitting, Cuff Size: Large Adult)   Pulse 51   Temp 97.8 °F (36.6 °C) (Temporal)   Ht 147.3 cm (58\")   Wt 85.3 kg (188 lb)   SpO2 94%   Breastfeeding No   BMI 39.29 kg/m²       Objective   Physical Exam  Vitals and nursing note reviewed.   Constitutional:       Appearance: Normal appearance. She is well-developed.   HENT:      Head: Normocephalic and atraumatic.      Right Ear: Tympanic membrane normal.      Left Ear: Tympanic membrane normal.      Nose: No rhinorrhea.      Mouth/Throat:      Pharynx: No posterior oropharyngeal erythema.   Eyes:      Pupils: Pupils are equal, round, and reactive to light.   Cardiovascular:      Rate and Rhythm: Normal rate and regular rhythm.      Pulses: Normal pulses.      Heart sounds: Normal heart sounds. No murmur heard.  Pulmonary:      Effort: Pulmonary effort is normal.      Breath sounds: Normal breath sounds.   Abdominal:      General: Bowel sounds are normal. There is no distension.      Palpations: Abdomen is soft.   Musculoskeletal:         General: No tenderness.      Cervical back: Normal range of motion and neck supple.   Skin:     " Capillary Refill: Capillary refill takes less than 2 seconds.   Neurological:      Mental Status: She is alert and oriented to person, place, and time.   Psychiatric:         Mood and Affect: Mood normal.         Behavior: Behavior normal.           Assessment & Plan   Problems Addressed this Visit        Cardiac and Vasculature    Mixed hyperlipidemia - Primary    Relevant Orders    Comprehensive Metabolic Panel    Lipid Panel    Primary hypertension    Relevant Orders    CBC & Differential    Comprehensive Metabolic Panel    Lipid Panel       Endocrine and Metabolic    Hyperglycemia    Relevant Orders    Comprehensive Metabolic Panel    Hemoglobin A1c    Acquired hypothyroidism    Relevant Orders    TSH       Neuro    Memory difficulties      Other Visit Diagnoses     Nausea        Relevant Orders    CBC & Differential    Comprehensive Metabolic Panel    Dizziness        Relevant Orders    CBC & Differential    Comprehensive Metabolic Panel    Ambulatory Referral to Neurology    Constipation, unspecified constipation type        Relevant Orders    CBC & Differential    Comprehensive Metabolic Panel    TSH    Magnesium    Bilateral mastodynia        Relevant Orders    Mammo Diagnostic Digital Tomosynthesis Bilateral With CAD      Diagnoses       Codes Comments    Mixed hyperlipidemia    -  Primary ICD-10-CM: E78.2  ICD-9-CM: 272.2     Primary hypertension     ICD-10-CM: I10  ICD-9-CM: 401.9     Hyperglycemia     ICD-10-CM: R73.9  ICD-9-CM: 790.29     Acquired hypothyroidism     ICD-10-CM: E03.9  ICD-9-CM: 244.9     Nausea     ICD-10-CM: R11.0  ICD-9-CM: 787.02     Dizziness     ICD-10-CM: R42  ICD-9-CM: 780.4     Constipation, unspecified constipation type     ICD-10-CM: K59.00  ICD-9-CM: 564.00     Bilateral mastodynia     ICD-10-CM: N64.4  ICD-9-CM: 611.71     Memory difficulties     ICD-10-CM: R41.3  ICD-9-CM: 780.93           1. Hyperlipidemia she is due her CMP and lipid. She is currently on atorvastatin 10 mg  and is noncompliant with her diet.     2. Primary hypertension she is currently on metoprolol XL 25 mg three is hyperglycemia. She is due a CMP and an A1c and again is noncompliant with her diet. I have counseled her on the need for dietary compliance. She keeps shifting the blame to her  saying that he buys her things to eat and drink that he knows she shouldn't have, but he loves her and wants her to have them.     3. Acquired hypothyroidism. She is due a follow-up TSH. I know that at this point, it may not be completely accurate since she has not been completely compliant with her medication. She doesn't always remember to take her medications and definitely doesn't remember to take them correctly.    4. Memory difficulties, this is contributing to the problems with her medications. Her medications were changed to pill pack, so everything is fixed so that she knows when to take them, but she still is not compliant. She has been driving. But is aware that that really should be stopping.     5. Nausea she is on medications that can trigger this. This chronic problem, the constipation can trigger it. I have requested a CBC, CMP to assist with this.     6. Dizziness, this may also be associated with the problems with her medications. I have requested a CBC, CMP and a referral to neurology. I have explained to her that she should be using her cane at all times. Part of her lifestyle may be contributing to the dizziness and that she says she spends most of the day lying flat in her bed watching YouTube. I have explained to her that she must be upright during the day with the exception of a short nap if she desires to take one. She has had a history of cardiac stents. I'm hesitant to get an MRI. I'm again referring her to neurology for further evaluation.    7. Constipation she will reschedule the appointment that she had to see her gastroenterologist. He is trying to further evaluate her problems with  constipation. She doesn't like taking anything for constipation requires her to drink it. She has been taking a magnesium citrate pill. I have strongly encouraged her to stop doing that and she is agreeable. I have encouraged her to increase her water intake. Next is complaints of bilateral mastodynia. She is due a mammogram. She is overdue a mammogram.            Transcribed from ambient dictation for Ilsa Her MD by KELLIE ROCK.  07/19/22   11:05 EDT    Patient verbalized consent to the visit recording.

## 2022-07-27 ENCOUNTER — TELEPHONE (OUTPATIENT)
Dept: FAMILY MEDICINE CLINIC | Facility: CLINIC | Age: 72
End: 2022-07-27

## 2022-07-27 DIAGNOSIS — N64.4 BILATERAL MASTODYNIA: Primary | ICD-10-CM

## 2022-07-27 NOTE — TELEPHONE ENCOUNTER
Patient referral for MAMMO DIAGNOSTIC DIGITAL TOMOSYNTHESIS BILATERAL W CAD was routed back with this message:      Diagnostic mammo needs US Breast to accompany. Please order for the US Breast.     Please advise    Quadrants Reporting?: 0 Eye Protection Verbiage: Before proceeding with the stage, a plastic scleral shield was inserted. The globe was anesthetized with a few drops of 1% lidocaine with 1:100,000 epinephrine. Then, an appropriate sized scleral shield was chosen and coated with lacrilube ointment. The shield was gently inserted and left in place for the duration of each stage. After the stage was completed, the shield was gently removed. Quadrant Reporting?: no Double Island Pedicle Flap Text: The defect edges were debeveled with a #15 scalpel blade.  Given the location of the defect, shape of the defect and the proximity to free margins a double island pedicle advancement flap was deemed most appropriate.  Using a sterile surgical marker, an appropriate advancement flap was drawn incorporating the defect, outlining the appropriate donor tissue and placing the expected incisions within the relaxed skin tension lines where possible.    The area thus outlined was incised deep to adipose tissue with a #15 scalpel blade.  The skin margins were undermined to an appropriate distance in all directions around the primary defect and laterally outward around the island pedicle utilizing iris scissors.  There was minimal undermining beneath the pedicle flap. W Plasty Text: The lesion was extirpated to the level of the fat with a #15 scalpel blade.  Given the location of the defect, shape of the defect and the proximity to free margins a W-plasty was deemed most appropriate for repair.  Using a sterile surgical marker, the appropriate transposition arms of the W-plasty were drawn incorporating the defect and placing the expected incisions within the relaxed skin tension lines where possible.    The area thus outlined was incised deep to adipose tissue with a #15 scalpel blade.  The skin margins were undermined to an appropriate distance in all directions utilizing iris scissors.  The opposing transposition arms were then transposed into place in opposite direction and anchored with interrupted buried subcutaneous sutures. S Plasty Text: Given the location and shape of the defect, and the orientation of relaxed skin tension lines, an S-plasty was deemed most appropriate for repair.  Using a sterile surgical marker, the appropriate outline of the S-plasty was drawn, incorporating the defect and placing the expected incisions within the relaxed skin tension lines where possible.  The area thus outlined was incised deep to adipose tissue with a #15 scalpel blade.  The skin margins were undermined to an appropriate distance in all directions utilizing iris scissors. The skin flaps were advanced over the defect.  The opposing margins were then approximated with interrupted buried subcutaneous sutures. Stage 5: Additional Anesthesia Type: 1% lidocaine with epinephrine Referred To Oculoplastics For Closure Text (Leave Blank If You Do Not Want): After obtaining clear surgical margins the patient was sent to oculoplastics for surgical repair.  The patient understands they will receive post-surgical care and follow-up from the referring physician's office. Ear Wedge Repair Text: A wedge excision was completed by carrying down an excision through the full thickness of the ear and cartilage with an inward facing Burow's triangle. The wound was then closed in a layered fashion. Anesthesia Volume In Cc: 2 Mauc Instructions: By selecting yes to the question below the MAUC number will be added into the note.  This will be calculated automatically based on the diagnosis chosen, the size entered, the body zone selected (H,M,L) and the specific indications you chose. You will also have the option to override the Mohs AUC if you disagree with the automatically calculated number and this option is found in the Case Summary tab. Consent 2/Introductory Paragraph: Mohs surgery was explained to the patient and consent was obtained. The risks, benefits and alternatives to therapy were discussed in detail. Specifically, the risks of infection, scarring, bleeding, prolonged wound healing, incomplete removal, allergy to anesthesia, nerve injury and recurrence were addressed. Prior to the procedure, the treatment site was clearly identified and confirmed by the patient. All components of Universal Protocol/PAUSE Rule completed. Advancement-Rotation Flap Text: The defect edges were debeveled with a #15 scalpel blade.  Given the location of the defect, shape of the defect and the proximity to free margins an advancement-rotation flap was deemed most appropriate.  Using a sterile surgical marker, an appropriate flap was drawn incorporating the defect and placing the expected incisions within the relaxed skin tension lines where possible. The area thus outlined was incised deep to adipose tissue with a #15 scalpel blade.  The skin margins were undermined to an appropriate distance in all directions utilizing iris scissors. Referred To Asc For Closure Text (Leave Blank If You Do Not Want): After obtaining clear surgical margins the patient was sent to an ASC for surgical repair.  The patient understands they will receive post-surgical care and follow-up from the ASC physician. Purse String (Simple) Text: Given the location of the defect and the characteristics of the surrounding skin a pursestring closure was deemed most appropriate.  Undermining was performed circumfirentially around the surgical defect.  A purstring suture was then placed and tightened. Epidermal Sutures: 5-0 Monocryl Melolabial Interpolation Flap Text: A decision was made to reconstruct the defect utilizing an interpolation axial flap and a staged reconstruction.  A telfa template was made of the defect.  This telfa template was then used to outline the melolabial interpolation flap.  The donor area for the pedicle flap was then injected with anesthesia.  The flap was excised through the skin and subcutaneous tissue down to the layer of the underlying musculature.  The pedicle flap was carefully excised within this deep plane to maintain its blood supply.  The edges of the donor site were undermined.   The donor site was closed in a primary fashion.  The pedicle was then rotated into position and sutured.  Once the tube was sutured into place, adequate blood supply was confirmed with blanching and refill.  The pedicle was then wrapped with xeroform gauze and dressed appropriately with a telfa and gauze bandage to ensure continued blood supply and protect the attached pedicle. Dermal Autograft Text: The defect edges were debeveled with a #15 scalpel blade.  Given the location of the defect, shape of the defect and the proximity to free margins a dermal autograft was deemed most appropriate.  Using a sterile surgical marker, the primary defect shape was transferred to the donor site. The area thus outlined was incised deep to adipose tissue with a #15 scalpel blade.  The harvested graft was then trimmed of adipose and epidermal tissue until only dermis was left.  The skin graft was then placed in the primary defect and oriented appropriately. Complex Repair Preamble Text (Leave Blank If You Do Not Want): Extensive wide undermining was performed. Complex Repair And Flap Additional Text (Will Appearing After The Standard Complex Repair Text): The complex repair was not sufficient to completely close the primary defect. The remaining additional defect was repaired with the flap mentioned below. Secondary Defect Length In Cm (Required For Flaps): 4.1 Rotation Flap Text: The defect edges were debeveled with a #15 scalpel blade.  Given the location of the defect, shape of the defect and the proximity to free margins a rotation flap was deemed most appropriate.  Using a sterile surgical marker, an appropriate rotation flap was drawn incorporating the defect and placing the expected incisions within the relaxed skin tension lines where possible.    The area thus outlined was incised deep to adipose tissue with a #15 scalpel blade.  The skin margins were undermined to an appropriate distance in all directions utilizing iris scissors. Surgeon/Pathologist Verbiage (Will Incorporate Name Of Surgeon From Intro If Not Blank): operated in two distinct and integrated capacities as the surgeon and pathologist. Bcc Histology Text: There were numerous aggregates of basaloid cells. Island Pedicle Flap Text: The defect edges were debeveled with a #15 scalpel blade.  Given the location of the defect, shape of the defect and the proximity to free margins an island pedicle advancement flap was deemed most appropriate.  Using a sterile surgical marker, an appropriate advancement flap was drawn incorporating the defect, outlining the appropriate donor tissue and placing the expected incisions within the relaxed skin tension lines where possible.    The area thus outlined was incised deep to adipose tissue with a #15 scalpel blade.  The skin margins were undermined to an appropriate distance in all directions around the primary defect and laterally outward around the island pedicle utilizing iris scissors.  There was minimal undermining beneath the pedicle flap. Muscle Hinge Flap Text: The defect edges were debeveled with a #15 scalpel blade.  Given the size, depth and location of the defect and the proximity to free margins a muscle hinge flap was deemed most appropriate.  Using a sterile surgical marker, an appropriate hinge flap was drawn incorporating the defect. The area thus outlined was incised with a #15 scalpel blade.  The skin margins were undermined to an appropriate distance in all directions utilizing iris scissors. Date Of Previous Biopsy (Optional): 1/30/17 Consent (Nose)/Introductory Paragraph: The rationale for Mohs was explained to the patient and consent was obtained. The risks, benefits and alternatives to therapy were discussed in detail. Specifically, the risks of nasal deformity, changes in the flow of air through the nose, infection, scarring, bleeding, prolonged wound healing, incomplete removal, allergy to anesthesia, nerve injury and recurrence were addressed. Prior to the procedure, the treatment site was clearly identified and confirmed by the patient. All components of Universal Protocol/PAUSE Rule completed. Rhombic Flap Text: The defect edges were debeveled with a #15 scalpel blade.  Given the location of the defect and the proximity to free margins a rhombic flap was deemed most appropriate.  Using a sterile surgical marker, an appropriate rhombic flap was drawn incorporating the defect.    The area thus outlined was incised deep to adipose tissue with a #15 scalpel blade.  The skin margins were undermined to an appropriate distance in all directions utilizing iris scissors. Consent (Temporal Branch)/Introductory Paragraph: The rationale for Mohs was explained to the patient and consent was obtained. The risks, benefits and alternatives to therapy were discussed in detail. Specifically, the risks of damage to the temporal branch of the facial nerve, infection, scarring, bleeding, prolonged wound healing, incomplete removal, allergy to anesthesia, and recurrence were addressed. Prior to the procedure, the treatment site was clearly identified and confirmed by the patient. All components of Universal Protocol/PAUSE Rule completed. Alternatives Discussed Intro (Do Not Add Period): I discussed alternative treatments to Mohs surgery and specifically discussed the risks and benefits of Consent (Scalp)/Introductory Paragraph: The rationale for Mohs was explained to the patient and consent was obtained. The risks, benefits and alternatives to therapy were discussed in detail. Specifically, the risks of changes in hair growth pattern secondary to repair, infection, scarring, bleeding, prolonged wound healing, incomplete removal, allergy to anesthesia, nerve injury and recurrence were addressed. Prior to the procedure, the treatment site was clearly identified and confirmed by the patient. All components of Universal Protocol/PAUSE Rule completed. Trilobed Flap Text: The defect edges were debeveled with a #15 scalpel blade.  Given the location of the defect and the proximity to free margins a trilobed flap was deemed most appropriate.  Using a sterile surgical marker, an appropriate trilobed flap drawn around the defect.    The area thus outlined was incised deep to adipose tissue with a #15 scalpel blade.  The skin margins were undermined to an appropriate distance in all directions utilizing iris scissors. Cartilage Graft Text: The defect edges were debeveled with a #15 scalpel blade.  Given the location of the defect, shape of the defect, the fact the defect involved a full thickness cartilage defect a cartilage graft was deemed most appropriate.  An appropriate donor site was identified, cleansed, and anesthetized. The cartilage graft was then harvested and transferred to the recipient site, oriented appropriately and then sutured into place.  The secondary defect was then repaired using a primary closure. No Residual Tumor Seen Histology Text: There were no malignant cells seen in the sections examined. Posterior Auricular Interpolation Flap Text: A decision was made to reconstruct the defect utilizing an interpolation axial flap and a staged reconstruction.  A telfa template was made of the defect.  This telfa template was then used to outline the posterior auricular interpolation flap.  The donor area for the pedicle flap was then injected with anesthesia.  The flap was excised through the skin and subcutaneous tissue down to the layer of the underlying musculature.  The pedicle flap was carefully excised within this deep plane to maintain its blood supply.  The edges of the donor site were undermined.   The donor site was closed in a primary fashion.  The pedicle was then rotated into position and sutured.  Once the tube was sutured into place, adequate blood supply was confirmed with blanching and refill.  The pedicle was then wrapped with xeroform gauze and dressed appropriately with a telfa and gauze bandage to ensure continued blood supply and protect the attached pedicle. Bi-Rhombic Flap Text: The defect edges were debeveled with a #15 scalpel blade.  Given the location of the defect and the proximity to free margins a bi-rhombic flap was deemed most appropriate.  Using a sterile surgical marker, an appropriate rhombic flap was drawn incorporating the defect. The area thus outlined was incised deep to adipose tissue with a #15 scalpel blade.  The skin margins were undermined to an appropriate distance in all directions utilizing iris scissors. A-T Advancement Flap Text: The defect edges were debeveled with a #15 scalpel blade.  Given the location of the defect, shape of the defect and the proximity to free margins an A-T advancement flap was deemed most appropriate.  Using a sterile surgical marker, an appropriate advancement flap was drawn incorporating the defect and placing the expected incisions within the relaxed skin tension lines where possible.    The area thus outlined was incised deep to adipose tissue with a #15 scalpel blade.  The skin margins were undermined to an appropriate distance in all directions utilizing iris scissors. O-Z Plasty Text: The defect edges were debeveled with a #15 scalpel blade.  Given the location of the defect, shape of the defect and the proximity to free margins an O-Z plasty (double transposition flap) was deemed most appropriate.  Using a sterile surgical marker, the appropriate transposition flaps were drawn incorporating the defect and placing the expected incisions within the relaxed skin tension lines where possible.    The area thus outlined was incised deep to adipose tissue with a #15 scalpel blade.  The skin margins were undermined to an appropriate distance in all directions utilizing iris scissors.  Hemostasis was achieved with electrocautery.  The flaps were then transposed into place, one clockwise and the other counterclockwise, and anchored with interrupted buried subcutaneous sutures. Histology Selection Override (Optional- Will Default To Parent Diagnosis If N/A): Morpheaform Basal Cell Carcinoma Keystone Flap Text: The defect edges were debeveled with a #15 scalpel blade.  Given the location of the defect, shape of the defect a keystone flap was deemed most appropriate.  Using a sterile surgical marker, an appropriate keystone flap was drawn incorporating the defect, outlining the appropriate donor tissue and placing the expected incisions within the relaxed skin tension lines where possible. The area thus outlined was incised deep to adipose tissue with a #15 scalpel blade.  The skin margins were undermined to an appropriate distance in all directions around the primary defect and laterally outward around the flap utilizing iris scissors. Flap Type: Advancement Flap (Single) Area M Indication Text: Tumors in this location are included in Area M (cheek, forehead, scalp, neck, jawline and pretibial skin).  Mohs surgery is indicated for tumors in these anatomic locations. Tumor Debulked?: curette Closure 3 Information: This tab is for additional flaps and grafts above and beyond our usual structured repairs.  Please note if you enter information here it will not currently bill and you will need to add the billing information manually. Display The Individual Mohs Indications As Separate Paragraphs: Yes Tissue Cultured Epidermal Autograft Text: The defect edges were debeveled with a #15 scalpel blade.  Given the location of the defect, shape of the defect and the proximity to free margins a tissue cultured epidermal autograft was deemed most appropriate.  The graft was then trimmed to fit the size of the defect.  The graft was then placed in the primary defect and oriented appropriately. Closure 4 Information: This tab is for additional flaps and grafts above and beyond our usual structured repairs.  Please note if you enter information here it will not currently bill and you will need to add the billing information manually. Mohs Method Verbiage: An incision at a 45 degree angle following the standard Mohs approach was done and the specimen was harvested as a microscopic controlled layer. O-T Advancement Flap Text: The defect edges were debeveled with a #15 scalpel blade.  Given the location of the defect, shape of the defect and the proximity to free margins an O-T advancement flap was deemed most appropriate.  Using a sterile surgical marker, an appropriate advancement flap was drawn incorporating the defect and placing the expected incisions within the relaxed skin tension lines where possible.    The area thus outlined was incised deep to adipose tissue with a #15 scalpel blade.  The skin margins were undermined to an appropriate distance in all directions utilizing iris scissors. Initial Size Of Lesion: 1 Consent (Spinal Accessory)/Introductory Paragraph: The rationale for Mohs was explained to the patient and consent was obtained. The risks, benefits and alternatives to therapy were discussed in detail. Specifically, the risks of damage to the spinal accessory nerve, infection, scarring, bleeding, prolonged wound healing, incomplete removal, allergy to anesthesia, and recurrence were addressed. Prior to the procedure, the treatment site was clearly identified and confirmed by the patient. All components of Universal Protocol/PAUSE Rule completed. Cheiloplasty (Less Than 50%) Text: A decision was made to reconstruct the defect with a  cheiloplasty.  The defect was undermined extensively.  Additional obicularis oris muscle was excised with a 15 blade scalpel.  The defect was converted into a full thickness wedge, of less than 50% of the vertical height of the lip, to facilite a better cosmetic result.  Small vessels were then tied off with 5-0 monocyrl. The obicularis oris, superficial fascia, adipose and dermis were then reapproximated.  After the deeper layers were approximated the epidermis was reapproximated with particular care given to realign the vermillion border. Full Thickness Lip Wedge Repair (Flap) Text: Given the location of the defect and the proximity to free margins a full thickness wedge repair was deemed most appropriate.  Using a sterile surgical marker, the appropriate repair was drawn incorporating the defect and placing the expected incisions perpendicular to the vermillion border.  The vermillion border was also meticulously outlined to ensure appropriate reapproximation during the repair.  The area thus outlined was incised through and through with a #15 scalpel blade.  The muscularis and dermis were reaproximated with deep sutures following hemostasis. Care was taken to realign the vermillion border before proceeding with the superficial closure.  Once the vermillion was realigned the superfical and mucosal closure was finished. Mucosal Advancement Flap Text: Given the location of the defect, shape of the defect and the proximity to free margins a mucosal advancement flap was deemed most appropriate. Incisions were made with a 15 blade scalpel in the appropriate fashion along the cutaneous vermillion border and the mucosal lip. The remaining actinically damaged mucosal tissue was excised.  The mucosal advancement flap was then elevated to the gingival sulcus with care taken to preserve the neurovascular structures and advanced into the primary defect. Care was taken to ensure that precise realignment of the vermillion border was achieved. Secondary Intention Text (Leave Blank If You Do Not Want): The defect will heal with secondary intention. Purse String (Intermediate) Text: Given the location of the defect and the characteristics of the surrounding skin a pursestring intermediate closure was deemed most appropriate.  Undermining was performed circumfirentially around the surgical defect.  A purstring suture was then placed and tightened. Subsequent Stages Histo Method Verbiage: Using a similar technique to that described above, a thin layer of tissue was removed from all areas where tumor was visible on the previous stage.  The tissue was again oriented, mapped, dyed, and processed as above. Burow's Advancement Flap Text: The defect edges were debeveled with a #15 scalpel blade.  Given the location of the defect and the proximity to free margins a Burow's advancement flap was deemed most appropriate.  Using a sterile surgical marker, the appropriate advancement flap was drawn incorporating the defect and placing the expected incisions within the relaxed skin tension lines where possible.    The area thus outlined was incised deep to adipose tissue with a #15 scalpel blade.  The skin margins were undermined to an appropriate distance in all directions utilizing iris scissors. Ear Star Wedge Flap Text: The defect edges were debeveled with a #15 blade scalpel.  Given the location of the defect and the proximity to free margins (helical rim) an ear star wedge flap was deemed most appropriate.  Using a sterile surgical marker, the appropriate flap was drawn incorporating the defect and placing the expected incisions between the helical rim and antihelix where possible.  The area thus outlined was incised through and through with a #15 scalpel blade. Cheek-To-Nose Interpolation Flap Text: A decision was made to reconstruct the defect utilizing an interpolation axial flap and a staged reconstruction.  A telfa template was made of the defect.  This telfa template was then used to outline the Cheek-To-Nose Interpolation flap.  The donor area for the pedicle flap was then injected with anesthesia.  The flap was excised through the skin and subcutaneous tissue down to the layer of the underlying musculature.  The interpolation flap was carefully excised within this deep plane to maintain its blood supply.  The edges of the donor site were undermined.   The donor site was closed in a primary fashion.  The pedicle was then rotated into position and sutured.  Once the tube was sutured into place, adequate blood supply was confirmed with blanching and refill.  The pedicle was then wrapped with xeroform gauze and dressed appropriately with a telfa and gauze bandage to ensure continued blood supply and protect the attached pedicle. Detail Level: Detailed Location Indication Override (Is Already Calculated Based On Selected Body Location): Area M Mohs Rapid Report Verbiage: The area of clinically evident tumor was marked with skin marking ink and appropriately hatched.  The initial incision was made following the Mohs approach through the skin.  The specimen was taken to the lab, divided into the necessary number of pieces, chromacoded and processed according to the Mohs protocol.  This was repeated in successive stages until a tumor free defect was achieved. Spiral Flap Text: The defect edges were debeveled with a #15 scalpel blade.  Given the location of the defect, shape of the defect and the proximity to free margins a spiral flap was deemed most appropriate.  Using a sterile surgical marker, an appropriate rotation flap was drawn incorporating the defect and placing the expected incisions within the relaxed skin tension lines where possible. The area thus outlined was incised deep to adipose tissue with a #15 scalpel blade.  The skin margins were undermined to an appropriate distance in all directions utilizing iris scissors. Postop Diagnosis: Basal Cell Carcinoma (morpheaform) Advancement Flap (Single) Text: The defect edges were debeveled with a #15 scalpel blade.  Given the location of the defect and the proximity to free margins a single advancement flap was deemed most appropriate.  Using a sterile surgical marker, an appropriate advancement flap was drawn incorporating the defect and placing the expected incisions within the relaxed skin tension lines where possible.    The area thus outlined was incised deep to adipose tissue with a #15 scalpel blade.  The skin margins were undermined to an appropriate distance in all directions utilizing iris scissors. Post-Care Instructions: I reviewed with the patient in detail post-care instructions. Patient is not to engage in any heavy lifting, exercise, or swimming for the next 14 days. Should the patient develop any fevers, chills, bleeding, severe pain patient will contact the office immediately. Complex Repair And Graft Additional Text (Will Appearing After The Standard Complex Repair Text): The complex repair was not sufficient to completely close the primary defect. The remaining additional defect was repaired with the graft mentioned below. V-Y Flap Text: The defect edges were debeveled with a #15 scalpel blade.  Given the location of the defect, shape of the defect and the proximity to free margins a V-Y flap was deemed most appropriate.  Using a sterile surgical marker, an appropriate advancement flap was drawn incorporating the defect and placing the expected incisions within the relaxed skin tension lines where possible.    The area thus outlined was incised deep to adipose tissue with a #15 scalpel blade.  The skin margins were undermined to an appropriate distance in all directions utilizing iris scissors. Consent (Ear)/Introductory Paragraph: The rationale for Mohs was explained to the patient and consent was obtained. The risks, benefits and alternatives to therapy were discussed in detail. Specifically, the risks of ear deformity, infection, scarring, bleeding, prolonged wound healing, incomplete removal, allergy to anesthesia, nerve injury and recurrence were addressed. Prior to the procedure, the treatment site was clearly identified and confirmed by the patient. All components of Universal Protocol/PAUSE Rule completed. Consent 3/Introductory Paragraph: I gave the patient a chance to ask questions they had about the procedure.  Following this I explained the Mohs procedure and consent was obtained. The risks, benefits and alternatives to therapy were discussed in detail. Specifically, the risks of infection, scarring, bleeding, prolonged wound healing, incomplete removal, allergy to anesthesia, nerve injury and recurrence were addressed. Prior to the procedure, the treatment site was clearly identified and confirmed by the patient. All components of Universal Protocol/PAUSE Rule completed. Mohs Histo Method Verbiage: Each section was then chromacoded and processed in the Mohs lab using the Mohs protocol and submitted for frozen section. Modified Advancement Flap Text: The defect edges were debeveled with a #15 scalpel blade.  Given the location of the defect, shape of the defect and the proximity to free margins a modified advancement flap was deemed most appropriate.  Using a sterile surgical marker, an appropriate advancement flap was drawn incorporating the defect and placing the expected incisions within the relaxed skin tension lines where possible.    The area thus outlined was incised deep to adipose tissue with a #15 scalpel blade.  The skin margins were undermined to an appropriate distance in all directions utilizing iris scissors. Epidermal Closure: running and interrupted Intermediate Repair Preamble Text (Leave Blank If You Do Not Want): Undermining was performed with blunt dissection. No Repair - Repaired With Adjacent Surgical Defect Text (Leave Blank If You Do Not Want): After obtaining clear surgical margins the defect was repaired concurrently with another surgical defect which was in close approximation. Primary Defect Length In Cm (Final Defect Size - Required For Flaps/Grafts): 1.5 Z Plasty Text: The lesion was extirpated to the level of the fat with a #15 scalpel blade.  Given the location of the defect, shape of the defect and the proximity to free margins a Z-plasty was deemed most appropriate for repair.  Using a sterile surgical marker, the appropriate transposition arms of the Z-plasty were drawn incorporating the defect and placing the expected incisions within the relaxed skin tension lines where possible.    The area thus outlined was incised deep to adipose tissue with a #15 scalpel blade.  The skin margins were undermined to an appropriate distance in all directions utilizing iris scissors.  The opposing transposition arms were then transposed into place in opposite direction and anchored with interrupted buried subcutaneous sutures. Anesthesia Volume In Cc: 6 Interpolation Flap Text: A decision was made to reconstruct the defect utilizing an interpolation axial flap and a staged reconstruction.  A telfa template was made of the defect.  This telfa template was then used to outline the interpolation flap.  The donor area for the pedicle flap was then injected with anesthesia.  The flap was excised through the skin and subcutaneous tissue down to the layer of the underlying musculature.  The interpolation flap was carefully excised within this deep plane to maintain its blood supply.  The edges of the donor site were undermined.   The donor site was closed in a primary fashion.  The pedicle was then rotated into position and sutured.  Once the tube was sutured into place, adequate blood supply was confirmed with blanching and refill.  The pedicle was then wrapped with xeroform gauze and dressed appropriately with a telfa and gauze bandage to ensure continued blood supply and protect the attached pedicle. Wound Care: Vaseline Repair Performed By Another Provider Text (Leave Blank If You Do Not Want): After obtaining clear surgical margins the defect was repaired by another provider. Consent 1/Introductory Paragraph: The rationale for Mohs was explained to the patient and consent was obtained. The risks, benefits and alternatives to therapy were discussed in detail. Specifically, the risks of infection, scarring, bleeding, prolonged wound healing, incomplete removal, allergy to anesthesia, nerve injury and recurrence were addressed. Prior to the procedure, the treatment site was clearly identified and confirmed by the patient. All components of Universal Protocol/PAUSE Rule completed. Melolabial Transposition Flap Text: The defect edges were debeveled with a #15 scalpel blade.  Given the location of the defect and the proximity to free margins a melolabial flap was deemed most appropriate.  Using a sterile surgical marker, an appropriate melolabial transposition flap was drawn incorporating the defect.    The area thus outlined was incised deep to adipose tissue with a #15 scalpel blade.  The skin margins were undermined to an appropriate distance in all directions utilizing iris scissors. Epidermal Closure Graft Donor Site (Optional): running Alar Island Pedicle Flap Text: The defect edges were debeveled with a #15 scalpel blade.  Given the location of the defect, shape of the defect and the proximity to the alar rim an island pedicle advancement flap was deemed most appropriate.  Using a sterile surgical marker, an appropriate advancement flap was drawn incorporating the defect, outlining the appropriate donor tissue and placing the expected incisions within the nasal ala running parallel to the alar rim. The area thus outlined was incised with a #15 scalpel blade.  The skin margins were undermined minimally to an appropriate distance in all directions around the primary defect and laterally outward around the island pedicle utilizing iris scissors.  There was minimal undermining beneath the pedicle flap. Previous Accession (Optional): [id number] Island Pedicle Flap With Canthal Suspension Text: The defect edges were debeveled with a #15 scalpel blade.  Given the location of the defect, shape of the defect and the proximity to free margins an island pedicle advancement flap was deemed most appropriate.  Using a sterile surgical marker, an appropriate advancement flap was drawn incorporating the defect, outlining the appropriate donor tissue and placing the expected incisions within the relaxed skin tension lines where possible. The area thus outlined was incised deep to adipose tissue with a #15 scalpel blade.  The skin margins were undermined to an appropriate distance in all directions around the primary defect and laterally outward around the island pedicle utilizing iris scissors.  There was minimal undermining beneath the pedicle flap. A suspension suture was placed in the canthal tendon to prevent tension and prevent ectropion. Bcc Infiltrative Histology Text: There were numerous aggregates of basaloid cells demonstrating an infiltrative pattern. Split-Thickness Skin Graft Text: The defect edges were debeveled with a #15 scalpel blade.  Given the location of the defect, shape of the defect and the proximity to free margins a split thickness skin graft was deemed most appropriate.  Using a sterile surgical marker, the primary defect shape was transferred to the donor site. The split thickness graft was then harvested.  The skin graft was then placed in the primary defect and oriented appropriately. Mohs Case Number: [id number] H Plasty Text: Given the location of the defect, shape of the defect and the proximity to free margins a H-plasty was deemed most appropriate for repair.  Using a sterile surgical marker, the appropriate advancement arms of the H-plasty were drawn incorporating the defect and placing the expected incisions within the relaxed skin tension lines where possible. The area thus outlined was incised deep to adipose tissue with a #15 scalpel blade. The skin margins were undermined to an appropriate distance in all directions utilizing iris scissors.  The opposing advancement arms were then advanced into place in opposite direction and anchored with interrupted buried subcutaneous sutures. Referred To Otolaryngology For Closure Text (Leave Blank If You Do Not Want): After obtaining clear surgical margins the patient was sent to otolaryngology for surgical repair.  The patient understands they will receive post-surgical care and follow-up from the referring physician's office. Consent (Lip)/Introductory Paragraph: The rationale for Mohs was explained to the patient and consent was obtained. The risks, benefits and alternatives to therapy were discussed in detail. Specifically, the risks of lip deformity, changes in the oral aperture, infection, scarring, bleeding, prolonged wound healing, incomplete removal, allergy to anesthesia, nerve injury and recurrence were addressed. Prior to the procedure, the treatment site was clearly identified and confirmed by the patient. All components of Universal Protocol/PAUSE Rule completed. Island Pedicle Flap-Requiring Vessel Identification Text: The defect edges were debeveled with a #15 scalpel blade.  Given the location of the defect, shape of the defect and the proximity to free margins an island pedicle advancement flap was deemed most appropriate.  Using a sterile surgical marker, an appropriate advancement flap was drawn, based on the axial vessel mentioned above, incorporating the defect, outlining the appropriate donor tissue and placing the expected incisions within the relaxed skin tension lines where possible.    The area thus outlined was incised deep to adipose tissue with a #15 scalpel blade.  The skin margins were undermined to an appropriate distance in all directions around the primary defect and laterally outward around the island pedicle utilizing iris scissors.  There was minimal undermining beneath the pedicle flap. V-Y Plasty Text: The defect edges were debeveled with a #15 scalpel blade.  Given the location of the defect, shape of the defect and the proximity to free margins an V-Y advancement flap was deemed most appropriate.  Using a sterile surgical marker, an appropriate advancement flap was drawn incorporating the defect and placing the expected incisions within the relaxed skin tension lines where possible.    The area thus outlined was incised deep to adipose tissue with a #15 scalpel blade.  The skin margins were undermined to an appropriate distance in all directions utilizing iris scissors. Same Histology In Subsequent Stages Text: The pattern and morphology of the tumor is as described in the first stage. Referred To Plastics For Closure Text (Leave Blank If You Do Not Want): After obtaining clear surgical margins the patient was sent to plastics for surgical repair.  The patient understands they will receive post-surgical care and follow-up from the referring physician's office. Secondary Defect Width In Cm (Required For Flaps): 3 Epidermal Autograft Text: The defect edges were debeveled with a #15 scalpel blade.  Given the location of the defect, shape of the defect and the proximity to free margins an epidermal autograft was deemed most appropriate.  Using a sterile surgical marker, the primary defect shape was transferred to the donor site. The epidermal graft was then harvested.  The skin graft was then placed in the primary defect and oriented appropriately. Primary Defect Width In Cm (Final Defect Size - Required For Flaps/Grafts): 1.3 Skin Substitute Text: The defect edges were debeveled with a #15 scalpel blade.  Given the location of the defect, shape of the defect and the proximity to free margins a skin substitute graft was deemed most appropriate.  The graft material was trimmed to fit the size of the defect. The graft was then placed in the primary defect and oriented appropriately. Manual Repair Warning Statement: We plan on removing the manually selected variable below in favor of our much easier automatic structured text blocks found in the previous tab. We decided to do this to help make the flow better and give you the full power of structured data. Manual selection is never going to be ideal in our platform and I would encourage you to avoid using manual selection from this point on, especially since I will be sunsetting this feature. It is important that you do one of two things with the customized text below. First, you can save all of the text in a word file so you can have it for future reference. Second, transfer the text to the appropriate area in the Library tab. Lastly, if there is a flap or graft type which we do not have you need to let us know right away so I can add it in before the variable is hidden. No need to panic, we plan to give you roughly 6 months to make the change. Repair Anesthesia Method: local infiltration Cheiloplasty (Complex) Text: A decision was made to reconstruct the defect with a  cheiloplasty.  The defect was undermined extensively.  Additional obicularis oris muscle was excised with a 15 blade scalpel.  The defect was converted into a full thickness wedge to facilite a better cosmetic result.  Small vessels were then tied off with 5-0 monocyrl. The obicularis oris, superficial fascia, adipose and dermis were then reapproximated.  After the deeper layers were approximated the epidermis was reapproximated with particular care given to realign the vermillion border. Hemostasis: Electrocautery Repair Type: Flap Estimated Blood Loss (Cc): minimal Dressing: pressure dressing Consent (Near Eyelid Margin)/Introductory Paragraph: The rationale for Mohs was explained to the patient and consent was obtained. The risks, benefits and alternatives to therapy were discussed in detail. Specifically, the risks of ectropion or eyelid deformity, infection, scarring, bleeding, prolonged wound healing, incomplete removal, allergy to anesthesia, nerve injury and recurrence were addressed. Prior to the procedure, the treatment site was clearly identified and confirmed by the patient. All components of Universal Protocol/PAUSE Rule completed. Consent (Marginal Mandibular)/Introductory Paragraph: The rationale for Mohs was explained to the patient and consent was obtained. The risks, benefits and alternatives to therapy were discussed in detail. Specifically, the risks of damage to the marginal mandibular branch of the facial nerve, infection, scarring, bleeding, prolonged wound healing, incomplete removal, allergy to anesthesia, and recurrence were addressed. Prior to the procedure, the treatment site was clearly identified and confirmed by the patient. All components of Universal Protocol/PAUSE Rule completed. Localized Dermabrasion Text: The patient was draped in routine manner.  Localized dermabrasion using 3 x 17 mm wire brush was performed in routine manner to papillary dermis. This spot dermabrasion is being performed to complete skin cancer reconstruction. It also will eliminate the other sun damaged precancerous cells that are known to be part of the regional effect of a lifetime's worth of sun exposure. This localized dermabrasion is therapeutic and should not be considered cosmetic in any regard. Hatchet Flap Text: The defect edges were debeveled with a #15 scalpel blade.  Given the location of the defect, shape of the defect and the proximity to free margins a hatchet flap was deemed most appropriate.  Using a sterile surgical marker, an appropriate hatchet flap was drawn incorporating the defect and placing the expected incisions within the relaxed skin tension lines where possible.    The area thus outlined was incised deep to adipose tissue with a #15 scalpel blade.  The skin margins were undermined to an appropriate distance in all directions utilizing iris scissors. Mastoid Interpolation Flap Text: A decision was made to reconstruct the defect utilizing an interpolation axial flap and a staged reconstruction.  A telfa template was made of the defect.  This telfa template was then used to outline the mastoid interpolation flap.  The donor area for the pedicle flap was then injected with anesthesia.  The flap was excised through the skin and subcutaneous tissue down to the layer of the underlying musculature.  The pedicle flap was carefully excised within this deep plane to maintain its blood supply.  The edges of the donor site were undermined.   The donor site was closed in a primary fashion.  The pedicle was then rotated into position and sutured.  Once the tube was sutured into place, adequate blood supply was confirmed with blanching and refill.  The pedicle was then wrapped with xeroform gauze and dressed appropriately with a telfa and gauze bandage to ensure continued blood supply and protect the attached pedicle. Bilobed Transposition Flap Text: The defect edges were debeveled with a #15 scalpel blade.  Given the location of the defect and the proximity to free margins a bilobed transposition flap was deemed most appropriate.  Using a sterile surgical marker, an appropriate bilobe flap drawn around the defect.    The area thus outlined was incised deep to adipose tissue with a #15 scalpel blade.  The skin margins were undermined to an appropriate distance in all directions utilizing iris scissors. Area L Indication Text: Tumors in this location are included in Area L (trunk and extremities).  Mohs surgery is indicated for larger tumors, or tumors with aggressive histologic features, in these anatomic locations. Dorsal Nasal Flap Text: The defect edges were debeveled with a #15 scalpel blade.  Given the location of the defect and the proximity to free margins a dorsal nasal flap was deemed most appropriate.  Using a sterile surgical marker, an appropriate dorsal nasal flap was drawn around the defect.    The area thus outlined was incised deep to adipose tissue with a #15 scalpel blade.  The skin margins were undermined to an appropriate distance in all directions utilizing iris scissors. Medical Necessity Statement: Based on my medical judgement, Mohs surgery is the most appropriate treatment for this cancer compared to other treatments. O-T Plasty Text: The defect edges were debeveled with a #15 scalpel blade.  Given the location of the defect, shape of the defect and the proximity to free margins an O-T plasty was deemed most appropriate.  Using a sterile surgical marker, an appropriate O-T plasty was drawn incorporating the defect and placing the expected incisions within the relaxed skin tension lines where possible.    The area thus outlined was incised deep to adipose tissue with a #15 scalpel blade.  The skin margins were undermined to an appropriate distance in all directions utilizing iris scissors. Bilobed Flap Text: The defect edges were debeveled with a #15 scalpel blade.  Given the location of the defect and the proximity to free margins a bilobe flap was deemed most appropriate.  Using a sterile surgical marker, an appropriate bilobe flap drawn around the defect.    The area thus outlined was incised deep to adipose tissue with a #15 scalpel blade.  The skin margins were undermined to an appropriate distance in all directions utilizing iris scissors. Inflammation Suggestive Of Cancer Camouflage Histology Text: There was a dense lymphocytic infiltrate which prevented adequate histologic evaluation of adjacent structures. Consent Type: Consent 1 (Standard) Transposition Flap Text: The defect edges were debeveled with a #15 scalpel blade.  Given the location of the defect and the proximity to free margins a transposition flap was deemed most appropriate.  Using a sterile surgical marker, an appropriate transposition flap was drawn incorporating the defect.    The area thus outlined was incised deep to adipose tissue with a #15 scalpel blade.  The skin margins were undermined to an appropriate distance in all directions utilizing iris scissors. Graft Donor Site Bandage (Optional-Leave Blank If You Don't Want In Note): Pressure bandage were applied to the donor site. Suture Removal: 7 days Composite Graft Text: The defect edges were debeveled with a #15 scalpel blade.  Given the location of the defect, shape of the defect, the proximity to free margins and the fact the defect was full thickness a composite graft was deemed most appropriate.  The defect was outline and then transferred to the donor site.  A full thickness graft was then excised from the donor site. The graft was then placed in the primary defect, oriented appropriately and then sutured into place.  The secondary defect was then repaired using a primary closure. Closure 2 Information: This tab is for additional flaps and grafts, including complex repair and grafts and complex repair and flaps. You can also specify a different location for the additional defect, if the location is the same you do not need to select a new one. We will insert the automated text for the repair you select below just as we do for solitary flaps and grafts. Please note that at this time if you select a location with a different insurance zone you will need to override the ICD10 and CPT if appropriate. Area H Indication Text: Tumors in this location are included in Area H (eyelids, eyebrows, nose, lips, chin, ear, pre-auricular, post-auricular, temple, genitalia, hands, feet, ankles and areola).  Tissue conservation is critical in these anatomic locations. O-L Flap Text: The defect edges were debeveled with a #15 scalpel blade.  Given the location of the defect, shape of the defect and the proximity to free margins an O-L flap was deemed most appropriate.  Using a sterile surgical marker, an appropriate advancement flap was drawn incorporating the defect and placing the expected incisions within the relaxed skin tension lines where possible.    The area thus outlined was incised deep to adipose tissue with a #15 scalpel blade.  The skin margins were undermined to an appropriate distance in all directions utilizing iris scissors. Graft Donor Site Epidermal Sutures (Optional): 6-0 Ethilon Home Suture Removal Text: Patient was provided instructions on removing sutures and will remove their sutures at home.  If they have any questions or difficulties they will call the office. Helical Rim Advancement Flap Text: The defect edges were debeveled with a #15 blade scalpel.  Given the location of the defect and the proximity to free margins (helical rim) a double helical rim advancement flap was deemed most appropriate.  Using a sterile surgical marker, the appropriate advancement flaps were drawn incorporating the defect and placing the expected incisions between the helical rim and antihelix where possible.  The area thus outlined was incised through and through with a #15 scalpel blade.  With a skin hook and iris scissors, the flaps were gently and sharply undermined and freed up. Crescentic Advancement Flap Text: The defect edges were debeveled with a #15 scalpel blade.  Given the location of the defect and the proximity to free margins a crescentic advancement flap was deemed most appropriate.  Using a sterile surgical marker, the appropriate advancement flap was drawn incorporating the defect and placing the expected incisions within the relaxed skin tension lines where possible.    The area thus outlined was incised deep to adipose tissue with a #15 scalpel blade.  The skin margins were undermined to an appropriate distance in all directions utilizing iris scissors. Xenograft Text: The defect edges were debeveled with a #15 scalpel blade.  Given the location of the defect, shape of the defect and the proximity to free margins a xenograft was deemed most appropriate.  The graft was then trimmed to fit the size of the defect.  The graft was then placed in the primary defect and oriented appropriately. Paramedian Forehead Flap Text: A decision was made to reconstruct the defect utilizing an interpolation axial flap and a staged reconstruction.  A telfa template was made of the defect.  This telfa template was then used to outline the paramedian forehead pedicle flap.  The donor area for the pedicle flap was then injected with anesthesia.  The flap was excised through the skin and subcutaneous tissue down to the layer of the underlying musculature.  The pedicle flap was carefully excised within this deep plane to maintain its blood supply.  The edges of the donor site were undermined.   The donor site was closed in a primary fashion.  The pedicle was then rotated into position and sutured.  Once the tube was sutured into place, adequate blood supply was confirmed with blanching and refill.  The pedicle was then wrapped with xeroform gauze and dressed appropriately with a telfa and gauze bandage to ensure continued blood supply and protect the attached pedicle. Ftsg Text: The defect edges were debeveled with a #15 scalpel blade.  Given the location of the defect, shape of the defect and the proximity to free margins a full thickness skin graft was deemed most appropriate.  Using a sterile surgical marker, the primary defect shape was transferred to the donor site. The area thus outlined was incised deep to adipose tissue with a #15 scalpel blade.  The harvested graft was then trimmed of adipose tissue until only dermis and epidermis was left.  The skin margins of the secondary defect were undermined to an appropriate distance in all directions utilizing iris scissors.  The secondary defect was closed with interrupted buried subcutaneous sutures.  The skin edges were then re-apposed with running  sutures.  The skin graft was then placed in the primary defect and oriented appropriately. Cheek Interpolation Flap Text: A decision was made to reconstruct the defect utilizing an interpolation axial flap and a staged reconstruction.  A telfa template was made of the defect.  This telfa template was then used to outline the Cheek Interpolation flap.  The donor area for the pedicle flap was then injected with anesthesia.  The flap was excised through the skin and subcutaneous tissue down to the layer of the underlying musculature.  The interpolation flap was carefully excised within this deep plane to maintain its blood supply.  The edges of the donor site were undermined.   The donor site was closed in a primary fashion.  The pedicle was then rotated into position and sutured.  Once the tube was sutured into place, adequate blood supply was confirmed with blanching and refill.  The pedicle was then wrapped with xeroform gauze and dressed appropriately with a telfa and gauze bandage to ensure continued blood supply and protect the attached pedicle. Bilateral Helical Rim Advancement Flap Text: The defect edges were debeveled with a #15 blade scalpel.  Given the location of the defect and the proximity to free margins (helical rim) a bilateral helical rim advancement flap was deemed most appropriate.  Using a sterile surgical marker, the appropriate advancement flaps were drawn incorporating the defect and placing the expected incisions between the helical rim and antihelix where possible.  The area thus outlined was incised through and through with a #15 scalpel blade.  With a skin hook and iris scissors, the flaps were gently and sharply undermined and freed up. Advancement Flap (Double) Text: The defect edges were debeveled with a #15 scalpel blade.  Given the location of the defect and the proximity to free margins a double advancement flap was deemed most appropriate.  Using a sterile surgical marker, the appropriate advancement flaps were drawn incorporating the defect and placing the expected incisions within the relaxed skin tension lines where possible.    The area thus outlined was incised deep to adipose tissue with a #15 scalpel blade.  The skin margins were undermined to an appropriate distance in all directions utilizing iris scissors.

## 2022-08-03 ENCOUNTER — OFFICE VISIT (OUTPATIENT)
Dept: ORTHOPEDIC SURGERY | Facility: CLINIC | Age: 72
End: 2022-08-03

## 2022-08-03 VITALS
WEIGHT: 193.2 LBS | HEART RATE: 58 BPM | SYSTOLIC BLOOD PRESSURE: 135 MMHG | DIASTOLIC BLOOD PRESSURE: 82 MMHG | HEIGHT: 58 IN | BODY MASS INDEX: 40.55 KG/M2

## 2022-08-03 DIAGNOSIS — G47.33 OBSTRUCTIVE SLEEP APNEA: ICD-10-CM

## 2022-08-03 DIAGNOSIS — I21.3 ST ELEVATION MYOCARDIAL INFARCTION (STEMI), UNSPECIFIED ARTERY: ICD-10-CM

## 2022-08-03 DIAGNOSIS — J45.909 ASTHMA, UNSPECIFIED ASTHMA SEVERITY, UNSPECIFIED WHETHER COMPLICATED, UNSPECIFIED WHETHER PERSISTENT: ICD-10-CM

## 2022-08-03 DIAGNOSIS — E55.9 VITAMIN D DEFICIENCY: ICD-10-CM

## 2022-08-03 DIAGNOSIS — F41.9 ANXIETY: ICD-10-CM

## 2022-08-03 DIAGNOSIS — M81.0 AGE-RELATED OSTEOPOROSIS WITHOUT CURRENT PATHOLOGICAL FRACTURE: Primary | ICD-10-CM

## 2022-08-03 DIAGNOSIS — F33.1 MODERATE EPISODE OF RECURRENT MAJOR DEPRESSIVE DISORDER: ICD-10-CM

## 2022-08-03 DIAGNOSIS — Z82.62 FAMILY HX OSTEOPOROSIS: ICD-10-CM

## 2022-08-03 DIAGNOSIS — K22.70 BARRETT'S ESOPHAGUS WITHOUT DYSPLASIA: ICD-10-CM

## 2022-08-03 DIAGNOSIS — Z51.81 MEDICATION MONITORING ENCOUNTER: ICD-10-CM

## 2022-08-03 PROCEDURE — 99214 OFFICE O/P EST MOD 30 MIN: CPT | Performed by: PHYSICIAN ASSISTANT

## 2022-08-03 PROCEDURE — 96372 THER/PROPH/DIAG INJ SC/IM: CPT | Performed by: PHYSICIAN ASSISTANT

## 2022-08-03 NOTE — PATIENT INSTRUCTIONS
Osteoporosis    Osteoporosis is when the bones get thin and weak. This can cause your bones to break (fracture) more easily.  What are the causes?  The exact cause of this condition is not known.  What increases the risk?  Having family members with this condition.  Not eating enough healthy foods.  Taking certain medicines.  Being female.  Being age 50 or older.  Smoking or using other products that contain nicotine or tobacco, such as e-cigarettes or chewing tobacco.  Not exercising.  Being of  or  ancestry.  Having a small body frame.  What are the signs or symptoms?  A broken bone might be the first sign, especially if the break results from a fall or injury that usually would not cause a bone to break.  Other signs and symptoms include:  Pain in the neck or low back.  Being hunched over (stooped posture).  Getting shorter.  How is this treated?  Eating more foods with more calcium and vitamin D in them.  Doing exercises.  Stopping tobacco use.  Limiting how much alcohol you drink.  Taking medicines to slow bone loss or help make the bones stronger.  Taking supplements of calcium and vitamin D every day.  Taking medicines to replace chemicals in the body (hormone replacement medicines).  Monitoring your levels of calcium and vitamin D.  The goal of treatment is to strengthen your bones and lower your risk for a bone break.  Follow these instructions at home:  Eating and drinking  Eat plenty of calcium and vitamin D. These nutrients are good for your bones. Good sources of calcium and vitamin D include:  Some fish, such as salmon and tuna.  Foods that have calcium and vitamin D added to them (fortified foods), such as some breakfast cereals.  Egg yolks.  Cheese.  Liver.    Activity  Do exercises as told by your doctor. Ask your doctor what exercises are safe for you. You should do:  Exercises that make your muscles work to hold your body weight up (weight-bearing exercises). These include alba chi,  yoga, and walking.  Exercises to make your muscles stronger. One example is lifting weights.  Lifestyle  Do not drink alcohol if:  Your doctor tells you not to drink.  You are pregnant, may be pregnant, or are planning to become pregnant.  If you drink alcohol:  Limit how much you use to:  0-1 drink a day for women.  0-2 drinks a day for men.  Know how much alcohol is in your drink. In the [place] one drink equals one 12 oz bottle of beer (355 mL), one 5 oz glass of wine (148 mL), or one 1½ oz glass of hard liquor (44 mL).  Do not smoke or use any products that contain nicotine or tobacco. If you need help quitting, ask your doctor.  Preventing falls  Use tools to help you move around (mobility aids) as needed. These include canes, walkers, scooters, and crutches.  Keep rooms well-lit.  Put away things on the floor that could make you trip. These include cords and rugs.  Install safety rails on stairs. Install grab bars in bathrooms.  Use rubber mats in slippery areas, like bathrooms.  Wear shoes that:  Fit you well.  Support your feet.  Have closed toes.  Have rubber soles or low heels.  Tell your doctor about all of the medicines you are taking. Some medicines can make you more likely to fall.  General instructions  Take over-the-counter and prescription medicines only as told by your doctor.  Keep all follow-up visits.  Contact a doctor if:  You have not been tested (screened) for osteoporosis and you are:  A woman who is age 65 or older.  A man who is age 70 or older.  Get help right away if:  You fall.  You get hurt.  Summary  Osteoporosis happens when your bones get thin and weak.  Weak bones can break (fracture) more easily.  Eat plenty of calcium and vitamin D. These are good for your bones.  Tell your doctor about all of the medicines that you take.  This information is not intended to replace advice given to you by your health care provider. Make sure you discuss any questions you have with your health care  provider.  Document Revised: 06/03/2021 Document Reviewed: 06/03/2021  Elsevier Patient Education © 2021 [employer]  Preventing Health Risks of Being Overweight  Maintaining a healthy body weight is an important part of your overall health. Your healthy body weight depends on your age, gender, and height. Being overweight puts you at risk for many health problems, including:  Heart disease.  Diabetes.  Problems sleeping.  Joint problems.  You can make changes to your diet and lifestyle to prevent these risks. Consider working with a health care provider or a dietitian to make these changes.  What nutrition changes can be made?    Eat only as much as your body needs. In most cases, this is about 2,000 calories a day, but the amount varies depending on your height, gender, and activity level. Ask your health care provider how many calories you should have each day. Eating more than your body needs on a regular basis can cause you to become overweight or obese.  Eat slowly, and stop eating when you feel full.  Choose healthy foods, including:  Fruits and vegetables.  Lean meats.  Low-fat dairy products.  High-fiber foods, such as whole grains and beans.  Healthy snacks like vegetable sticks, a piece of fruit, or a small amount of yogurt or cheese.  Avoid foods and drinks that are high in sugar, salt (sodium), saturated fat, or trans fat. This includes:  Many desserts such as candy, cookies, and ice cream.  Soda.  Fried foods.  Processed meats such as hot dogs or lunch meats.  Prepackaged snack foods.  What lifestyle changes can be made?    Exercise for at least 150 minutes a week to prevent weight gain, or as often as recommended by your health care provider. Do moderate-intensity exercise, such as brisk walking.  Spread it out by exercising for 30 minutes 5 days a week, or in short 10-minute bursts several times a day.  Find other ways to stay active and burn calories, such as yard work or a hobby that involves  physical activity.  Get at least 8 hours of sleep each night. When you are well-rested, you are more likely to be active and make healthy choices during the day. To sleep better:  Try to go to bed and wake up at about the same time every day.  Keep your bedroom dark, quiet, and cool.  Make sure that your bed is comfortable.  Avoid stimulating activities, such as watching television or exercising, for at least one hour before bedtime.  Why are these changes important?  Eating healthy and being active helps you lose weight and prevent health problems caused by being overweight. Making these changes can also help you manage stress, feel better mentally, and connect with friends and family.  What can happen if changes are not made?  Being overweight can affect you for your entire life. You may develop joint or bone problems that make it painful or difficult for you to play sports or do activities you enjoy. Being overweight puts stress on your heart and lungs and can lead to medical problems like diabetes, heart disease, and sleeping problems.  Where to find support  You can get support for preventing health risks of being overweight from:  Your health care provider or a dietitian. They can provide guidance about healthy eating and healthy lifestyle choices.  Weight loss support groups, online or in-person.  Where to find more information  MyPlate: www.choosemyplate.gov  This an online tool that provides personalized recommendations about foods to eat each day.  The Centers for Disease Control and Prevention: www.cdc.gov/healthyweight  This resource gives tips for managing weight and having an active lifestyle.  Summary  To prevent unhealthy weight gain, it is important to maintain a healthy diet high in vegetables and whole grains, exercise regularly, and get at least 8 hours of sleep each night.  Making these changes helps prevent many long-term (chronic) health conditions that can shorten your life, such as diabetes,  heart disease, and stroke.  This information is not intended to replace advice given to you by your health care provider. Make sure you discuss any questions you have with your health care provider.  Document Revised: 04/15/2021 Document Reviewed: 04/15/2021  Elsevier Patient Education © 2021 [employer]

## 2022-08-03 NOTE — PROGRESS NOTES
ORTHO FOLLOW UP       Subjective:    HPI:   Charleen Barnett is a 72 y.o. female who presents in follow-up for osteoporosis.  She is currently on Prolia reports that she is doing well with no noticeable side effects.  She does report having a dental extraction that healed without complications, however after the extraction she reports having small shards of bone expelled from the area.  She does report that the oral surgeon struggled with the extraction.  This medication was restarted on 2/1/2022, which was her last injection.  She continues a calcium rich diet and 50,000 international units of vitamin D weekly.  She denies any new fractures since last office visit.  She is not getting any significant physical activity at this time.  She denies any falls in the last year, however does at times feel unsteady with walking.  She does report a recent increase in her left knee pain and will follow up with Dr. Ghotra as he performed her knee replacement.  She also reports intermittent flares of her back pain.  Recent labs reviewed.  She did have a DEXA scan on 4/22/2021 at [facility], which revealed a T score of -2.2 in the right femoral neck.  When compared to 2019, this did show an increase in bone density of 7.3% in the right hip and 9.5% in the left hip.  The spine was not accurate for comparison due to severe scoliosis.    STEADI Fall Risk Assessment was completed, and patient is at MODERATE risk for falls. Assessment completed on:8/3/2022    Past Medical History:   Diagnosis Date   • Anxiety    • Asthma 02/13/2020   • Ramos's esophagus 02/15/2018   • Depression 08/27/2018   • Fibromyalgia 12/14/2011   • GERD (gastroesophageal reflux disease)    • Gout 02/13/2020   • Hypertension    • Joint pain    • Obstructive sleep apnea 10/16/2014   • Osteoporosis     fosamax(SE), on prolia(4/10/18-6/18/21, 2/1/22)   • Otitis media    • Pedal edema 10/22/2018   • Presence of left artificial knee joint 03/14/2019    • Seasonal allergies    • ST elevation (STEMI) myocardial infarction (HCC) 02/13/2020   • Status post percutaneous transluminal coronary angioplasty 06/10/2014   • Tachycardia    • Urge incontinence 08/27/2018   • Vitamin D deficiency 03/19/2015       Past Surgical History:   Procedure Laterality Date   • BLADDER SURGERY     • CARDIAC CATHETERIZATION N/A 5/29/2021    Procedure: Left Heart Cath and coronary angiogram;  Surgeon: Yazmin Matta MD;  Location: [facility] CATH INVASIVE LOCATION;  Service: Cardiovascular;  Laterality: N/A;   • CARDIAC CATHETERIZATION  5/29/2021    Procedure: Functional Flow Reserve (iFR);  Surgeon: Bryan Patel MD;  Location: [facility] CATH INVASIVE LOCATION;  Service: Cardiology;;   • CARDIAC CATHETERIZATION N/A 5/29/2021    Procedure: Percutaneous Coronary Intervention;  Surgeon: Bryan Patel MD;  Location: [facility] CATH INVASIVE LOCATION;  Service: Cardiology;  Laterality: N/A;   • CARDIAC CATHETERIZATION N/A 5/29/2021    Procedure: Stent FRANCOISE coronary;  Surgeon: Bryan Patel MD;  Location: [facility] CATH INVASIVE LOCATION;  Service: Cardiology;  Laterality: N/A;   • CHOLECYSTECTOMY     • HYSTERECTOMY     • KNEE SURGERY     • OTHER SURGICAL HISTORY      STENT       Social History     Occupational History   • Not on file   Tobacco Use   • Smoking status: Never Smoker   • Smokeless tobacco: Never Used   Vaping Use   • Vaping Use: Never used   Substance and Sexual Activity   • Alcohol use: Not Currently     Comment: NO DRINK 1994   • Drug use: Never   • Sexual activity: Defer      The following portions of the patient's history were reviewed and updated as appropriate: allergies, current medications, past family history, past medical history, past social history, past surgical history and problem list.    Medications:    Current Outpatient Medications:   •  albuterol sulfate  (90 Base) MCG/ACT inhaler, Inhale 1 puff Every 6 (Six) Hours As Needed for  Wheezing or Shortness of Air., Disp: , Rfl:   •  aspirin 81 MG tablet, Take 81 mg by mouth Daily., Disp: , Rfl:   •  atorvastatin (LIPITOR) 10 MG tablet, TAKE 1 TABLET BY MOUTH DAILY., Disp: 90 tablet, Rfl: 1  •  Biotin 10 MG tablet, Take 10 mg by mouth Daily., Disp: , Rfl:   •  buPROPion XL (WELLBUTRIN XL) 150 MG 24 hr tablet, TAKE 1 TABLET BY MOUTH EVERY MORNING, Disp: 90 tablet, Rfl: 2  •  busPIRone (BUSPAR) 7.5 MG tablet, TAKE 1 TABLET BY MOUTH TWICE DAILY, Disp: 180 tablet, Rfl: 0  •  citalopram (CeleXA) 20 MG tablet, TAKE 1 TABLET BY MOUTH EVERY DAY, Disp: 90 tablet, Rfl: 2  •  gabapentin (NEURONTIN) 400 MG capsule, Take 1 capsule by mouth every night at bedtime., Disp: 90 capsule, Rfl: 3  •  hydrOXYzine (ATARAX) 10 MG tablet, , Disp: , Rfl:   •  lansoprazole (PREVACID) 15 MG capsule, Take 15 mg by mouth Daily., Disp: , Rfl:   •  levothyroxine (SYNTHROID, LEVOTHROID) 25 MCG tablet, TAKE 1 TABLET BY MOUTH EVERY MORNING, Disp: 90 tablet, Rfl: 0  •  metoprolol succinate XL (TOPROL-XL) 25 MG 24 hr tablet, TAKE 1 TABLET BY MOUTH EVERY MORNING, Disp: 90 tablet, Rfl: 2  •  montelukast (SINGULAIR) 10 MG tablet, TAKE 1 TABLET BY MOUTH EVERY DAY, Disp: 90 tablet, Rfl: 1  •  oxybutynin (DITROPAN) 5 MG tablet, Take 1 tablet by mouth 2 (Two) Times a Day., Disp: 180 tablet, Rfl: 2  •  triamcinolone (KENALOG) 0.1 % cream, Apply 1 application topically to the appropriate area as directed 2 (Two) Times a Day., Disp: 30 g, Rfl: 0  •  vitamin D (ERGOCALCIFEROL) 1.25 MG (13407 UT) capsule capsule, TAKE 1 CAPSULE BY MOUTH EVERY WEEK, Disp: 13 capsule, Rfl: 1  •  VITAMIN E PO, Take 1 tablet by mouth Daily., Disp: , Rfl:     Current Facility-Administered Medications:   •  denosumab (PROLIA) syringe 60 mg, 60 mg, Subcutaneous, Q6 Months, Dede nAders PA, 60 mg at 08/03/22 5985    Allergies:  Allergies   Allergen Reactions   • Hydrocodone Hives   • Chlorpheniramine-Phenylephrine Rash   • Penicillins Rash   • Sulfa Antibiotics  "Hives and Rash   • Tetracycline Rash   • Warfarin Rash       Review of Systems:  Gen -no fever, chills , sweats, headache   Eyes - no irritation or discharge   ENT -  no ear pain , runny nose , sore throat , difficulty swallowing   Resp - no cough , congestion , excessive expectoration   CVS - no chest pain , palpitations.   Abd - no pain , nausea , vomiting , diarrhea   Skin - no rash , lesions.   Neuro - no dizziness    Please see HPI for any other pertinent positives.  All other systems were reviewed and are negative.       Objective   Objective:    /82   Pulse 58   Ht 147.3 cm (58\")   Wt 87.6 kg (193 lb 3.2 oz)   BMI 40.38 kg/m²     Physical Examination:  Short stature, morbidly obese individual in no acute distress, patient is alert and cooperative with the exam, appears to have normal mood and affect with a normal attention span and concentration, ambulating unassisted  normocephalic, atraumatic, extraocular movements intact, conjunctiva and sclera are clear, grossly normal hearing  no lymphadenopathy or thyromegaly  normal respiratory effort  abdomen is nontender, without guarding or rebound and nondistended  Short torso with signs of scoliosis  no LE edema noted  cranial nerves II-XII grossly intact with no focal defects  skin intact without lesions or rashes visible          Imagin2017-DEXA scan at [facility], revealed a T score of -2.8 in the left femoral neck, FRAX scores of 18.3% and 5.1%.  4/10/2018-patient started on Prolia  2019-DEXA scan at [facility], revealed a T score of -3.8 in the left one third radius.  When compared to 2017, this did show an increase in bone density of 3.6% in the spine, however due to her significant scoliosis this may not be accurate, and a decrease of 4.3% in the hips.  2021-DEXA scan at [facility], revealed a T score of -2.2 in the right femoral neck, -2.1 in the left total hip, -2.0 in the right total hip and left " femoral neck, -0.9 in the spine.  When compared to 2019, this did show an increase in bone density of 7.3% in the right hip and 9.5% in the left hip.  The spine was not accurate for comparison due to severe scoliosis.  6/18/2021-Prolia injection  2/1/2022- restarted Prolia, as injection was over 6 weeks late  8/3/2022-Prolia injection          Assessment:  1. Age-related osteoporosis without current pathological fracture    2. Vitamin D deficiency    3. Family hx osteoporosis    4. Ramos's esophagus without dysplasia    5. Moderate episode of recurrent major depressive disorder (HCC)    6. ST elevation myocardial infarction (STEMI), unspecified artery (HCC)    7. Anxiety    8. Asthma, unspecified asthma severity, unspecified whether complicated, unspecified whether persistent    9. Obstructive sleep apnea    10. Medication monitoring encounter         Currently on Prolia restarted on 2/1/2022        Plan:  Prolia injection today.  Today, [facility] She should continue her calcium rich diet and vitamin D.  She has seen great results with Prolia.  She continues to be at increased risk for fractures, and I am recommending that she continue her Prolia injections every 6 months.  We did review weightbearing exercises and fall prevention today.  She will also be referred for formal physical therapy at this time.  Her dental extraction healed without complications and her initial pain resolved.  Would she describes does not sound like osteonecrosis of the jaw.  I will plan to see her back in 1 year and as needed, and she will be scheduled for next Prolia injection in 6 months.  She should call with any questions or concerns.    Advance Care Planning   ACP discussion was held with the patient during this visit. Patient does not have an advance directive, information provided.             LEVI Gee  08/03/22  14:35 EDT

## 2022-08-18 ENCOUNTER — HOSPITAL ENCOUNTER (OUTPATIENT)
Dept: ULTRASOUND IMAGING | Facility: HOSPITAL | Age: 72
Discharge: HOME | End: 2022-08-18

## 2022-08-18 ENCOUNTER — APPOINTMENT (OUTPATIENT)
Dept: ULTRASOUND IMAGING | Facility: HOSPITAL | Age: 72
End: 2022-08-18

## 2022-08-18 ENCOUNTER — HOSPITAL ENCOUNTER (OUTPATIENT)
Dept: MAMMOGRAPHY | Facility: HOSPITAL | Age: 72
Discharge: HOME | End: 2022-08-18

## 2022-08-18 DIAGNOSIS — N64.4 BILATERAL MASTODYNIA: ICD-10-CM

## 2022-08-18 PROCEDURE — 76642 ULTRASOUND BREAST LIMITED: CPT

## 2022-08-18 PROCEDURE — G0279 TOMOSYNTHESIS, MAMMO: HCPCS

## 2022-08-18 PROCEDURE — 77066 DX MAMMO INCL CAD BI: CPT

## 2022-08-25 ENCOUNTER — TREATMENT (OUTPATIENT)
Dept: PHYSICAL THERAPY | Facility: CLINIC | Age: 72
End: 2022-08-25

## 2022-08-25 DIAGNOSIS — R26.81 UNSTEADY GAIT: ICD-10-CM

## 2022-08-25 DIAGNOSIS — M81.0 AGE-RELATED OSTEOPOROSIS WITHOUT CURRENT PATHOLOGICAL FRACTURE: Primary | ICD-10-CM

## 2022-08-25 PROCEDURE — 97162 PT EVAL MOD COMPLEX 30 MIN: CPT

## 2022-08-25 NOTE — PROGRESS NOTES
Physical Therapy Initial Evaluation and Plan of Care    Patient: Charleen Barnett   : 1950  Diagnosis/ICD-10 Code:  Age-related osteoporosis without current pathological fracture [M81.0]  Referring practitioner: LEVI Gee  Date of Initial Visit: 2022  Today's Date: 2022  Patient seen for 1 sessions           Subjective Questionnaire: Oswestry: 21/50 indicates 42% functional impairment.  Subjective Evaluation    History of Present Illness  Mechanism of injury: Patient reported she has gained about 25 pounds in the last 3-4 months due to being sedentary.  Pt reported increased spinal pain and knee pain since weight gain     Patient reported increasing issues with balance.  Patient denied any recent falls (none in last few years).     Patient report periodic dizziness and impaired cognition.      Has OA every where. CTS both hands(at times T/N/R hand lateral 3 fingers fx had surgery still limited flexion, gets shots for them, also shot in shoulders.  Pt reported having fibromyalgia.  Cardiac stents x 2; asthma (has inhaler) ; (L) TKR; gallbladder; partial hysterectomy       Subjective comment: 71 y/o w/f  with chronic LBP , scoliosis, osteoporsis presenting for strengthening and balance training to decrease risk of fall  Patient Occupation: Pt is retired.  Patient is no longer working part time job Pain  Current pain ratin (in sitting right now today no pain anywhere,.)  At best pain ratin (if laying on back or on side)  At worst pain ratin (worst Left LBP hip:10, Neck pain/shoulder upper back pain 6/10; Left leg 9/10, both feet hx Gout and bunions neuropathy. )  Quality: burning and throbbing (Left LBP stabbing, burning, ice hot pain depending on how long she stands and walks. )  Relieving factors: relaxation, change in position, rest and medications (Lying down is best, sitting also helps; Only takes Tylenol, Gabapentin, is a recovered alcoholic.)  Aggravating factors:  ambulation, movement and standing (housework, doing dishes, standing for more than 5 minutes)  Progression: worsening (More frequent pain, walk less distance before the pain starts. )    Social Support  Lives in: one-story house (3 steps in, laundry one step down.  Pt's spouse does most of the cooking and much of the housework)  Lives with: spouse    Hand dominance: right (Trouble due to R fingers after fx. )    Patient Goals  Patient goals for therapy: improved balance, independence with ADLs/IADLs, increased strength and increased motion  Patient goal: Improve balance; increase flexibility; increased standing duration/tolerance; be able to bend more without LOB            Objective          Postural Observations  Seated posture: fair  Standing posture: fair (short, (L) shoulder elevated versus (R); (R) iliac crest elevated)        Active Range of Motion     Lumbar   Flexion: Active lumbar flexion: squat vs bend at wiast due to osteoporosis. WFL  Extension: 6 (produces central LBP during) degrees   Left lateral flexion: 8 (feels more stiff than other side) degrees   Right lateral flexion: 20 degrees     Additional Active Range of Motion Details  Normal sensation both UE/LE today in sitting  UE;shoulder WFL AROM, limited function R lateral 3 fingers since fall/Surgery 3 fingers.     LE WFL AROM.       Strength/Myotome Testing     Left Elbow   Flexion: 5  Extension: 5    Right Elbow   Flexion: 5  Extension: 5    Left Hip   Planes of Motion   Flexion: 4  External rotation: 3+  Internal rotation: 3-    Right Hip   Planes of Motion   Flexion: 4-  External rotation: 4+  Internal rotation: 4+    Functional Assessment     Comments  Coordination:  Alt palm up/down: grossly normal  Finger to nose: slow/ataxic  Fingertip to thumb: decreased speed          DGI: 13/24, a score of 19 or less is indicative of fall risk    TUG: 10.68 average of trials   TUG cognitive: 11.67, 11.88 seconds.  4 and 5 numbers correct      Assessment  & Plan     Assessment  Impairments: abnormal gait, abnormal or restricted ROM, activity intolerance, impaired balance, impaired physical strength, lacks appropriate home exercise program, pain with function, safety issue and weight-bearing intolerance  Functional Limitations: carrying objects, lifting, sleeping, walking, pulling, pushing, uncomfortable because of pain, moving in bed, sitting, standing, stooping and reaching overhead  Assessment details: 71 y/o w/f with chronic spinal pain from neck to sacrum.  Patient is limited in sitting, standing, walking and general activity tolerance.      71 y/o w/f  with chronic LBP , scoliosis, osteoporsis presenting for strengthening and balance training to decrease risk of fall.    Patient reported overall the last year standing, walking and activity tolerance has declined due to pain pattern and impaired mobility and strength.  Pt's spouse must assist with household tasks such as cooking and cleaning.  Patient is limited to less than 5 minutes of standing.    Barriers to therapy: hx. OA, MI, L TKA, Fibromyalgia, Osteoporosis  Prognosis: good    Goals  Plan Goals: Patient presents with significant impairments, including pain; decreased mobility; impaired gait; decreased ROM and strength and impaired balance and proprioception. Based on the above impairments and the examination, this patient has the potential to benefit from Physical Therapy.     STG's 3-4 weeks  1. Pt will be (I) with HEP handout for self management of impairments   2. Able to ambulate x 10 min over ground and 10 minutes of standing without limitation due to pain or fatigue for ability to complete household cooking and cleaning   3. Pt will demonstrate (I) with balance over uneven surfaces such as foam, grass without overt LOB or physical assistance required for decreased risk of fall    LTG's : 6- 8 weeks  1. Independent with updated HEP and able to manage condition independently.  2.  Pt will demonstrate  hip and abdominal strength improvement by 1 muscle grade of greater via MMT for improved postural support, stability and improved overall function.   3. Significant improvement on outcome measure, indicating 30 % impairment or less via Oswestry.  4. DGI score > 19 indicating safe ambulation, decreased risk of fall and injury with functional mobility       Plan  Therapy options: will be seen for skilled therapy services  Planned modality interventions: cryotherapy, electrical stimulation/Russian stimulation, TENS, thermotherapy (hydrocollator packs), traction and ultrasound  Planned therapy interventions: abdominal trunk stabilization, body mechanics training, functional ROM exercises, gait training, home exercise program, joint mobilization, manual therapy, neuromuscular re-education, postural training, soft tissue mobilization, spinal/joint mobilization, strengthening, stretching and therapeutic activities  Frequency: 2x week  Duration in visits: 12  Duration in weeks: 6  Treatment plan discussed with: patient        Timed:         Manual Therapy:   0      mins  06791;     Therapeutic Exercise:    0     mins  80415;     Neuromuscular Jeancarlos:    0    mins  70468;    Therapeutic Activity:       0   mins  32552;     Gait Trainin     mins  72880;     Ultrasound:     0     mins  49809;    Ionto                               0    mins   81393  Self Care                       0     mins   00741  Aquatic                            0   mins 80223      Un-Timed:  Electrical Stimulation:     0    mins  89183 ( );  Dry Needling     0     mins self-pay  Traction      0    mins 57805  Low Eval     0     Mins  66747  Mod Eval     45     Mins  87542  High Eval                        0    Mins  00798  Re-Eval                           0    mins  42585        Timed Treatment:   10   mins   Total Treatment:     45   mins    PT SIGNATURE: Mary Ramos, PT   DATE TREATMENT INITIATED: 2022    Medicare Initial  Certification  Certification Period: 11/23/2022  I certify that the therapy services are furnished while this patient is under my care.  The services outlined above are required by this patient, and will be reviewed every 90 days.     PHYSICIAN: Dede Anders PA      DATE:     Please sign and return via fax to [phone].. Thank you, [facility].

## 2022-09-15 RX ORDER — BUSPIRONE HYDROCHLORIDE 7.5 MG/1
TABLET ORAL
Qty: 180 TABLET | Refills: 0 | Status: SHIPPED | OUTPATIENT
Start: 2022-09-15 | End: 2022-12-07

## 2022-09-26 ENCOUNTER — OFFICE VISIT (OUTPATIENT)
Dept: FAMILY MEDICINE CLINIC | Facility: CLINIC | Age: 72
End: 2022-09-26

## 2022-09-26 ENCOUNTER — HOSPITAL ENCOUNTER (OUTPATIENT)
Dept: GENERAL RADIOLOGY | Facility: HOSPITAL | Age: 72
Discharge: HOME | End: 2022-09-26
Admitting: NURSE PRACTITIONER

## 2022-09-26 VITALS
HEART RATE: 59 BPM | BODY MASS INDEX: 40.09 KG/M2 | SYSTOLIC BLOOD PRESSURE: 154 MMHG | DIASTOLIC BLOOD PRESSURE: 89 MMHG | WEIGHT: 191 LBS | OXYGEN SATURATION: 94 % | HEIGHT: 58 IN

## 2022-09-26 DIAGNOSIS — R68.84 JAW PAIN: ICD-10-CM

## 2022-09-26 DIAGNOSIS — R82.90 ABNORMAL URINE ODOR: ICD-10-CM

## 2022-09-26 DIAGNOSIS — R68.84 JAW PAIN: Primary | ICD-10-CM

## 2022-09-26 LAB
BILIRUB BLD-MCNC: NEGATIVE MG/DL
CLARITY, POC: CLEAR
COLOR UR: YELLOW
EXPIRATION DATE: ABNORMAL
GLUCOSE UR STRIP-MCNC: NEGATIVE MG/DL
KETONES UR QL: NEGATIVE
LEUKOCYTE EST, POC: ABNORMAL
Lab: ABNORMAL
NITRITE UR-MCNC: NEGATIVE MG/ML
PH UR: 6 [PH] (ref 5–8)
PROT UR STRIP-MCNC: ABNORMAL MG/DL
RBC # UR STRIP: ABNORMAL /UL
SP GR UR: 1.03 (ref 1–1.03)
UROBILINOGEN UR QL: NORMAL

## 2022-09-26 PROCEDURE — 87086 URINE CULTURE/COLONY COUNT: CPT | Performed by: NURSE PRACTITIONER

## 2022-09-26 PROCEDURE — 99214 OFFICE O/P EST MOD 30 MIN: CPT | Performed by: NURSE PRACTITIONER

## 2022-09-26 PROCEDURE — 81003 URINALYSIS AUTO W/O SCOPE: CPT | Performed by: NURSE PRACTITIONER

## 2022-09-26 PROCEDURE — 70328 X-RAY EXAM OF JAW JOINT: CPT

## 2022-09-26 RX ORDER — CIPROFLOXACIN 250 MG/1
250 TABLET, FILM COATED ORAL 2 TIMES DAILY
Qty: 10 TABLET | Refills: 0 | Status: SHIPPED | OUTPATIENT
Start: 2022-09-26 | End: 2022-10-01

## 2022-09-26 NOTE — PROGRESS NOTES
"Subjective   Charleen Barnett is a 72 y.o. female.       HPI   Pt is here today with concern of right jaw pain.  She says symptoms started 10 days ago.    She reports jaw feels as if it is  \"locking up\" and it is hard for her to open her mouth and eat/chew.  She was suppose to go to the dentist last week to get a mouth piece because she says she grinds her teeth. She didn't go because she wasn't feeling well.      Also feels like she may have a UTI.  Has noted a strong odor to her urine for a couple of days. No burning or pain with urination.  No fevers.      The following portions of the patient's history were reviewed and updated as appropriate: allergies, current medications, past family history, past medical history, past social history, past surgical history and problem list.    Review of Systems   Constitutional: Positive for fatigue. Negative for chills and fever.   Eyes: Negative for visual disturbance.   Respiratory: Negative for cough, chest tightness, shortness of breath and wheezing.    Cardiovascular: Negative for chest pain and palpitations.   Gastrointestinal: Negative for abdominal pain, blood in stool, constipation, diarrhea, nausea, vomiting and indigestion.   Genitourinary: Negative for decreased urine volume, dysuria, flank pain, frequency, hematuria, pelvic pain, pelvic pressure and urgency.   Musculoskeletal: Positive for arthralgias (right jaw pain). Negative for joint swelling.   Neurological: Positive for dizziness and weakness. Negative for light-headedness, headache and confusion.   Psychiatric/Behavioral: Negative for depressed mood. The patient is not nervous/anxious.        Objective   Physical Exam  Vitals reviewed.   Constitutional:       General: She is not in acute distress.     Appearance: Normal appearance. She is obese.   HENT:      Head: Normocephalic and atraumatic.      Jaw: There is normal jaw occlusion. Tenderness (right ) and pain on movement (right) present. No swelling.    "   Right Ear: Tympanic membrane, ear canal and external ear normal.      Left Ear: Tympanic membrane, ear canal and external ear normal.      Nose: Nose normal.      Mouth/Throat:      Mouth: Mucous membranes are moist.      Pharynx: Oropharynx is clear. No oropharyngeal exudate or posterior oropharyngeal erythema.   Eyes:      General:         Right eye: No discharge.         Left eye: No discharge.      Conjunctiva/sclera: Conjunctivae normal.   Cardiovascular:      Rate and Rhythm: Normal rate and regular rhythm.      Pulses: Normal pulses.      Heart sounds: Normal heart sounds. No murmur heard.  Pulmonary:      Effort: Pulmonary effort is normal. No respiratory distress.      Breath sounds: Normal breath sounds. No wheezing or rhonchi.   Chest:      Chest wall: No tenderness.   Abdominal:      Tenderness: There is no right CVA tenderness or left CVA tenderness.   Musculoskeletal:      Cervical back: Normal range of motion and neck supple. No tenderness.   Lymphadenopathy:      Cervical: No cervical adenopathy.   Skin:     General: Skin is warm and dry.      Findings: No erythema.   Neurological:      General: No focal deficit present.      Mental Status: She is alert and oriented to person, place, and time.   Psychiatric:         Mood and Affect: Mood normal.           Assessment & Plan   Diagnoses and all orders for this visit:    1. Jaw pain (Primary)  Comments:  Xray ordered.   Encouarged follow up with dentist.   Orders:  -     XR Temporomandibular Joints Right; Future    2. Abnormal urine odor  Comments:  UA shows small blood.    Sent for cx.   Given Cipro.   Increase fluids.   Call for worsening.   Orders:  -     POC Urinalysis Dipstick, Automated  -     Urine Culture - Urine, Urine, Clean Catch  -     ciprofloxacin (Cipro) 250 MG tablet; Take 1 tablet by mouth 2 (Two) Times a Day for 5 days.  Dispense: 10 tablet; Refill: 0

## 2022-09-27 ENCOUNTER — TREATMENT (OUTPATIENT)
Dept: PHYSICAL THERAPY | Facility: CLINIC | Age: 72
End: 2022-09-27

## 2022-09-27 DIAGNOSIS — M41.122 ADOLESCENT IDIOPATHIC SCOLIOSIS OF CERVICAL SPINE: ICD-10-CM

## 2022-09-27 DIAGNOSIS — G89.29 CHRONIC LOW BACK PAIN, UNSPECIFIED BACK PAIN LATERALITY, UNSPECIFIED WHETHER SCIATICA PRESENT: ICD-10-CM

## 2022-09-27 DIAGNOSIS — R26.81 UNSTEADY GAIT: ICD-10-CM

## 2022-09-27 DIAGNOSIS — M81.0 AGE-RELATED OSTEOPOROSIS WITHOUT CURRENT PATHOLOGICAL FRACTURE: Primary | ICD-10-CM

## 2022-09-27 DIAGNOSIS — M54.50 CHRONIC LOW BACK PAIN, UNSPECIFIED BACK PAIN LATERALITY, UNSPECIFIED WHETHER SCIATICA PRESENT: ICD-10-CM

## 2022-09-27 LAB — BACTERIA SPEC AEROBE CULT: NO GROWTH

## 2022-09-27 PROCEDURE — 97140 MANUAL THERAPY 1/> REGIONS: CPT

## 2022-09-27 PROCEDURE — 97110 THERAPEUTIC EXERCISES: CPT

## 2022-09-28 ENCOUNTER — TELEPHONE (OUTPATIENT)
Dept: FAMILY MEDICINE CLINIC | Facility: CLINIC | Age: 72
End: 2022-09-28

## 2022-09-28 NOTE — TELEPHONE ENCOUNTER
PATIENT CALLING IN REGARDS TO MEDICATION  ciprofloxacin (Cipro) 250 MG tablet    SHE IS WANTING TO KNOW WHAT IT IS FOR.    PLEASE CALL [phone]

## 2022-09-28 NOTE — TELEPHONE ENCOUNTER
The medication was sent in for her at her office visit this week for concern of UTI.  Since her urine culture came back with no bacteria growth she was advised she didn't need to take it (Rebecca notified her).

## 2022-09-28 NOTE — PROGRESS NOTES
Physical Therapy Treatment  Note     Diagnosis Plan   1. Age-related osteoporosis without current pathological fracture     2. Unsteady gait     3. Adolescent idiopathic scoliosis of cervical spine     4. Chronic low back pain, unspecified back pain laterality, unspecified whether sciatica present         VISIT#: 2    Subjective   Charleen Barnett reports:  She missed several therapy appts since initial evaluation secondary to not feeling well, dizziness, fatigue and weakness.       Objective     See Exercise, Manual, and Modality Logs for complete treatment.     Patient Education:      STG's 3-4 weeks  1. Pt will be (I) with HEP handout for self management of impairments   2. Able to ambulate x 10 min over ground and 10 minutes of standing without limitation due to pain or fatigue for ability to complete household cooking and cleaning   3. Pt will demonstrate (I) with balance over uneven surfaces such as foam, grass without overt LOB or physical assistance required for decreased risk of fall    LTG's : 6- 8 weeks  1. Independent with updated HEP and able to manage condition independently.  2.  Pt will demonstrate hip and abdominal strength improvement by 1 muscle grade of greater via MMT for improved postural support, stability and improved overall function.   3. Significant improvement on outcome measure, indicating 30 % impairment or less via Oswestry.  4. DGI score > 19 indicating safe ambulation, decreased risk of fall and injury with functional mobility   Assessment/Plan  Due to primary c/o neck and jaw pain and UE weakness, initiated manual techniques and strengthening to address this date.  Patient reported improved mobility of pain and improved comfort of (R) side of jaw and neck following STM and manual stretching.  Pt reported dizziness when transition in bed.  Patient may benefit from assessment for BPPV and canalith repositioning if appropriate.   Progress per Plan of Care            Timed:         Manual  Therapy:    25     mins  02587;     Therapeutic Exercise:    20     mins  10972;     Neuromuscular Jeancarlos:    0    mins  02323;    Therapeutic Activity:     0     mins  94343;     Gait Trainin     mins  97509;     Ultrasound:     0     mins  36412;    Ionto                               0    mins   16102  Self Care                       0     mins   61833  Canalith Repos               0    mins  4209  Aquatic                          0     mins 98507    Un-Timed:  Electrical Stimulation:    0     mins  06445 ( );  Dry Needling     0     mins self-pay  Traction     0     mins 81801  Low Eval     0     Mins  98704  Mod Eval     0     Mins  28768  High Eval                       0     Mins  73982  Re-Eval                           0    mins  55919    Timed Treatment:   45   mins   Total Treatment:     45   mins    Mary Ramos, PT PT, DPT, [id number]

## 2022-09-29 ENCOUNTER — TREATMENT (OUTPATIENT)
Dept: PHYSICAL THERAPY | Facility: CLINIC | Age: 72
End: 2022-09-29

## 2022-09-29 DIAGNOSIS — M81.0 AGE-RELATED OSTEOPOROSIS WITHOUT CURRENT PATHOLOGICAL FRACTURE: Primary | ICD-10-CM

## 2022-09-29 DIAGNOSIS — R26.81 UNSTEADY GAIT: ICD-10-CM

## 2022-09-29 DIAGNOSIS — G89.29 CHRONIC LOW BACK PAIN, UNSPECIFIED BACK PAIN LATERALITY, UNSPECIFIED WHETHER SCIATICA PRESENT: ICD-10-CM

## 2022-09-29 DIAGNOSIS — M41.122 ADOLESCENT IDIOPATHIC SCOLIOSIS OF CERVICAL SPINE: ICD-10-CM

## 2022-09-29 DIAGNOSIS — M54.50 CHRONIC LOW BACK PAIN, UNSPECIFIED BACK PAIN LATERALITY, UNSPECIFIED WHETHER SCIATICA PRESENT: ICD-10-CM

## 2022-09-29 PROCEDURE — 97140 MANUAL THERAPY 1/> REGIONS: CPT

## 2022-09-29 PROCEDURE — 95992 CANALITH REPOSITIONING PROC: CPT

## 2022-09-29 PROCEDURE — 97110 THERAPEUTIC EXERCISES: CPT

## 2022-10-03 ENCOUNTER — TREATMENT (OUTPATIENT)
Dept: PHYSICAL THERAPY | Facility: CLINIC | Age: 72
End: 2022-10-03

## 2022-10-03 DIAGNOSIS — M81.0 AGE-RELATED OSTEOPOROSIS WITHOUT CURRENT PATHOLOGICAL FRACTURE: Primary | ICD-10-CM

## 2022-10-03 DIAGNOSIS — M41.122 ADOLESCENT IDIOPATHIC SCOLIOSIS OF CERVICAL SPINE: ICD-10-CM

## 2022-10-03 DIAGNOSIS — G89.29 CHRONIC LOW BACK PAIN, UNSPECIFIED BACK PAIN LATERALITY, UNSPECIFIED WHETHER SCIATICA PRESENT: ICD-10-CM

## 2022-10-03 DIAGNOSIS — R26.81 UNSTEADY GAIT: ICD-10-CM

## 2022-10-03 DIAGNOSIS — M54.50 CHRONIC LOW BACK PAIN, UNSPECIFIED BACK PAIN LATERALITY, UNSPECIFIED WHETHER SCIATICA PRESENT: ICD-10-CM

## 2022-10-03 PROCEDURE — 97110 THERAPEUTIC EXERCISES: CPT

## 2022-10-03 PROCEDURE — 97140 MANUAL THERAPY 1/> REGIONS: CPT

## 2022-10-03 PROCEDURE — 95992 CANALITH REPOSITIONING PROC: CPT

## 2022-10-03 NOTE — PROGRESS NOTES
Physical Therapy Treatment  Note     Diagnosis Plan   1. Age-related osteoporosis without current pathological fracture     2. Unsteady gait     3. Adolescent idiopathic scoliosis of cervical spine     4. Chronic low back pain, unspecified back pain laterality, unspecified whether sciatica present         VISIT#: 4    Subjective   Charleen Barnett reports:  She felt really good for about 2 days following previous treatment.  Pt reported decreased dizziness and vertigo and improved stability of gait.  Patient reported symptoms returned over the weekend.        Objective   - (L) modified [place] Hallpike performed: torsional nystagmus noted less than 10 seconds.  No dizziness or vertigo reported in test position  - (R) modified [place] Hallpike illicited more overt, longer lasting torsional nystagmus with symptoms reported  - Epley performed to address (R) posterior canal BPPV  - (R) rest of Orte[place] Hallpike with continued nystagmus noted   - 2nd trial of Epley    See Exercise, Manual, and Modality Logs for complete treatment.     Patient Education:      STG's 3-4 weeks  1. Pt will be (I) with HEP handout for self management of impairments   2. Able to ambulate x 10 min over ground and 10 minutes of standing without limitation due to pain or fatigue for ability to complete household cooking and cleaning   3. Pt will demonstrate (I) with balance over uneven surfaces such as foam, grass without overt LOB or physical assistance required for decreased risk of fall    LTG's : 6- 8 weeks  1. Independent with updated HEP and able to manage condition independently.  2.  Pt will demonstrate hip and abdominal strength improvement by 1 muscle grade of greater via MMT for improved postural support, stability and improved overall function.   3. Significant improvement on outcome measure, indicating 30 % impairment or less via Oswestry.  4. DGI score > 19 indicating safe ambulation, decreased risk of fall and injury with functional mobility    Assessment/Plan  (R) posterior BPPV treated this date as appears more symptomatic versus (L).  + (B) modified [place] Hallpike observed for nystagmus but vertigo/dizziness reported in (R) test position.  Patient reported feeling a slight headache at conclusion of treatment but felt safe to drive.   Progress per Plan of Care            Timed:         Manual Therapy:    15     mins  00923;     Therapeutic Exercise:    10     mins  68023;     Neuromuscular Jeancarlos:    0    mins  29660;    Therapeutic Activity:     0     mins  22545;     Gait Trainin     mins  09860;     Ultrasound:     0     mins  60712;    Ionto                               0    mins   64594  Self Care                       0     mins   96804  Canalith Repos              20    mins  4209  Aquatic                          0     mins 78125    Un-Timed:  Electrical Stimulation:    0     mins  56259 ( );  Dry Needling     0     mins self-pay  Traction     0     mins 13098  Low Eval     0     Mins  85642  Mod Eval     0     Mins  29010  High Eval                       0     Mins  88095  Re-Eval                           0    mins  43338    Timed Treatment:   45   mins   Total Treatment:     45   mins    Mary Ramos, PT PT, DPT, [id number]

## 2022-10-10 ENCOUNTER — TREATMENT (OUTPATIENT)
Dept: PHYSICAL THERAPY | Facility: CLINIC | Age: 72
End: 2022-10-10

## 2022-10-10 DIAGNOSIS — R26.81 UNSTEADY GAIT: ICD-10-CM

## 2022-10-10 DIAGNOSIS — M41.122 ADOLESCENT IDIOPATHIC SCOLIOSIS OF CERVICAL SPINE: ICD-10-CM

## 2022-10-10 DIAGNOSIS — M81.0 AGE-RELATED OSTEOPOROSIS WITHOUT CURRENT PATHOLOGICAL FRACTURE: Primary | ICD-10-CM

## 2022-10-10 PROCEDURE — 97140 MANUAL THERAPY 1/> REGIONS: CPT

## 2022-10-10 PROCEDURE — 95992 CANALITH REPOSITIONING PROC: CPT

## 2022-10-10 PROCEDURE — 97112 NEUROMUSCULAR REEDUCATION: CPT

## 2022-10-10 NOTE — PROGRESS NOTES
Physical Therapy  Progress Note/Reasessment      Patient: Charleen Barnett   : 1950  Diagnosis/ICD-10 Code:  Age-related osteoporosis without current pathological fracture [M81.0]  Referring practitioner: LEVI Gee  Date of Initial Visit: Type: THERAPY  Noted: 2022  Today's Date: 10/10/2022  Patient seen for 5 sessions      Subjective:   Visit Diagnosis:    ICD-10-CM ICD-9-CM   1. Age-related osteoporosis without current pathological fracture  M81.0 733.01   2. Unsteady gait  R26.81 781.2   3. Adolescent idiopathic scoliosis of cervical spine  M41.122 737.30       Charleen Barnett reports: she was very dizzy while taking a shower prior to therapy  Subjective Questionnaire: Not assessed this date   Clinical Progress: unchanged  Home Program Compliance: No- inconsistent   Treatment has included: therapeutic exercise; manual therapy; canalith repositioning     Subjective   Patient reported she felt better for a few days following previous treatment     Objective   + (R) [place] Hallpike: for nystagmus and c/o vertigo : performed x 3 total trials.  Epley performed x 2, after trial 1 and 2 of Orte[place] Hallpike.  Nystagmus and vertigo reported during trial 1 and 2 of Ortega Hallpike.  3 rd of [place] Hallpike: no nystagmus or vertigo noted.       Assessment/Plan  Progress toward goals has been limited by compliance and motivation.  Also limited by dizziness and vertigo for which canalith repositioning has been performed.  Recommend continued therapy to address remaining deficits and to progress strengthening and balance activities to maximize function.   Progress toward previous goals: Not Met    Goals  STG's 3-4 weeks  1. Pt will be (I) with HEP handout for self management of impairments : INCONSISTENT, pt is not regularly performing HEP or exercise at home.   2. Able to ambulate x 10 min over ground and 10 minutes of standing without limitation due to pain or fatigue for ability to complete household cooking and  cleaning : NO CHANGE, pt reported she is limited in standing and walking tolerance by pain, motivation and impaired strength  3. Pt will demonstrate (I) with balance over uneven surfaces such as foam, grass without overt LOB or physical assistance required for decreased risk of fall: INCONSISTENT, pt reported she continues to have good and bad days in regards to dizziness, balance and function    LTG's : 6- 8 weeks  1. Independent with updated HEP and able to manage condition independently.  2.  Pt will demonstrate hip and abdominal strength improvement by 1 muscle grade of greater via MMT for improved postural support, stability and improved overall function.   3. Significant improvement on outcome measure, indicating 30 % impairment or less via Oswestry.  4. DGI score > 19 indicating safe ambulation, decreased risk of fall and injury with functional mobility     Short-term goals (STG): 0/3  Long-term goals (LTG):       Recommendations: Continue as planned  Timeframe: 1 month  Prognosis to achieve goals: fair    Timed:         Manual Therapy:    15     mins  58198;     Therapeutic Exercise:    0     mins  48544;     Neuromuscular Jeancarlos:    10   mins  16085;    Therapeutic Activity:     0     mins  03553;     Gait Trainin     mins  72554;     Ultrasound:     0     mins  71682;    Ionto                               0    mins   20727  Self Care                       0     mins   25895  Aquatic                          0     mins 30280      Un-Timed:  Electrical Stimulation:    0     mins  02239 ( );  Dry Needling     0     mins self-pay  Traction     0     mins 07532  Low Eval     0     Mins  65319  Mod Eval     0     Mins  48754  High Eval                       0     Mins  99591  Re-Eval                           0    mins  90813  Canalith Repositioning  20 mins   45109      Timed Treatment:   25   mins   Total Treatment:     45   mins      PT Signature: Mary Ramos PT  [place] License #: [id number]

## 2022-10-12 ENCOUNTER — TREATMENT (OUTPATIENT)
Dept: PHYSICAL THERAPY | Facility: CLINIC | Age: 72
End: 2022-10-12

## 2022-10-12 DIAGNOSIS — E03.9 ACQUIRED HYPOTHYROIDISM: ICD-10-CM

## 2022-10-12 DIAGNOSIS — R26.81 UNSTEADY GAIT: ICD-10-CM

## 2022-10-12 DIAGNOSIS — M41.122 ADOLESCENT IDIOPATHIC SCOLIOSIS OF CERVICAL SPINE: ICD-10-CM

## 2022-10-12 DIAGNOSIS — M81.0 AGE-RELATED OSTEOPOROSIS WITHOUT CURRENT PATHOLOGICAL FRACTURE: Primary | ICD-10-CM

## 2022-10-12 PROCEDURE — 97110 THERAPEUTIC EXERCISES: CPT

## 2022-10-12 PROCEDURE — 97112 NEUROMUSCULAR REEDUCATION: CPT

## 2022-10-12 RX ORDER — LEVOTHYROXINE SODIUM 0.03 MG/1
25 TABLET ORAL
Qty: 28 TABLET | Refills: 1 | Status: SHIPPED | OUTPATIENT
Start: 2022-10-12 | End: 2022-12-07

## 2022-10-12 RX ORDER — OXYBUTYNIN CHLORIDE 5 MG/1
TABLET ORAL
Qty: 56 TABLET | Refills: 1 | Status: SHIPPED | OUTPATIENT
Start: 2022-10-12 | End: 2022-12-07

## 2022-10-12 NOTE — PROGRESS NOTES
Physical Therapy Treatment  Note     Diagnosis Plan   1. Age-related osteoporosis without current pathological fracture        2. Unsteady gait        3. Adolescent idiopathic scoliosis of cervical spine            VISIT#: 6    Subjective   Charleen Anibal reports:  Vertigo is much improved.  Patient reported only one brief episode of dizziness lasting less than 5 seconds, pt denied nystagmus or vertigo since last treatment session.     Objective     See Exercise, Manual, and Modality Logs for complete treatment.     Patient Education:      STG's 3-4 weeks  1. Pt will be (I) with HEP handout for self management of impairments   2. Able to ambulate x 10 min over ground and 10 minutes of standing without limitation due to pain or fatigue for ability to complete household cooking and cleaning   3. Pt will demonstrate (I) with balance over uneven surfaces such as foam, grass without overt LOB or physical assistance required for decreased risk of fall    LTG's : 6- 8 weeks  1. Independent with updated HEP and able to manage condition independently.  2.  Pt will demonstrate hip and abdominal strength improvement by 1 muscle grade of greater via MMT for improved postural support, stability and improved overall function.   3. Significant improvement on outcome measure, indicating 30 % impairment or less via Oswestry.  4. DGI score > 19 indicating safe ambulation, decreased risk of fall and injury with functional mobility   Assessment/Plan  Added LE strengthening and balance activities this date.  Patient demonstrated sway on foam surface requiring CGA- min A for safety and steadying.  Patient denied dizziness throughout session, even with head turning activity on foam.   Intermittent seated rest period required secondary to fatigue.   Progress per Plan of Care            Timed:         Manual Therapy:    0     mins  64159;     Therapeutic Exercise:    30     mins  21772;     Neuromuscular Jeancarlos:    15    mins  58698;     Therapeutic Activity:     0     mins  79421;     Gait Trainin     mins  03183;     Ultrasound:     0     mins  11324;    Ionto                               0    mins   92402  Self Care                       0     mins   47436  Canalith Repos             0    mins  4209  Aquatic                          0     mins 09101    Un-Timed:  Electrical Stimulation:    0     mins  41211 ( );  Dry Needling     0     mins self-pay  Traction     0     mins 96872  Low Eval     0     Mins  21020  Mod Eval     0     Mins  92147  High Eval                       0     Mins  50486  Re-Eval                           0    mins  04468    Timed Treatment:   45   mins   Total Treatment:     45   mins    Mary Ramos, PT PT, DPT, [id number]

## 2022-10-17 ENCOUNTER — TREATMENT (OUTPATIENT)
Dept: PHYSICAL THERAPY | Facility: CLINIC | Age: 72
End: 2022-10-17

## 2022-10-17 DIAGNOSIS — R26.81 UNSTEADY GAIT: ICD-10-CM

## 2022-10-17 DIAGNOSIS — M41.122 ADOLESCENT IDIOPATHIC SCOLIOSIS OF CERVICAL SPINE: ICD-10-CM

## 2022-10-17 DIAGNOSIS — M81.0 AGE-RELATED OSTEOPOROSIS WITHOUT CURRENT PATHOLOGICAL FRACTURE: Primary | ICD-10-CM

## 2022-10-17 PROCEDURE — 97140 MANUAL THERAPY 1/> REGIONS: CPT

## 2022-10-17 PROCEDURE — 97110 THERAPEUTIC EXERCISES: CPT

## 2022-10-17 NOTE — PROGRESS NOTES
Physical Therapy Treatment  Note     Diagnosis Plan   1. Age-related osteoporosis without current pathological fracture        2. Unsteady gait        3. Adolescent idiopathic scoliosis of cervical spine            VISIT#: 7    Subjective   Charleen Barnett reports:  Vertigo is much improved.  Patient reported only one brief episode of dizziness with nystagmus which she said she was able to resolve with focusing on stationary target.      Objective     See Exercise, Manual, and Modality Logs for complete treatment.     Patient Education:      STG's 3-4 weeks  1. Pt will be (I) with HEP handout for self management of impairments   2. Able to ambulate x 10 min over ground and 10 minutes of standing without limitation due to pain or fatigue for ability to complete household cooking and cleaning   3. Pt will demonstrate (I) with balance over uneven surfaces such as foam, grass without overt LOB or physical assistance required for decreased risk of fall    LTG's : 6- 8 weeks  1. Independent with updated HEP and able to manage condition independently.  2.  Pt will demonstrate hip and abdominal strength improvement by 1 muscle grade of greater via MMT for improved postural support, stability and improved overall function.   3. Significant improvement on outcome measure, indicating 30 % impairment or less via Oswestry.  4. DGI score > 19 indicating safe ambulation, decreased risk of fall and injury with functional mobility   Assessment/Plan    Patient demonstrated sway on foam surface requiring CGA- min A for safety and steadying.  Patient denied dizziness throughout session, even with head turning activity on foam.     Perform manual techniques at neck to address ear and neck pain. Pt reported this is beneficial in symptom reduction.     Progress per Plan of Care            Timed:         Manual Therapy:    10    mins  14810;     Therapeutic Exercise:    25     mins  58440;     Neuromuscular Jeancarlos:    5    mins  21482;     Therapeutic Activity:     0     mins  54814;     Gait Trainin     mins  36194;     Ultrasound:     0     mins  00752;    Ionto                               0    mins   69010  Self Care                       0     mins   02715  Canalith Repos             0    mins  4209  Aquatic                          0     mins 93716    Un-Timed:  Electrical Stimulation:    0     mins  04617 ( );  Dry Needling     0     mins self-pay  Traction     0     mins 40996  Low Eval     0     Mins  94297  Mod Eval     0     Mins  44334  High Eval                       0     Mins  59206  Re-Eval                           0    mins  46806    Timed Treatment:   40   mins   Total Treatment:     40   mins    Mary Ramos, PT PT, DPT, [id number]       Cold/Sinus

## 2022-10-19 ENCOUNTER — TREATMENT (OUTPATIENT)
Dept: PHYSICAL THERAPY | Facility: CLINIC | Age: 72
End: 2022-10-19

## 2022-10-19 DIAGNOSIS — M81.0 AGE-RELATED OSTEOPOROSIS WITHOUT CURRENT PATHOLOGICAL FRACTURE: Primary | ICD-10-CM

## 2022-10-19 DIAGNOSIS — R26.81 UNSTEADY GAIT: ICD-10-CM

## 2022-10-19 PROCEDURE — 97140 MANUAL THERAPY 1/> REGIONS: CPT

## 2022-10-19 PROCEDURE — 97110 THERAPEUTIC EXERCISES: CPT

## 2022-10-19 PROCEDURE — 97112 NEUROMUSCULAR REEDUCATION: CPT

## 2022-10-19 NOTE — PROGRESS NOTES
Physical Therapy Treatment  Note     Diagnosis Plan   1. Age-related osteoporosis without current pathological fracture        2. Unsteady gait            VISIT#: 8    Subjective   Charleen Barnett reports:  No recent episodes of dizziness or vertigo reported for approx 1 week.  Patient reported she veers a bit back and forth while walking at home but believes it is more related to vision than balance or vestibular issues at this time.  Patient reported she is scheduled for cataract surgery in the near future.    Objective     See Exercise, Manual, and Modality Logs for complete treatment.     Patient Education:      STG's 3-4 weeks  1. Pt will be (I) with HEP handout for self management of impairments   2. Able to ambulate x 10 min over ground and 10 minutes of standing without limitation due to pain or fatigue for ability to complete household cooking and cleaning   3. Pt will demonstrate (I) with balance over uneven surfaces such as foam, grass without overt LOB or physical assistance required for decreased risk of fall    LTG's : 6- 8 weeks  1. Independent with updated HEP and able to manage condition independently.  2.  Pt will demonstrate hip and abdominal strength improvement by 1 muscle grade of greater via MMT for improved postural support, stability and improved overall function.   3. Significant improvement on outcome measure, indicating 30 % impairment or less via Oswestry.  4. DGI score > 19 indicating safe ambulation, decreased risk of fall and injury with functional mobility     Assessment/Plan    Patient demonstrated sway on foam surface requiring CGA- min A for safety and steadying.  Patient denied dizziness throughout session, even with head turning activity on foam.  Added balance activities this date with increased challenge and more frequent LOB with (L) step out/reach out versus (R) .     Perform manual techniques at neck to address ear and neck pain. Pt reported this is beneficial in symptom  reduction.  Patient reported she is better able to move her neck and reduction in ear pain following performance of manual stretching and tissue mobilization.     Progress per Plan of Care            Timed:         Manual Therapy:    10    mins  20705;     Therapeutic Exercise:    25     mins  66650;     Neuromuscular Jeancarlos:    10    mins  40324;    Therapeutic Activity:     0     mins  43484;     Gait Trainin     mins  94009;     Ultrasound:     0     mins  07507;    Ionto                               0    mins   25888  Self Care                       0     mins   80942  Canalith Repos             0    mins  4209  Aquatic                          0     mins 64695    Un-Timed:  Electrical Stimulation:    0     mins  59655 ( );  Dry Needling     0     mins self-pay  Traction     0     mins 65942  Low Eval     0     Mins  44528  Mod Eval     0     Mins  76292  High Eval                       0     Mins  05263  Re-Eval                           0    mins  47688    Timed Treatment:   45   mins   Total Treatment:     45   mins    Mary Ramos, PT PT, DPT, [id number]

## 2022-10-24 ENCOUNTER — TREATMENT (OUTPATIENT)
Dept: PHYSICAL THERAPY | Facility: CLINIC | Age: 72
End: 2022-10-24

## 2022-10-24 DIAGNOSIS — M41.122 ADOLESCENT IDIOPATHIC SCOLIOSIS OF CERVICAL SPINE: ICD-10-CM

## 2022-10-24 DIAGNOSIS — R42 VERTIGO: ICD-10-CM

## 2022-10-24 DIAGNOSIS — R26.81 UNSTEADY GAIT: ICD-10-CM

## 2022-10-24 DIAGNOSIS — M81.0 AGE-RELATED OSTEOPOROSIS WITHOUT CURRENT PATHOLOGICAL FRACTURE: Primary | ICD-10-CM

## 2022-10-24 PROCEDURE — 97110 THERAPEUTIC EXERCISES: CPT

## 2022-10-24 PROCEDURE — 95992 CANALITH REPOSITIONING PROC: CPT

## 2022-10-24 NOTE — PROGRESS NOTES
"Physical Therapy Treatment  Note     Diagnosis Plan   1. Age-related osteoporosis without current pathological fracture        2. Unsteady gait        3. Adolescent idiopathic scoliosis of cervical spine        4. Vertigo  R42     Addendum: added vertigo diagnosis as treated this date    VISIT#: 9    Subjective   Charleen Barnett reports:  She has experienced 2 episodes of vertigo and \" nystagmus\" since last treatment session.  Patient reported this episodes occurred while rolling over in bed and lasted less than 60 seconds.     Objective     See Exercise, Manual, and Modality Logs for complete treatment.     Patient Education:      STG's 3-4 weeks  1. Pt will be (I) with HEP handout for self management of impairments   2. Able to ambulate x 10 min over ground and 10 minutes of standing without limitation due to pain or fatigue for ability to complete household cooking and cleaning   3. Pt will demonstrate (I) with balance over uneven surfaces such as foam, grass without overt LOB or physical assistance required for decreased risk of fall    LTG's : 6- 8 weeks  1. Independent with updated HEP and able to manage condition independently.  2.  Pt will demonstrate hip and abdominal strength improvement by 1 muscle grade of greater via MMT for improved postural support, stability and improved overall function.   3. Significant improvement on outcome measure, indicating 30 % impairment or less via Oswestry.  4. DGI score > 19 indicating safe ambulation, decreased risk of fall and injury with functional mobility     Assessment/Plan    Due to report of recent vertigo and nystagmus, (B) [place] Hallpike performed.  + (R) test observed indicating posterior canal BPPV.  Epley performed and subsequent (R) [place] Hallpike was negative for report of symptoms or observation of nystagmus.      Certain exercises held this date due to time constraints due to reassessment of BPPV and canalith repositioning.   Patient denied dizziness at " conclusion and felt safe to drive.   Progress per Plan of Care            Timed:         Manual Therapy:    0    mins  70952;     Therapeutic Exercise:    25     mins  88958;     Neuromuscular Jeancarlos:    0    mins  88016;    Therapeutic Activity:     0     mins  89890;     Gait Trainin     mins  19163;     Ultrasound:     0     mins  63888;    Ionto                               0    mins   57059  Self Care                       0     mins   35039    Aquatic                          0     mins 06562    Un-Timed:  Electrical Stimulation:    0     mins  80933 ( );  Dry Needling     0     mins self-pay  Traction     0     mins 68349  Low Eval     0     Mins  63857  Mod Eval     0     Mins  21329  High Eval                       0     Mins  27473  Re-Eval                           0    mins  47365    Canalith Repos            15    mins  26991    Timed Treatment:   25   mins   Total Treatment:     40   mins    Mary Ramos, PT PT, DPT, [id number]

## 2022-10-26 ENCOUNTER — TREATMENT (OUTPATIENT)
Dept: PHYSICAL THERAPY | Facility: CLINIC | Age: 72
End: 2022-10-26

## 2022-10-26 DIAGNOSIS — R26.81 UNSTEADY GAIT: ICD-10-CM

## 2022-10-26 DIAGNOSIS — M41.122 ADOLESCENT IDIOPATHIC SCOLIOSIS OF CERVICAL SPINE: ICD-10-CM

## 2022-10-26 DIAGNOSIS — M81.0 AGE-RELATED OSTEOPOROSIS WITHOUT CURRENT PATHOLOGICAL FRACTURE: Primary | ICD-10-CM

## 2022-10-26 PROCEDURE — 97112 NEUROMUSCULAR REEDUCATION: CPT

## 2022-10-26 PROCEDURE — 97140 MANUAL THERAPY 1/> REGIONS: CPT

## 2022-10-26 PROCEDURE — 97110 THERAPEUTIC EXERCISES: CPT

## 2022-10-26 NOTE — PROGRESS NOTES
"Physical Therapy Treatment  Note     Diagnosis Plan   1. Age-related osteoporosis without current pathological fracture        2. Unsteady gait        3. Adolescent idiopathic scoliosis of cervical spine            VISIT#: 10    Subjective   Charleen Barnett reports:  She was sore \" everywhere\" following last treatment.  Patient reported no episodes of vertigo or nystagmus following previous treatment     Objective     See Exercise, Manual, and Modality Logs for complete treatment.     Patient Education:      STG's 3-4 weeks  1. Pt will be (I) with HEP handout for self management of impairments   2. Able to ambulate x 10 min over ground and 10 minutes of standing without limitation due to pain or fatigue for ability to complete household cooking and cleaning   3. Pt will demonstrate (I) with balance over uneven surfaces such as foam, grass without overt LOB or physical assistance required for decreased risk of fall    LTG's : 6- 8 weeks  1. Independent with updated HEP and able to manage condition independently.  2.  Pt will demonstrate hip and abdominal strength improvement by 1 muscle grade of greater via MMT for improved postural support, stability and improved overall function.   3. Significant improvement on outcome measure, indicating 30 % impairment or less via Oswestry.  4. DGI score > 19 indicating safe ambulation, decreased risk of fall and injury with functional mobility     Assessment/Plan   Patient is limited in standing duration due to back pain.  Demonstrated improved performance with balance activities on foam, requiring less physical assistance for steadying. Required increased physical assistance during walking in hallway with head turning due to deviation from straight path and LOB.      Progress per Plan of Care            Timed:         Manual Therapy:    15    mins  39327;     Therapeutic Exercise:    15     mins  10812;     Neuromuscular Jeancarlos:    15    mins  85811;    Therapeutic Activity:     0  "    mins  72548;     Gait Trainin     mins  02399;     Ultrasound:     0     mins  27061;    Ionto                               0    mins   70617  Self Care                       0     mins   15166    Aquatic                          0     mins 86157    Un-Timed:  Electrical Stimulation:    0     mins  23669 ( );  Dry Needling     0     mins self-pay  Traction     0     mins 23997  Low Eval     0     Mins  54760  Mod Eval     0     Mins  81358  High Eval                       0     Mins  91668  Re-Eval                           0    mins  34143    Canalith Repos            0   mins  32778    Timed Treatment:   45   mins   Total Treatment:     45   mins    Mary Ramos, PT PT, DPT, [id number]

## 2022-11-03 ENCOUNTER — OFFICE VISIT (OUTPATIENT)
Dept: CARDIOLOGY | Facility: CLINIC | Age: 72
End: 2022-11-03

## 2022-11-03 VITALS
DIASTOLIC BLOOD PRESSURE: 81 MMHG | OXYGEN SATURATION: 97 % | BODY MASS INDEX: 41.27 KG/M2 | HEIGHT: 58 IN | HEART RATE: 65 BPM | SYSTOLIC BLOOD PRESSURE: 135 MMHG | WEIGHT: 196.6 LBS

## 2022-11-03 DIAGNOSIS — E78.2 MIXED HYPERLIPIDEMIA: ICD-10-CM

## 2022-11-03 DIAGNOSIS — Z98.61 STATUS POST PERCUTANEOUS TRANSLUMINAL CORONARY ANGIOPLASTY: Primary | ICD-10-CM

## 2022-11-03 DIAGNOSIS — R55 NEAR SYNCOPE: ICD-10-CM

## 2022-11-03 DIAGNOSIS — R07.89 CHEST DISCOMFORT: ICD-10-CM

## 2022-11-03 DIAGNOSIS — I10 ESSENTIAL HYPERTENSION: ICD-10-CM

## 2022-11-03 PROCEDURE — 99214 OFFICE O/P EST MOD 30 MIN: CPT | Performed by: INTERNAL MEDICINE

## 2022-11-03 PROCEDURE — 93000 ELECTROCARDIOGRAM COMPLETE: CPT | Performed by: INTERNAL MEDICINE

## 2022-11-03 NOTE — PROGRESS NOTES
Encounter Date:11/03/2022  Last seen 5/3/2022    Patient ID: Charleen Barnett is a 72 y.o. female.    Chief Complaint:    Status post stent  Hypertension  Dyslipidemia  History of SVT.     History of present illness    Since I have last seen, the patient has been without any chest discomfort ,shortness of breath, palpitations, dizziness or syncope.  Denies having any headache ,abdominal pain ,nausea, vomiting , diarrhea constipation, loss of weight or loss of appetite.  Denies having any excessive bruising ,hematuria or blood in the stool.    Review of all systems negative except as indicated.    Reviewed ROS.  Assessment and plan  ////////////////////////  Impression  ==================    - status post stent to LAD 06/10/2014   status post subendocardial myocardial infarction prior to stent placement  Status post stent to LAD 5/29/2021.     Cardiac catheterization 5/29/2021 revealed  Left ventricle size and contractility normal with ejection fraction of 60%.  Left main coronary artery normal.  Left anterior descending artery has previously placed stent.  Left anterior descending artery has 80 to 90% disease just distal to the diagonal branch.  (Patient to have FFR).  IFR of 0.86  Diagonal branch has diffuse 50 to 60% disease.  Circumflex coronary artery is normal except for 30 to 40% marginal branch disease.  Right coronary artery is a large and dominant vessel and is normal.  Right common iliac external iliac and femoral arteries are normal.     -Right-sided chest pain-atypical.-Improved  EKG showed no acute changes.  Troponin levels are negative.     -Elevated D-dimer  CT scan of the chest is negative for pulmonary embolus.     Stress Cardiolite test-- 5/17/2021.  Echocardiogram-normal except for left atrial enlargement 5/17/2021.      -palpitations likely SVT.  -improved on atenolol     Echocardiogram showed mild aortic regurgitation with normal left ventricular function.  5/18/2017     - hypertension  dyslipidemia sleep apnea asthma fibromyalgia GERD and gout.     - family history of coronary artery disease     - allergy to penicillin sulfa and tetracycline     =======  Plan  =======   Status post stent to LAD 5/29/2021 (abnormal IFR).  Patient is not having any angina pectoris or congestive heart failure.  EKG showed sinus rhythm without any ischemic changes-  11/3/2022.  Patient is off Plavix.    Hypertension-well-controlled-  135/81  Continue metoprolol     Dyslipidemia-continue atorvastatin      Medications were reviewed and updated.     Follow-up in the office in 6 months.     Further plan will depend on patient's progress  //////////////////////////////                    Diagnosis Plan   1. Status post percutaneous transluminal coronary angioplasty  ECG 12 Lead      2. Mixed hyperlipidemia        3. Chest discomfort        4. Near syncope        5. Essential hypertension        LAB RESULTS (LAST 7 DAYS)    CBC        BMP        CMP         BNP        TROPONIN        CoAg        Creatinine Clearance  CrCl cannot be calculated (Patient's most recent lab result is older than the maximum 30 days allowed.).    ABG        Radiology  No radiology results for the last day                The following portions of the patient's history were reviewed and updated as appropriate: allergies, current medications, past family history, past medical history, past social history, past surgical history and problem list.    Review of Systems   Constitutional: Negative for malaise/fatigue.   Cardiovascular: Negative for chest pain, dyspnea on exertion, leg swelling and palpitations.   Respiratory: Negative for cough and shortness of breath.    Gastrointestinal: Negative for abdominal pain, nausea and vomiting.   Neurological: Negative for dizziness, focal weakness, headaches, light-headedness and numbness.   All other systems reviewed and are negative.        Current Outpatient Medications:   •  albuterol sulfate  (90  Base) MCG/ACT inhaler, Inhale 1 puff Every 6 (Six) Hours As Needed for Wheezing or Shortness of Air., Disp: , Rfl:   •  aspirin 81 MG tablet, Take 81 mg by mouth Daily., Disp: , Rfl:   •  atorvastatin (LIPITOR) 10 MG tablet, TAKE 1 TABLET BY MOUTH DAILY., Disp: 90 tablet, Rfl: 1  •  Biotin 10 MG tablet, Take 10 mg by mouth Daily., Disp: , Rfl:   •  buPROPion XL (WELLBUTRIN XL) 150 MG 24 hr tablet, TAKE 1 TABLET BY MOUTH EVERY MORNING, Disp: 90 tablet, Rfl: 2  •  busPIRone (BUSPAR) 7.5 MG tablet, TAKE 1 TABLET BY MOUTH TWICE DAILY, Disp: 180 tablet, Rfl: 0  •  citalopram (CeleXA) 20 MG tablet, TAKE 1 TABLET BY MOUTH EVERY DAY, Disp: 90 tablet, Rfl: 2  •  gabapentin (NEURONTIN) 400 MG capsule, Take 1 capsule by mouth every night at bedtime., Disp: 90 capsule, Rfl: 3  •  hydrOXYzine (ATARAX) 10 MG tablet, , Disp: , Rfl:   •  lansoprazole (PREVACID) 15 MG capsule, Take 15 mg by mouth Daily., Disp: , Rfl:   •  levothyroxine (SYNTHROID, LEVOTHROID) 25 MCG tablet, TAKE 1 TABLET BY MOUTH EVERY MORNING, Disp: 28 tablet, Rfl: 1  •  metoprolol succinate XL (TOPROL-XL) 25 MG 24 hr tablet, TAKE 1 TABLET BY MOUTH EVERY MORNING, Disp: 90 tablet, Rfl: 2  •  montelukast (SINGULAIR) 10 MG tablet, TAKE 1 TABLET BY MOUTH EVERY DAY, Disp: 90 tablet, Rfl: 1  •  oxybutynin (DITROPAN) 5 MG tablet, TAKE 1 TABLET BY MOUTH TWICE DAILY, Disp: 56 tablet, Rfl: 1  •  triamcinolone (KENALOG) 0.1 % cream, Apply 1 application topically to the appropriate area as directed 2 (Two) Times a Day., Disp: 30 g, Rfl: 0  •  vitamin D (ERGOCALCIFEROL) 1.25 MG (79538 UT) capsule capsule, TAKE 1 CAPSULE BY MOUTH EVERY WEEK, Disp: 13 capsule, Rfl: 1  •  VITAMIN E PO, Take 1 tablet by mouth Daily., Disp: , Rfl:     Current Facility-Administered Medications:   •  denosumab (PROLIA) syringe 60 mg, 60 mg, Subcutaneous, Q6 Months, Dede Anders PA, 60 mg at 08/03/22 1345    Allergies   Allergen Reactions   • Hydrocodone Hives   • Chlorpheniramine-Phenylephrine  Rash   • Penicillins Rash   • Sulfa Antibiotics Hives and Rash   • Tetracycline Rash   • Warfarin Rash       Family History   Problem Relation Age of Onset   • No Known Problems Mother    • Heart attack Father    • Cancer Father            • No Known Problems Sister    • No Known Problems Brother    • No Known Problems Maternal Aunt    • No Known Problems Maternal Uncle    • No Known Problems Paternal Aunt    • No Known Problems Paternal Uncle    • Heart attack Maternal Grandmother    • Heart attack Maternal Grandfather    • Heart attack Paternal Grandmother         Type 1 diabetic   • Heart attack Paternal Grandfather    • Heart disease Sister    • Hypertension Sister    • Hyperlipidemia Brother    • Hypertension Brother    • Heart attack Brother    • Hyperlipidemia Brother    • Hypertension Brother    • Heart attack Brother    • Hyperlipidemia Brother    • Hypertension Brother    • Heart attack Brother    • Anemia Neg Hx    • Arrhythmia Neg Hx    • Asthma Neg Hx    • Clotting disorder Neg Hx    • Fainting Neg Hx    • Heart failure Neg Hx        Past Surgical History:   Procedure Laterality Date   • BLADDER SURGERY     • CARDIAC CATHETERIZATION N/A 2021    Procedure: Left Heart Cath and coronary angiogram;  Surgeon: Yazmin Matta MD;  Location: [facility] CATH INVASIVE LOCATION;  Service: Cardiovascular;  Laterality: N/A;   • CARDIAC CATHETERIZATION  2021    Procedure: Functional Flow Reserve (iFR);  Surgeon: Bryan Patel MD;  Location: [facility] CATH INVASIVE LOCATION;  Service: Cardiology;;   • CARDIAC CATHETERIZATION N/A 2021    Procedure: Percutaneous Coronary Intervention;  Surgeon: Bryan Patel MD;  Location: [facility] CATH INVASIVE LOCATION;  Service: Cardiology;  Laterality: N/A;   • CARDIAC CATHETERIZATION N/A 2021    Procedure: Stent FARNCOISE coronary;  Surgeon: Bryan Patel MD;  Location: [facility] CATH INVASIVE LOCATION;  Service: Cardiology;   Laterality: N/A;   • CAROTID STENT     • CHOLECYSTECTOMY     • COLONOSCOPY  ??   • CORONARY STENT PLACEMENT  ??   • FRACTURE SURGERY     • HYSTERECTOMY     • INGUINAL HERNIA REPAIR     • JOINT REPLACEMENT  L-knee   • KNEE SURGERY     • OTHER SURGICAL HISTORY      STENT   • SUBTOTAL HYSTERECTOMY         Past Medical History:   Diagnosis Date   • Allergic    • Anemia    • Anxiety    • Arthritis    • Asthma 2020   • Ramos's esophagus 02/15/2018   • Cancer (HCC) Carcinoma Insitu cervical   • Cataract    • Cholelithiasis Removed    • Coronary artery disease    • Depression 2018   • Fibromyalgia 2011   • Fibromyalgia, primary  I think   • GERD (gastroesophageal reflux disease)    • Gout 2020   • Headache    • HL (hearing loss) Age related   • Hyperlipidemia    • Hypertension    • Hypothyroidism    • Joint pain    • Low back pain 's   • Obesity Lifelong since puberty   • Obstructive sleep apnea 10/16/2014   • Osteopenia Five years appx   • Osteoporosis     fosamax(SE), on prolia(4/10/18-21, 22)   • Otitis media    • Pedal edema 10/22/2018   • Pneumonia    • Presence of left artificial knee joint 2019   • Scoliosis    • Seasonal allergies    • ST elevation (STEMI) myocardial infarction ([facility]) 2020   • Status post percutaneous transluminal coronary angioplasty 06/10/2014   • Substance abuse ([facility])    • Tachycardia    • Urge incontinence 2018   • Urinary tract infection    • Visual impairment Catsracts   • Vitamin D deficiency 2015       Family History   Problem Relation Age of Onset   • No Known Problems Mother    • Heart attack Father    • Cancer Father            • No Known Problems Sister    • No Known Problems Brother    • No Known Problems Maternal Aunt    • No Known Problems Maternal Uncle    • No Known Problems Paternal Aunt    • No Known Problems Paternal Uncle    • Heart attack Maternal Grandmother   "  • Heart attack Maternal Grandfather    • Heart attack Paternal Grandmother         Type 1 diabetic   • Heart attack Paternal Grandfather    • Heart disease Sister    • Hypertension Sister    • Hyperlipidemia Brother    • Hypertension Brother    • Heart attack Brother    • Hyperlipidemia Brother    • Hypertension Brother    • Heart attack Brother    • Hyperlipidemia Brother    • Hypertension Brother    • Heart attack Brother    • Anemia Neg Hx    • Arrhythmia Neg Hx    • Asthma Neg Hx    • Clotting disorder Neg Hx    • Fainting Neg Hx    • Heart failure Neg Hx        Social History     Socioeconomic History   • Marital status:    Tobacco Use   • Smoking status: Never   • Smokeless tobacco: Never   • Tobacco comments:     Parents and husbands heavy smokers   Vaping Use   • Vaping Use: Never used   Substance and Sexual Activity   • Alcohol use: Not Currently     Comment: Recovery since 1994 drank from [age]   • Drug use: Not Currently     Frequency: 1.0 times per week     Types: Amphetamines     Comment: Short term use/over use diet pills in the 70's   • Sexual activity: Defer           ECG 12 Lead    Date/Time: 11/3/2022 4:20 PM  Performed by: Yazmin Matta MD  Authorized by: Yazmin Matta MD   Comparison: compared with previous ECG   Similar to previous ECG  Comparison to previous ECG: Normal sinus rhythm normal ECG 65/min normal axis normal intervals no ectopy no significant change from 5/3/2022                Objective:       Physical Exam    /81 (BP Location: Right arm, Patient Position: Sitting, Cuff Size: Adult)   Pulse 65   Ht 147.3 cm (58\")   Wt 89.2 kg (196 lb 9.6 oz)   SpO2 97%   BMI 41.09 kg/m²   The patient is alert, oriented and in no distress.    Vital signs as noted above.    Head and neck revealed no carotid bruits or jugular venous distension.  No thyromegaly or lymphadenopathy is present.    Lungs clear.  No wheezing.  Breath sounds are normal bilaterally.    Heart normal " first and second heart sounds.  No murmur..  No pericardial rub is present.  No gallop is present.    Abdomen soft and nontender.  No organomegaly is present.    Extremities revealed good peripheral pulses without any pedal edema.    Skin warm and dry.    Musculoskeletal system is grossly normal.    CNS grossly normal.    Reviewed and updated.

## 2022-11-09 RX ORDER — MONTELUKAST SODIUM 10 MG/1
TABLET ORAL
Qty: 90 TABLET | Refills: 2 | Status: SHIPPED | OUTPATIENT
Start: 2022-11-09

## 2022-11-09 RX ORDER — ATORVASTATIN CALCIUM 10 MG/1
10 TABLET, FILM COATED ORAL DAILY
Qty: 90 TABLET | Refills: 2 | Status: SHIPPED | OUTPATIENT
Start: 2022-11-09

## 2022-11-11 NOTE — PROGRESS NOTES
Subjective: dizziness    Patient ID: Charleen Barnett is a 72 y.o. female.    CHIEF COMPLAINT: imbalance   History of Present Illness Ms. Barnett is a very pleasant 72-year-old  female with a BMI of 40.96 who presented alone for an evaluation of imbalance.  She was referred as a new patient by Dr. Her for dizziness.  The patient states she was having some vertigo but has done vestibular rehab and the vertigo has resolved.  She primarily complains of getting up and feeling lightheaded and walking down the ibarra at night bouncing off the walls.      The patient has a history of: Scoliosis, cervical radiculopathy and lumbar radiculopathy related to scoliosis, is followed by orthopedic surgeon.  She also has a history of neuropathy in both feet, arthritis, fibromyalgia, artificial left knee, osteoarthritis of both knees, KAYLIE, edema and dyspnea, hyperglycemia, morbidly obese, obesity, hypothyroidism, chest pain, coronary artery bypass graft, ST elevation, primary hypertension, mixed hyperlipidemia and unstable angina.      Orthostatic testing in our clinic today:  Orthostatic Hypotension positive   Laying       153/86  Sitting       144/79  Standing   120/75    Orthostatic laying to standing Systolic 33 point decrease   Orthostatic Laying to Standing Diastolic 11 point decrease          Vertigo  Onset: 6 months ago  Quality: Off balance with spinning sensation/vertigo elicited when turning her head to the right or left quickly  Severity: Can be very severe  Duration: 2 minuts  Frequency: when ever she stands from sitting position  Timing: Varies  context: Turning her head makes it worse  Worsening factors: standing/sitting/lying to standing  Alleviating factors: Vestibular rehab stopped all vertigo's symptoms  The patient states she does still feel off balance when she gets up and when she walks too fast or too long.      The following portions of the patient's history were reviewed and updated as  appropriate: allergies, current medications, past family history, past medical history, past social history, past surgical history and problem list.      Family History   Problem Relation Age of Onset   • No Known Problems Mother    • Heart attack Father    • Cancer Father            • No Known Problems Sister    • No Known Problems Brother    • No Known Problems Maternal Aunt    • No Known Problems Maternal Uncle    • No Known Problems Paternal Aunt    • No Known Problems Paternal Uncle    • Heart attack Maternal Grandmother    • Heart attack Maternal Grandfather    • Heart attack Paternal Grandmother         Type 1 diabetic   • Heart attack Paternal Grandfather    • Heart disease Sister    • Hypertension Sister    • Hyperlipidemia Brother    • Hypertension Brother    • Heart attack Brother    • Hyperlipidemia Brother    • Hypertension Brother    • Heart attack Brother    • Hyperlipidemia Brother    • Hypertension Brother    • Heart attack Brother    • Anemia Neg Hx    • Arrhythmia Neg Hx    • Asthma Neg Hx    • Clotting disorder Neg Hx    • Fainting Neg Hx    • Heart failure Neg Hx        Past Medical History:   Diagnosis Date   • Allergic    • Anemia    • Anxiety    • Arthritis    • Asthma 2020   • Ramos's esophagus 02/15/2018   • Cancer (HCC) Carcinoma Insitu cervical   • Cataract    • Cholelithiasis Removed    • Coronary artery disease    • Depression 2018   • Fibromyalgia 2011   • Fibromyalgia, primary  I think   • GERD (gastroesophageal reflux disease)    • Gout 2020   • Headache    • HL (hearing loss) Age related   • Hyperlipidemia    • Hypertension    • Hypothyroidism    • Joint pain    • Low back pain 's   • Obesity Lifelong since puberty   • Obstructive sleep apnea 10/16/2014   • Osteopenia Five years appx   • Osteoporosis     fosamax(SE), on prolia(4/10/18-21, 22)   • Otitis media    • Pedal edema 10/22/2018   • Pneumonia    •  Presence of left artificial knee joint 03/14/2019   • Scoliosis 1961   • Seasonal allergies    • ST elevation (STEMI) myocardial infarction ([facility]) 02/13/2020   • Status post percutaneous transluminal coronary angioplasty 06/10/2014   • Substance abuse ([facility])    • Tachycardia    • Urge incontinence 08/27/2018   • Urinary tract infection    • Visual impairment Catsracts   • Vitamin D deficiency 03/19/2015       Social History     Socioeconomic History   • Marital status:    Tobacco Use   • Smoking status: Never   • Smokeless tobacco: Never   • Tobacco comments:     Parents and husbands heavy smokers   Vaping Use   • Vaping Use: Never used   Substance and Sexual Activity   • Alcohol use: Not Currently     Comment: Recovery since 1994 drank from [age]   • Drug use: Not Currently     Frequency: 1.0 times per week     Types: Amphetamines     Comment: Short term use/over use diet pills in the 70's   • Sexual activity: Defer         Current Outpatient Medications:   •  albuterol sulfate  (90 Base) MCG/ACT inhaler, Inhale 1 puff Every 6 (Six) Hours As Needed for Wheezing or Shortness of Air., Disp: , Rfl:   •  aspirin 81 MG tablet, Take 81 mg by mouth Daily., Disp: , Rfl:   •  atorvastatin (LIPITOR) 10 MG tablet, TAKE 1 TABLET BY MOUTH DAILY, Disp: 90 tablet, Rfl: 2  •  Biotin 10 MG tablet, Take 10 mg by mouth Daily., Disp: , Rfl:   •  buPROPion XL (WELLBUTRIN XL) 150 MG 24 hr tablet, TAKE 1 TABLET BY MOUTH EVERY MORNING, Disp: 90 tablet, Rfl: 2  •  busPIRone (BUSPAR) 7.5 MG tablet, TAKE 1 TABLET BY MOUTH TWICE DAILY, Disp: 180 tablet, Rfl: 0  •  citalopram (CeleXA) 20 MG tablet, TAKE 1 TABLET BY MOUTH EVERY DAY, Disp: 90 tablet, Rfl: 2  •  diclofenac (VOLTAREN) 0.1 % ophthalmic solution, , Disp: , Rfl:   •  gabapentin (NEURONTIN) 400 MG capsule, Take 1 capsule by mouth every night at bedtime., Disp: 90 capsule, Rfl: 3  •  hydrOXYzine (ATARAX) 10 MG tablet, , Disp: , Rfl:   •  lansoprazole (PREVACID) 15 MG  capsule, Take 15 mg by mouth Daily., Disp: , Rfl:   •  levothyroxine (SYNTHROID, LEVOTHROID) 25 MCG tablet, TAKE 1 TABLET BY MOUTH EVERY MORNING, Disp: 28 tablet, Rfl: 1  •  metoprolol succinate XL (TOPROL-XL) 25 MG 24 hr tablet, TAKE 1 TABLET BY MOUTH EVERY MORNING, Disp: 90 tablet, Rfl: 2  •  montelukast (SINGULAIR) 10 MG tablet, TAKE 1 TABLET BY MOUTH EVERY DAY, Disp: 90 tablet, Rfl: 2  •  ofloxacin (OCUFLOX) 0.3 % ophthalmic solution, , Disp: , Rfl:   •  oxybutynin (DITROPAN) 5 MG tablet, TAKE 1 TABLET BY MOUTH TWICE DAILY, Disp: 56 tablet, Rfl: 1  •  prednisoLONE acetate (PRED FORTE) 1 % ophthalmic suspension, , Disp: , Rfl:   •  triamcinolone (KENALOG) 0.1 % cream, Apply 1 application topically to the appropriate area as directed 2 (Two) Times a Day., Disp: 30 g, Rfl: 0  •  vitamin D (ERGOCALCIFEROL) 1.25 MG (21314 UT) capsule capsule, TAKE 1 CAPSULE BY MOUTH EVERY WEEK, Disp: 13 capsule, Rfl: 1    Current Facility-Administered Medications:   •  denosumab (PROLIA) syringe 60 mg, 60 mg, Subcutaneous, Q6 Months, Dede Anders PA, 60 mg at 08/03/22 1345    Review of Systems   Constitutional: Positive for activity change, appetite change and fatigue.   HENT: Positive for congestion, dental problem, postnasal drip, sinus pressure, tinnitus, trouble swallowing and voice change.    Eyes: Positive for discharge, redness and itching.   Respiratory: Positive for apnea, cough, shortness of breath and wheezing.    Cardiovascular: Positive for leg swelling.   Gastrointestinal: Positive for abdominal distention, abdominal pain and nausea.   Endocrine: Positive for cold intolerance and heat intolerance.   Genitourinary: Positive for frequency, pelvic pain, urgency and vaginal pain.   Musculoskeletal: Positive for back pain, gait problem, joint swelling, myalgias, neck pain and neck stiffness.   Allergic/Immunologic: Positive for environmental allergies.   Neurological: Positive for dizziness, tremors, facial asymmetry,  speech difficulty, weakness, light-headedness, numbness and headaches.   Hematological: Bruises/bleeds easily.   Psychiatric/Behavioral: Positive for confusion, decreased concentration, dysphoric mood and sleep disturbance.        I have reviewed ROS completed by medical assistant.     Objective:    Neurologic Exam     Mental Status   Oriented to person, place, and time.   Speech: speech is normal     Cranial Nerves     CN III, IV, VI   Pupils are equal, round, and reactive to light.    Gait, Coordination, and Reflexes     Coordination   Finger to nose coordination: normal  Tandem walking coordination: abnormal (Could walk heels toes but not tandem)    Reflexes   Right brachioradialis: 2+  Left brachioradialis: 2+  Right biceps: 2+  Left biceps: 2+  Right triceps: 3+  Left triceps: 3+  Right patellar: 2+  Left patellar: 2+  Right achilles: 2+  Left achilles: 2+      Physical Exam  Vitals and nursing note reviewed.   Constitutional:       Appearance: Normal appearance. She is well-groomed. She is obese.      Interventions: Face mask in place.   HENT:      Head: Normocephalic and atraumatic.      Right Ear: Decreased hearing noted.      Left Ear: Decreased hearing noted.      Nose: Nose normal.      Mouth/Throat:      Lips: Pink.      Mouth: Mucous membranes are moist.   Eyes:      General: Lids are normal. No visual field deficit.     Extraocular Movements:      Right eye: Normal extraocular motion and no nystagmus.      Left eye: Normal extraocular motion and no nystagmus.      Pupils: Pupils are equal, round, and reactive to light.   Neck:      Comments: The patient states she is followed by orthopedic group for her cervical spine disease.  Cardiovascular:      Rate and Rhythm: Normal rate and regular rhythm.      Pulses: Normal pulses.      Heart sounds: Normal heart sounds.      Comments:   Orthostatic on today's visit  Pulmonary:      Effort: Pulmonary effort is normal.      Breath sounds: Normal breath sounds  and air entry.   Musculoskeletal:         General: Normal range of motion.      Cervical back: Pain with movement (Left right or dorsiflexion caused pain.) present.        Feet:       Comments: BUE 5/5 including , BLE 5/5 including dorsiflexion plantar flexion of feet.   Skin:     General: Skin is warm and dry.   Neurological:      Mental Status: She is alert and oriented to person, place, and time.      Cranial Nerves: No cranial nerve deficit, dysarthria or facial asymmetry.      Sensory: Sensation is intact. No sensory deficit.      Motor: Motor function is intact. No weakness, tremor, atrophy, abnormal muscle tone, seizure activity or pronator drift.      Coordination: Coordination is intact. Romberg sign negative. Coordination normal. Finger-Nose-Finger Test and Heel to Shin Test normal. Rapid alternating movements normal.      Gait: Tandem walk abnormal (Could walk heels toes but not tandem). Gait normal.      Deep Tendon Reflexes: Babinski sign absent on the right side. Babinski sign absent on the left side.      Reflex Scores:       Tricep reflexes are 3+ on the right side and 3+ on the left side.       Bicep reflexes are 2+ on the right side and 2+ on the left side.       Brachioradialis reflexes are 2+ on the right side and 2+ on the left side.       Patellar reflexes are 2+ on the right side and 2+ on the left side.       Achilles reflexes are 2+ on the right side and 2+ on the left side.  Psychiatric:         Attention and Perception: Attention normal.         Mood and Affect: Mood normal.         Speech: Speech normal.         Behavior: Behavior normal. Behavior is cooperative.         Assessment/Plan:  Discussion: The patient states her vertigo has completely resolved with vestibular rehab.  She states she does have difficulty walking at times and reports that she has a history of neuropathy and has had that evaluated at [facility] in the past.  Today during exam Romberg the patient had much  titubation and reported that she felt unsteady which is her normal feeling this confirmed that the neuropathy in her feet is causing some of the imbalance that she feels.  The patient states she is on gabapentin and does not need any work-up or evaluation of the neuropathy that she has completed that already.  She was encouraged to use a cane or walker when ambulating at home or in the community and especially at night.  She was told to put on nightlights in her bathroom her ibarra in her bedroom so that she had a way to see her way directly into the bathroom without falling.    She is currently followed by Dr. Matta cardiology and today in the clinic the patient was orthostatic due to her blood pressure changes.  She will be sent back to Dr. Matta for evaluation of orthostasis.    She complained of some memory issues and states she would like an evaluation of her memory, she states her sister has Alzheimer's and a frontotemporal dementia.  She was notified that the memory evaluations in this clinic are completed by Dr. Seiple and that that would be a secondary appointment, she agreed.      Diagnoses and all orders for this visit:    1. Orthostatic hypotension (Primary)  Comments:  It is felt the patient's balance issues are related to both orthostatic hypotension and neuropathy in both feet.  She should use a cane.  Orders:  -     Ambulatory Referral to Cardiology    2. Neuropathy  Comments:  The patient states she has had a full work-up of neuropathy at [facility].  She states she does not want any further testing completed.     3. Memory loss  Comments:  The patient will be scheduled back with Dr. Seiple for a full memory evaluation.        Return in about 4 months (around 3/14/2023) for Next scheduled follow up Dr Seipel for Memory Work up .    I spent 43 minutes caring for Charleen on this date of service. This time includes time spent by me in the following activities: obtaining and/or reviewing a separately  obtained history, performing a medically appropriate examination and/or evaluation, counseling and educating the patient/family/caregiver, ordering medications, tests, or procedures, referring and communicating with other health care professionals and documenting information in the medical record.      This document has been electronically signed by Hellen DOMINIQUE on November 14, 2022 14:06 EST

## 2022-11-14 ENCOUNTER — OFFICE VISIT (OUTPATIENT)
Dept: NEUROLOGY | Facility: CLINIC | Age: 72
End: 2022-11-14

## 2022-11-14 VITALS — WEIGHT: 196 LBS | TEMPERATURE: 98.2 F | HEIGHT: 58 IN | BODY MASS INDEX: 41.14 KG/M2

## 2022-11-14 DIAGNOSIS — R41.3 MEMORY LOSS: ICD-10-CM

## 2022-11-14 DIAGNOSIS — G62.9 NEUROPATHY: Chronic | ICD-10-CM

## 2022-11-14 DIAGNOSIS — I95.1 ORTHOSTATIC HYPOTENSION: Primary | ICD-10-CM

## 2022-11-14 PROCEDURE — 99213 OFFICE O/P EST LOW 20 MIN: CPT | Performed by: NURSE PRACTITIONER

## 2022-11-14 RX ORDER — PREDNISOLONE ACETATE 10 MG/ML
SUSPENSION/ DROPS OPHTHALMIC
COMMUNITY
Start: 2022-11-10

## 2022-11-14 RX ORDER — OFLOXACIN 3 MG/ML
SOLUTION/ DROPS OPHTHALMIC
COMMUNITY
Start: 2022-11-10

## 2022-11-14 RX ORDER — DICLOFENAC SODIUM 1 MG/ML
SOLUTION/ DROPS OPHTHALMIC
COMMUNITY
Start: 2022-11-10

## 2022-11-16 ENCOUNTER — OFFICE VISIT (OUTPATIENT)
Dept: CARDIOLOGY | Facility: CLINIC | Age: 72
End: 2022-11-16

## 2022-11-16 VITALS
WEIGHT: 197 LBS | OXYGEN SATURATION: 94 % | SYSTOLIC BLOOD PRESSURE: 155 MMHG | HEIGHT: 58 IN | DIASTOLIC BLOOD PRESSURE: 82 MMHG | BODY MASS INDEX: 41.35 KG/M2 | HEART RATE: 60 BPM

## 2022-11-16 DIAGNOSIS — Z98.61 STATUS POST PERCUTANEOUS TRANSLUMINAL CORONARY ANGIOPLASTY: Primary | ICD-10-CM

## 2022-11-16 DIAGNOSIS — I10 ESSENTIAL HYPERTENSION: ICD-10-CM

## 2022-11-16 DIAGNOSIS — E78.2 MIXED HYPERLIPIDEMIA: ICD-10-CM

## 2022-11-16 DIAGNOSIS — R55 NEAR SYNCOPE: ICD-10-CM

## 2022-11-16 DIAGNOSIS — R07.89 CHEST DISCOMFORT: ICD-10-CM

## 2022-11-16 PROCEDURE — 99214 OFFICE O/P EST MOD 30 MIN: CPT | Performed by: INTERNAL MEDICINE

## 2022-11-16 NOTE — PROGRESS NOTES
Encounter Date:11/16/2022  Last seen 11/3/2022      Patient ID: Charleen Barnett is a 72 y.o. female.    Chief Complaint:  Status post stent  Hypertension  Dyslipidemia  History of SVT.     History of present illness  Recently patient has been having problems with balance and dizziness.  Patient was seen by neurologist and had documented orthostatic blood pressure changes.    Since I have last seen, the patient has been without any chest discomfort ,shortness of breath, palpitations, dizziness or syncope.  Denies having any headache ,abdominal pain ,nausea, vomiting , diarrhea constipation, loss of weight or loss of appetite.  Denies having any excessive bruising ,hematuria or blood in the stool.     Review of all systems negative except as indicated.     Reviewed ROS.  Assessment and plan  ////////////////////////  Impression  ==================    - status post stent to LAD 06/10/2014   status post subendocardial myocardial infarction prior to stent placement  Status post stent to LAD 5/29/2021.     Cardiac catheterization 5/29/2021 revealed  Left ventricle size and contractility normal with ejection fraction of 60%.  Left main coronary artery normal.  Left anterior descending artery has previously placed stent.  Left anterior descending artery has 80 to 90% disease just distal to the diagonal branch.  (Patient to have FFR).  IFR of 0.86  Diagonal branch has diffuse 50 to 60% disease.  Circumflex coronary artery is normal except for 30 to 40% marginal branch disease.  Right coronary artery is a large and dominant vessel and is normal.  Right common iliac external iliac and femoral arteries are normal.     -Right-sided chest pain-atypical.-Improved  EKG showed no acute changes.  Troponin levels are negative.     -Elevated D-dimer  CT scan of the chest is negative for pulmonary embolus.     Stress Cardiolite test-- 5/17/2021.  Echocardiogram-normal except for left atrial enlargement 5/17/2021.      -palpitations  "likely SVT.  -improved on atenolol     Echocardiogram showed mild aortic regurgitation with normal left ventricular function.  5/18/2017     - hypertension dyslipidemia sleep apnea asthma fibromyalgia GERD and gout.    - Orthostatic hypotension     - family history of coronary artery disease     - allergy to penicillin sulfa and tetracycline     =======  Plan  =======   Recently patient has been having problems with balance and dizziness.  Patient was seen by neurologist and had documented orthostatic blood pressure changes.  The following was copied from neurology office visit    \"Orthostatic testing in our clinic today:  Orthostatic Hypotension positive   Laying       153/86  Sitting       144/79  Standing   120/75     Orthostatic laying to standing Systolic 33 point decrease   Orthostatic Laying to Standing Diastolic 11 point decrease \"    Continue metoprolol XL 25 mg a day.  If we stop metoprolol her blood pressure would be much higher.  Add low-dose midodrine (5 mg tablet) half a tablet twice a day and increase to 5 mg twice a day.  Trying to strike a balance between hyper and orthostatic hypotension.  Patient was educated regarding this.    Status post stent to LAD 5/29/2021 (abnormal IFR).  Patient is not having any angina pectoris or congestive heart failure.  EKG showed sinus rhythm without any ischemic changes-  11/3/2022.  Patient is off Plavix.     Hypertension-well-controlled-   155/82.  Continue metoprolol    Dyslipidemia-continue atorvastatin      Medications were reviewed and updated.     Follow-up in the office in 6 weeks.     Further plan will depend on patient's progress  //////////////////////////////                       Diagnosis Plan   1. Status post percutaneous transluminal coronary angioplasty        2. Mixed hyperlipidemia        3. Essential hypertension        4. Chest discomfort        5. Near syncope        LAB RESULTS (LAST 7 DAYS)    CBC        BMP        CMP         BNP    "     TROPONIN        CoAg        Creatinine Clearance  CrCl cannot be calculated (Patient's most recent lab result is older than the maximum 30 days allowed.).    ABG        Radiology  No radiology results for the last day                The following portions of the patient's history were reviewed and updated as appropriate: allergies, current medications, past family history, past medical history, past social history, past surgical history and problem list.    Review of Systems   Constitutional: Positive for malaise/fatigue.   Cardiovascular: Positive for leg swelling. Negative for chest pain, palpitations and syncope.   Respiratory: Positive for shortness of breath.    Skin: Negative for rash.   Gastrointestinal: Negative for nausea and vomiting.   Neurological: Positive for dizziness, light-headedness and weakness. Negative for numbness.   All other systems reviewed and are negative.        Current Outpatient Medications:   •  albuterol sulfate  (90 Base) MCG/ACT inhaler, Inhale 1 puff Every 6 (Six) Hours As Needed for Wheezing or Shortness of Air., Disp: , Rfl:   •  aspirin 81 MG tablet, Take 81 mg by mouth Daily., Disp: , Rfl:   •  atorvastatin (LIPITOR) 10 MG tablet, TAKE 1 TABLET BY MOUTH DAILY, Disp: 90 tablet, Rfl: 2  •  Biotin 10 MG tablet, Take 10 mg by mouth Daily., Disp: , Rfl:   •  buPROPion XL (WELLBUTRIN XL) 150 MG 24 hr tablet, TAKE 1 TABLET BY MOUTH EVERY MORNING, Disp: 90 tablet, Rfl: 2  •  busPIRone (BUSPAR) 7.5 MG tablet, TAKE 1 TABLET BY MOUTH TWICE DAILY, Disp: 180 tablet, Rfl: 0  •  citalopram (CeleXA) 20 MG tablet, TAKE 1 TABLET BY MOUTH EVERY DAY, Disp: 90 tablet, Rfl: 2  •  diclofenac (VOLTAREN) 0.1 % ophthalmic solution, , Disp: , Rfl:   •  gabapentin (NEURONTIN) 400 MG capsule, Take 1 capsule by mouth every night at bedtime., Disp: 90 capsule, Rfl: 3  •  hydrOXYzine (ATARAX) 10 MG tablet, , Disp: , Rfl:   •  lansoprazole (PREVACID) 15 MG capsule, Take 15 mg by mouth Daily., Disp:  , Rfl:   •  levothyroxine (SYNTHROID, LEVOTHROID) 25 MCG tablet, TAKE 1 TABLET BY MOUTH EVERY MORNING, Disp: 28 tablet, Rfl: 1  •  metoprolol succinate XL (TOPROL-XL) 25 MG 24 hr tablet, TAKE 1 TABLET BY MOUTH EVERY MORNING, Disp: 90 tablet, Rfl: 2  •  montelukast (SINGULAIR) 10 MG tablet, TAKE 1 TABLET BY MOUTH EVERY DAY, Disp: 90 tablet, Rfl: 2  •  ofloxacin (OCUFLOX) 0.3 % ophthalmic solution, , Disp: , Rfl:   •  oxybutynin (DITROPAN) 5 MG tablet, TAKE 1 TABLET BY MOUTH TWICE DAILY, Disp: 56 tablet, Rfl: 1  •  prednisoLONE acetate (PRED FORTE) 1 % ophthalmic suspension, , Disp: , Rfl:   •  triamcinolone (KENALOG) 0.1 % cream, Apply 1 application topically to the appropriate area as directed 2 (Two) Times a Day., Disp: 30 g, Rfl: 0  •  vitamin D (ERGOCALCIFEROL) 1.25 MG (24593 UT) capsule capsule, TAKE 1 CAPSULE BY MOUTH EVERY WEEK, Disp: 13 capsule, Rfl: 1    Current Facility-Administered Medications:   •  denosumab (PROLIA) syringe 60 mg, 60 mg, Subcutaneous, Q6 Months, Dede Anders PA, 60 mg at 22 1345    Allergies   Allergen Reactions   • Hydrocodone Hives   • Chlorpheniramine-Phenylephrine Rash   • Penicillins Rash   • Sulfa Antibiotics Hives and Rash   • Tetracycline Rash   • Warfarin Rash       Family History   Problem Relation Age of Onset   • No Known Problems Mother    • Heart attack Father         Vein bypass in legs   • Cancer Father            • No Known Problems Sister    • No Known Problems Brother    • No Known Problems Maternal Aunt    • No Known Problems Maternal Uncle    • No Known Problems Paternal Aunt    • No Known Problems Paternal Uncle    • Heart attack Maternal Grandmother         Heart atrack in her 80s   • Heart attack Maternal Grandfather         Heart attack in his 60s   • Heart attack Paternal Grandmother         Type 1 diabetic, heart attack   • Heart attack Paternal Grandfather         Heart attack in his 50s or 60s   • Heart disease Sister         Blockages  monitored with medication   • Hypertension Sister         Coronary heart issues   • Hyperlipidemia Brother         Extremely high triglycerides and cholesterol   • Hypertension Brother         Hogh blood pressure   • Heart attack Brother         Passed from heart attack age 54   • Hyperlipidemia Brother         Unknown   • Hypertension Brother    • Heart attack Brother         Passed from heart attack age 44   • Hyperlipidemia Brother         Had heart disease for years before passing   • Hypertension Brother    • Heart attack Brother          from heart failure in his 60s   • Anemia Neg Hx    • Arrhythmia Neg Hx    • Asthma Neg Hx    • Clotting disorder Neg Hx    • Fainting Neg Hx    • Heart failure Neg Hx        Past Surgical History:   Procedure Laterality Date   • BLADDER SURGERY     • CARDIAC CATHETERIZATION N/A 2021    Procedure: Left Heart Cath and coronary angiogram;  Surgeon: Yazmin Matta MD;  Location: [facility] CATH INVASIVE LOCATION;  Service: Cardiovascular;  Laterality: N/A;   • CARDIAC CATHETERIZATION  2021    Procedure: Functional Flow Reserve (iFR);  Surgeon: Bryan Patel MD;  Location: [facility] CATH INVASIVE LOCATION;  Service: Cardiology;;   • CARDIAC CATHETERIZATION N/A 2021    Procedure: Percutaneous Coronary Intervention;  Surgeon: Bryan Patel MD;  Location: [facility] CATH INVASIVE LOCATION;  Service: Cardiology;  Laterality: N/A;   • CARDIAC CATHETERIZATION N/A 2021    Procedure: Stent FRANCOISE coronary;  Surgeon: Bryan Patel MD;  Location: [facility] CATH INVASIVE LOCATION;  Service: Cardiology;  Laterality: N/A;   • CAROTID STENT     • CHOLECYSTECTOMY     • COLONOSCOPY  ??   • CORONARY STENT PLACEMENT  ??   • FRACTURE SURGERY     • HYSTERECTOMY     • INGUINAL HERNIA REPAIR     • JOINT REPLACEMENT  L-knee   • KNEE SURGERY     • OTHER SURGICAL HISTORY      STENT   • SUBTOTAL HYSTERECTOMY         Past Medical History:    Diagnosis Date   • Allergic    • Anemia 's   • Anxiety    • Arthritis    • Asthma 2020   • Ramos's esophagus 02/15/2018   • Cancer ([facility]) Carcinoma Insitu cervical   • Cataract    • Cholelithiasis Removed    • Coronary artery disease    • Depression 2018   • Fibromyalgia 2011   • Fibromyalgia, primary  I think   • GERD (gastroesophageal reflux disease)    • Gout 2020   • Headache    • HL (hearing loss) Age related   • Hyperlipidemia    • Hypertension    • Hypothyroidism    • Joint pain    • Low back pain 0's   • Obesity Lifelong since puberty   • Obstructive sleep apnea 10/16/2014   • Osteopenia Five years appx   • Osteoporosis     fosamax(SE), on prolia(4/10/18-21, 22)   • Otitis media    • Pedal edema 10/22/2018   • Pneumonia    • Presence of left artificial knee joint 2019   • Scoliosis    • Seasonal allergies    • ST elevation (STEMI) myocardial infarction ([facility]) 2020   • Status post percutaneous transluminal coronary angioplasty 06/10/2014   • Substance abuse ([facility])    • Tachycardia    • Urge incontinence 2018   • Urinary tract infection    • Visual impairment Catsracts   • Vitamin D deficiency 2015       Family History   Problem Relation Age of Onset   • No Known Problems Mother    • Heart attack Father         Vein bypass in legs   • Cancer Father            • No Known Problems Sister    • No Known Problems Brother    • No Known Problems Maternal Aunt    • No Known Problems Maternal Uncle    • No Known Problems Paternal Aunt    • No Known Problems Paternal Uncle    • Heart attack Maternal Grandmother         Heart atrack in her 80s   • Heart attack Maternal Grandfather         Heart attack in his 60s   • Heart attack Paternal Grandmother         Type 1 diabetic, heart attack   • Heart attack Paternal Grandfather         Heart attack in his 50s or 60s   • Heart disease Sister         Blockages monitored with  "medication   • Hypertension Sister         Coronary heart issues   • Hyperlipidemia Brother         Extremely high triglycerides and cholesterol   • Hypertension Brother         Hogh blood pressure   • Heart attack Brother         Passed from heart attack age 54   • Hyperlipidemia Brother         Unknown   • Hypertension Brother    • Heart attack Brother         Passed from heart attack age 44   • Hyperlipidemia Brother         Had heart disease for years before passing   • Hypertension Brother    • Heart attack Brother          from heart failure in his 60s   • Anemia Neg Hx    • Arrhythmia Neg Hx    • Asthma Neg Hx    • Clotting disorder Neg Hx    • Fainting Neg Hx    • Heart failure Neg Hx        Social History     Socioeconomic History   • Marital status:    Tobacco Use   • Smoking status: Never   • Smokeless tobacco: Never   • Tobacco comments:     Parents and husbands heavy smokers   Vaping Use   • Vaping Use: Never used   Substance and Sexual Activity   • Alcohol use: Not Currently     Comment: Recovery since  drank from age [age]   • Drug use: Not Currently     Frequency: 1.0 times per week     Types: Amphetamines     Comment: Short term use/over use diet pills in the 70's   • Sexual activity: Not Currently     Partners: Male     Birth control/protection: None, Hysterectomy         Procedures      Objective:       Physical Exam    /82 (BP Location: Right arm, Patient Position: Sitting, Cuff Size: Adult)   Pulse 60   Ht 147.3 cm (58\")   Wt 89.4 kg (197 lb)   SpO2 94%   BMI 41.17 kg/m²   The patient is alert, oriented and in no distress.    Vital signs as noted above.    Head and neck revealed no carotid bruits or jugular venous distension.  No thyromegaly or lymphadenopathy is present.    Lungs clear.  No wheezing.  Breath sounds are normal bilaterally.    Heart normal first and second heart sounds.  No murmur..  No pericardial rub is present.  No gallop is present.    Abdomen soft " and nontender.  No organomegaly is present.    Extremities revealed good peripheral pulses without any pedal edema.    Skin warm and dry.    Musculoskeletal system is grossly normal.    CNS grossly normal.    Reviewed and updated.

## 2022-11-21 ENCOUNTER — TELEPHONE (OUTPATIENT)
Dept: PHYSICAL THERAPY | Facility: CLINIC | Age: 72
End: 2022-11-21

## 2022-11-21 NOTE — TELEPHONE ENCOUNTER
Therapist contacted patient about missed appt.  Patient reported she overslept.  Wished to cancel next appt on 11/23/22.  Stated she would like to reschedule for D/C appt as new medication is improving her symptoms and balance

## 2022-12-06 ENCOUNTER — OFFICE (AMBULATORY)
Dept: URBAN - METROPOLITAN AREA CLINIC 64 | Facility: CLINIC | Age: 72
End: 2022-12-06

## 2022-12-06 VITALS
DIASTOLIC BLOOD PRESSURE: 94 MMHG | HEART RATE: 72 BPM | WEIGHT: 190 LBS | HEIGHT: 59 IN | SYSTOLIC BLOOD PRESSURE: 168 MMHG

## 2022-12-06 DIAGNOSIS — R13.10 DYSPHAGIA, UNSPECIFIED: ICD-10-CM

## 2022-12-06 DIAGNOSIS — Z86.010 PERSONAL HISTORY OF COLONIC POLYPS: ICD-10-CM

## 2022-12-06 DIAGNOSIS — R19.4 CHANGE IN BOWEL HABIT: ICD-10-CM

## 2022-12-06 PROCEDURE — 99204 OFFICE O/P NEW MOD 45 MIN: CPT | Performed by: INTERNAL MEDICINE

## 2022-12-07 DIAGNOSIS — E03.9 ACQUIRED HYPOTHYROIDISM: ICD-10-CM

## 2022-12-07 RX ORDER — HYDROXYZINE HYDROCHLORIDE 10 MG/1
TABLET, FILM COATED ORAL
Qty: 30 TABLET | Refills: 0 | Status: SHIPPED | OUTPATIENT
Start: 2022-12-07

## 2022-12-07 RX ORDER — OXYBUTYNIN CHLORIDE 5 MG/1
TABLET ORAL
Qty: 56 TABLET | Refills: 0 | Status: SHIPPED | OUTPATIENT
Start: 2022-12-07 | End: 2022-12-29

## 2022-12-07 RX ORDER — BUSPIRONE HYDROCHLORIDE 7.5 MG/1
TABLET ORAL
Qty: 180 TABLET | Refills: 0 | Status: SHIPPED | OUTPATIENT
Start: 2022-12-07 | End: 2023-02-28

## 2022-12-07 RX ORDER — BUPROPION HYDROCHLORIDE 150 MG/1
150 TABLET ORAL EVERY MORNING
Qty: 28 TABLET | Refills: 0 | Status: SHIPPED | OUTPATIENT
Start: 2022-12-07 | End: 2023-02-28

## 2022-12-07 RX ORDER — LEVOTHYROXINE SODIUM 0.03 MG/1
25 TABLET ORAL
Qty: 28 TABLET | Refills: 0 | Status: SHIPPED | OUTPATIENT
Start: 2022-12-07 | End: 2022-12-29

## 2022-12-12 ENCOUNTER — OFFICE VISIT (OUTPATIENT)
Dept: NEUROLOGY | Facility: CLINIC | Age: 72
End: 2022-12-12

## 2022-12-12 VITALS
HEART RATE: 65 BPM | DIASTOLIC BLOOD PRESSURE: 83 MMHG | SYSTOLIC BLOOD PRESSURE: 146 MMHG | WEIGHT: 198 LBS | TEMPERATURE: 97.7 F | HEIGHT: 58 IN | BODY MASS INDEX: 41.56 KG/M2

## 2022-12-12 DIAGNOSIS — G47.33 OBSTRUCTIVE SLEEP APNEA: Primary | ICD-10-CM

## 2022-12-12 DIAGNOSIS — R41.3 MEMORY DIFFICULTIES: ICD-10-CM

## 2022-12-12 PROCEDURE — 99214 OFFICE O/P EST MOD 30 MIN: CPT | Performed by: PSYCHIATRY & NEUROLOGY

## 2022-12-12 RX ORDER — MIDODRINE HYDROCHLORIDE 5 MG/1
TABLET ORAL
COMMUNITY
Start: 2022-12-08

## 2022-12-14 ENCOUNTER — DOCUMENTATION (OUTPATIENT)
Dept: PHYSICAL THERAPY | Facility: CLINIC | Age: 72
End: 2022-12-14

## 2022-12-14 NOTE — PROGRESS NOTES
Discharge Summary  Discharge Summary from Physical Therapy Report      Dates  PT visit: 8/25/22- 11/21/22  Number of Visits:        Goals: Partially Met    Discharge Plan: Patient to return to referring/providing physician    Comments Therapist contacted patient about missed appt on 11/21/22.  Patient reported she overslept.  Wished to cancel next appt on 11/23/22.  Stated she would like to reschedule for D/C appt as new medication is improving her symptoms and balance.    No additional contact received from patient. D/C at this time    Date of Discharge 12/14/22        Mary Ramos, PT  Physical Therapist

## 2022-12-27 ENCOUNTER — HOSPITAL ENCOUNTER (EMERGENCY)
Facility: HOSPITAL | Age: 72
Discharge: HOME | End: 2022-12-27
Attending: EMERGENCY MEDICINE | Admitting: EMERGENCY MEDICINE

## 2022-12-27 ENCOUNTER — APPOINTMENT (OUTPATIENT)
Dept: GENERAL RADIOLOGY | Facility: HOSPITAL | Age: 72
End: 2022-12-27

## 2022-12-27 VITALS
TEMPERATURE: 98 F | SYSTOLIC BLOOD PRESSURE: 160 MMHG | OXYGEN SATURATION: 2 % | HEIGHT: 58 IN | DIASTOLIC BLOOD PRESSURE: 67 MMHG | WEIGHT: 202.38 LBS | RESPIRATION RATE: 20 BRPM | BODY MASS INDEX: 42.48 KG/M2 | HEART RATE: 58 BPM

## 2022-12-27 DIAGNOSIS — L03.116 BILATERAL CELLULITIS OF LOWER LEG: Primary | ICD-10-CM

## 2022-12-27 DIAGNOSIS — L03.115 BILATERAL CELLULITIS OF LOWER LEG: Primary | ICD-10-CM

## 2022-12-27 LAB
ANION GAP SERPL CALCULATED.3IONS-SCNC: 10 MMOL/L (ref 5–15)
BASOPHILS # BLD AUTO: 0.1 10*3/MM3 (ref 0–0.2)
BASOPHILS NFR BLD AUTO: 1.1 % (ref 0–1.5)
BUN SERPL-MCNC: 15 MG/DL (ref 8–23)
BUN/CREAT SERPL: 15 (ref 7–25)
CALCIUM SPEC-SCNC: 9.3 MG/DL (ref 8.6–10.5)
CHLORIDE SERPL-SCNC: 103 MMOL/L (ref 98–107)
CO2 SERPL-SCNC: 28 MMOL/L (ref 22–29)
CREAT SERPL-MCNC: 1 MG/DL (ref 0.57–1)
DEPRECATED RDW RBC AUTO: 41.6 FL (ref 37–54)
EGFRCR SERPLBLD CKD-EPI 2021: 60 ML/MIN/1.73
EOSINOPHIL # BLD AUTO: 0.4 10*3/MM3 (ref 0–0.4)
EOSINOPHIL NFR BLD AUTO: 4.7 % (ref 0.3–6.2)
ERYTHROCYTE [DISTWIDTH] IN BLOOD BY AUTOMATED COUNT: 13.6 % (ref 12.3–15.4)
GLUCOSE SERPL-MCNC: 90 MG/DL (ref 65–99)
HCT VFR BLD AUTO: 42.1 % (ref 34–46.6)
HGB BLD-MCNC: 13.6 G/DL (ref 12–15.9)
LYMPHOCYTES # BLD AUTO: 2.3 10*3/MM3 (ref 0.7–3.1)
LYMPHOCYTES NFR BLD AUTO: 27.1 % (ref 19.6–45.3)
MCH RBC QN AUTO: 28.5 PG (ref 26.6–33)
MCHC RBC AUTO-ENTMCNC: 32.2 G/DL (ref 31.5–35.7)
MCV RBC AUTO: 88.4 FL (ref 79–97)
MONOCYTES # BLD AUTO: 0.6 10*3/MM3 (ref 0.1–0.9)
MONOCYTES NFR BLD AUTO: 7.5 % (ref 5–12)
NEUTROPHILS NFR BLD AUTO: 5 10*3/MM3 (ref 1.7–7)
NEUTROPHILS NFR BLD AUTO: 59.6 % (ref 42.7–76)
NRBC BLD AUTO-RTO: 0 /100 WBC (ref 0–0.2)
PLATELET # BLD AUTO: 250 10*3/MM3 (ref 140–450)
PMV BLD AUTO: 8.2 FL (ref 6–12)
POTASSIUM SERPL-SCNC: 5 MMOL/L (ref 3.5–5.2)
RBC # BLD AUTO: 4.76 10*6/MM3 (ref 3.77–5.28)
SODIUM SERPL-SCNC: 141 MMOL/L (ref 136–145)
WBC NRBC COR # BLD: 8.4 10*3/MM3 (ref 3.4–10.8)

## 2022-12-27 PROCEDURE — 85025 COMPLETE CBC W/AUTO DIFF WBC: CPT | Performed by: EMERGENCY MEDICINE

## 2022-12-27 PROCEDURE — 36415 COLL VENOUS BLD VENIPUNCTURE: CPT

## 2022-12-27 PROCEDURE — 80048 BASIC METABOLIC PNL TOTAL CA: CPT | Performed by: EMERGENCY MEDICINE

## 2022-12-27 PROCEDURE — 73560 X-RAY EXAM OF KNEE 1 OR 2: CPT

## 2022-12-27 PROCEDURE — 99282 EMERGENCY DEPT VISIT SF MDM: CPT

## 2022-12-27 RX ORDER — CEPHALEXIN 500 MG/1
500 CAPSULE ORAL 3 TIMES DAILY
Qty: 21 CAPSULE | Refills: 0 | Status: SHIPPED | OUTPATIENT
Start: 2022-12-27 | End: 2023-03-09

## 2022-12-27 RX ORDER — TRAMADOL HYDROCHLORIDE 50 MG/1
50 TABLET ORAL EVERY 6 HOURS PRN
Qty: 12 TABLET | Refills: 0 | Status: SHIPPED | OUTPATIENT
Start: 2022-12-27 | End: 2023-03-09

## 2022-12-28 NOTE — ED PROVIDER NOTES
Subjective   History of Present Illness  Patient is a 72-year-old female complaining of redness and swelling to both legs with pain to her right knee.  This developed over the past 1 week.  Denies fever or other complaint        Review of Systems  Negative for headache ears no cough fever chest pain shortness of breath Lobo diarrhea recent trauma numbness tingling weakness or other  Past Medical History:   Diagnosis Date   • Allergic 1970's   • Anemia 1950's   • Anxiety    • Arthritis    • Asthma 02/13/2020   • Ramos's esophagus 02/15/2018   • Cancer ([facility]) Carcinoma Insitu cervical   • Cataract    • Cholelithiasis Removed 2000's   • Coronary artery disease    • Depression 08/27/2018   • Fibromyalgia 12/14/2011   • Fibromyalgia, primary 1990's I think   • GERD (gastroesophageal reflux disease)    • Gout 02/13/2020   • Headache    • HL (hearing loss) Age related   • Hyperlipidemia    • Hypertension    • Hypothyroidism 2022   • Joint pain    • Low back pain 2970's   • Obesity Lifelong since puberty   • Obstructive sleep apnea 10/16/2014   • Osteopenia Five years appx   • Osteoporosis     fosamax(SE), on prolia(4/10/18-6/18/21, 2/1/22)   • Otitis media    • Pedal edema 10/22/2018   • Pneumonia 2020   • Presence of left artificial knee joint 03/14/2019   • Scoliosis 1961   • Seasonal allergies    • ST elevation (STEMI) myocardial infarction ([facility]) 02/13/2020   • Status post percutaneous transluminal coronary angioplasty 06/10/2014   • Substance abuse ([facility])    • Tachycardia    • Urge incontinence 08/27/2018   • Urinary tract infection    • Visual impairment Catsracts   • Vitamin D deficiency 03/19/2015       Allergies   Allergen Reactions   • Hydrocodone Hives   • Chlorpheniramine-Phenylephrine Rash   • Penicillins Rash   • Sulfa Antibiotics Hives and Rash   • Tetracycline Rash   • Warfarin Rash       Past Surgical History:   Procedure Laterality Date   • BLADDER SURGERY     • CARDIAC CATHETERIZATION N/A 05/29/2021     Procedure: Left Heart Cath and coronary angiogram;  Surgeon: Yazmin Matta MD;  Location: [facility] CATH INVASIVE LOCATION;  Service: Cardiovascular;  Laterality: N/A;   • CARDIAC CATHETERIZATION  2021    Procedure: Functional Flow Reserve (iFR);  Surgeon: Bryan Patel MD;  Location: [facility] CATH INVASIVE LOCATION;  Service: Cardiology;;   • CARDIAC CATHETERIZATION N/A 2021    Procedure: Percutaneous Coronary Intervention;  Surgeon: Bryan Patel MD;  Location: [facility] CATH INVASIVE LOCATION;  Service: Cardiology;  Laterality: N/A;   • CARDIAC CATHETERIZATION N/A 2021    Procedure: Stent FRANCOISE coronary;  Surgeon: Bryan Patel MD;  Location: [facility] CATH INVASIVE LOCATION;  Service: Cardiology;  Laterality: N/A;   • CAROTID STENT     • CHOLECYSTECTOMY     • COLONOSCOPY  ??   • CORONARY STENT PLACEMENT  ??   • FRACTURE SURGERY     • HYSTERECTOMY     • INGUINAL HERNIA REPAIR     • JOINT REPLACEMENT  L-knee   • KNEE SURGERY     • OTHER SURGICAL HISTORY      STENT   • SUBTOTAL HYSTERECTOMY         Family History   Problem Relation Age of Onset   • No Known Problems Mother    • Heart attack Father         Vein bypass in legs   • Cancer Father            • No Known Problems Sister    • No Known Problems Brother    • No Known Problems Maternal Aunt    • No Known Problems Maternal Uncle    • No Known Problems Paternal Aunt    • No Known Problems Paternal Uncle    • Heart attack Maternal Grandmother         Heart atrack in her 80s   • Heart attack Maternal Grandfather         Heart attack in his 60s   • Heart attack Paternal Grandmother         Type 1 diabetic, heart attack   • Heart attack Paternal Grandfather         Heart attack in his 50s or 60s   • Heart disease Sister         Blockages monitored with medication   • Hypertension Sister         Coronary heart issues   • Hyperlipidemia Brother         Extremely high triglycerides and cholesterol   •  Hypertension Brother         Hogh blood pressure   • Heart attack Brother         Passed from heart attack age 54   • Hyperlipidemia Brother         Unknown   • Hypertension Brother    • Heart attack Brother         Passed from heart attack age 44   • Hyperlipidemia Brother         Had heart disease for years before passing   • Hypertension Brother    • Heart attack Brother          from heart failure in his 60s   • Anemia Neg Hx    • Arrhythmia Neg Hx    • Asthma Neg Hx    • Clotting disorder Neg Hx    • Fainting Neg Hx    • Heart failure Neg Hx        Social History     Socioeconomic History   • Marital status:    Tobacco Use   • Smoking status: Never   • Smokeless tobacco: Never   • Tobacco comments:     Parents and husbands heavy smokers   Vaping Use   • Vaping Use: Never used   Substance and Sexual Activity   • Alcohol use: Not Currently     Comment: Recovery since  drank from age [age]   • Drug use: Not Currently     Frequency: 1.0 times per week     Types: Amphetamines     Comment: Short term use/over use diet pills in the 70's   • Sexual activity: Not Currently     Partners: Male     Birth control/protection: None, Hysterectomy           Objective   Physical Exam  HEENT exam shows TMs to be clear.  Oropharynx comers but sclera is nonicteric.  Neck has no adenopathy JVD or bruits.  Lungs are clear.  Heart has regular rhythm without murmur gallop.  Chest is nontender.  Abdomen soft nontender.  Extremities Amcill swelling and erythema to the patient's both lower extremities.  She has mild tenderness on palpation of her right knee.  There is no swelling ecchymosis deformity or effusion.  Procedures           ED Course      Results for orders placed or performed during the hospital encounter of 22   Basic Metabolic Panel    Specimen: Blood   Result Value Ref Range    Glucose 90 65 - 99 mg/dL    BUN 15 8 - 23 mg/dL    Creatinine 1.00 0.57 - 1.00 mg/dL    Sodium 141 136 - 145 mmol/L     Potassium 5.0 3.5 - 5.2 mmol/L    Chloride 103 98 - 107 mmol/L    CO2 28.0 22.0 - 29.0 mmol/L    Calcium 9.3 8.6 - 10.5 mg/dL    BUN/Creatinine Ratio 15.0 7.0 - 25.0    Anion Gap 10.0 5.0 - 15.0 mmol/L    eGFR 60.0 (L) >60.0 mL/min/1.73   CBC Auto Differential    Specimen: Blood   Result Value Ref Range    WBC 8.40 3.40 - 10.80 10*3/mm3    RBC 4.76 3.77 - 5.28 10*6/mm3    Hemoglobin 13.6 12.0 - 15.9 g/dL    Hematocrit 42.1 34.0 - 46.6 %    MCV 88.4 79.0 - 97.0 fL    MCH 28.5 26.6 - 33.0 pg    MCHC 32.2 31.5 - 35.7 g/dL    RDW 13.6 12.3 - 15.4 %    RDW-SD 41.6 37.0 - 54.0 fl    MPV 8.2 6.0 - 12.0 fL    Platelets 250 140 - 450 10*3/mm3    Neutrophil % 59.6 42.7 - 76.0 %    Lymphocyte % 27.1 19.6 - 45.3 %    Monocyte % 7.5 5.0 - 12.0 %    Eosinophil % 4.7 0.3 - 6.2 %    Basophil % 1.1 0.0 - 1.5 %    Neutrophils, Absolute 5.00 1.70 - 7.00 10*3/mm3    Lymphocytes, Absolute 2.30 0.70 - 3.10 10*3/mm3    Monocytes, Absolute 0.60 0.10 - 0.90 10*3/mm3    Eosinophils, Absolute 0.40 0.00 - 0.40 10*3/mm3    Basophils, Absolute 0.10 0.00 - 0.20 10*3/mm3    nRBC 0.0 0.0 - 0.2 /100 WBC     XR Knee 1 or 2 View Right    Result Date: 12/27/2022   1. Negative for acute osseous abnormality. 2. Osteoarthritis most pronounced in the patellofemoral compartment.  Electronically Signed By-Mitchel Rodas MD On:12/27/2022 6:48 PM This report was finalized on [id number] by  Mitchel Rodas MD.                                           MDM  Number of Diagnoses or Management Options  Diagnosis management comments: Patient is findings consistent with cellulitis to both lower legs.  There is no evidence of injury to her right knee.  Patient will be discharged.  She will be placed on Keflex and Ultram.  She will follow with her MD for recheck.       Amount and/or Complexity of Data Reviewed  Clinical lab tests: reviewed  Tests in the radiology section of CPT®: reviewed    Risk of Complications, Morbidity, and/or Mortality  Presenting problems:  moderate  Diagnostic procedures: moderate  Management options: moderate    Patient Progress  Patient progress: stable      Final diagnoses:   Bilateral cellulitis of lower leg       ED Disposition  ED Disposition     ED Disposition   Discharge    Condition   Stable    Comment   --             No follow-up provider specified.       Medication List      New Prescriptions    cephalexin 500 MG capsule  Commonly known as: KEFLEX  Take 1 capsule by mouth 3 (Three) Times a Day.     traMADol 50 MG tablet  Commonly known as: ULTRAM  Take 1 tablet by mouth Every 6 (Six) Hours As Needed for Severe Pain.           Where to Get Your Medications      These medications were sent to [facility] [place] - [address] [place] [address] - [phone]  - [phone] FX  [address] [place] [address] [place] [address]    Phone: [phone]   · cephalexin 500 MG capsule  · traMADol 50 MG tablet          Colton Allen MD  12/27/22 2049

## 2022-12-29 DIAGNOSIS — E03.9 ACQUIRED HYPOTHYROIDISM: ICD-10-CM

## 2022-12-29 RX ORDER — ERGOCALCIFEROL 1.25 MG/1
CAPSULE ORAL
Qty: 13 CAPSULE | Refills: 0 | Status: SHIPPED | OUTPATIENT
Start: 2022-12-29 | End: 2023-03-27

## 2022-12-29 RX ORDER — LEVOTHYROXINE SODIUM 0.03 MG/1
25 TABLET ORAL
Qty: 28 TABLET | Refills: 0 | Status: SHIPPED | OUTPATIENT
Start: 2022-12-29 | End: 2023-01-26

## 2022-12-29 RX ORDER — GABAPENTIN 400 MG/1
400 CAPSULE ORAL
Qty: 90 CAPSULE | Refills: 2 | Status: SHIPPED | OUTPATIENT
Start: 2022-12-29

## 2022-12-29 RX ORDER — OXYBUTYNIN CHLORIDE 5 MG/1
TABLET ORAL
Qty: 56 TABLET | Refills: 0 | Status: SHIPPED | OUTPATIENT
Start: 2022-12-29 | End: 2023-01-26

## 2023-01-13 ENCOUNTER — HOSPITAL ENCOUNTER (OUTPATIENT)
Dept: MRI IMAGING | Facility: HOSPITAL | Age: 73
Discharge: HOME | End: 2023-01-13
Payer: MEDICARE

## 2023-01-13 ENCOUNTER — LAB (OUTPATIENT)
Dept: LAB | Facility: HOSPITAL | Age: 73
End: 2023-01-13
Payer: MEDICARE

## 2023-01-13 DIAGNOSIS — R41.3 MEMORY DIFFICULTIES: ICD-10-CM

## 2023-01-13 LAB
CREAT BLDA-MCNC: 1.1 MG/DL (ref 0.6–1.3)
EGFRCR SERPLBLD CKD-EPI 2021: 53.5 ML/MIN/1.73
FOLATE SERPL-MCNC: 7.95 NG/ML (ref 4.78–24.2)
VIT B12 BLD-MCNC: 336 PG/ML (ref 211–946)

## 2023-01-13 PROCEDURE — 84207 ASSAY OF VITAMIN B-6: CPT

## 2023-01-13 PROCEDURE — A9579 GAD-BASE MR CONTRAST NOS,1ML: HCPCS | Performed by: PSYCHIATRY & NEUROLOGY

## 2023-01-13 PROCEDURE — 82565 ASSAY OF CREATININE: CPT

## 2023-01-13 PROCEDURE — 82746 ASSAY OF FOLIC ACID SERUM: CPT

## 2023-01-13 PROCEDURE — 84446 ASSAY OF VITAMIN E: CPT

## 2023-01-13 PROCEDURE — 25010000002 GADOTERIDOL PER 1 ML: Performed by: PSYCHIATRY & NEUROLOGY

## 2023-01-13 PROCEDURE — 36415 COLL VENOUS BLD VENIPUNCTURE: CPT

## 2023-01-13 PROCEDURE — 70553 MRI BRAIN STEM W/O & W/DYE: CPT

## 2023-01-13 PROCEDURE — 82607 VITAMIN B-12: CPT

## 2023-01-13 RX ADMIN — GADOTERIDOL 19 ML: 279.3 INJECTION, SOLUTION INTRAVENOUS at 11:28

## 2023-01-18 LAB — PYRIDOXAL PHOS SERPL-MCNC: 4.3 UG/L (ref 3.4–65.2)

## 2023-01-19 ENCOUNTER — TELEPHONE (OUTPATIENT)
Dept: NEUROLOGY | Facility: CLINIC | Age: 73
End: 2023-01-19
Payer: MEDICARE

## 2023-01-19 NOTE — TELEPHONE ENCOUNTER
----- Message from Joseph F Seipel, MD sent at 1/18/2023  6:41 PM EST -----  All labs normal b6 mb12 and folate normal

## 2023-01-20 LAB
A-TOCOPHEROL VIT E SERPL-MCNC: 11.4 MG/L (ref 9–29)
GAMMA TOCOPHEROL SERPL-MCNC: 0.8 MG/L (ref 0.5–4.9)

## 2023-01-26 DIAGNOSIS — E03.9 ACQUIRED HYPOTHYROIDISM: ICD-10-CM

## 2023-01-26 RX ORDER — OXYBUTYNIN CHLORIDE 5 MG/1
TABLET ORAL
Qty: 56 TABLET | Refills: 5 | Status: SHIPPED | OUTPATIENT
Start: 2023-01-26

## 2023-01-26 RX ORDER — LEVOTHYROXINE SODIUM 0.03 MG/1
25 TABLET ORAL
Qty: 28 TABLET | Refills: 5 | Status: SHIPPED | OUTPATIENT
Start: 2023-01-26

## 2023-01-26 RX ORDER — CITALOPRAM 20 MG/1
TABLET ORAL
Qty: 28 TABLET | Refills: 5 | Status: SHIPPED | OUTPATIENT
Start: 2023-01-26

## 2023-02-01 ENCOUNTER — HOSPITAL ENCOUNTER (OUTPATIENT)
Dept: SLEEP MEDICINE | Facility: HOSPITAL | Age: 73
Discharge: HOME | End: 2023-02-01
Admitting: PSYCHIATRY & NEUROLOGY
Payer: MEDICARE

## 2023-02-01 DIAGNOSIS — G47.33 OBSTRUCTIVE SLEEP APNEA: ICD-10-CM

## 2023-02-01 PROCEDURE — 95806 SLEEP STUDY UNATT&RESP EFFT: CPT

## 2023-02-01 PROCEDURE — 95806 SLEEP STUDY UNATT&RESP EFFT: CPT | Performed by: PSYCHIATRY & NEUROLOGY

## 2023-02-15 ENCOUNTER — OFFICE VISIT (OUTPATIENT)
Dept: FAMILY MEDICINE CLINIC | Facility: CLINIC | Age: 73
End: 2023-02-15
Payer: MEDICARE

## 2023-02-15 VITALS
WEIGHT: 201 LBS | SYSTOLIC BLOOD PRESSURE: 141 MMHG | BODY MASS INDEX: 42.19 KG/M2 | HEIGHT: 58 IN | TEMPERATURE: 97.6 F | DIASTOLIC BLOOD PRESSURE: 82 MMHG | OXYGEN SATURATION: 97 % | HEART RATE: 65 BPM

## 2023-02-15 DIAGNOSIS — L85.3 DRY SKIN DERMATITIS: ICD-10-CM

## 2023-02-15 DIAGNOSIS — R05.9 COUGH, UNSPECIFIED TYPE: ICD-10-CM

## 2023-02-15 DIAGNOSIS — H92.02 OTALGIA OF LEFT EAR: ICD-10-CM

## 2023-02-15 DIAGNOSIS — R60.0 PEDAL EDEMA: ICD-10-CM

## 2023-02-15 DIAGNOSIS — H66.91 RIGHT OTITIS MEDIA, UNSPECIFIED OTITIS MEDIA TYPE: ICD-10-CM

## 2023-02-15 DIAGNOSIS — T14.8XXA BRUISING: ICD-10-CM

## 2023-02-15 DIAGNOSIS — L03.119 CELLULITIS OF LOWER EXTREMITY, UNSPECIFIED LATERALITY: ICD-10-CM

## 2023-02-15 DIAGNOSIS — N39.41 URGE INCONTINENCE: Primary | ICD-10-CM

## 2023-02-15 DIAGNOSIS — J32.4 PANSINUSITIS, UNSPECIFIED CHRONICITY: ICD-10-CM

## 2023-02-15 PROCEDURE — 99214 OFFICE O/P EST MOD 30 MIN: CPT | Performed by: FAMILY MEDICINE

## 2023-02-15 RX ORDER — CLINDAMYCIN HYDROCHLORIDE 300 MG/1
300 CAPSULE ORAL 3 TIMES DAILY
Qty: 21 CAPSULE | Refills: 0 | Status: SHIPPED | OUTPATIENT
Start: 2023-02-15 | End: 2023-02-22

## 2023-02-15 RX ORDER — BUTYROSPERMUM PARKII(SHEA BUTTER), SIMMONDSIA CHINENSIS (JOJOBA) SEED OIL, ALOE BARBADENSIS LEAF EXTRACT .01; 1; 3.5 G/100G; G/100G; G/100G
1 LIQUID TOPICAL DAILY
COMMUNITY

## 2023-02-15 NOTE — PATIENT INSTRUCTIONS
Apply lotion frequently (avoid ones with perfume and dye)  Elevate feet as much as possible  Drink more water  Limit salt

## 2023-02-15 NOTE — PROGRESS NOTES
"Answers for HPI/ROS submitted by the patient on 2/8/2023  What is the primary reason for your visit?: Cough  Chronicity: new  Onset: in the past 7 days  Progression since onset: gradually worsening  Frequency: every few hours  Cough characteristics: non-productive  chest pain: No  chills: No  ear congestion: Yes  ear pain: Yes  fever: No  headaches: Yes  heartburn: Yes  hemoptysis: No  myalgias: Yes  nasal congestion: Yes  postnasal drip: Yes  rash: Yes  rhinorrhea: No  shortness of breath: Yes  sore throat: Yes  sweats: No  weight loss: No  wheezing: Yes  Aggravated by: lying down  Risk factors for lung disease: animal exposure, occupational exposure    Subjective   Charleen Barnett is a 72 y.o. female.     Cough  This is a new problem. The current episode started in the past 7 days. The problem has been gradually worsening. The problem occurs every few hours. The cough is non-productive. Associated symptoms include ear congestion, ear pain, headaches, heartburn, myalgias, nasal congestion, postnasal drip, a rash, a sore throat, shortness of breath and wheezing. Pertinent negatives include no chest pain, chills, fever, hemoptysis, rhinorrhea, sweats or weight loss. The symptoms are aggravated by lying down. Risk factors for lung disease include animal exposure and occupational exposure.      The patient presents today with a multitude of complaints. The patient has consented to [facility].    Cough  The patient states that her cough has mostly resolved. However, she continues to experience a dry cough. Previously, it was a deep cough.     Bruising  The patient is currently on aspirin.    Cellulitis  The patient states the cellulitis on her legs has been present for 6 to 8 weeks. She was directed to go to the emergency room and was treated with cephalexin on 12/27/2022.  p    Dermatitis  The patient states that she is getting \"alligator skin\" from scratching the area. She describes the area as being \"thickened\". She states " "that she tries not to itch the area, but sometimes she can not help it.     Edema  The patient states that she has a significant amount of swelling that began when she was started on a new blood pressure medicine and had the cellulitis. She states that every night her legs \"look like tree trunks\".     GERD  The patient states that she is working with a nurse from Dr. Ashanti Villela's office and a dietician to help control her GERD and colon. She is scheduled for a colonoscopy and endoscopy in 03/2023.    Ear pain  The patient complains of left ear pain and nighttime swelling, which has been ongoing for months. She states that the pain will give her a headache and make her cry nightly. She states that her MRI, ordered by Dr. Joseph F Seipel, showed inflammation of her sinus or her ear. There is also an infection in either her ear or sinus, she is not sure which one it was. The right side of the jaw was fine. They did not do the left side.     Urge incontinence  The patient is requesting a referral to urology.    The patient states that she has been compliant with her medications, fluids, movement and diet. The patient states that she is not eating a lot.       The following portions of the patient's history were reviewed and updated as appropriate: allergies, current medications, past family history, past medical history, past social history, past surgical history, and problem list.  Past Medical History:   Diagnosis Date   • Allergic 1970's   • Anemia 1950's   • Anxiety    • Arthritis    • Asthma 02/13/2020   • Ramos's esophagus 02/15/2018   • Cancer (HCC) Carcinoma Insitu cervical   • Cataract    • Cholelithiasis Removed 2000's   • Coronary artery disease    • Depression 08/27/2018   • Fibromyalgia 12/14/2011   • Fibromyalgia, primary 1990's I think   • GERD (gastroesophageal reflux disease)    • Gout 02/13/2020   • Headache    • HL (hearing loss) Age related   • Hyperlipidemia    • Hypertension    • Hypothyroidism " 2022   • Joint pain    • Low back pain 2970's   • Obesity Lifelong since puberty   • Obstructive sleep apnea 10/16/2014   • Osteopenia Five years appx   • Osteoporosis     fosamax(SE), on prolia(4/10/18-6/18/21, 2/1/22)   • Otitis media    • Pedal edema 10/22/2018   • Pneumonia 2020   • Presence of left artificial knee joint 03/14/2019   • Scoliosis 1961   • Seasonal allergies    • ST elevation (STEMI) myocardial infarction ([facility]) 02/13/2020   • Status post percutaneous transluminal coronary angioplasty 06/10/2014   • Substance abuse ([facility])    • Tachycardia    • Urge incontinence 08/27/2018   • Urinary tract infection    • Visual impairment Catsracts   • Vitamin D deficiency 03/19/2015     Past Surgical History:   Procedure Laterality Date   • BLADDER SURGERY     • CARDIAC CATHETERIZATION N/A 05/29/2021    Procedure: Left Heart Cath and coronary angiogram;  Surgeon: Yazmin Matta MD;  Location: [facility] CATH INVASIVE LOCATION;  Service: Cardiovascular;  Laterality: N/A;   • CARDIAC CATHETERIZATION  05/29/2021    Procedure: Functional Flow Reserve (iFR);  Surgeon: Bryan Patel MD;  Location: [facility] CATH INVASIVE LOCATION;  Service: Cardiology;;   • CARDIAC CATHETERIZATION N/A 05/29/2021    Procedure: Percutaneous Coronary Intervention;  Surgeon: Bryan Patel MD;  Location: [facility] CATH INVASIVE LOCATION;  Service: Cardiology;  Laterality: N/A;   • CARDIAC CATHETERIZATION N/A 05/29/2021    Procedure: Stent FRANCOISE coronary;  Surgeon: Bryan Patel MD;  Location: [facility] CATH INVASIVE LOCATION;  Service: Cardiology;  Laterality: N/A;   • CAROTID STENT     • CHOLECYSTECTOMY     • COLONOSCOPY  2017??   • CORONARY STENT PLACEMENT  ??   • FRACTURE SURGERY  2020   • HYSTERECTOMY     • INGUINAL HERNIA REPAIR     • JOINT REPLACEMENT  L-knee   • KNEE SURGERY     • OTHER SURGICAL HISTORY      STENT   • SUBTOTAL HYSTERECTOMY  1977     Family History   Problem Relation Age of Onset   • No Known  Problems Mother    • Heart attack Father         Vein bypass in legs   • Cancer Father            • No Known Problems Sister    • No Known Problems Brother    • No Known Problems Maternal Aunt    • No Known Problems Maternal Uncle    • No Known Problems Paternal Aunt    • No Known Problems Paternal Uncle    • Heart attack Maternal Grandmother         Heart atrack in her 80s   • Heart attack Maternal Grandfather         Heart attack in his 60s   • Heart attack Paternal Grandmother         Type 1 diabetic, heart attack   • Heart attack Paternal Grandfather         Heart attack in his 50s or 60s   • Heart disease Sister         Blockages monitored with medication   • Hypertension Sister         Coronary heart issues   • Hyperlipidemia Brother         Extremely high triglycerides and cholesterol   • Hypertension Brother         Hogh blood pressure   • Heart attack Brother         Passed from heart attack age 54   • Hyperlipidemia Brother         Unknown   • Hypertension Brother    • Heart attack Brother         Passed from heart attack age 44   • Hyperlipidemia Brother         Had heart disease for years before passing   • Hypertension Brother    • Heart attack Brother          from heart failure in his 60s   • Anemia Neg Hx    • Arrhythmia Neg Hx    • Asthma Neg Hx    • Clotting disorder Neg Hx    • Fainting Neg Hx    • Heart failure Neg Hx      Social History     Socioeconomic History   • Marital status:    Tobacco Use   • Smoking status: Never   • Smokeless tobacco: Never   • Tobacco comments:     Parents and husbands heavy smokers   Vaping Use   • Vaping Use: Never used   Substance and Sexual Activity   • Alcohol use: Not Currently     Comment: Recovery since  drank from age [age]   • Drug use: Not Currently     Frequency: 1.0 times per week     Types: Amphetamines     Comment: Short term use/over use diet pills in the 70's   • Sexual activity: Not Currently     Partners: Male     Birth  control/protection: None, Hysterectomy         Current Outpatient Medications:   •  albuterol sulfate  (90 Base) MCG/ACT inhaler, Inhale 1 puff Every 6 (Six) Hours As Needed for Wheezing or Shortness of Air., Disp: , Rfl:   •  aspirin 81 MG tablet, Take 81 mg by mouth Daily., Disp: , Rfl:   •  atorvastatin (LIPITOR) 10 MG tablet, TAKE 1 TABLET BY MOUTH DAILY, Disp: 90 tablet, Rfl: 2  •  Biotin 10 MG tablet, Take 10 mg by mouth Daily., Disp: , Rfl:   •  buPROPion XL (WELLBUTRIN XL) 150 MG 24 hr tablet, TAKE 1 TABLET BY MOUTH EVERY MORNING, Disp: 28 tablet, Rfl: 0  •  busPIRone (BUSPAR) 7.5 MG tablet, TAKE 1 TABLET BY MOUTH TWICE DAILY, Disp: 180 tablet, Rfl: 0  •  cephalexin (KEFLEX) 500 MG capsule, Take 1 capsule by mouth 3 (Three) Times a Day., Disp: 21 capsule, Rfl: 0  •  citalopram (CeleXA) 20 MG tablet, TAKE 1 TABLET BY MOUTH EVERY DAY, Disp: 28 tablet, Rfl: 5  •  diclofenac (VOLTAREN) 0.1 % ophthalmic solution, , Disp: , Rfl:   •  gabapentin (NEURONTIN) 400 MG capsule, TAKE 1 CAPSULE BY MOUTH EVERY NIGHT AT BEDTIME, Disp: 90 capsule, Rfl: 2  •  hydrOXYzine (ATARAX) 10 MG tablet, TAKE 1 TABLET BY MOUTH EVERY NIGHT AT BEDTIME, Disp: 30 tablet, Rfl: 0  •  lansoprazole (PREVACID) 15 MG capsule, Take 15 mg by mouth Daily., Disp: , Rfl:   •  levothyroxine (SYNTHROID, LEVOTHROID) 25 MCG tablet, TAKE 1 TABLET BY MOUTH EVERY MORNING, Disp: 28 tablet, Rfl: 5  •  Magnesium Citrate 100 MG capsule, Take 1 capsule by mouth Daily., Disp: , Rfl:   •  metoprolol succinate XL (TOPROL-XL) 25 MG 24 hr tablet, TAKE 1 TABLET BY MOUTH EVERY MORNING, Disp: 90 tablet, Rfl: 2  •  midodrine (PROAMATINE) 5 MG tablet, , Disp: , Rfl:   •  montelukast (SINGULAIR) 10 MG tablet, TAKE 1 TABLET BY MOUTH EVERY DAY, Disp: 90 tablet, Rfl: 2  •  ofloxacin (OCUFLOX) 0.3 % ophthalmic solution, , Disp: , Rfl:   •  oxybutynin (DITROPAN) 5 MG tablet, TAKE 1 TABLET BY MOUTH TWICE DAILY, Disp: 56 tablet, Rfl: 5  •  prednisoLONE acetate (PRED FORTE)  "1 % ophthalmic suspension, , Disp: , Rfl:   •  traMADol (ULTRAM) 50 MG tablet, Take 1 tablet by mouth Every 6 (Six) Hours As Needed for Severe Pain., Disp: 12 tablet, Rfl: 0  •  triamcinolone (KENALOG) 0.1 % cream, Apply 1 application topically to the appropriate area as directed 2 (Two) Times a Day., Disp: 30 g, Rfl: 0  •  vitamin D (ERGOCALCIFEROL) 1.25 MG (50178 UT) capsule capsule, TAKE 1 CAPSULE BY MOUTH EVERY WEEK, Disp: 13 capsule, Rfl: 0  •  clindamycin (Cleocin) 300 MG capsule, Take 1 capsule by mouth 3 (Three) Times a Day for 7 days., Disp: 21 capsule, Rfl: 0    Current Facility-Administered Medications:   •  denosumab (PROLIA) syringe 60 mg, 60 mg, Subcutaneous, Q6 Months, Dede Anders PA, 60 mg at 08/03/22 1345    Review of Systems   Constitutional: Negative for chills, fever and unexpected weight loss.   HENT: Positive for ear pain, postnasal drip and sore throat. Negative for rhinorrhea.    Respiratory: Positive for cough, shortness of breath and wheezing. Negative for hemoptysis.    Cardiovascular: Negative for chest pain.   Musculoskeletal: Positive for myalgias.   Skin: Positive for rash.     /82 (BP Location: Left arm, Patient Position: Sitting, Cuff Size: Large Adult)   Pulse 65   Temp 97.6 °F (36.4 °C) (Temporal)   Ht 147.3 cm (58\")   Wt 91.2 kg (201 lb)   SpO2 97%   BMI 42.01 kg/m²       Objective   Physical Exam  Vitals and nursing note reviewed.   Constitutional:       Appearance: Normal appearance. She is morbidly obese.   HENT:      Head: Normocephalic and atraumatic.      Right Ear: Ear canal normal. Tympanic membrane is erythematous.      Left Ear: Tympanic membrane and ear canal normal.   Cardiovascular:      Rate and Rhythm: Normal rate and regular rhythm.      Heart sounds: Normal heart sounds.   Pulmonary:      Effort: Pulmonary effort is normal. No respiratory distress.      Breath sounds: Normal breath sounds.   Musculoskeletal:      Cervical back: Neck supple.      " Right lower leg: Edema (trace) present.      Left lower leg: Edema (trace) present.   Lymphadenopathy:      Cervical: No cervical adenopathy.   Skin:     General: Skin is warm and dry.      Findings: Bruising (few small bruises) present.      Comments: Faint erythema and warmth across the anterior aspect of both shins   Neurological:      Mental Status: She is alert.   Psychiatric:         Mood and Affect: Mood normal.         Behavior: Behavior is cooperative.           Assessment & Plan   Problems Addressed this Visit        Genitourinary and Reproductive     Urge incontinence - Primary    Relevant Orders    Ambulatory Referral to Urology       Symptoms and Signs    Pedal edema   Other Visit Diagnoses     Bruising        Otalgia of left ear        Pansinusitis, unspecified chronicity        Cough, unspecified type        Dry skin dermatitis        Cellulitis of lower extremity, unspecified laterality          Diagnoses       Codes Comments    Urge incontinence    -  Primary ICD-10-CM: N39.41  ICD-9-CM: 788.31     Bruising     ICD-10-CM: T14.8XXA  ICD-9-CM: 924.9     Otalgia of left ear     ICD-10-CM: H92.02  ICD-9-CM: 388.70     Pansinusitis, unspecified chronicity     ICD-10-CM: J32.4  ICD-9-CM: 473.8     Cough, unspecified type     ICD-10-CM: R05.9  ICD-9-CM: 786.2     Dry skin dermatitis     ICD-10-CM: L85.3  ICD-9-CM: 692.89     Pedal edema     ICD-10-CM: R60.0  ICD-9-CM: 782.3     Cellulitis of lower extremity, unspecified laterality     ICD-10-CM: L03.119  ICD-9-CM: 682.6         1. Urge incontinence  - A referral to urology for further evaluation has been placed.     2. Bruising  - The patient was reassured. She is currently on aspirin and she is 72 years old. The bruising is very limited.    3. Otalgia of the left ear and pansinusitis  - She will go ahead and be put on clindamycin secondary to her allergies and previous use of cephalexin. She does have a right otitis media as well.    4. Dry skin  dermatitis  - It was recommended that she try to stop scratching as much as possible and use a good thick lotion that is free of dyes and perfumes.    5. Cough  - Resolved.    6. Pedal edema  - She was counseled on the need to elevate her feet as much as possible and to limit salt and to increase her water intake.    7. Cellulitis of her lower extremities  - Again, a prescription for clindamycin has been sent to help treat.    35 minutes were spent reviewing the chart, taking history and doing exam, counseling her, ordering referrals and medications, and completing the chart. She will keep her appointment for Medicare wellness with our office the first week of 03/2023.    Transcribed from ambient dictation for Ilsa Her MD by Ilsa Rocha.  02/15/23   12:19 EST    Patient or patient representative verbalized consent to the visit recording.  I have personally performed the services described in this document as transcribed by the above individual, and it is both accurate and complete.

## 2023-02-28 ENCOUNTER — ON CAMPUS - OUTPATIENT (AMBULATORY)
Dept: URBAN - METROPOLITAN AREA HOSPITAL 2 | Facility: HOSPITAL | Age: 73
End: 2023-02-28

## 2023-02-28 ENCOUNTER — OFFICE (AMBULATORY)
Dept: URBAN - METROPOLITAN AREA CLINIC 64 | Facility: CLINIC | Age: 73
End: 2023-02-28

## 2023-02-28 ENCOUNTER — OFFICE (AMBULATORY)
Dept: URBAN - METROPOLITAN AREA PATHOLOGY 4 | Facility: PATHOLOGY | Age: 73
End: 2023-02-28
Payer: COMMERCIAL

## 2023-02-28 ENCOUNTER — OFFICE (AMBULATORY)
Dept: URBAN - METROPOLITAN AREA PATHOLOGY 4 | Facility: PATHOLOGY | Age: 73
End: 2023-02-28

## 2023-02-28 VITALS
HEART RATE: 115 BPM | OXYGEN SATURATION: 97 % | OXYGEN SATURATION: 95 % | HEART RATE: 67 BPM | RESPIRATION RATE: 16 BRPM | DIASTOLIC BLOOD PRESSURE: 95 MMHG | DIASTOLIC BLOOD PRESSURE: 97 MMHG | HEART RATE: 112 BPM | HEART RATE: 106 BPM | TEMPERATURE: 98 F | OXYGEN SATURATION: 99 % | SYSTOLIC BLOOD PRESSURE: 144 MMHG | DIASTOLIC BLOOD PRESSURE: 59 MMHG | DIASTOLIC BLOOD PRESSURE: 93 MMHG | HEART RATE: 114 BPM | SYSTOLIC BLOOD PRESSURE: 131 MMHG | RESPIRATION RATE: 15 BRPM | SYSTOLIC BLOOD PRESSURE: 113 MMHG | WEIGHT: 197 LBS | RESPIRATION RATE: 17 BRPM | SYSTOLIC BLOOD PRESSURE: 125 MMHG | HEART RATE: 122 BPM | RESPIRATION RATE: 12 BRPM | SYSTOLIC BLOOD PRESSURE: 127 MMHG | OXYGEN SATURATION: 98 % | HEIGHT: 59 IN | RESPIRATION RATE: 13 BRPM | DIASTOLIC BLOOD PRESSURE: 84 MMHG | DIASTOLIC BLOOD PRESSURE: 94 MMHG | DIASTOLIC BLOOD PRESSURE: 90 MMHG | SYSTOLIC BLOOD PRESSURE: 132 MMHG | HEART RATE: 119 BPM | HEART RATE: 117 BPM

## 2023-02-28 DIAGNOSIS — K22.2 ESOPHAGEAL OBSTRUCTION: ICD-10-CM

## 2023-02-28 DIAGNOSIS — K57.30 DIVERTICULOSIS OF LARGE INTESTINE WITHOUT PERFORATION OR ABS: ICD-10-CM

## 2023-02-28 DIAGNOSIS — K25.9 GASTRIC ULCER, UNSPECIFIED AS ACUTE OR CHRONIC, WITHOUT HEMO: ICD-10-CM

## 2023-02-28 DIAGNOSIS — K31.89 OTHER DISEASES OF STOMACH AND DUODENUM: ICD-10-CM

## 2023-02-28 DIAGNOSIS — K64.1 SECOND DEGREE HEMORRHOIDS: ICD-10-CM

## 2023-02-28 DIAGNOSIS — E78.00 PURE HYPERCHOLESTEROLEMIA, UNSPECIFIED: ICD-10-CM

## 2023-02-28 DIAGNOSIS — E66.3 OVERWEIGHT: ICD-10-CM

## 2023-02-28 DIAGNOSIS — G47.30 SLEEP APNEA, UNSPECIFIED: ICD-10-CM

## 2023-02-28 DIAGNOSIS — D12.2 BENIGN NEOPLASM OF ASCENDING COLON: ICD-10-CM

## 2023-02-28 DIAGNOSIS — K22.70 BARRETT'S ESOPHAGUS WITHOUT DYSPLASIA: ICD-10-CM

## 2023-02-28 DIAGNOSIS — K21.00 GASTRO-ESOPHAGEAL REFLUX DISEASE WITH ESOPHAGITIS, WITHOUT B: ICD-10-CM

## 2023-02-28 DIAGNOSIS — Z86.010 PERSONAL HISTORY OF COLONIC POLYPS: ICD-10-CM

## 2023-02-28 DIAGNOSIS — R13.10 DYSPHAGIA, UNSPECIFIED: ICD-10-CM

## 2023-02-28 DIAGNOSIS — K44.9 DIAPHRAGMATIC HERNIA WITHOUT OBSTRUCTION OR GANGRENE: ICD-10-CM

## 2023-02-28 DIAGNOSIS — K58.9 IRRITABLE BOWEL SYNDROME WITHOUT DIARRHEA: ICD-10-CM

## 2023-02-28 DIAGNOSIS — I10 ESSENTIAL (PRIMARY) HYPERTENSION: ICD-10-CM

## 2023-02-28 PROBLEM — K63.5 POLYP OF COLON: Status: ACTIVE | Noted: 2023-02-28

## 2023-02-28 LAB
GI HISTOLOGY: A. SELECT: (no result)
GI HISTOLOGY: B. UNSPECIFIED: (no result)
GI HISTOLOGY: PDF REPORT: (no result)

## 2023-02-28 PROCEDURE — 99487 CPLX CHRNC CARE 1ST 60 MIN: CPT | Performed by: INTERNAL MEDICINE

## 2023-02-28 PROCEDURE — 99457 RPM TX MGMT 1ST 20 MIN: CPT | Performed by: INTERNAL MEDICINE

## 2023-02-28 PROCEDURE — 99489 CPLX CHRNC CARE EA ADDL 30: CPT | Performed by: INTERNAL MEDICINE

## 2023-02-28 PROCEDURE — 99458 RPM TX MGMT EA ADDL 20 MIN: CPT | Performed by: INTERNAL MEDICINE

## 2023-02-28 PROCEDURE — 88305 TISSUE EXAM BY PATHOLOGIST: CPT | Mod: 26 | Performed by: INTERNAL MEDICINE

## 2023-02-28 PROCEDURE — 88342 IMHCHEM/IMCYTCHM 1ST ANTB: CPT | Mod: 26 | Performed by: INTERNAL MEDICINE

## 2023-02-28 PROCEDURE — 43248 EGD GUIDE WIRE INSERTION: CPT | Performed by: INTERNAL MEDICINE

## 2023-02-28 PROCEDURE — 43239 EGD BIOPSY SINGLE/MULTIPLE: CPT | Mod: 59 | Performed by: INTERNAL MEDICINE

## 2023-02-28 PROCEDURE — 99453 REM MNTR PHYSIOL PARAM SETUP: CPT | Performed by: INTERNAL MEDICINE

## 2023-02-28 PROCEDURE — 45385 COLONOSCOPY W/LESION REMOVAL: CPT | Mod: PT | Performed by: INTERNAL MEDICINE

## 2023-02-28 PROCEDURE — 99454 REM MNTR PHYSIOL PARAM 16-30: CPT | Performed by: INTERNAL MEDICINE

## 2023-02-28 RX ORDER — METOPROLOL SUCCINATE 25 MG/1
TABLET, EXTENDED RELEASE ORAL
Qty: 90 TABLET | Refills: 1 | Status: SHIPPED | OUTPATIENT
Start: 2023-02-28

## 2023-02-28 RX ORDER — BUSPIRONE HYDROCHLORIDE 7.5 MG/1
TABLET ORAL
Qty: 180 TABLET | Refills: 0 | Status: SHIPPED | OUTPATIENT
Start: 2023-02-28

## 2023-02-28 RX ORDER — BUPROPION HYDROCHLORIDE 150 MG/1
150 TABLET ORAL EVERY MORNING
Qty: 90 TABLET | Refills: 0 | Status: SHIPPED | OUTPATIENT
Start: 2023-02-28

## 2023-02-28 NOTE — TELEPHONE ENCOUNTER
Rx Refill Note  Requested Prescriptions     Pending Prescriptions Disp Refills   • metoprolol succinate XL (TOPROL-XL) 25 MG 24 hr tablet [Pharmacy Med Name: METOPROLOL SUC 25MG ER^] 90 tablet 1     Sig: TAKE 1 TABLET BY MOUTH EVERY MORNING      Last office visit with prescribing clinician: 11/16/2022   Last telemedicine visit with prescribing clinician: 5/4/2023   Next office visit with prescribing clinician: 5/4/2023                         Would you like a call back once the refill request has been completed: [] Yes [] No    If the office needs to give you a call back, can they leave a voicemail: [] Yes [] No    Barbara Rogers MA  02/28/23, 08:37 EST

## 2023-03-06 ENCOUNTER — TELEPHONE (OUTPATIENT)
Dept: NEUROLOGY | Facility: CLINIC | Age: 73
End: 2023-03-06
Payer: MEDICARE

## 2023-03-06 DIAGNOSIS — G47.33 OBSTRUCTIVE SLEEP APNEA: Primary | ICD-10-CM

## 2023-03-09 ENCOUNTER — OFFICE VISIT (OUTPATIENT)
Dept: FAMILY MEDICINE CLINIC | Facility: CLINIC | Age: 73
End: 2023-03-09
Payer: MEDICARE

## 2023-03-09 VITALS
BODY MASS INDEX: 41.98 KG/M2 | OXYGEN SATURATION: 99 % | HEIGHT: 58 IN | HEART RATE: 58 BPM | SYSTOLIC BLOOD PRESSURE: 160 MMHG | TEMPERATURE: 98.4 F | WEIGHT: 200 LBS | DIASTOLIC BLOOD PRESSURE: 82 MMHG

## 2023-03-09 DIAGNOSIS — I10 PRIMARY HYPERTENSION: ICD-10-CM

## 2023-03-09 DIAGNOSIS — E03.9 ACQUIRED HYPOTHYROIDISM: ICD-10-CM

## 2023-03-09 DIAGNOSIS — R09.81 NASAL CONGESTION: ICD-10-CM

## 2023-03-09 DIAGNOSIS — E55.9 VITAMIN D DEFICIENCY: ICD-10-CM

## 2023-03-09 DIAGNOSIS — Z00.00 ENCOUNTER FOR ANNUAL WELLNESS EXAM IN MEDICARE PATIENT: Primary | ICD-10-CM

## 2023-03-09 DIAGNOSIS — E78.2 MIXED HYPERLIPIDEMIA: ICD-10-CM

## 2023-03-09 DIAGNOSIS — M81.0 AGE-RELATED OSTEOPOROSIS WITHOUT CURRENT PATHOLOGICAL FRACTURE: ICD-10-CM

## 2023-03-09 PROCEDURE — G0439 PPPS, SUBSEQ VISIT: HCPCS | Performed by: FAMILY MEDICINE

## 2023-03-09 PROCEDURE — 99214 OFFICE O/P EST MOD 30 MIN: CPT | Performed by: FAMILY MEDICINE

## 2023-03-09 PROCEDURE — 1125F AMNT PAIN NOTED PAIN PRSNT: CPT | Performed by: FAMILY MEDICINE

## 2023-03-09 PROCEDURE — 1170F FXNL STATUS ASSESSED: CPT | Performed by: FAMILY MEDICINE

## 2023-03-09 PROCEDURE — 1159F MED LIST DOCD IN RCRD: CPT | Performed by: FAMILY MEDICINE

## 2023-03-09 RX ORDER — ALBUTEROL SULFATE 90 UG/1
1 AEROSOL, METERED RESPIRATORY (INHALATION) EVERY 6 HOURS PRN
Qty: 48 G | Refills: 3 | Status: SHIPPED | OUTPATIENT
Start: 2023-03-09

## 2023-03-09 RX ORDER — FLUTICASONE PROPIONATE 50 MCG
2 SPRAY, SUSPENSION (ML) NASAL DAILY
Qty: 9.9 ML | Refills: 11 | Status: SHIPPED | OUTPATIENT
Start: 2023-03-09

## 2023-03-09 NOTE — PROGRESS NOTES
The ABCs of the Annual Wellness Visit  Subsequent Medicare Wellness Visit    Subjective    Charleen Barnett is a 72 y.o. female who presents for a Subsequent Medicare Wellness Visit.    The following portions of the patient's history were reviewed and   updated as appropriate: allergies, current medications, past family history, past medical history, past social history, past surgical history and problem list.    Compared to one year ago, the patient feels her physical   health is better.    Compared to one year ago, the patient feels her mental   health is better.    Recent Hospitalizations:  She was not admitted to the hospital during the last year.       Current Medical Providers:  Patient Care Team:  Isla Her MD as PCP - Yazmin Bridges MD as Consulting Physician (Cardiology)    Outpatient Medications Prior to Visit   Medication Sig Dispense Refill   • aspirin 81 MG tablet Take 1 tablet by mouth Daily.     • atorvastatin (LIPITOR) 10 MG tablet TAKE 1 TABLET BY MOUTH DAILY 90 tablet 2   • Biotin 10 MG tablet Take 10 mg by mouth Daily.     • buPROPion XL (WELLBUTRIN XL) 150 MG 24 hr tablet TAKE 1 TABLET BY MOUTH EVERY MORNING 90 tablet 0   • busPIRone (BUSPAR) 7.5 MG tablet TAKE 1 TABLET BY MOUTH TWICE DAILY 180 tablet 0   • citalopram (CeleXA) 20 MG tablet TAKE 1 TABLET BY MOUTH EVERY DAY 28 tablet 5   • diclofenac (VOLTAREN) 0.1 % ophthalmic solution      • gabapentin (NEURONTIN) 400 MG capsule TAKE 1 CAPSULE BY MOUTH EVERY NIGHT AT BEDTIME 90 capsule 2   • hydrOXYzine (ATARAX) 10 MG tablet TAKE 1 TABLET BY MOUTH EVERY NIGHT AT BEDTIME 30 tablet 0   • lansoprazole (PREVACID) 15 MG capsule Take 1 capsule by mouth Daily.     • levothyroxine (SYNTHROID, LEVOTHROID) 25 MCG tablet TAKE 1 TABLET BY MOUTH EVERY MORNING 28 tablet 5   • Magnesium Citrate 100 MG capsule Take 1 capsule by mouth Daily.     • metoprolol succinate XL (TOPROL-XL) 25 MG 24 hr tablet TAKE 1 TABLET BY MOUTH EVERY  MORNING 90 tablet 1   • midodrine (PROAMATINE) 5 MG tablet      • montelukast (SINGULAIR) 10 MG tablet TAKE 1 TABLET BY MOUTH EVERY DAY 90 tablet 2   • ofloxacin (OCUFLOX) 0.3 % ophthalmic solution      • oxybutynin (DITROPAN) 5 MG tablet TAKE 1 TABLET BY MOUTH TWICE DAILY 56 tablet 5   • prednisoLONE acetate (PRED FORTE) 1 % ophthalmic suspension      • triamcinolone (KENALOG) 0.1 % cream Apply 1 application topically to the appropriate area as directed 2 (Two) Times a Day. 30 g 0   • vitamin D (ERGOCALCIFEROL) 1.25 MG (47692 UT) capsule capsule TAKE 1 CAPSULE BY MOUTH EVERY WEEK 13 capsule 0   • albuterol sulfate  (90 Base) MCG/ACT inhaler Inhale 1 puff Every 6 (Six) Hours As Needed for Wheezing or Shortness of Air.     • cephalexin (KEFLEX) 500 MG capsule Take 1 capsule by mouth 3 (Three) Times a Day. 21 capsule 0   • traMADol (ULTRAM) 50 MG tablet Take 1 tablet by mouth Every 6 (Six) Hours As Needed for Severe Pain. 12 tablet 0     Facility-Administered Medications Prior to Visit   Medication Dose Route Frequency Provider Last Rate Last Admin   • denosumab (PROLIA) syringe 60 mg  60 mg Subcutaneous Q6 Months Dede Anders PA   60 mg at 08/03/22 1345       No opioid medication identified on active medication list. I have reviewed chart for other potential  high risk medication/s and harmful drug interactions in the elderly.          Aspirin is on active medication list. Aspirin use is indicated based on review of current medical condition/s. Pros and cons of this therapy have been discussed today. Benefits of this medication outweigh potential harm.  Patient has been encouraged to continue taking this medication.  .      Patient Active Problem List   Diagnosis   • Anxiety   • Arthritis   • Asthma   • Depression   • Encounter for therapeutic drug monitoring   • Family hx osteoporosis   • Fibromyalgia   • Ramos's esophagus   • Gastroesophageal reflux disease   • Gout   • Hyperglycemia   • Mixed  "hyperlipidemia   • Primary hypertension   • Obstructive sleep apnea   • Age related osteoporosis   • Pedal edema   • Postmenopausal status   • Presence of left artificial knee joint   • ST elevation (STEMI) myocardial infarction (HCC)   • Status post percutaneous transluminal coronary angioplasty   • Urge incontinence   • Vitamin D deficiency   • Morbidly obese (HCC)   • Dyspnea   • Unstable angina (HCC)   • Coronary artery disease involving coronary bypass graft of native heart with unstable angina pectoris (HCC)   • Encounter for annual wellness exam in Medicare patient   • Obesity (BMI 30-39.9)   • Chest pain   • Aftercare following joint replacement surgery   • Localized osteoarthrosis   • Localized, primary osteoarthritis   • Osteoarthritis of knee   • Lumbar radiculopathy   • Artificial knee joint present   • Acquired hypothyroidism   • Rash   • Memory difficulties     Advance Care Planning  Advance Directive is not on file.  ACP discussion was held with the patient during this visit. Patient does not have an advance directive, information provided.     Objective    Vitals:    03/09/23 0904   BP: 160/82   BP Location: Left arm   Patient Position: Sitting   Cuff Size: Large Adult   Pulse: 58   Temp: 98.4 °F (36.9 °C)   TempSrc: Temporal   SpO2: 99%   Weight: 90.7 kg (200 lb)   Height: 147.3 cm (58\")   PainSc:   4   PainLoc: Leg     Estimated body mass index is 41.8 kg/m² as calculated from the following:    Height as of this encounter: 147.3 cm (58\").    Weight as of this encounter: 90.7 kg (200 lb).    Class 3 Severe Obesity (BMI >=40). Obesity-related health conditions include the following: hypertension. Obesity is unchanged. BMI is is above average; BMI management plan is completed. We discussed portion control and increasing exercise.      Does the patient have evidence of cognitive impairment? No   MMSE done 30/30          HEALTH RISK ASSESSMENT    Smoking Status:  Social History     Tobacco Use   Smoking " Status Never   Smokeless Tobacco Never   Tobacco Comments    Parents and husbands heavy smokers     Alcohol Consumption:  Social History     Substance and Sexual Activity   Alcohol Use Not Currently    Comment: Recovery since 1994 drank from age [age]     Fall Risk Screen:    LISA Fall Risk Assessment was completed, and patient is at LOW risk for falls.Assessment completed on:3/9/2023    Depression Screening:  PHQ-2/PHQ-9 Depression Screening 3/9/2023   Little Interest or Pleasure in Doing Things 0-->not at all   Feeling Down, Depressed or Hopeless 1-->several days   PHQ-9: Brief Depression Severity Measure Score 1       Health Habits and Functional and Cognitive Screening:  Functional & Cognitive Status 3/9/2023   Do you have difficulty preparing food and eating? No   Do you have difficulty bathing yourself, getting dressed or grooming yourself? No   Do you have difficulty using the toilet? No   Do you have difficulty moving around from place to place? Yes   Do you have trouble with steps or getting out of a bed or a chair? Yes   Current Diet Limited Junk Food   Dental Exam Up to date   Eye Exam Up to date   Exercise (times per week) -   Current Exercises Include Walking        Exercise Comment stretches.   Current Exercise Activities Include -   Do you need help using the phone?  No   Are you deaf or do you have serious difficulty hearing?  No   Do you need help with transportation? No   Do you need help shopping? No   Do you need help preparing meals?  No   Do you need help with housework?  No   Do you need help with laundry? No   Do you need help taking your medications? No   Do you need help managing money? No   Do you ever drive or ride in a car without wearing a seat belt? No   Have you felt unusual stress, anger or loneliness in the last month? No   Who do you live with? Spouse   If you need help, do you have trouble finding someone available to you? No   Have you been bothered in the last four weeks by  sexual problems? No   Do you have difficulty concentrating, remembering or making decisions? Yes       Age-appropriate Screening Schedule:  Refer to the list below for future screening recommendations based on patient's age, sex and/or medical conditions. Orders for these recommended tests are listed in the plan section. The patient has been provided with a written plan.    Health Maintenance   Topic Date Due   • TDAP/TD VACCINES (1 - Tdap) Never done   • ZOSTER VACCINE (1 of 2) Never done   • COVID-19 Vaccine (3 - Booster for Moderna series) 11/05/2021   • INFLUENZA VACCINE  08/01/2022   • DXA SCAN  04/22/2023   • LIPID PANEL  07/19/2023   • ANNUAL WELLNESS VISIT  03/09/2024   • MAMMOGRAM  08/18/2024   • COLORECTAL CANCER SCREENING  02/28/2033   • HEPATITIS C SCREENING  Completed   • Pneumococcal Vaccine 65+  Completed            She was counseled on the need for weight loss  She was encouraged to get a Tdap at her local pharmacy  She was encouraged to increase her physical activity  She was counseled on advanced directives  She is up-to-date on mammogram and DEXA    CMS Preventative Services Quick Reference  Risk Factors Identified During Encounter  Immunizations Discussed/Encouraged: Tdap  The above risks/problems have been discussed with the patient.  Pertinent information has been shared with the patient in the After Visit Summary.  An After Visit Summary and PPPS were made available to the patient.    Follow Up:   Next Medicare Wellness visit to be scheduled in 1 year.       Additional E&M Note during same encounter follows:  Patient has multiple medical problems which are significant and separately identifiable that require additional work above and beyond the Medicare Wellness Visit.      Chief Complaint  Medicare Wellness-subsequent (Forgot to take morning meds) and Allergies (Still congested from taking her antibx from December. Pain went away, but not the congestion.)    Subjective        HPI  Charleen ROOT  "Anibal is also being seen today for follow up on bp, chol, thyroid and vit D  Still congested  On claritin and Singulair       Objective   Vital Signs:  /82 (BP Location: Left arm, Patient Position: Sitting, Cuff Size: Large Adult)   Pulse 58   Temp 98.4 °F (36.9 °C) (Temporal)   Ht 147.3 cm (58\")   Wt 90.7 kg (200 lb)   SpO2 99%   BMI 41.80 kg/m²     Physical Exam                    Assessment and Plan   Diagnoses and all orders for this visit:    1. Encounter for annual wellness exam in Medicare patient (Primary)    2. Mixed hyperlipidemia  -     Comprehensive Metabolic Panel; Future  -     Lipid Panel; Future    3. Primary hypertension  -     Comprehensive Metabolic Panel; Future  -     Lipid Panel; Future    4. Acquired hypothyroidism  -     TSH; Future    5. Vitamin D deficiency  -     Vitamin D,25-Hydroxy; Future    6. Age-related osteoporosis without current pathological fracture  -     Ambulatory Referral to Orthopedic Surgery    Other orders  -     fluticasone (FLONASE) 50 MCG/ACT nasal spray; 2 sprays into the nostril(s) as directed by provider Daily.  Dispense: 9.9 mL; Refill: 11  -     albuterol sulfate  (90 Base) MCG/ACT inhaler; Inhale 1 puff Every 6 (Six) Hours As Needed for Wheezing or Shortness of Air.  Dispense: 48 g; Refill: 3             Follow Up   Return in about 6 months (around 9/9/2023).  Patient was given instructions and counseling regarding her condition or for health maintenance advice. Please see specific information pulled into the AVS if appropriate.     Assessment and plan:   For her nasal congestion I have put her on Flonase nasal spray in addition to the loratadine and Singulair  Hyperlipidemia she will continue the atorvastatin  Acquired hypothyroidism she will continue the levothyroxine 25  Vitamin D deficiency she will continue her prescription strength vitamin D  Osteoporosis currently on Prolia I will get her into see one of the docs at the osteoporosis " clinic  Labs were ordered and I will see her back in 6 months

## 2023-03-09 NOTE — PATIENT INSTRUCTIONS
Keep working to lose weight through healthy eating and exercise.   Consider getting a tetanus with whooping cough (tdap) vaccine at the pharmacy              Advance Directive  Advance directives are legal documents that allow you to make decisions about your health care and medical treatment in case you become unable to communicate for yourself. Advance directives let your wishes be known to family, friends, and health care providers.  Discussing and writing advance directives should happen over time rather than all at once. Advance directives can be changed and updated at any time. There are different types of advance directives, such as:  Medical power of .  Living will.  Do not resuscitate (DNR) order or do not attempt resuscitation (DNAR) order.  Health care proxy and medical power of   A health care proxy is also called a health care agent. This person is appointed to make medical decisions for you when you are unable to make decisions for yourself. Generally, people ask a trusted friend or family member to act as their proxy and represent their preferences. Make sure you have an agreement with your trusted person to act as your proxy. A proxy may have to make a medical decision on your behalf if your wishes are not known.  A medical power of , also called a durable power of  for health care, is a legal document that names your health care proxy. Depending on the laws in your state, the document may need to be:  Signed.  Notarized.  Dated.  Copied.  Witnessed.  Incorporated into your medical record.  You may also want to appoint a trusted person to manage your money in the event you are unable to do so. This is called a durable power of  for finances. It is a separate legal document from the durable power of  for health care. You may choose your health care proxy or someone different to act as your agent in money matters.  If you do not appoint a proxy, or there  is a concern that the proxy is not acting in your best interest, a court may appoint a guardian to act on your behalf.  Living will  A living will is a set of instructions that state your wishes about medical care when you cannot express them yourself. Health care providers should keep a copy of your living will in your medical record. You may want to give a copy to family members or friends. To alert caregivers in case of an emergency, you can place a card in your wallet to let them know that you have a living will and where they can find it. A living will is used if you become:  Terminally ill.  Disabled.  Unable to communicate or make decisions.  The following decisions should be included in your living will:  To use or not to use life support equipment, such as dialysis machines and breathing machines (ventilators).  Whether you want a DNR or DNAR order. This tells health care providers not to use cardiopulmonary resuscitation (CPR) if breathing or heartbeat stops.  To use or not to use tube feeding.  To be given or not to be given food and fluids.  Whether you want comfort (palliative) care when the goal becomes comfort rather than a cure.  Whether you want to donate your organs and tissues.  A living will does not give instructions for distributing your money and property if you should pass away.  DNR or DNAR  A DNR or DNAR order is a request not to have CPR in the event that your heart stops beating or you stop breathing. If a DNR or DNAR order has not been made and shared, a health care provider will try to help any patient whose heart has stopped or who has stopped breathing. If you plan to have surgery, talk with your health care provider about how your DNR or DNAR order will be followed if problems occur.  What if I do not have an advance directive?  Some states assign family decision makers to act on your behalf if you do not have an advance directive. Each state has its own laws about advance  directives. You may want to check with your health care provider, , or state representative about the laws in your state.  Summary  Advance directives are legal documents that allow you to make decisions about your health care and medical treatment in case you become unable to communicate for yourself.  The process of discussing and writing advance directives should happen over time. You can change and update advance directives at any time.  Advance directives may include a medical power of , a living will, and a DNR or DNAR order.  This information is not intended to replace advice given to you by your health care provider. Make sure you discuss any questions you have with your health care provider.  Document Revised: 09/21/2021 Document Reviewed: 09/21/2021  [employer]vier Patient Education © 2022 [employer]

## 2023-03-27 RX ORDER — ERGOCALCIFEROL 1.25 MG/1
CAPSULE ORAL
Qty: 13 CAPSULE | Refills: 0 | Status: SHIPPED | OUTPATIENT
Start: 2023-03-27

## 2023-03-31 ENCOUNTER — OFFICE (AMBULATORY)
Dept: URBAN - METROPOLITAN AREA CLINIC 64 | Facility: CLINIC | Age: 73
End: 2023-03-31
Payer: MEDICARE

## 2023-03-31 DIAGNOSIS — G47.30 SLEEP APNEA, UNSPECIFIED: ICD-10-CM

## 2023-03-31 DIAGNOSIS — I10 ESSENTIAL (PRIMARY) HYPERTENSION: ICD-10-CM

## 2023-03-31 DIAGNOSIS — K22.70 BARRETT'S ESOPHAGUS WITHOUT DYSPLASIA: ICD-10-CM

## 2023-03-31 DIAGNOSIS — E66.9 OBESITY, UNSPECIFIED: ICD-10-CM

## 2023-03-31 DIAGNOSIS — K21.00 GASTRO-ESOPHAGEAL REFLUX DISEASE WITH ESOPHAGITIS, WITHOUT B: ICD-10-CM

## 2023-03-31 DIAGNOSIS — K58.9 IRRITABLE BOWEL SYNDROME WITHOUT DIARRHEA: ICD-10-CM

## 2023-03-31 DIAGNOSIS — N83.201 CYST OF RIGHT OVARY: Primary | ICD-10-CM

## 2023-03-31 DIAGNOSIS — E78.00 PURE HYPERCHOLESTEROLEMIA, UNSPECIFIED: ICD-10-CM

## 2023-03-31 PROCEDURE — 99454 REM MNTR PHYSIOL PARAM 16-30: CPT | Performed by: INTERNAL MEDICINE

## 2023-03-31 PROCEDURE — 99487 CPLX CHRNC CARE 1ST 60 MIN: CPT | Performed by: INTERNAL MEDICINE

## 2023-03-31 PROCEDURE — 99489 CPLX CHRNC CARE EA ADDL 30: CPT | Performed by: INTERNAL MEDICINE

## 2023-03-31 PROCEDURE — 99458 RPM TX MGMT EA ADDL 20 MIN: CPT | Performed by: INTERNAL MEDICINE

## 2023-03-31 PROCEDURE — 99457 RPM TX MGMT 1ST 20 MIN: CPT | Performed by: INTERNAL MEDICINE

## 2023-04-17 ENCOUNTER — HOSPITAL ENCOUNTER (OUTPATIENT)
Dept: CARDIOLOGY | Facility: HOSPITAL | Age: 73
Discharge: HOME | End: 2023-04-17
Payer: MEDICARE

## 2023-04-17 ENCOUNTER — TRANSCRIBE ORDERS (OUTPATIENT)
Dept: ADMINISTRATIVE | Facility: HOSPITAL | Age: 73
End: 2023-04-17
Payer: MEDICARE

## 2023-04-17 ENCOUNTER — HOSPITAL ENCOUNTER (OUTPATIENT)
Dept: ULTRASOUND IMAGING | Facility: HOSPITAL | Age: 73
Discharge: HOME | End: 2023-04-17
Payer: MEDICARE

## 2023-04-17 ENCOUNTER — LAB (OUTPATIENT)
Dept: LAB | Facility: HOSPITAL | Age: 73
End: 2023-04-17
Payer: MEDICARE

## 2023-04-17 DIAGNOSIS — Z01.818 OTHER SPECIFIED PRE-OPERATIVE EXAMINATION: ICD-10-CM

## 2023-04-17 DIAGNOSIS — N36.42 INTRINSIC SPHINCTER DEFICIENCY (ISD): Primary | ICD-10-CM

## 2023-04-17 DIAGNOSIS — N36.42 INTRINSIC SPHINCTER DEFICIENCY (ISD): ICD-10-CM

## 2023-04-17 DIAGNOSIS — N83.201 CYST OF RIGHT OVARY: ICD-10-CM

## 2023-04-17 LAB
ANION GAP SERPL CALCULATED.3IONS-SCNC: 8 MMOL/L (ref 5–15)
BASOPHILS # BLD AUTO: 0.04 10*3/MM3 (ref 0–0.2)
BASOPHILS NFR BLD AUTO: 0.5 % (ref 0–1.5)
BUN SERPL-MCNC: 14 MG/DL (ref 8–23)
BUN/CREAT SERPL: 13.5 (ref 7–25)
CALCIUM SPEC-SCNC: 9 MG/DL (ref 8.6–10.5)
CHLORIDE SERPL-SCNC: 99 MMOL/L (ref 98–107)
CO2 SERPL-SCNC: 30 MMOL/L (ref 22–29)
CREAT SERPL-MCNC: 1.04 MG/DL (ref 0.57–1)
DEPRECATED RDW RBC AUTO: 37.9 FL (ref 37–54)
EGFRCR SERPLBLD CKD-EPI 2021: 57.2 ML/MIN/1.73
EOSINOPHIL # BLD AUTO: 0.14 10*3/MM3 (ref 0–0.4)
EOSINOPHIL NFR BLD AUTO: 1.7 % (ref 0.3–6.2)
ERYTHROCYTE [DISTWIDTH] IN BLOOD BY AUTOMATED COUNT: 12.5 % (ref 12.3–15.4)
GLUCOSE SERPL-MCNC: 80 MG/DL (ref 65–99)
HCT VFR BLD AUTO: 38.4 % (ref 34–46.6)
HGB BLD-MCNC: 12.9 G/DL (ref 12–15.9)
IMM GRANULOCYTES # BLD AUTO: 0.03 10*3/MM3 (ref 0–0.05)
IMM GRANULOCYTES NFR BLD AUTO: 0.4 % (ref 0–0.5)
LYMPHOCYTES # BLD AUTO: 2.14 10*3/MM3 (ref 0.7–3.1)
LYMPHOCYTES NFR BLD AUTO: 25.6 % (ref 19.6–45.3)
MCH RBC QN AUTO: 28.4 PG (ref 26.6–33)
MCHC RBC AUTO-ENTMCNC: 33.6 G/DL (ref 31.5–35.7)
MCV RBC AUTO: 84.4 FL (ref 79–97)
MONOCYTES # BLD AUTO: 0.54 10*3/MM3 (ref 0.1–0.9)
MONOCYTES NFR BLD AUTO: 6.5 % (ref 5–12)
NEUTROPHILS NFR BLD AUTO: 5.47 10*3/MM3 (ref 1.7–7)
NEUTROPHILS NFR BLD AUTO: 65.3 % (ref 42.7–76)
NRBC BLD AUTO-RTO: 0 /100 WBC (ref 0–0.2)
PLATELET # BLD AUTO: 222 10*3/MM3 (ref 140–450)
PMV BLD AUTO: 10.5 FL (ref 6–12)
POTASSIUM SERPL-SCNC: 5 MMOL/L (ref 3.5–5.2)
QT INTERVAL: 473 MS
RBC # BLD AUTO: 4.55 10*6/MM3 (ref 3.77–5.28)
SODIUM SERPL-SCNC: 137 MMOL/L (ref 136–145)
WBC NRBC COR # BLD: 8.36 10*3/MM3 (ref 3.4–10.8)

## 2023-04-17 PROCEDURE — 36415 COLL VENOUS BLD VENIPUNCTURE: CPT

## 2023-04-17 PROCEDURE — 85025 COMPLETE CBC W/AUTO DIFF WBC: CPT

## 2023-04-17 PROCEDURE — 93005 ELECTROCARDIOGRAM TRACING: CPT | Performed by: UROLOGY

## 2023-04-17 PROCEDURE — 93976 VASCULAR STUDY: CPT

## 2023-04-17 PROCEDURE — 76830 TRANSVAGINAL US NON-OB: CPT

## 2023-04-17 PROCEDURE — 76856 US EXAM PELVIC COMPLETE: CPT

## 2023-04-17 PROCEDURE — 80048 BASIC METABOLIC PNL TOTAL CA: CPT

## 2023-04-18 NOTE — PROGRESS NOTES
Patient have reviewed their results on MyChart. If any questions or concerns please contact the office.

## 2023-04-24 ENCOUNTER — HOSPITAL ENCOUNTER (OUTPATIENT)
Dept: BONE DENSITY | Facility: HOSPITAL | Age: 73
Discharge: HOME | End: 2023-04-24
Admitting: PHYSICIAN ASSISTANT
Payer: MEDICARE

## 2023-04-24 DIAGNOSIS — M81.0 AGE-RELATED OSTEOPOROSIS WITHOUT CURRENT PATHOLOGICAL FRACTURE: ICD-10-CM

## 2023-04-24 DIAGNOSIS — Z82.62 FAMILY HX OSTEOPOROSIS: ICD-10-CM

## 2023-04-24 DIAGNOSIS — E55.9 VITAMIN D DEFICIENCY: ICD-10-CM

## 2023-04-24 DIAGNOSIS — Z51.81 MEDICATION MONITORING ENCOUNTER: ICD-10-CM

## 2023-04-24 PROCEDURE — 77080 DXA BONE DENSITY AXIAL: CPT

## 2023-04-25 ENCOUNTER — TELEPHONE (OUTPATIENT)
Dept: FAMILY MEDICINE CLINIC | Facility: CLINIC | Age: 73
End: 2023-04-25
Payer: MEDICARE

## 2023-04-25 DIAGNOSIS — N83.201 RIGHT OVARIAN CYST: Primary | ICD-10-CM

## 2023-04-25 NOTE — TELEPHONE ENCOUNTER
Caller: Charleen Barnett    Relationship to patient: Self    Best call back number: [phone]    Patient is needing:     WOULD LIKE CLARIFICATION ON WHETHER SHE IS SUPPOSED TO BE TAKING A PRESCRIPTION OF VITAMIN D AND CALCIUM.    OR IS SHE SUPPOSED TO BE TAKING THESE AS OVER THE COUNTER.     WOULD LIKE A CALL BACK.

## 2023-04-30 ENCOUNTER — OFFICE (AMBULATORY)
Dept: URBAN - METROPOLITAN AREA CLINIC 64 | Facility: CLINIC | Age: 73
End: 2023-04-30
Payer: MEDICARE

## 2023-04-30 DIAGNOSIS — K21.9 GASTRO-ESOPHAGEAL REFLUX DISEASE WITHOUT ESOPHAGITIS: ICD-10-CM

## 2023-04-30 DIAGNOSIS — G47.30 SLEEP APNEA, UNSPECIFIED: ICD-10-CM

## 2023-04-30 DIAGNOSIS — E66.9 OBESITY, UNSPECIFIED: ICD-10-CM

## 2023-04-30 DIAGNOSIS — K58.9 IRRITABLE BOWEL SYNDROME WITHOUT DIARRHEA: ICD-10-CM

## 2023-04-30 DIAGNOSIS — K22.70 BARRETT'S ESOPHAGUS WITHOUT DYSPLASIA: ICD-10-CM

## 2023-04-30 DIAGNOSIS — I10 ESSENTIAL (PRIMARY) HYPERTENSION: ICD-10-CM

## 2023-04-30 DIAGNOSIS — E78.00 PURE HYPERCHOLESTEROLEMIA, UNSPECIFIED: ICD-10-CM

## 2023-04-30 DIAGNOSIS — K21.00 GASTRO-ESOPHAGEAL REFLUX DISEASE WITH ESOPHAGITIS, WITHOUT B: ICD-10-CM

## 2023-04-30 PROCEDURE — 99457 RPM TX MGMT 1ST 20 MIN: CPT | Performed by: INTERNAL MEDICINE

## 2023-04-30 PROCEDURE — 99487 CPLX CHRNC CARE 1ST 60 MIN: CPT | Performed by: INTERNAL MEDICINE

## 2023-04-30 PROCEDURE — 99454 REM MNTR PHYSIOL PARAM 16-30: CPT | Performed by: INTERNAL MEDICINE

## 2023-04-30 PROCEDURE — 99489 CPLX CHRNC CARE EA ADDL 30: CPT | Performed by: INTERNAL MEDICINE

## 2023-04-30 PROCEDURE — 99458 RPM TX MGMT EA ADDL 20 MIN: CPT | Performed by: INTERNAL MEDICINE

## 2023-05-03 LAB — QT INTERVAL: 473 MS

## 2023-05-03 NOTE — PROGRESS NOTES
Encounter Date:05/04/2023  Last seen 11/16/2022      Patient ID: Charleen Barnett is a 72 y.o. female.    Chief Complaint:    Status post stent  Hypertension  Dyslipidemia  History of SVT.     History of present illness  Since I have last seen, the patient has been without any chest discomfort ,shortness of breath, palpitations, dizziness or syncope.  Denies having any headache ,abdominal pain ,nausea, vomiting , diarrhea constipation, loss of weight or loss of appetite.  Denies having any excessive bruising ,hematuria or blood in the stool.    Review of all systems negative except as indicated.    Reviewed ROS.      Assessment and plan  ////////////////////////  Impression  ==================    - status post stent to LAD 06/10/2014   status post subendocardial myocardial infarction prior to stent placement  Status post stent to LAD 5/29/2021.     Cardiac catheterization 5/29/2021 revealed  Left ventricle size and contractility normal with ejection fraction of 60%.  Left main coronary artery normal.  Left anterior descending artery has previously placed stent.  Left anterior descending artery has 80 to 90% disease just distal to the diagonal branch.  (Patient to have FFR).  IFR of 0.86  Diagonal branch has diffuse 50 to 60% disease.  Circumflex coronary artery is normal except for 30 to 40% marginal branch disease.  Right coronary artery is a large and dominant vessel and is normal.  Right common iliac external iliac and femoral arteries are normal.     -Right-sided chest pain-atypical.-Improved  EKG showed no acute changes.  Troponin levels are negative.     -Elevated D-dimer  CT scan of the chest is negative for pulmonary embolus.     Stress Cardiolite test-- 5/17/2021.  Echocardiogram-normal except for left atrial enlargement 5/17/2021.      -palpitations likely SVT.  -improved on atenolol     Echocardiogram showed mild aortic regurgitation with normal left ventricular function.  5/18/2017     - hypertension  "dyslipidemia sleep apnea asthma fibromyalgia GERD and gout.     - Orthostatic hypotension     - family history of coronary artery disease     - allergy to penicillin sulfa and tetracycline     =======  Plan  =======  Orthostatic hypotension-better  Recently patient has been having problems with balance and dizziness.  Patient was seen by neurologist and had documented orthostatic blood pressure changes.  The following was copied from neurology office visit     \"Orthostatic testing in our clinic today:  Orthostatic Hypotension positive   Laying       153/86  Sitting       144/79  Standing   120/75      Continue metoprolol XL 25 mg a day.  Continue midodrine 5 mg twice a day.     Status post stent to LAD 5/29/2021 (abnormal IFR).  Patient is not having any angina pectoris or congestive heart failure.  EKG showed sinus rhythm without any ischemic changes-  11/3/2022.  Patient is off Plavix.  EKG was not performed today     Hypertension-well-controlled-    148/80  Continue metoprolol  Patient's Blood Pressure Was kept Higher with midodrine to minimize risk of orthostatic hypotension     Dyslipidemia-continue atorvastatin      Medications were reviewed and updated.     Follow-up in the office in 6 months.     Further plan will depend on patient's progress  //////////////////////////////            Diagnosis Plan   1. Chest discomfort        2. Near syncope        3. Status post percutaneous transluminal coronary angioplasty        4. Mixed hyperlipidemia        5. Essential hypertension        LAB RESULTS (LAST 7 DAYS)    CBC        BMP        CMP         BNP        TROPONIN        CoAg        Creatinine Clearance  Estimated Creatinine Clearance: 48.6 mL/min (A) (by C-G formula based on SCr of 1.04 mg/dL (H)).    ABG        Radiology  No radiology results for the last day                The following portions of the patient's history were reviewed and updated as appropriate: allergies, current medications, past family " history, past medical history, past social history, past surgical history and problem list.    Review of Systems   Constitutional: Negative for malaise/fatigue.   Cardiovascular: Negative for chest pain, dyspnea on exertion, leg swelling and palpitations.   Respiratory: Negative for cough and shortness of breath.    Gastrointestinal: Negative for abdominal pain, nausea and vomiting.   Neurological: Negative for dizziness, focal weakness, headaches, light-headedness and numbness.   All other systems reviewed and are negative.        Current Outpatient Medications:   •  albuterol sulfate  (90 Base) MCG/ACT inhaler, Inhale 1 puff Every 6 (Six) Hours As Needed for Wheezing or Shortness of Air., Disp: 48 g, Rfl: 3  •  aspirin 81 MG tablet, Take 1 tablet by mouth Daily., Disp: , Rfl:   •  atorvastatin (LIPITOR) 10 MG tablet, TAKE 1 TABLET BY MOUTH DAILY, Disp: 90 tablet, Rfl: 2  •  buPROPion XL (WELLBUTRIN XL) 150 MG 24 hr tablet, TAKE 1 TABLET BY MOUTH EVERY MORNING, Disp: 90 tablet, Rfl: 0  •  busPIRone (BUSPAR) 10 MG tablet, Take  by mouth Every 8 (Eight) Hours., Disp: , Rfl:   •  Calcium Citrate-Vitamin D (Citrus Calcium/Vitamin D) 200-6.25 MG-MCG tablet, Take  by mouth., Disp: , Rfl:   •  Cholecalciferol (Vitamin D) 50 MCG (2000 UT) tablet, Take 2,000 Units by mouth Daily., Disp: , Rfl:   •  citalopram (CeleXA) 20 MG tablet, TAKE 1 TABLET BY MOUTH EVERY DAY, Disp: 28 tablet, Rfl: 5  •  fluticasone (FLONASE) 50 MCG/ACT nasal spray, 2 sprays into the nostril(s) as directed by provider Daily., Disp: 9.9 mL, Rfl: 11  •  gabapentin (NEURONTIN) 400 MG capsule, TAKE 1 CAPSULE BY MOUTH EVERY NIGHT AT BEDTIME, Disp: 90 capsule, Rfl: 2  •  hydrOXYzine (ATARAX) 10 MG tablet, TAKE 1 TABLET BY MOUTH EVERY NIGHT AT BEDTIME, Disp: 30 tablet, Rfl: 0  •  lansoprazole (PREVACID) 15 MG capsule, Take 1 capsule by mouth Daily., Disp: , Rfl:   •  levothyroxine (SYNTHROID, LEVOTHROID) 25 MCG tablet, TAKE 1 TABLET BY MOUTH EVERY  MORNING, Disp: 28 tablet, Rfl: 5  •  Magnesium Citrate 100 MG capsule, Take 1 capsule by mouth Daily., Disp: , Rfl:   •  metoprolol succinate XL (TOPROL-XL) 25 MG 24 hr tablet, TAKE 1 TABLET BY MOUTH EVERY MORNING, Disp: 90 tablet, Rfl: 1  •  midodrine (PROAMATINE) 5 MG tablet, , Disp: , Rfl:   •  Mirabegron ER (MYRBETRIQ) 25 MG tablet sustained-release 24 hour 24 hr tablet, Take 1 tablet by mouth Daily., Disp: , Rfl:   •  montelukast (SINGULAIR) 10 MG tablet, TAKE 1 TABLET BY MOUTH EVERY DAY, Disp: 90 tablet, Rfl: 2  •  triamcinolone (KENALOG) 0.1 % cream, Apply 1 application topically to the appropriate area as directed 2 (Two) Times a Day., Disp: 30 g, Rfl: 0    Current Facility-Administered Medications:   •  denosumab (PROLIA) syringe 60 mg, 60 mg, Subcutaneous, Q6 Months, Dede Anders PA, 60 mg at 22 1345    Allergies   Allergen Reactions   • Hydrocodone Hives   • Chlorpheniramine-Phenylephrine Rash   • Penicillins Rash   • Sulfa Antibiotics Hives and Rash   • Tetracycline Rash   • Warfarin Rash   • Pseudoephedrine Hcl Rash   • Triprolidine Hcl Rash       Family History   Problem Relation Age of Onset   • No Known Problems Mother    • Heart attack Father         Vein bypass in legs   • Cancer Father            • No Known Problems Sister    • No Known Problems Brother    • No Known Problems Maternal Aunt    • No Known Problems Maternal Uncle    • No Known Problems Paternal Aunt    • No Known Problems Paternal Uncle    • Heart attack Maternal Grandmother         Heart atrack in her 80s   • Heart attack Maternal Grandfather         Heart attack in his 60s   • Heart attack Paternal Grandmother         Type 1 diabetic, heart attack   • Heart attack Paternal Grandfather         Heart attack in his 50s or 60s   • Heart disease Sister         Blockages monitored with medication   • Hypertension Sister         Coronary heart issues   • Hyperlipidemia Brother         Extremely high triglycerides and  cholesterol   • Hypertension Brother         High blood pressure   • Heart attack Brother         Passed from heart attack age 54   • Hyperlipidemia Brother         Unknown   • Hypertension Brother    • Heart attack Brother         Passed from heart attack age 44   • Hyperlipidemia Brother         Had heart disease for years before passing   • Hypertension Brother    • Heart attack Brother          from heart failure in his 60s   • Anemia Neg Hx    • Arrhythmia Neg Hx    • Asthma Neg Hx    • Clotting disorder Neg Hx    • Fainting Neg Hx    • Heart failure Neg Hx        Past Surgical History:   Procedure Laterality Date   • BLADDER SURGERY     • BLADDER SUSPENSION  2023   • CARDIAC CATHETERIZATION N/A 2021    Procedure: Left Heart Cath and coronary angiogram;  Surgeon: Yazmin Matta MD;  Location: [facility] CATH INVASIVE LOCATION;  Service: Cardiovascular;  Laterality: N/A;   • CARDIAC CATHETERIZATION  2021    Procedure: Functional Flow Reserve (iFR);  Surgeon: Bryan Patel MD;  Location: [facility] CATH INVASIVE LOCATION;  Service: Cardiology;;   • CARDIAC CATHETERIZATION N/A 2021    Procedure: Percutaneous Coronary Intervention;  Surgeon: Bryan Patel MD;  Location: [facility] CATH INVASIVE LOCATION;  Service: Cardiology;  Laterality: N/A;   • CARDIAC CATHETERIZATION N/A 2021    Procedure: Stent FRANCOISE coronary;  Surgeon: Bryan Patel MD;  Location: [facility] CATH INVASIVE LOCATION;  Service: Cardiology;  Laterality: N/A;   • CAROTID STENT     • CHOLECYSTECTOMY     • COLONOSCOPY  ??   • CORONARY STENT PLACEMENT  ??   • FRACTURE SURGERY     • HYSTERECTOMY     • INGUINAL HERNIA REPAIR     • JOINT REPLACEMENT  L-knee   • KNEE SURGERY     • OTHER SURGICAL HISTORY      STENT   • SUBTOTAL HYSTERECTOMY         Past Medical History:   Diagnosis Date   • Allergic    • Anemia 1950s   • Anxiety    • Arthritis    • Asthma 2020   • Ramos's  esophagus 02/15/2018   • Cancer Carcinoma Insitu cervical   • Cataract    • Cholelithiasis Removed    • Coronary artery disease    • Depression 2018   • Fibromyalgia 2011   • Fibromyalgia, primary  I think   • GERD (gastroesophageal reflux disease)    • Gout 2020   • Headache    • HL (hearing loss) Age related   • Hyperlipidemia    • Hypertension    • Hypothyroidism    • Joint pain    • Low back pain 's   • Obesity Lifelong since puberty   • Obstructive sleep apnea 10/16/2014   • Osteopenia Five years appx   • Osteoporosis     fosamax(SE), on prolia(4/10/18-21, 22)   • Otitis media    • Pedal edema 10/22/2018   • Pneumonia    • Presence of left artificial knee joint 2019   • Scoliosis    • Seasonal allergies    • ST elevation (STEMI) myocardial infarction 2020   • Status post percutaneous transluminal coronary angioplasty 06/10/2014   • Substance abuse    • Tachycardia    • Urge incontinence 2018   • Urinary tract infection    • Visual impairment Catsracts   • Vitamin D deficiency 2015       Family History   Problem Relation Age of Onset   • No Known Problems Mother    • Heart attack Father         Vein bypass in legs   • Cancer Father            • No Known Problems Sister    • No Known Problems Brother    • No Known Problems Maternal Aunt    • No Known Problems Maternal Uncle    • No Known Problems Paternal Aunt    • No Known Problems Paternal Uncle    • Heart attack Maternal Grandmother         Heart atrack in her 80s   • Heart attack Maternal Grandfather         Heart attack in his 60s   • Heart attack Paternal Grandmother         Type 1 diabetic, heart attack   • Heart attack Paternal Grandfather         Heart attack in his 50s or 60s   • Heart disease Sister         Blockages monitored with medication   • Hypertension Sister         Coronary heart issues   • Hyperlipidemia Brother         Extremely high triglycerides and  "cholesterol   • Hypertension Brother         High blood pressure   • Heart attack Brother         Passed from heart attack age 54   • Hyperlipidemia Brother         Unknown   • Hypertension Brother    • Heart attack Brother         Passed from heart attack age 44   • Hyperlipidemia Brother         Had heart disease for years before passing   • Hypertension Brother    • Heart attack Brother          from heart failure in his 60s   • Anemia Neg Hx    • Arrhythmia Neg Hx    • Asthma Neg Hx    • Clotting disorder Neg Hx    • Fainting Neg Hx    • Heart failure Neg Hx        Social History     Socioeconomic History   • Marital status:    Tobacco Use   • Smoking status: Never   • Smokeless tobacco: Never   • Tobacco comments:     Parents and husbands heavy smokers   Vaping Use   • Vaping Use: Never used   Substance and Sexual Activity   • Alcohol use: Not Currently     Comment: Recovery since  drank from age [age]   • Drug use: Not Currently     Frequency: 1.0 times per week     Types: Amphetamines     Comment: Short term use/over use diet pills in the 70's   • Sexual activity: Not Currently     Partners: Male     Birth control/protection: None, Hysterectomy         Procedures      Objective:       Physical Exam    /80 (BP Location: Left arm, Patient Position: Sitting, Cuff Size: Large Adult)   Pulse 60   Ht 147.3 cm (58\")   Wt 86.6 kg (191 lb)   SpO2 96% Comment: RA  BMI 39.92 kg/m²   The patient is alert, oriented and in no distress.    Vital signs as noted above.    Head and neck revealed no carotid bruits or jugular venous distension.  No thyromegaly or lymphadenopathy is present.    Lungs clear.  No wheezing.  Breath sounds are normal bilaterally.    Heart normal first and second heart sounds.  No murmur..  No pericardial rub is present.  No gallop is present.    Abdomen soft and nontender.  No organomegaly is present.    Extremities revealed good peripheral pulses without any pedal " edema.    Skin warm and dry.    Musculoskeletal system is grossly normal.    CNS grossly normal.

## 2023-05-04 ENCOUNTER — OFFICE VISIT (OUTPATIENT)
Dept: CARDIOLOGY | Facility: CLINIC | Age: 73
End: 2023-05-04
Payer: MEDICARE

## 2023-05-04 VITALS
WEIGHT: 191 LBS | HEIGHT: 58 IN | HEART RATE: 60 BPM | OXYGEN SATURATION: 96 % | BODY MASS INDEX: 40.09 KG/M2 | DIASTOLIC BLOOD PRESSURE: 80 MMHG | SYSTOLIC BLOOD PRESSURE: 148 MMHG

## 2023-05-04 DIAGNOSIS — R55 NEAR SYNCOPE: ICD-10-CM

## 2023-05-04 DIAGNOSIS — E78.2 MIXED HYPERLIPIDEMIA: ICD-10-CM

## 2023-05-04 DIAGNOSIS — R07.89 CHEST DISCOMFORT: Primary | ICD-10-CM

## 2023-05-04 DIAGNOSIS — I10 ESSENTIAL HYPERTENSION: ICD-10-CM

## 2023-05-04 DIAGNOSIS — Z98.61 STATUS POST PERCUTANEOUS TRANSLUMINAL CORONARY ANGIOPLASTY: ICD-10-CM

## 2023-05-04 PROCEDURE — 3077F SYST BP >= 140 MM HG: CPT | Performed by: INTERNAL MEDICINE

## 2023-05-04 PROCEDURE — 99214 OFFICE O/P EST MOD 30 MIN: CPT | Performed by: INTERNAL MEDICINE

## 2023-05-04 PROCEDURE — 1160F RVW MEDS BY RX/DR IN RCRD: CPT | Performed by: INTERNAL MEDICINE

## 2023-05-04 PROCEDURE — 1159F MED LIST DOCD IN RCRD: CPT | Performed by: INTERNAL MEDICINE

## 2023-05-04 PROCEDURE — 3079F DIAST BP 80-89 MM HG: CPT | Performed by: INTERNAL MEDICINE

## 2023-05-04 RX ORDER — BUSPIRONE HYDROCHLORIDE 10 MG/1
TABLET ORAL EVERY 8 HOURS
COMMUNITY

## 2023-05-04 RX ORDER — CHOLECALCIFEROL (VITAMIN D3) 50 MCG
2000 TABLET ORAL DAILY
COMMUNITY

## 2023-05-23 RX ORDER — BUPROPION HYDROCHLORIDE 150 MG/1
150 TABLET ORAL EVERY MORNING
Qty: 90 TABLET | Refills: 0 | Status: SHIPPED | OUTPATIENT
Start: 2023-05-23

## 2023-05-23 RX ORDER — BUSPIRONE HYDROCHLORIDE 7.5 MG/1
TABLET ORAL
Qty: 180 TABLET | Refills: 0 | OUTPATIENT
Start: 2023-05-23

## 2023-05-24 NOTE — TELEPHONE ENCOUNTER
Caller: [id number] [place], [place] [phone] [place]    Requested Prescriptions:   Requested Prescriptions     Pending Prescriptions Disp Refills   • busPIRone (BUSPAR) 10 MG tablet       Sig: Take  by mouth Every 8 (Eight) Hours.     Signed Prescriptions Disp Refills   • buPROPion XL (WELLBUTRIN XL) 150 MG 24 hr tablet 90 tablet 0     Sig: TAKE 1 TABLET BY MOUTH EVERY MORNING     Authorizing Provider: MARC CARRERA     Refused Prescriptions Disp Refills   • busPIRone (BUSPAR) 7.5 MG tablet [Pharmacy Med Name: BUSPIRONE 7.5MG] 180 tablet 0     Sig: TAKE 1 TABLET BY MOUTH TWICE DAILY     Refused By: MARC CARRERA     Reason for Refusal: Refill not appropriate        Pharmacy where request should be sent: [facility], [phone] [place], [place] [phone] [place]     Last office visit with prescribing clinician: 3/9/2023   Last telemedicine visit with prescribing clinician: Visit date not found   Next office visit with prescribing clinician: 9/12/2023       Rachna Espinoza Rep   05/24/23 16:01 EDT

## 2023-05-25 RX ORDER — BUSPIRONE HYDROCHLORIDE 7.5 MG/1
TABLET ORAL
Qty: 180 TABLET | Refills: 0 | OUTPATIENT
Start: 2023-05-25

## 2023-05-25 RX ORDER — BUSPIRONE HYDROCHLORIDE 10 MG/1
10 TABLET ORAL 3 TIMES DAILY
Qty: 270 TABLET | Refills: 1 | Status: SHIPPED | OUTPATIENT
Start: 2023-05-25

## 2023-05-31 ENCOUNTER — OFFICE (AMBULATORY)
Dept: URBAN - METROPOLITAN AREA CLINIC 64 | Facility: CLINIC | Age: 73
End: 2023-05-31
Payer: MEDICARE

## 2023-05-31 DIAGNOSIS — E66.9 OBESITY, UNSPECIFIED: ICD-10-CM

## 2023-05-31 DIAGNOSIS — K21.00 GASTRO-ESOPHAGEAL REFLUX DISEASE WITH ESOPHAGITIS, WITHOUT B: ICD-10-CM

## 2023-05-31 DIAGNOSIS — K22.70 BARRETT'S ESOPHAGUS WITHOUT DYSPLASIA: ICD-10-CM

## 2023-05-31 DIAGNOSIS — K58.9 IRRITABLE BOWEL SYNDROME WITHOUT DIARRHEA: ICD-10-CM

## 2023-05-31 DIAGNOSIS — G47.30 SLEEP APNEA, UNSPECIFIED: ICD-10-CM

## 2023-05-31 DIAGNOSIS — E78.00 PURE HYPERCHOLESTEROLEMIA, UNSPECIFIED: ICD-10-CM

## 2023-05-31 DIAGNOSIS — I10 ESSENTIAL (PRIMARY) HYPERTENSION: ICD-10-CM

## 2023-05-31 PROCEDURE — 99489 CPLX CHRNC CARE EA ADDL 30: CPT | Performed by: INTERNAL MEDICINE

## 2023-05-31 PROCEDURE — 99454 REM MNTR PHYSIOL PARAM 16-30: CPT | Performed by: INTERNAL MEDICINE

## 2023-05-31 PROCEDURE — 99457 RPM TX MGMT 1ST 20 MIN: CPT | Performed by: INTERNAL MEDICINE

## 2023-05-31 PROCEDURE — 99487 CPLX CHRNC CARE 1ST 60 MIN: CPT | Performed by: INTERNAL MEDICINE

## 2023-06-12 ENCOUNTER — OFFICE VISIT (OUTPATIENT)
Dept: NEUROLOGY | Facility: CLINIC | Age: 73
End: 2023-06-12
Payer: MEDICARE

## 2023-06-12 VITALS
DIASTOLIC BLOOD PRESSURE: 96 MMHG | SYSTOLIC BLOOD PRESSURE: 157 MMHG | HEART RATE: 74 BPM | WEIGHT: 193 LBS | HEIGHT: 58 IN | RESPIRATION RATE: 18 BRPM | BODY MASS INDEX: 40.51 KG/M2

## 2023-06-12 DIAGNOSIS — F33.1 MODERATE EPISODE OF RECURRENT MAJOR DEPRESSIVE DISORDER: ICD-10-CM

## 2023-06-12 DIAGNOSIS — G47.33 OBSTRUCTIVE SLEEP APNEA: Primary | ICD-10-CM

## 2023-06-12 DIAGNOSIS — R41.3 MEMORY DIFFICULTIES: ICD-10-CM

## 2023-06-12 PROCEDURE — 1159F MED LIST DOCD IN RCRD: CPT | Performed by: PSYCHIATRY & NEUROLOGY

## 2023-06-12 PROCEDURE — 3080F DIAST BP >= 90 MM HG: CPT | Performed by: PSYCHIATRY & NEUROLOGY

## 2023-06-12 PROCEDURE — 3077F SYST BP >= 140 MM HG: CPT | Performed by: PSYCHIATRY & NEUROLOGY

## 2023-06-12 PROCEDURE — 1160F RVW MEDS BY RX/DR IN RCRD: CPT | Performed by: PSYCHIATRY & NEUROLOGY

## 2023-06-12 NOTE — PROGRESS NOTES
Chief Complaint  Sleep Apnea    Subjective          Charleen Barnett presents to [facility] NEUROLOGY  History of Present Illness  30-90 DAY  KAYLIE F/U patient states she is benefiting from pap therapy, she uses [facility] and goes through [facility] for supplies  Sleep testing history:    On NPSG at [facility] , 23 patient had Mild obstructive sleep apnea syndrome with apnea-hypopnea index of 9.7 per sleep hour, minimum SpO2 of 82%  PAP download:  The patient is on CPAP therapy at 5-15 cm/H2O.  36% usage for more than 4 hours with an average usage of 5 hours 55 minutes. AHI down to 1.7 .  Average pressures 7.   The patient's hypersomnia has resolved     [place] Sleepiness Scale:  Sitting and reading 1 WatchingTV 1  Sitting, inactive, in a public place 1  As a passenger in a car for 1 hour w/o a break  0  Lying down to rest in the afternoon  3  Sitting and talking to someone  0  Sitting quietly after a lunch  1  In a car, while stopped for traffic or a light  0  Total 7    MEMORY  Sister has Alzheimer in her 80's, other family members  from heart disease  Pt feels her memory is ok at times and not so good at others,  Lives with . Who is younger and has some memory problems after a SDH.    Tsh, b12 vit d , folate,   ------------------------------  MRI brain  Narrative & Impression   MRI BRAIN W WO CONTRAST     Date of Exam: 2023 11:02 AM EST     Indication: memory loss.     Comparison: None available.     Technique:  Routine multiplanar/multisequence sequence images of the brain were obtained before and after the uneventful administration of 19 cc of ProHance .     Findings:  There is no restricted diffusion. There is some cerebral atrophy more pronounced in the in the frontal area. The ventricles are not dilated. There are some T2 signal changes subcortical white matter of the frontal lobes and periventricular areas as well   as some signal in the subcortical white matter left parietal lobe. This  "could relate to small vessel ischemic change. There is an empty sella type configuration. An acute orbital abnormality is not definitely identified. There is no abnormal enhancement   following contrast. There is some signal within the mastoid area on the left could be reflective of some inflammation.     IMPRESSION:  Impression:  1.There is some minimal white matter changes involving the cerebral hemispheres which could reflect more chronic small vessel ischemic change.  2.There is some cerebral atrophy more pronounced in the frontal lobe area.  3.There is some sphenoid sinus disease and minimal left mastoid disease.  4.Empty sella type configuration.     Electronically Signed: Yusef Mukesh    1/13/2023 8:46 PM EST          Review of Systems   Constitutional:  Positive for fatigue.   HENT:  Positive for postnasal drip.    Respiratory:  Positive for apnea.    Psychiatric/Behavioral:  Positive for decreased concentration. The patient is nervous/anxious.    All other systems reviewed and are negative.      Objective   Vital Signs:   /96   Pulse 74   Resp 18   Ht 147.3 cm (58\")   Wt 87.5 kg (193 lb)   BMI 40.34 kg/m²     Physical Exam  Vitals reviewed.   Constitutional:       Appearance: Normal appearance.   Pulmonary:      Effort: Pulmonary effort is normal. No respiratory distress.   Neurological:      Mental Status: She is alert and oriented to person, place, and time.   Psychiatric:         Mood and Affect: Mood normal.      Result Review :                 Assessment and Plan    Diagnoses and all orders for this visit:    1. Obstructive sleep apnea (Primary)  -     PAP Therapy    2. Memory difficulties    3. Moderate episode of recurrent major depressive disorder  -     Ambulatory Referral to Psychiatry      Pt reports having trouble with mask leak. Which has limited use at times , now has a mask that fits and may do better now   The patient is compliant with and benefiting from PAP therapy.    Will refer " to psychiatry for management of depression  Reviewed mri and labs, no other testing indicated at this time    Follow Up   Return in about 1 year (around 6/12/2024).    Patient was given instructions and counseling regarding her condition or for health maintenance advice. Please see specific information pulled into the AVS if appropriate.       This document has been electronically signed by Joseph Seipel, MD on June 12, 2023 14:32 EDT

## 2023-06-30 ENCOUNTER — OFFICE (AMBULATORY)
Dept: URBAN - METROPOLITAN AREA CLINIC 64 | Facility: CLINIC | Age: 73
End: 2023-06-30
Payer: MEDICARE

## 2023-06-30 DIAGNOSIS — I10 ESSENTIAL (PRIMARY) HYPERTENSION: ICD-10-CM

## 2023-06-30 DIAGNOSIS — G47.30 SLEEP APNEA, UNSPECIFIED: ICD-10-CM

## 2023-06-30 DIAGNOSIS — E66.9 OBESITY, UNSPECIFIED: ICD-10-CM

## 2023-06-30 DIAGNOSIS — K21.00 GASTRO-ESOPHAGEAL REFLUX DISEASE WITH ESOPHAGITIS, WITHOUT B: ICD-10-CM

## 2023-06-30 DIAGNOSIS — K22.70 BARRETT'S ESOPHAGUS WITHOUT DYSPLASIA: ICD-10-CM

## 2023-06-30 DIAGNOSIS — E78.00 PURE HYPERCHOLESTEROLEMIA, UNSPECIFIED: ICD-10-CM

## 2023-06-30 DIAGNOSIS — K58.9 IRRITABLE BOWEL SYNDROME WITHOUT DIARRHEA: ICD-10-CM

## 2023-06-30 PROCEDURE — 99489 CPLX CHRNC CARE EA ADDL 30: CPT | Performed by: INTERNAL MEDICINE

## 2023-06-30 PROCEDURE — 99457 RPM TX MGMT 1ST 20 MIN: CPT | Performed by: INTERNAL MEDICINE

## 2023-06-30 PROCEDURE — 99487 CPLX CHRNC CARE 1ST 60 MIN: CPT | Performed by: INTERNAL MEDICINE

## 2023-07-31 ENCOUNTER — OFFICE (AMBULATORY)
Dept: URBAN - METROPOLITAN AREA CLINIC 64 | Facility: CLINIC | Age: 73
End: 2023-07-31
Payer: MEDICARE

## 2023-07-31 DIAGNOSIS — K22.70 BARRETT'S ESOPHAGUS WITHOUT DYSPLASIA: ICD-10-CM

## 2023-07-31 DIAGNOSIS — E78.00 PURE HYPERCHOLESTEROLEMIA, UNSPECIFIED: ICD-10-CM

## 2023-07-31 DIAGNOSIS — K21.00 GASTRO-ESOPHAGEAL REFLUX DISEASE WITH ESOPHAGITIS, WITHOUT B: ICD-10-CM

## 2023-07-31 DIAGNOSIS — G47.30 SLEEP APNEA, UNSPECIFIED: ICD-10-CM

## 2023-07-31 DIAGNOSIS — E66.9 OBESITY, UNSPECIFIED: ICD-10-CM

## 2023-07-31 DIAGNOSIS — I10 ESSENTIAL (PRIMARY) HYPERTENSION: ICD-10-CM

## 2023-07-31 DIAGNOSIS — K58.9 IRRITABLE BOWEL SYNDROME WITHOUT DIARRHEA: ICD-10-CM

## 2023-07-31 PROCEDURE — 99454 REM MNTR PHYSIOL PARAM 16-30: CPT | Performed by: INTERNAL MEDICINE

## 2023-07-31 PROCEDURE — 99487 CPLX CHRNC CARE 1ST 60 MIN: CPT | Performed by: INTERNAL MEDICINE

## 2023-07-31 PROCEDURE — 99457 RPM TX MGMT 1ST 20 MIN: CPT | Performed by: INTERNAL MEDICINE

## 2023-07-31 PROCEDURE — 99489 CPLX CHRNC CARE EA ADDL 30: CPT | Performed by: INTERNAL MEDICINE

## 2023-08-14 RX ORDER — BUPROPION HYDROCHLORIDE 150 MG/1
150 TABLET ORAL EVERY MORNING
Qty: 90 TABLET | Refills: 0 | Status: SHIPPED | OUTPATIENT
Start: 2023-08-14

## 2023-08-16 NOTE — PROGRESS NOTES
Encounter Date:08/31/2023  Last seen 7/20/2023      Patient ID: Charleen Barnett is a 73 y.o. female.    Chief Complaint:    Status post stent  Hypertension  Dyslipidemia  History of SVT.     History of present illness  Patient recently was seen with following history.    Reviewed and updated.    Recently patient has been having palpitations and exertional chest discomfort and shortness of breath.     Since I have last seen, the patient has been without any dizziness or syncope.  Denies having any headache ,abdominal pain ,nausea, vomiting , diarrhea constipation, loss of weight or loss of appetite.  Denies having any excessive bruising ,hematuria or blood in the stool.     Review of all systems negative except as indicated.     Reviewed ROS.        Assessment and plan  ////////////////////////  History  ==================    - status post stent to LAD 06/10/2014   status post subendocardial myocardial infarction prior to stent placement  Status post stent to LAD 5/29/2021.    Lexiscan Cardiolite test-normal-8/31/2023  Echocardiogram-was not performed (echo technician)     Cardiac catheterization 5/29/2021 revealed  Left ventricle size and contractility normal with ejection fraction of 60%.  Left main coronary artery normal.  Left anterior descending artery has previously placed stent.  Left anterior descending artery has 80 to 90% disease just distal to the diagonal branch.  (Patient to have FFR).  IFR of 0.86  Diagonal branch has diffuse 50 to 60% disease.  Circumflex coronary artery is normal except for 30 to 40% marginal branch disease.  Right coronary artery is a large and dominant vessel and is normal.  Right common iliac external iliac and femoral arteries are normal.     -Right-sided chest pain-atypical.-Improved  EKG showed no acute changes.  Troponin levels are negative.     -Elevated D-dimer  CT scan of the chest is negative for pulmonary embolus.     Stress Cardiolite test--  "5/17/2021.  Echocardiogram-normal except for left atrial enlargement 5/17/2021.      -palpitations likely SVT.  -improved on atenolol     Echocardiogram showed mild aortic regurgitation with normal left ventricular function.  5/18/2017     - hypertension dyslipidemia sleep apnea asthma fibromyalgia GERD and gout.     - Orthostatic hypotension     - family history of coronary artery disease     - allergy to penicillin sulfa and tetracycline     =======  Plan  =======  Exertional shortness of breath chest discomfort and palpitations     Status post stent to LAD 5/29/2021 (abnormal IFR).  Patient is off Plavix.  Stress Cardiolite test-normal-8/31/2023  Echocardiogram-not performed due to echo technician not available     Orthostatic hypotension-better  Recently patient has been having problems with balance and dizziness.  Patient was seen by neurologist and had documented orthostatic blood pressure changes.  The following was copied from neurology office visit     \"Orthostatic testing in our clinic today:  Orthostatic Hypotension positive   Laying       153/86  Sitting       144/79  Standing   120/75    Continue metoprolol XL 25 mg twice a day.  Continue midodrine 5 mg twice a day.     Hypertension-well-controlled-     138/84  Continue metoprolol  mg twice daily.  Patient is on midodrine 5 mg twice daily     Dyslipidemia-continue atorvastatin      Medications were reviewed and updated.     Patient has fluid retention.  Patient is allergic to sulfa.  Patient was given spironolactone 25 mg to be taken on a as needed basis once a day.     Patient apparently has decreased visual in the left eye off-and-on at nighttime.  Suggested primary care and neurological follow-up.    Patient was educated regarding the results of the testing.  No need for cardiac catheterization at this time.  Follow-up in the office in 6 months.     Further plan will depend on patient's progress    Reviewed and " updated-8/31/2023  //////////////////////////////               Diagnosis Plan   1. Chest discomfort        2. Near syncope        3. Mixed hyperlipidemia        4. Palpitations        5. Status post percutaneous transluminal coronary angioplasty        6. Essential hypertension        LAB RESULTS (LAST 7 DAYS)    CBC        BMP        CMP         BNP        TROPONIN        CoAg        Creatinine Clearance  CrCl cannot be calculated (Patient's most recent lab result is older than the maximum 30 days allowed.).    ABG        Radiology  No radiology results for the last day                The following portions of the patient's history were reviewed and updated as appropriate: allergies, current medications, past family history, past medical history, past social history, past surgical history, and problem list.    Review of Systems   Constitutional: Negative for malaise/fatigue.   Cardiovascular:  Negative for chest pain, dyspnea on exertion, leg swelling and palpitations.   Respiratory:  Negative for cough and shortness of breath.    Gastrointestinal:  Negative for abdominal pain, nausea and vomiting.   Neurological:  Negative for dizziness, focal weakness, headaches, light-headedness and numbness.   All other systems reviewed and are negative.      Current Outpatient Medications:     albuterol sulfate  (90 Base) MCG/ACT inhaler, Inhale 1 puff Every 6 (Six) Hours As Needed for Wheezing or Shortness of Air., Disp: 48 g, Rfl: 3    aspirin 81 MG tablet, Take 1 tablet by mouth Daily., Disp: , Rfl:     atorvastatin (LIPITOR) 10 MG tablet, TAKE 1 TABLET BY MOUTH DAILY, Disp: 90 tablet, Rfl: 1    buPROPion XL (WELLBUTRIN XL) 150 MG 24 hr tablet, TAKE 1 TABLET BY MOUTH EVERY MORNING, Disp: 90 tablet, Rfl: 0    busPIRone (BUSPAR) 10 MG tablet, Take 1 tablet by mouth 3 (Three) Times a Day., Disp: 270 tablet, Rfl: 1    Calcium Citrate-Vitamin D (Citrus Calcium/Vitamin D) 200-6.25 MG-MCG tablet, Take  by mouth., Disp: ,  Rfl:     Cholecalciferol (Vitamin D) 50 MCG ( UT) tablet, Take 2,000 Units by mouth Daily., Disp: , Rfl:     citalopram (CeleXA) 20 MG tablet, TAKE 1 TABLET BY MOUTH EVERY DAY, Disp: 28 tablet, Rfl: 4    fluticasone (FLONASE) 50 MCG/ACT nasal spray, 2 sprays into the nostril(s) as directed by provider Daily., Disp: 9.9 mL, Rfl: 11    gabapentin (NEURONTIN) 400 MG capsule, TAKE 1 CAPSULE BY MOUTH EVERY NIGHT AT BEDTIME, Disp: 90 capsule, Rfl: 2    hydrOXYzine (ATARAX) 10 MG tablet, TAKE 1 TABLET BY MOUTH EVERY NIGHT AT BEDTIME, Disp: 30 tablet, Rfl: 0    levothyroxine (SYNTHROID, LEVOTHROID) 25 MCG tablet, TAKE 1 TABLET BY MOUTH EVERY MORNING, Disp: 28 tablet, Rfl: 4    Magnesium Citrate 100 MG capsule, Take 1 capsule by mouth Daily., Disp: , Rfl:     metoprolol succinate XL (TOPROL-XL) 25 MG 24 hr tablet, Take 2 tablets by mouth Every Morning., Disp: 180 tablet, Rfl: 3    midodrine (PROAMATINE) 5 MG tablet, , Disp: , Rfl:     Mirabegron ER (MYRBETRIQ) 25 MG tablet sustained-release 24 hour 24 hr tablet, Take 1 tablet by mouth Daily., Disp: , Rfl:     montelukast (SINGULAIR) 10 MG tablet, TAKE 1 TABLET BY MOUTH EVERY DAY, Disp: 90 tablet, Rfl: 1    triamcinolone (KENALOG) 0.1 % cream, Apply 1 application topically to the appropriate area as directed 2 (Two) Times a Day., Disp: 30 g, Rfl: 0    Current Facility-Administered Medications:     denosumab (PROLIA) syringe 60 mg, 60 mg, Subcutaneous, Q6 Months, Dede Anders PA, 60 mg at 22 1345    Allergies   Allergen Reactions    Hydrocodone Hives    Chlorpheniramine-Phenylephrine Rash    Penicillins Rash    Sulfa Antibiotics Hives and Rash    Tetracycline Rash    Warfarin Rash    Pseudoephedrine Hcl Rash    Triprolidine Hcl Rash       Family History   Problem Relation Age of Onset    No Known Problems Mother     Heart attack Father         Vein bypass in legs    Cancer Father             No Known Problems Sister     No Known Problems Brother     No  Known Problems Maternal Aunt     No Known Problems Maternal Uncle     No Known Problems Paternal Aunt     No Known Problems Paternal Uncle     Heart attack Maternal Grandmother         Heart atrack in her 80s    Heart attack Maternal Grandfather         Heart attack in his 60s    Heart attack Paternal Grandmother         Type 1 diabetic, heart attack    Heart attack Paternal Grandfather         Heart attack in his 50s or 60s    Heart disease Sister         Blockages monitored with medication    Hypertension Sister         Coronary heart issues    Hyperlipidemia Brother         Extremely high triglycerides and cholesterol    Hypertension Brother         High blood pressure    Heart attack Brother         Passed from heart attack age 54    Hyperlipidemia Brother         Unknown    Hypertension Brother     Heart attack Brother         Passed from heart attack age 44    Hyperlipidemia Brother         Had heart disease for years before passing    Hypertension Brother     Heart attack Brother          from heart failure in his 60s    Anemia Neg Hx     Arrhythmia Neg Hx     Asthma Neg Hx     Clotting disorder Neg Hx     Fainting Neg Hx     Heart failure Neg Hx        Past Surgical History:   Procedure Laterality Date    BLADDER SURGERY      BLADDER SUSPENSION  2023    CARDIAC CATHETERIZATION N/A 2021    Procedure: Left Heart Cath and coronary angiogram;  Surgeon: Yazmin Matta MD;  Location: [facility] CATH INVASIVE LOCATION;  Service: Cardiovascular;  Laterality: N/A;    CARDIAC CATHETERIZATION  2021    Procedure: Functional Flow Reserve (iFR);  Surgeon: Bryan Patel MD;  Location:  [facility] CATH INVASIVE LOCATION;  Service: Cardiology;;    CARDIAC CATHETERIZATION N/A 2021    Procedure: Percutaneous Coronary Intervention;  Surgeon: Bryan Patel MD;  Location: [facility] CATH INVASIVE LOCATION;  Service: Cardiology;  Laterality: N/A;    CARDIAC CATHETERIZATION N/A 2021     Procedure: Stent FRANCOISE coronary;  Surgeon: Bryan Patel MD;  Location: [facility] CATH INVASIVE LOCATION;  Service: Cardiology;  Laterality: N/A;    CAROTID STENT      CHOLECYSTECTOMY      COLONOSCOPY  ??    CORONARY STENT PLACEMENT  ??    FRACTURE SURGERY      HYSTERECTOMY      INGUINAL HERNIA REPAIR      JOINT REPLACEMENT  L-knee    KNEE SURGERY      OTHER SURGICAL HISTORY      STENT    SUBTOTAL HYSTERECTOMY         Past Medical History:   Diagnosis Date    Allergic 1970's    Anemia 1950's    Anxiety     Arthritis     Asthma 2020    Ramos's esophagus 02/15/2018    Cancer Carcinoma Insitu cervical    Cataract     Cholelithiasis Removed     Coronary artery disease     Depression 2018    Fibromyalgia 2011    Fibromyalgia, primary  I think    GERD (gastroesophageal reflux disease)     Gout 2020    Headache     HL (hearing loss) Age related    Hyperlipidemia     Hypertension     Hypothyroidism     Joint pain     Low back pain 's    Obesity Lifelong since puberty    Obstructive sleep apnea 10/16/2014    Osteopenia Five years appx    Osteoporosis     fosamax(SE), on prolia(4/10/18-21, 22)    Otitis media     Pedal edema 10/22/2018    Pneumonia     Presence of left artificial knee joint 2019    Scoliosis 1961    Seasonal allergies     ST elevation (STEMI) myocardial infarction 2020    Status post percutaneous transluminal coronary angioplasty 06/10/2014    Substance abuse     Tachycardia     Urge incontinence 2018    Urinary tract infection     Visual impairment [place]    Vitamin D deficiency 2015       Family History   Problem Relation Age of Onset    No Known Problems Mother     Heart attack Father         Vein bypass in legs    Cancer Father             No Known Problems Sister     No Known Problems Brother     No Known Problems Maternal Aunt     No Known Problems Maternal Uncle     No Known Problems  Paternal Aunt     No Known Problems Paternal Uncle     Heart attack Maternal Grandmother         Heart atrack in her 80s    Heart attack Maternal Grandfather         Heart attack in his 60s    Heart attack Paternal Grandmother         Type 1 diabetic, heart attack    Heart attack Paternal Grandfather         Heart attack in his 50s or 60s    Heart disease Sister         Blockages monitored with medication    Hypertension Sister         Coronary heart issues    Hyperlipidemia Brother         Extremely high triglycerides and cholesterol    Hypertension Brother         High blood pressure    Heart attack Brother         Passed from heart attack age 54    Hyperlipidemia Brother         Unknown    Hypertension Brother     Heart attack Brother         Passed from heart attack age 44    Hyperlipidemia Brother         Had heart disease for years before passing    Hypertension Brother     Heart attack Brother          from heart failure in his 60s    Anemia Neg Hx     Arrhythmia Neg Hx     Asthma Neg Hx     Clotting disorder Neg Hx     Fainting Neg Hx     Heart failure Neg Hx        Social History     Socioeconomic History    Marital status:    Tobacco Use    Smoking status: Never    Smokeless tobacco: Never    Tobacco comments:     Parents and husbands heavy smokers   Vaping Use    Vaping Use: Never used   Substance and Sexual Activity    Alcohol use: Not Currently     Comment: Recovery since  drank from age [age]    Drug use: Not Currently     Frequency: 1.0 times per week     Types: Amphetamines     Comment: Short term use/over use diet pills in the 70's    Sexual activity: Not Currently     Partners: Male     Birth control/protection: None, Hysterectomy         Procedures      Objective:       Physical Exam    There were no vitals taken for this visit.  The patient is alert, oriented and in no distress.    Vital signs as noted above.    Head and neck revealed no carotid bruits or jugular venous distension.   No thyromegaly or lymphadenopathy is present.    Lungs clear.  No wheezing.  Breath sounds are normal bilaterally.    Heart normal first and second heart sounds.  No murmur..  No pericardial rub is present.  No gallop is present.    Abdomen soft and nontender.  No organomegaly is present.    Extremities revealed good peripheral pulses without any pedal edema.    Skin warm and dry.    Musculoskeletal system is grossly normal.    CNS grossly normal.    Reviewed and updated.

## 2023-08-31 ENCOUNTER — HOSPITAL ENCOUNTER (OUTPATIENT)
Dept: CARDIOLOGY | Facility: HOSPITAL | Age: 73
Discharge: HOME | End: 2023-08-31
Payer: MEDICARE

## 2023-08-31 ENCOUNTER — OFFICE (AMBULATORY)
Dept: URBAN - METROPOLITAN AREA CLINIC 64 | Facility: CLINIC | Age: 73
End: 2023-08-31

## 2023-08-31 ENCOUNTER — OFFICE VISIT (OUTPATIENT)
Dept: CARDIOLOGY | Facility: CLINIC | Age: 73
End: 2023-08-31
Payer: MEDICARE

## 2023-08-31 VITALS
HEART RATE: 54 BPM | DIASTOLIC BLOOD PRESSURE: 84 MMHG | BODY MASS INDEX: 40.51 KG/M2 | WEIGHT: 193 LBS | HEIGHT: 58 IN | SYSTOLIC BLOOD PRESSURE: 138 MMHG

## 2023-08-31 DIAGNOSIS — R00.2 PALPITATIONS: ICD-10-CM

## 2023-08-31 DIAGNOSIS — I10 ESSENTIAL HYPERTENSION: ICD-10-CM

## 2023-08-31 DIAGNOSIS — G47.30 SLEEP APNEA, UNSPECIFIED: ICD-10-CM

## 2023-08-31 DIAGNOSIS — E78.00 PURE HYPERCHOLESTEROLEMIA, UNSPECIFIED: ICD-10-CM

## 2023-08-31 DIAGNOSIS — K22.70 BARRETT'S ESOPHAGUS WITHOUT DYSPLASIA: ICD-10-CM

## 2023-08-31 DIAGNOSIS — R55 NEAR SYNCOPE: ICD-10-CM

## 2023-08-31 DIAGNOSIS — E66.9 OBESITY, UNSPECIFIED: ICD-10-CM

## 2023-08-31 DIAGNOSIS — K21.00 GASTRO-ESOPHAGEAL REFLUX DISEASE WITH ESOPHAGITIS, WITHOUT B: ICD-10-CM

## 2023-08-31 DIAGNOSIS — Z98.61 STATUS POST PERCUTANEOUS TRANSLUMINAL CORONARY ANGIOPLASTY: ICD-10-CM

## 2023-08-31 DIAGNOSIS — K58.9 IRRITABLE BOWEL SYNDROME WITHOUT DIARRHEA: ICD-10-CM

## 2023-08-31 DIAGNOSIS — E78.2 MIXED HYPERLIPIDEMIA: ICD-10-CM

## 2023-08-31 DIAGNOSIS — R07.89 CHEST DISCOMFORT: Primary | ICD-10-CM

## 2023-08-31 DIAGNOSIS — R07.89 CHEST DISCOMFORT: ICD-10-CM

## 2023-08-31 DIAGNOSIS — I10 ESSENTIAL (PRIMARY) HYPERTENSION: ICD-10-CM

## 2023-08-31 LAB
BH CV REST NUCLEAR ISOTOPE DOSE: 9.7 MCI
BH CV STRESS COMMENTS STAGE 1: NORMAL
BH CV STRESS DOSE REGADENOSON STAGE 1: 0.4
BH CV STRESS DURATION MIN STAGE 1: 0
BH CV STRESS DURATION SEC STAGE 1: 10
BH CV STRESS NUCLEAR ISOTOPE DOSE: 32.6 MCI
BH CV STRESS PROTOCOL 1: NORMAL
BH CV STRESS RECOVERY BP: NORMAL MMHG
BH CV STRESS RECOVERY HR: 77 BPM
BH CV STRESS STAGE 1: 1
MAXIMAL PREDICTED HEART RATE: 147 BPM
STRESS BASELINE BP: NORMAL MMHG
STRESS BASELINE HR: 53 BPM
STRESS TARGET HR: 125 BPM

## 2023-08-31 PROCEDURE — A9500 TC99M SESTAMIBI: HCPCS | Performed by: INTERNAL MEDICINE

## 2023-08-31 PROCEDURE — 0 TECHNETIUM SESTAMIBI: Performed by: INTERNAL MEDICINE

## 2023-08-31 PROCEDURE — 78452 HT MUSCLE IMAGE SPECT MULT: CPT

## 2023-08-31 PROCEDURE — 25010000002 REGADENOSON 0.4 MG/5ML SOLUTION: Performed by: INTERNAL MEDICINE

## 2023-08-31 PROCEDURE — 3079F DIAST BP 80-89 MM HG: CPT | Performed by: INTERNAL MEDICINE

## 2023-08-31 PROCEDURE — 93017 CV STRESS TEST TRACING ONLY: CPT

## 2023-08-31 PROCEDURE — 99489 CPLX CHRNC CARE EA ADDL 30: CPT | Performed by: INTERNAL MEDICINE

## 2023-08-31 PROCEDURE — 3075F SYST BP GE 130 - 139MM HG: CPT | Performed by: INTERNAL MEDICINE

## 2023-08-31 PROCEDURE — 1160F RVW MEDS BY RX/DR IN RCRD: CPT | Performed by: INTERNAL MEDICINE

## 2023-08-31 PROCEDURE — 1159F MED LIST DOCD IN RCRD: CPT | Performed by: INTERNAL MEDICINE

## 2023-08-31 PROCEDURE — 99457 RPM TX MGMT 1ST 20 MIN: CPT | Performed by: INTERNAL MEDICINE

## 2023-08-31 PROCEDURE — 99458 RPM TX MGMT EA ADDL 20 MIN: CPT | Performed by: INTERNAL MEDICINE

## 2023-08-31 PROCEDURE — 99214 OFFICE O/P EST MOD 30 MIN: CPT | Performed by: INTERNAL MEDICINE

## 2023-08-31 PROCEDURE — 99487 CPLX CHRNC CARE 1ST 60 MIN: CPT | Performed by: INTERNAL MEDICINE

## 2023-08-31 PROCEDURE — 99454 REM MNTR PHYSIOL PARAM 16-30: CPT | Performed by: INTERNAL MEDICINE

## 2023-08-31 RX ORDER — SPIRONOLACTONE 25 MG/1
TABLET ORAL
COMMUNITY
Start: 2023-07-21

## 2023-08-31 RX ORDER — REGADENOSON 0.08 MG/ML
0.4 INJECTION, SOLUTION INTRAVENOUS
Status: COMPLETED | OUTPATIENT
Start: 2023-08-31 | End: 2023-08-31

## 2023-08-31 RX ADMIN — TECHNETIUM TC 99M SESTAMIBI 1 DOSE: 1 INJECTION INTRAVENOUS at 12:48

## 2023-08-31 RX ADMIN — TECHNETIUM TC 99M SESTAMIBI 1 DOSE: 1 INJECTION INTRAVENOUS at 13:56

## 2023-08-31 RX ADMIN — REGADENOSON 0.4 MG: 0.08 INJECTION, SOLUTION INTRAVENOUS at 13:56

## 2023-09-12 RX ORDER — GABAPENTIN 400 MG/1
CAPSULE ORAL
Qty: 90 CAPSULE | Refills: 1 | Status: SHIPPED | OUTPATIENT
Start: 2023-09-12

## 2023-09-29 ENCOUNTER — HOSPITAL ENCOUNTER (OUTPATIENT)
Dept: CARDIOLOGY | Facility: HOSPITAL | Age: 73
Discharge: HOME | End: 2023-09-29
Payer: MEDICARE

## 2023-09-29 DIAGNOSIS — R55 NEAR SYNCOPE: ICD-10-CM

## 2023-09-29 DIAGNOSIS — Z98.61 STATUS POST PERCUTANEOUS TRANSLUMINAL CORONARY ANGIOPLASTY: ICD-10-CM

## 2023-09-29 DIAGNOSIS — R00.2 PALPITATIONS: ICD-10-CM

## 2023-09-29 DIAGNOSIS — E78.2 MIXED HYPERLIPIDEMIA: ICD-10-CM

## 2023-09-29 DIAGNOSIS — I10 ESSENTIAL HYPERTENSION: ICD-10-CM

## 2023-09-29 DIAGNOSIS — R07.89 CHEST DISCOMFORT: ICD-10-CM

## 2023-09-29 LAB
BH CV ECHO MEAS - ACS: 1.8 CM
BH CV ECHO MEAS - AO MAX PG: 3.3 MMHG
BH CV ECHO MEAS - AO MEAN PG: 1.93 MMHG
BH CV ECHO MEAS - AO ROOT DIAM: 2.9 CM
BH CV ECHO MEAS - AO V2 MAX: 91.2 CM/SEC
BH CV ECHO MEAS - AO V2 VTI: 20 CM
BH CV ECHO MEAS - AVA(I,D): 2.45 CM2
BH CV ECHO MEAS - EDV(CUBED): 102.1 ML
BH CV ECHO MEAS - EDV(MOD-SP4): 52.6 ML
BH CV ECHO MEAS - EF(MOD-BP): 57 %
BH CV ECHO MEAS - EF(MOD-SP4): 56.6 %
BH CV ECHO MEAS - ESV(CUBED): 32.5 ML
BH CV ECHO MEAS - ESV(MOD-SP4): 22.8 ML
BH CV ECHO MEAS - FS: 31.7 %
BH CV ECHO MEAS - IVS/LVPW: 0.83 CM
BH CV ECHO MEAS - IVSD: 0.96 CM
BH CV ECHO MEAS - LA DIMENSION: 4.3 CM
BH CV ECHO MEAS - LV MASS(C)D: 174.6 GRAMS
BH CV ECHO MEAS - LV MAX PG: 2.9 MMHG
BH CV ECHO MEAS - LV MEAN PG: 1.47 MMHG
BH CV ECHO MEAS - LV V1 MAX: 85.7 CM/SEC
BH CV ECHO MEAS - LV V1 VTI: 20.1 CM
BH CV ECHO MEAS - LVIDD: 4.7 CM
BH CV ECHO MEAS - LVIDS: 3.2 CM
BH CV ECHO MEAS - LVOT AREA: 2.44 CM2
BH CV ECHO MEAS - LVOT DIAM: 1.76 CM
BH CV ECHO MEAS - LVPWD: 1.15 CM
BH CV ECHO MEAS - MV A MAX VEL: 82.9 CM/SEC
BH CV ECHO MEAS - MV DEC SLOPE: 202 CM/SEC2
BH CV ECHO MEAS - MV DEC TIME: 0.32 SEC
BH CV ECHO MEAS - MV E MAX VEL: 63.7 CM/SEC
BH CV ECHO MEAS - MV E/A: 0.77
BH CV ECHO MEAS - MV MAX PG: 3.4 MMHG
BH CV ECHO MEAS - MV MEAN PG: 1.02 MMHG
BH CV ECHO MEAS - MV V2 VTI: 23.5 CM
BH CV ECHO MEAS - MVA(VTI): 2.08 CM2
BH CV ECHO MEAS - PA ACC TIME: 0.13 SEC
BH CV ECHO MEAS - PA V2 MAX: 76.9 CM/SEC
BH CV ECHO MEAS - PI END-D VEL: 78.9 CM/SEC
BH CV ECHO MEAS - PULM A REVS DUR: 0.1 SEC
BH CV ECHO MEAS - PULM A REVS VEL: 26.8 CM/SEC
BH CV ECHO MEAS - PULM DIAS VEL: 51.3 CM/SEC
BH CV ECHO MEAS - PULM S/D: 1.23
BH CV ECHO MEAS - PULM SYS VEL: 63.3 CM/SEC
BH CV ECHO MEAS - RAP SYSTOLE: 8 MMHG
BH CV ECHO MEAS - RV MAX PG: 1.2 MMHG
BH CV ECHO MEAS - RV V1 MAX: 54.8 CM/SEC
BH CV ECHO MEAS - RV V1 VTI: 14.5 CM
BH CV ECHO MEAS - RVDD: 3.4 CM
BH CV ECHO MEAS - SV(LVOT): 48.9 ML
BH CV ECHO MEAS - SV(MOD-SP4): 29.7 ML

## 2023-09-29 PROCEDURE — 93306 TTE W/DOPPLER COMPLETE: CPT | Performed by: INTERNAL MEDICINE

## 2023-09-29 PROCEDURE — 93306 TTE W/DOPPLER COMPLETE: CPT

## 2023-09-30 ENCOUNTER — OFFICE (AMBULATORY)
Dept: URBAN - METROPOLITAN AREA CLINIC 64 | Facility: CLINIC | Age: 73
End: 2023-09-30

## 2023-09-30 DIAGNOSIS — I10 ESSENTIAL (PRIMARY) HYPERTENSION: ICD-10-CM

## 2023-09-30 DIAGNOSIS — K58.9 IRRITABLE BOWEL SYNDROME WITHOUT DIARRHEA: ICD-10-CM

## 2023-09-30 DIAGNOSIS — K21.9 GASTRO-ESOPHAGEAL REFLUX DISEASE WITHOUT ESOPHAGITIS: ICD-10-CM

## 2023-09-30 DIAGNOSIS — E66.9 OBESITY, UNSPECIFIED: ICD-10-CM

## 2023-09-30 DIAGNOSIS — K22.70 BARRETT'S ESOPHAGUS WITHOUT DYSPLASIA: ICD-10-CM

## 2023-09-30 DIAGNOSIS — E78.00 PURE HYPERCHOLESTEROLEMIA, UNSPECIFIED: ICD-10-CM

## 2023-09-30 DIAGNOSIS — G47.30 SLEEP APNEA, UNSPECIFIED: ICD-10-CM

## 2023-09-30 DIAGNOSIS — K21.00 GASTRO-ESOPHAGEAL REFLUX DISEASE WITH ESOPHAGITIS, WITHOUT B: ICD-10-CM

## 2023-09-30 PROCEDURE — 99439 CHRNC CARE MGMT STAF EA ADDL: CPT | Performed by: INTERNAL MEDICINE

## 2023-09-30 PROCEDURE — 99458 RPM TX MGMT EA ADDL 20 MIN: CPT | Performed by: INTERNAL MEDICINE

## 2023-09-30 PROCEDURE — 99490 CHRNC CARE MGMT STAFF 1ST 20: CPT | Performed by: INTERNAL MEDICINE

## 2023-09-30 PROCEDURE — 99457 RPM TX MGMT 1ST 20 MIN: CPT | Performed by: INTERNAL MEDICINE

## 2023-09-30 PROCEDURE — 99454 REM MNTR PHYSIOL PARAM 16-30: CPT | Performed by: INTERNAL MEDICINE

## 2023-10-31 ENCOUNTER — OFFICE (AMBULATORY)
Dept: URBAN - METROPOLITAN AREA CLINIC 64 | Facility: CLINIC | Age: 73
End: 2023-10-31

## 2023-10-31 DIAGNOSIS — G47.30 SLEEP APNEA, UNSPECIFIED: ICD-10-CM

## 2023-10-31 DIAGNOSIS — E78.00 PURE HYPERCHOLESTEROLEMIA, UNSPECIFIED: ICD-10-CM

## 2023-10-31 DIAGNOSIS — K21.00 GASTRO-ESOPHAGEAL REFLUX DISEASE WITH ESOPHAGITIS, WITHOUT B: ICD-10-CM

## 2023-10-31 DIAGNOSIS — E66.9 OBESITY, UNSPECIFIED: ICD-10-CM

## 2023-10-31 DIAGNOSIS — K58.9 IRRITABLE BOWEL SYNDROME WITHOUT DIARRHEA: ICD-10-CM

## 2023-10-31 DIAGNOSIS — I10 ESSENTIAL (PRIMARY) HYPERTENSION: ICD-10-CM

## 2023-10-31 DIAGNOSIS — K22.70 BARRETT'S ESOPHAGUS WITHOUT DYSPLASIA: ICD-10-CM

## 2023-10-31 PROCEDURE — 99439 CHRNC CARE MGMT STAF EA ADDL: CPT | Performed by: INTERNAL MEDICINE

## 2023-10-31 PROCEDURE — 99454 REM MNTR PHYSIOL PARAM 16-30: CPT | Performed by: INTERNAL MEDICINE

## 2023-10-31 PROCEDURE — 99457 RPM TX MGMT 1ST 20 MIN: CPT | Performed by: INTERNAL MEDICINE

## 2023-10-31 PROCEDURE — 99490 CHRNC CARE MGMT STAFF 1ST 20: CPT | Performed by: INTERNAL MEDICINE

## 2023-11-01 ENCOUNTER — OFFICE VISIT (OUTPATIENT)
Dept: FAMILY MEDICINE CLINIC | Facility: CLINIC | Age: 73
End: 2023-11-01
Payer: MEDICARE

## 2023-11-01 VITALS
HEART RATE: 52 BPM | OXYGEN SATURATION: 95 % | WEIGHT: 194 LBS | SYSTOLIC BLOOD PRESSURE: 139 MMHG | HEIGHT: 58 IN | TEMPERATURE: 98.4 F | DIASTOLIC BLOOD PRESSURE: 84 MMHG | BODY MASS INDEX: 40.72 KG/M2

## 2023-11-01 DIAGNOSIS — E03.9 ACQUIRED HYPOTHYROIDISM: ICD-10-CM

## 2023-11-01 DIAGNOSIS — E78.2 MIXED HYPERLIPIDEMIA: Primary | ICD-10-CM

## 2023-11-01 DIAGNOSIS — E55.9 VITAMIN D DEFICIENCY: ICD-10-CM

## 2023-11-01 DIAGNOSIS — Z23 NEED FOR INFLUENZA VACCINATION: ICD-10-CM

## 2023-11-01 DIAGNOSIS — I10 PRIMARY HYPERTENSION: ICD-10-CM

## 2023-11-01 DIAGNOSIS — R73.9 HYPERGLYCEMIA: ICD-10-CM

## 2023-11-01 PROCEDURE — G0008 ADMIN INFLUENZA VIRUS VAC: HCPCS | Performed by: FAMILY MEDICINE

## 2023-11-01 PROCEDURE — 1160F RVW MEDS BY RX/DR IN RCRD: CPT | Performed by: FAMILY MEDICINE

## 2023-11-01 PROCEDURE — 3075F SYST BP GE 130 - 139MM HG: CPT | Performed by: FAMILY MEDICINE

## 2023-11-01 PROCEDURE — 1159F MED LIST DOCD IN RCRD: CPT | Performed by: FAMILY MEDICINE

## 2023-11-01 PROCEDURE — 3079F DIAST BP 80-89 MM HG: CPT | Performed by: FAMILY MEDICINE

## 2023-11-01 PROCEDURE — 99213 OFFICE O/P EST LOW 20 MIN: CPT | Performed by: FAMILY MEDICINE

## 2023-11-01 PROCEDURE — 90662 IIV NO PRSV INCREASED AG IM: CPT | Performed by: FAMILY MEDICINE

## 2023-11-01 RX ORDER — MELATONIN
1000 DAILY
COMMUNITY

## 2023-11-01 NOTE — PROGRESS NOTES
Subjective   Charleen Barnett is a 73 y.o. female.     History of Present Illness     Charleen Barnett is a 73-year-old female who presents today for follow-up on her blood pressure, cholesterol, thyroid, and blood sugar.    The patient reports that she is feeling well. She denies any chest pain. She states that she is always short of breath. She notes that her asthma has been flaring up due to the seasonal changes.    The patient reports that she is still experiencing intermittent episodes of dizziness. She states that most of them are at night when she gets up from sleep. She does not need a cane. She states that she has had 2 falls lately.    The patient reports that she has been trying to lose weight, but she has an addiction to sweets.    The following portions of the patient's history were reviewed and updated as appropriate: allergies, current medications, past family history, past medical history, past social history, past surgical history, and problem list.  Past Medical History:   Diagnosis Date    Allergic 1970's    Anemia 1950's    Anxiety     Arthritis     Asthma 02/13/2020    Ramos's esophagus 02/15/2018    Cancer Carcinoma Insitu cervical    Cataract     Cholelithiasis Removed 2000's    Coronary artery disease     Depression 08/27/2018    Fibromyalgia 12/14/2011    Fibromyalgia, primary 1990's I think    GERD (gastroesophageal reflux disease)     Gout 02/13/2020    Headache     HL (hearing loss) Age related    Hyperlipidemia     Hypertension     Hypothyroidism 2022    Joint pain     Low back pain 2970's    Obesity Lifelong since puberty    Obstructive sleep apnea 10/16/2014    Osteopenia Five years appx    Osteoporosis     fosamax(SE), on prolia(4/10/18-6/18/21, 2/1/22)    Otitis media     Pedal edema 10/22/2018    Pneumonia 2020    Presence of left artificial knee joint 03/14/2019    Scoliosis 1961    Seasonal allergies     ST elevation (STEMI) myocardial infarction 02/13/2020    Status post  percutaneous transluminal coronary angioplasty 06/10/2014    Substance abuse     Tachycardia     Urge incontinence 2018    Urinary tract infection     Visual impairment [place]    Vitamin D deficiency 2015     Past Surgical History:   Procedure Laterality Date    BLADDER SURGERY      BLADDER SUSPENSION  2023    CARDIAC CATHETERIZATION N/A 2021    Procedure: Left Heart Cath and coronary angiogram;  Surgeon: Yazmin Matta MD;  Location: [facility] CATH INVASIVE LOCATION;  Service: Cardiovascular;  Laterality: N/A;    CARDIAC CATHETERIZATION  2021    Procedure: Functional Flow Reserve (iFR);  Surgeon: Bryan Patel MD;  Location: [facility] CATH INVASIVE LOCATION;  Service: Cardiology;;    CARDIAC CATHETERIZATION N/A 2021    Procedure: Percutaneous Coronary Intervention;  Surgeon: Bryan Patel MD;  Location: [facility] CATH INVASIVE LOCATION;  Service: Cardiology;  Laterality: N/A;    CARDIAC CATHETERIZATION N/A 2021    Procedure: Stent FRANCOISE coronary;  Surgeon: Bryan Patel MD;  Location: [facility] CATH INVASIVE LOCATION;  Service: Cardiology;  Laterality: N/A;    CAROTID STENT      CHOLECYSTECTOMY      COLONOSCOPY  ??    CORONARY STENT PLACEMENT  ??    FRACTURE SURGERY      HYSTERECTOMY      INGUINAL HERNIA REPAIR      JOINT REPLACEMENT  L-knee    KNEE SURGERY      OTHER SURGICAL HISTORY      STENT    SUBTOTAL HYSTERECTOMY       Family History   Problem Relation Age of Onset    No Known Problems Mother     Heart attack Father         Vein bypass in legs    Cancer Father             No Known Problems Sister     No Known Problems Brother     No Known Problems Maternal Aunt     No Known Problems Maternal Uncle     No Known Problems Paternal Aunt     No Known Problems Paternal Uncle     Heart attack Maternal Grandmother         Heart atrack in her 80s    Heart attack Maternal Grandfather         Heart attack in his 60s    Heart attack  Paternal Grandmother         Type 1 diabetic, heart attack    Heart attack Paternal Grandfather         Heart attack in his 50s or 60s    Heart disease Sister         Blockages monitored with medication    Hypertension Sister         Coronary heart issues    Hyperlipidemia Brother         Extremely high triglycerides and cholesterol    Hypertension Brother         High blood pressure    Heart attack Brother         Passed from heart attack age 54    Hyperlipidemia Brother         Unknown    Hypertension Brother     Heart attack Brother         Passed from heart attack age 44    Hyperlipidemia Brother         Had heart disease for years before passing    Hypertension Brother     Heart attack Brother          from heart failure in his 60s    Anemia Neg Hx     Arrhythmia Neg Hx     Asthma Neg Hx     Clotting disorder Neg Hx     Fainting Neg Hx     Heart failure Neg Hx      Social History     Socioeconomic History    Marital status:    Tobacco Use    Smoking status: Never     Passive exposure: Never    Smokeless tobacco: Never    Tobacco comments:     Parents and husbands heavy smokers   Vaping Use    Vaping Use: Never used   Substance and Sexual Activity    Alcohol use: Not Currently     Comment: Recovery since  drank from age [age]    Drug use: Not Currently     Frequency: 1.0 times per week     Types: Amphetamines     Comment: Short term use/over use diet pills in the 70's    Sexual activity: Not Currently     Partners: Male     Birth control/protection: None, Hysterectomy         Current Outpatient Medications:     albuterol sulfate  (90 Base) MCG/ACT inhaler, Inhale 1 puff Every 6 (Six) Hours As Needed for Wheezing or Shortness of Air., Disp: 48 g, Rfl: 3    aspirin 81 MG tablet, Take 1 tablet by mouth Daily., Disp: , Rfl:     atorvastatin (LIPITOR) 10 MG tablet, TAKE 1 TABLET BY MOUTH DAILY, Disp: 90 tablet, Rfl: 1    buPROPion XL (WELLBUTRIN XL) 150 MG 24 hr tablet, TAKE 1 TABLET BY MOUTH  "EVERY MORNING, Disp: 90 tablet, Rfl: 0    busPIRone (BUSPAR) 10 MG tablet, Take 1 tablet by mouth 3 (Three) Times a Day., Disp: 270 tablet, Rfl: 1    cholecalciferol (Vitamin D) 25 MCG (1000 UT) tablet, Take 1 tablet by mouth Daily., Disp: , Rfl:     Cholecalciferol (Vitamin D) 50 MCG (2000 UT) tablet, Take 1 tablet by mouth Daily., Disp: , Rfl:     citalopram (CeleXA) 20 MG tablet, TAKE 1 TABLET BY MOUTH EVERY DAY, Disp: 28 tablet, Rfl: 4    fluticasone (FLONASE) 50 MCG/ACT nasal spray, 2 sprays into the nostril(s) as directed by provider Daily., Disp: 9.9 mL, Rfl: 11    gabapentin (NEURONTIN) 400 MG capsule, TAKE 1 CAPSULE BY MOUTH EVERY NIGHT AT BEDTIME (FORCE OUT OF ROBOT. NDC SPECIFIC), Disp: 90 capsule, Rfl: 1    hydrOXYzine (ATARAX) 10 MG tablet, TAKE 1 TABLET BY MOUTH EVERY NIGHT AT BEDTIME, Disp: 30 tablet, Rfl: 0    levothyroxine (SYNTHROID, LEVOTHROID) 25 MCG tablet, TAKE 1 TABLET BY MOUTH EVERY MORNING, Disp: 28 tablet, Rfl: 4    Magnesium Citrate 100 MG capsule, Take 1 capsule by mouth Daily., Disp: , Rfl:     metoprolol succinate XL (TOPROL-XL) 25 MG 24 hr tablet, Take 2 tablets by mouth Every Morning., Disp: 180 tablet, Rfl: 3    midodrine (PROAMATINE) 5 MG tablet, , Disp: , Rfl:     montelukast (SINGULAIR) 10 MG tablet, TAKE 1 TABLET BY MOUTH EVERY DAY, Disp: 90 tablet, Rfl: 1    Myrbetriq 50 MG tablet sustained-release 24 hour 24 hr tablet, , Disp: , Rfl:     spironolactone (ALDACTONE) 25 MG tablet, , Disp: , Rfl:     triamcinolone (KENALOG) 0.1 % cream, Apply 1 application topically to the appropriate area as directed 2 (Two) Times a Day., Disp: 30 g, Rfl: 0    Current Facility-Administered Medications:     denosumab (PROLIA) syringe 60 mg, 60 mg, Subcutaneous, Q6 Months, Dede Anders PA, 60 mg at 08/03/22 1345    Review of Systems  /84 (BP Location: Left arm, Patient Position: Sitting, Cuff Size: Large Adult)   Pulse 52   Temp 98.4 °F (36.9 °C) (Temporal)   Ht 147.3 cm (58\")   Wt " 88 kg (194 lb)   SpO2 95%   BMI 40.55 kg/m²       A review of systems was performed, and the pertinent positives are noted in the HPI.     Objective   Physical Exam  Vitals and nursing note reviewed.   Constitutional:       General: She is not in acute distress.     Appearance: She is well-developed. She is morbidly obese.   HENT:      Head: Normocephalic and atraumatic.   Neck:      Thyroid: No thyromegaly.   Cardiovascular:      Rate and Rhythm: Normal rate and regular rhythm.      Heart sounds: Normal heart sounds. No murmur heard.     No friction rub. No gallop.   Pulmonary:      Effort: Pulmonary effort is normal. No respiratory distress.      Breath sounds: Normal breath sounds. No wheezing or rales.   Musculoskeletal:      Cervical back: Neck supple.      Right lower leg: Edema present.      Left lower leg: Edema present.   Lymphadenopathy:      Cervical: No cervical adenopathy.   Skin:     General: Skin is warm and dry.   Neurological:      Mental Status: She is alert.           Assessment & Plan   Problems Addressed this Visit          Cardiac and Vasculature    Mixed hyperlipidemia - Primary    Relevant Orders    Comprehensive Metabolic Panel    Lipid Panel    Primary hypertension    Relevant Orders    Comprehensive Metabolic Panel    Lipid Panel       Endocrine and Metabolic    Hyperglycemia    Relevant Orders    Comprehensive Metabolic Panel    Lipid Panel    Hemoglobin A1c    Vitamin D deficiency    Relevant Orders    Vitamin D,25-Hydroxy    Acquired hypothyroidism    Relevant Orders    TSH     Diagnoses         Codes Comments    Mixed hyperlipidemia    -  Primary ICD-10-CM: E78.2  ICD-9-CM: 272.2     Primary hypertension     ICD-10-CM: I10  ICD-9-CM: 401.9     Hyperglycemia     ICD-10-CM: R73.9  ICD-9-CM: 790.29     Vitamin D deficiency     ICD-10-CM: E55.9  ICD-9-CM: 268.9     Acquired hypothyroidism     ICD-10-CM: E03.9  ICD-9-CM: 244.9         1. Hypertension  2. Hyperlipidemia  3.  Hypothyroidism  4. Hyperglycemia    - She will continue her current medications.  - I ordered a CMP, lipid, A1c, and TSH, and I also ordered a vitamin D level for her vitamin D deficiency.  - She was counseled on the need to continue to work on weight loss.  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  - She also wants her influenza shot.    I will see her back in 6 months for follow-up and Medicare wellness.    Transcribed from ambient dictation for Ilsa Her MD by Mary Hill.  11/01/23   11:57 EDT    Patient or patient representative verbalized consent to the visit recording.  I have personally performed the services described in this document as transcribed by the above individual, and it is both accurate and complete.

## 2023-11-07 ENCOUNTER — OFFICE VISIT (OUTPATIENT)
Dept: CARDIOLOGY | Facility: CLINIC | Age: 73
End: 2023-11-07
Payer: MEDICARE

## 2023-11-07 VITALS
WEIGHT: 195 LBS | SYSTOLIC BLOOD PRESSURE: 140 MMHG | BODY MASS INDEX: 40.93 KG/M2 | HEART RATE: 59 BPM | DIASTOLIC BLOOD PRESSURE: 76 MMHG | HEIGHT: 58 IN | OXYGEN SATURATION: 96 %

## 2023-11-07 DIAGNOSIS — R55 NEAR SYNCOPE: ICD-10-CM

## 2023-11-07 DIAGNOSIS — E78.2 MIXED HYPERLIPIDEMIA: ICD-10-CM

## 2023-11-07 DIAGNOSIS — Z98.61 STATUS POST PERCUTANEOUS TRANSLUMINAL CORONARY ANGIOPLASTY: ICD-10-CM

## 2023-11-07 DIAGNOSIS — R07.89 CHEST DISCOMFORT: Primary | ICD-10-CM

## 2023-11-07 DIAGNOSIS — R00.2 PALPITATIONS: ICD-10-CM

## 2023-11-07 DIAGNOSIS — I10 ESSENTIAL HYPERTENSION: ICD-10-CM

## 2023-11-07 PROCEDURE — 99214 OFFICE O/P EST MOD 30 MIN: CPT | Performed by: INTERNAL MEDICINE

## 2023-11-07 PROCEDURE — 3078F DIAST BP <80 MM HG: CPT | Performed by: INTERNAL MEDICINE

## 2023-11-07 PROCEDURE — 1159F MED LIST DOCD IN RCRD: CPT | Performed by: INTERNAL MEDICINE

## 2023-11-07 PROCEDURE — 1160F RVW MEDS BY RX/DR IN RCRD: CPT | Performed by: INTERNAL MEDICINE

## 2023-11-07 PROCEDURE — 3077F SYST BP >= 140 MM HG: CPT | Performed by: INTERNAL MEDICINE

## 2023-11-07 RX ORDER — BUSPIRONE HYDROCHLORIDE 10 MG/1
10 TABLET ORAL 3 TIMES DAILY
Qty: 270 TABLET | Refills: 0 | Status: SHIPPED | OUTPATIENT
Start: 2023-11-07

## 2023-11-07 RX ORDER — BUPROPION HYDROCHLORIDE 150 MG/1
150 TABLET ORAL EVERY MORNING
Qty: 90 TABLET | Refills: 0 | Status: SHIPPED | OUTPATIENT
Start: 2023-11-07

## 2023-11-07 NOTE — PROGRESS NOTES
Encounter Date:11/07/2023  Last seen 8/31/2023      Patient ID: Charleen Barnett is a 73 y.o. female.    Chief Complaint:     Status post stent  Hypertension  Dyslipidemia  History of SVT.     History of present illness  Since I have last seen, the patient has been without any chest discomfort , unusual shortness of breath, palpitations, dizziness or syncope.  Denies having any headache ,abdominal pain ,nausea, vomiting , diarrhea constipation, loss of weight or loss of appetite.  Denies having any excessive bruising ,hematuria or blood in the stool.    Review of all systems negative except as indicated.    Reviewed ROS.     Assessment and plan  ////////////////////////  History  ==================    - status post stent to LAD 06/10/2014   status post subendocardial myocardial infarction prior to stent placement  Status post stent to LAD 5/29/2021.     Lexiscan Cardiolite txel-kntjim-5/31/2023  Echocardiogram-9/29/2023.  Structurally and functionally normal cardiac valves.  Left atrial enlargement.  Left ventricular size and contractility is normal with ejection fraction of 60%.    Cardiac catheterization 5/29/2021 revealed  Left ventricle size and contractility normal with ejection fraction of 60%.  Left main coronary artery normal.  Left anterior descending artery has previously placed stent.  Left anterior descending artery has 80 to 90% disease just distal to the diagonal branch.  (Patient to have FFR).  IFR of 0.86  Diagonal branch has diffuse 50 to 60% disease.  Circumflex coronary artery is normal except for 30 to 40% marginal branch disease.  Right coronary artery is a large and dominant vessel and is normal.  Right common iliac external iliac and femoral arteries are normal.     -Right-sided chest pain-atypical.-Improved  EKG showed no acute changes.  Troponin levels are negative.     -Elevated D-dimer  CT scan of the chest is negative for pulmonary embolus.     Stress Cardiolite test--  5/17/2021.  Echocardiogram-normal except for left atrial enlargement 5/17/2021.      -palpitations likely SVT.  -improved on atenolol     Echocardiogram showed mild aortic regurgitation with normal left ventricular function.  5/18/2017     - hypertension dyslipidemia sleep apnea asthma fibromyalgia GERD and gout.     - Orthostatic hypotension     - family history of coronary artery disease     - allergy to penicillin sulfa and tetracycline     =======  Plan  =======  Exertional shortness of breath chest discomfort and palpitations.-Better     Status post stent to LAD 5/29/2021 (abnormal IFR).  Patient is off Plavix.  Stress Cardiolite test-normal-8/31/2023  Echocardiogram-as above.     Orthostatic hypotension-better    Continue metoprolol XL 25 mg twice a day.  Continue midodrine 5 mg twice a day.     Hypertension-well-controlled-     140/76.  Maintain blood pressure log.  Continue metoprolol  mg twice daily.  Patient is on midodrine 5 mg twice daily     Dyslipidemia-continue atorvastatin      Medications were reviewed and updated.     Patient has fluid retention.  Patient is allergic to sulfa.  Patient was given spironolactone 25 mg to be taken on a as needed basis once a day.     Patient apparently has decreased visual in the left eye off-and-on at nighttime.  Suggested primary care and neurological follow-up.     Echocardiogram results were discussed and educated patient.    Further plan will depend on patient's progress     Reviewed and updated-11/7/2023.  //////////////////////////////            Diagnosis Plan   1. Chest discomfort        2. Near syncope        3. Status post percutaneous transluminal coronary angioplasty        4. Essential hypertension        5. Palpitations        6. Mixed hyperlipidemia        LAB RESULTS (LAST 7 DAYS)    CBC        BMP        CMP         BNP        TROPONIN        CoAg        Creatinine Clearance  CrCl cannot be calculated (Patient's most recent lab result is older  than the maximum 30 days allowed.).    ABG        Radiology  No radiology results for the last day                The following portions of the patient's history were reviewed and updated as appropriate: allergies, current medications, past family history, past medical history, past social history, past surgical history, and problem list.    Review of Systems   Constitutional: Positive for malaise/fatigue.   Cardiovascular:  Negative for chest pain, leg swelling, palpitations and syncope.   Respiratory:  Positive for shortness of breath.    Skin:  Negative for rash.   Gastrointestinal:  Negative for nausea and vomiting.   Neurological:  Positive for dizziness. Negative for light-headedness and numbness.   All other systems reviewed and are negative.        Current Outpatient Medications:     albuterol sulfate  (90 Base) MCG/ACT inhaler, Inhale 1 puff Every 6 (Six) Hours As Needed for Wheezing or Shortness of Air., Disp: 48 g, Rfl: 3    aspirin 81 MG tablet, Take 1 tablet by mouth Daily., Disp: , Rfl:     atorvastatin (LIPITOR) 10 MG tablet, TAKE 1 TABLET BY MOUTH DAILY, Disp: 90 tablet, Rfl: 1    buPROPion XL (WELLBUTRIN XL) 150 MG 24 hr tablet, TAKE 1 TABLET BY MOUTH EVERY MORNING, Disp: 90 tablet, Rfl: 0    busPIRone (BUSPAR) 10 MG tablet, Take 1 tablet by mouth 3 (Three) Times a Day., Disp: 270 tablet, Rfl: 1    cholecalciferol (Vitamin D) 25 MCG (1000 UT) tablet, Take 1 tablet by mouth Daily., Disp: , Rfl:     Cholecalciferol (Vitamin D) 50 MCG (2000 UT) tablet, Take 1 tablet by mouth Daily., Disp: , Rfl:     citalopram (CeleXA) 20 MG tablet, TAKE 1 TABLET BY MOUTH EVERY DAY, Disp: 28 tablet, Rfl: 4    fluticasone (FLONASE) 50 MCG/ACT nasal spray, 2 sprays into the nostril(s) as directed by provider Daily., Disp: 9.9 mL, Rfl: 11    gabapentin (NEURONTIN) 400 MG capsule, TAKE 1 CAPSULE BY MOUTH EVERY NIGHT AT BEDTIME (FORCE OUT OF ROBOT. NDC SPECIFIC), Disp: 90 capsule, Rfl: 1    hydrOXYzine (ATARAX) 10 MG  tablet, TAKE 1 TABLET BY MOUTH EVERY NIGHT AT BEDTIME, Disp: 30 tablet, Rfl: 0    levothyroxine (SYNTHROID, LEVOTHROID) 25 MCG tablet, TAKE 1 TABLET BY MOUTH EVERY MORNING, Disp: 28 tablet, Rfl: 4    Magnesium Citrate 100 MG capsule, Take 1 capsule by mouth Daily., Disp: , Rfl:     metoprolol succinate XL (TOPROL-XL) 25 MG 24 hr tablet, Take 2 tablets by mouth Every Morning., Disp: 180 tablet, Rfl: 3    midodrine (PROAMATINE) 5 MG tablet, , Disp: , Rfl:     montelukast (SINGULAIR) 10 MG tablet, TAKE 1 TABLET BY MOUTH EVERY DAY, Disp: 90 tablet, Rfl: 1    Myrbetriq 50 MG tablet sustained-release 24 hour 24 hr tablet, , Disp: , Rfl:     spironolactone (ALDACTONE) 25 MG tablet, , Disp: , Rfl:     triamcinolone (KENALOG) 0.1 % cream, Apply 1 application topically to the appropriate area as directed 2 (Two) Times a Day., Disp: 30 g, Rfl: 0    Current Facility-Administered Medications:     denosumab (PROLIA) syringe 60 mg, 60 mg, Subcutaneous, Q6 Months, Dede Anders PA, 60 mg at 22 1345    Allergies   Allergen Reactions    Hydrocodone Hives    Chlorpheniramine-Phenylephrine Rash    Penicillins Rash    Sulfa Antibiotics Hives and Rash    Tetracycline Rash    Warfarin Rash    Pseudoephedrine Hcl Rash    Triprolidine Hcl Rash       Family History   Problem Relation Age of Onset    No Known Problems Mother     Heart attack Father         Vein bypass in legs    Cancer Father             No Known Problems Sister     No Known Problems Brother     No Known Problems Maternal Aunt     No Known Problems Maternal Uncle     No Known Problems Paternal Aunt     No Known Problems Paternal Uncle     Heart attack Maternal Grandmother         Heart atrack in her 80s    Heart attack Maternal Grandfather         Heart attack in his 60s    Heart attack Paternal Grandmother         Type 1 diabetic, heart attack    Heart attack Paternal Grandfather         Heart attack in his 50s or 60s    Heart disease Sister          Blockages monitored with medication    Hypertension Sister         Coronary heart issues    Hyperlipidemia Brother         Extremely high triglycerides and cholesterol    Hypertension Brother         High blood pressure    Heart attack Brother         Passed from heart attack age 54    Hyperlipidemia Brother         Unknown    Hypertension Brother     Heart attack Brother         Passed from heart attack age 44    Hyperlipidemia Brother         Had heart disease for years before passing    Hypertension Brother     Heart attack Brother          from heart failure in his 60s    Anemia Neg Hx     Arrhythmia Neg Hx     Asthma Neg Hx     Clotting disorder Neg Hx     Fainting Neg Hx     Heart failure Neg Hx        Past Surgical History:   Procedure Laterality Date    BLADDER SURGERY      BLADDER SUSPENSION  2023    CARDIAC CATHETERIZATION N/A 2021    Procedure: Left Heart Cath and coronary angiogram;  Surgeon: Yazmin Matta MD;  Location: [facility] CATH INVASIVE LOCATION;  Service: Cardiovascular;  Laterality: N/A;    CARDIAC CATHETERIZATION  2021    Procedure: Functional Flow Reserve (iFR);  Surgeon: Bryan Patel MD;  Location: [facility] CATH INVASIVE LOCATION;  Service: Cardiology;;    CARDIAC CATHETERIZATION N/A 2021    Procedure: Percutaneous Coronary Intervention;  Surgeon: Bryan Patel MD;  Location: [facility] CATH INVASIVE LOCATION;  Service: Cardiology;  Laterality: N/A;    CARDIAC CATHETERIZATION N/A 2021    Procedure: Stent FRANCOISE coronary;  Surgeon: Bryan Patel MD;  Location: [facility] CATH INVASIVE LOCATION;  Service: Cardiology;  Laterality: N/A;    CAROTID STENT      CHOLECYSTECTOMY      COLONOSCOPY  ??    CORONARY STENT PLACEMENT  ??    FRACTURE SURGERY      HYSTERECTOMY      INGUINAL HERNIA REPAIR      JOINT REPLACEMENT  L-knee    KNEE SURGERY      OTHER SURGICAL HISTORY      STENT    SUBTOTAL HYSTERECTOMY         Past Medical History:    Diagnosis Date    Allergic 1970's    Anemia 1950's    Anxiety     Arthritis     Asthma 2020    Ramos's esophagus 02/15/2018    Cancer Carcinoma Insitu cervical    Cataract     Cholelithiasis Removed 's    Coronary artery disease     Depression 2018    Fibromyalgia 2011    Fibromyalgia, primary 's I think    GERD (gastroesophageal reflux disease)     Gout 2020    Headache     HL (hearing loss) Age related    Hyperlipidemia     Hypertension     Hypothyroidism     Joint pain     Low back pain 2970's    Obesity Lifelong since puberty    Obstructive sleep apnea 10/16/2014    Osteopenia Five years appx    Osteoporosis     fosamax(SE), on prolia(4/10/18-21, 22)    Otitis media     Pedal edema 10/22/2018    Pneumonia     Presence of left artificial knee joint 2019    Scoliosis 1961    Seasonal allergies     ST elevation (STEMI) myocardial infarction 2020    Status post percutaneous transluminal coronary angioplasty 06/10/2014    Substance abuse     Tachycardia     Urge incontinence 2018    Urinary tract infection     Visual impairment Catsracts    Vitamin D deficiency 2015       Family History   Problem Relation Age of Onset    No Known Problems Mother     Heart attack Father         Vein bypass in legs    Cancer Father             No Known Problems Sister     No Known Problems Brother     No Known Problems Maternal Aunt     No Known Problems Maternal Uncle     No Known Problems Paternal Aunt     No Known Problems Paternal Uncle     Heart attack Maternal Grandmother         Heart atrack in her 80s    Heart attack Maternal Grandfather         Heart attack in his 60s    Heart attack Paternal Grandmother         Type 1 diabetic, heart attack    Heart attack Paternal Grandfather         Heart attack in his 50s or 60s    Heart disease Sister         Blockages monitored with medication    Hypertension Sister         Coronary heart issues     "Hyperlipidemia Brother         Extremely high triglycerides and cholesterol    Hypertension Brother         High blood pressure    Heart attack Brother         Passed from heart attack age 54    Hyperlipidemia Brother         Unknown    Hypertension Brother     Heart attack Brother         Passed from heart attack age 44    Hyperlipidemia Brother         Had heart disease for years before passing    Hypertension Brother     Heart attack Brother          from heart failure in his 60s    Anemia Neg Hx     Arrhythmia Neg Hx     Asthma Neg Hx     Clotting disorder Neg Hx     Fainting Neg Hx     Heart failure Neg Hx        Social History     Socioeconomic History    Marital status:    Tobacco Use    Smoking status: Never     Passive exposure: Never    Smokeless tobacco: Never    Tobacco comments:     Parents and husbands heavy smokers   Vaping Use    Vaping Use: Never used   Substance and Sexual Activity    Alcohol use: Not Currently     Comment: Recovery since  drank from age [age]    Drug use: Not Currently     Frequency: 1.0 times per week     Types: Amphetamines     Comment: Short term use/over use diet pills in the 70's    Sexual activity: Not Currently     Partners: Male     Birth control/protection: None, Hysterectomy         Procedures      Objective:       Physical Exam    /76 (BP Location: Right arm, Patient Position: Sitting, Cuff Size: Large Adult)   Pulse 59   Ht 147.3 cm (58\")   Wt 88.5 kg (195 lb)   SpO2 96% Comment: RA  BMI 40.76 kg/m²   The patient is alert, oriented and in no distress.    Vital signs as noted above.  Exogenous obesity (BMI 41)    Head and neck revealed no carotid bruits or jugular venous distension.  No thyromegaly or lymphadenopathy is present.    Lungs clear.  No wheezing.  Breath sounds are normal bilaterally.    Heart normal first and second heart sounds.  No murmur..  No pericardial rub is present.  No gallop is present.    Abdomen soft and nontender.  No " organomegaly is present.    Extremities revealed good peripheral pulses without any pedal edema.    Skin warm and dry.    Musculoskeletal system-kyphoscoliosis    CNS grossly normal.    Reviewed and updated.

## 2023-11-09 ENCOUNTER — LAB (OUTPATIENT)
Dept: FAMILY MEDICINE CLINIC | Facility: CLINIC | Age: 73
End: 2023-11-09
Payer: MEDICARE

## 2023-11-09 DIAGNOSIS — E78.2 MIXED HYPERLIPIDEMIA: ICD-10-CM

## 2023-11-09 DIAGNOSIS — R73.9 HYPERGLYCEMIA: ICD-10-CM

## 2023-11-09 DIAGNOSIS — E03.9 ACQUIRED HYPOTHYROIDISM: ICD-10-CM

## 2023-11-09 DIAGNOSIS — I10 PRIMARY HYPERTENSION: ICD-10-CM

## 2023-11-09 DIAGNOSIS — E55.9 VITAMIN D DEFICIENCY: ICD-10-CM

## 2023-11-09 LAB
25(OH)D3 SERPL-MCNC: 37.6 NG/ML (ref 30–100)
ALBUMIN SERPL-MCNC: 4.2 G/DL (ref 3.5–5.2)
ALBUMIN/GLOB SERPL: 1.9 G/DL
ALP SERPL-CCNC: 66 U/L (ref 39–117)
ALT SERPL W P-5'-P-CCNC: 14 U/L (ref 1–33)
ANION GAP SERPL CALCULATED.3IONS-SCNC: 6 MMOL/L (ref 5–15)
AST SERPL-CCNC: 15 U/L (ref 1–32)
BILIRUB SERPL-MCNC: 0.5 MG/DL (ref 0–1.2)
BUN SERPL-MCNC: 12 MG/DL (ref 8–23)
BUN/CREAT SERPL: 12.5 (ref 7–25)
CALCIUM SPEC-SCNC: 9.8 MG/DL (ref 8.6–10.5)
CHLORIDE SERPL-SCNC: 102 MMOL/L (ref 98–107)
CHOLEST SERPL-MCNC: 159 MG/DL (ref 0–200)
CO2 SERPL-SCNC: 33 MMOL/L (ref 22–29)
CREAT SERPL-MCNC: 0.96 MG/DL (ref 0.57–1)
EGFRCR SERPLBLD CKD-EPI 2021: 62.6 ML/MIN/1.73
GLOBULIN UR ELPH-MCNC: 2.2 GM/DL
GLUCOSE SERPL-MCNC: 89 MG/DL (ref 65–99)
HBA1C MFR BLD: 5.4 % (ref 4.8–5.6)
HDLC SERPL-MCNC: 56 MG/DL (ref 40–60)
LDLC SERPL CALC-MCNC: 84 MG/DL (ref 0–100)
LDLC/HDLC SERPL: 1.47 {RATIO}
POTASSIUM SERPL-SCNC: 4.7 MMOL/L (ref 3.5–5.2)
PROT SERPL-MCNC: 6.4 G/DL (ref 6–8.5)
SODIUM SERPL-SCNC: 141 MMOL/L (ref 136–145)
TRIGL SERPL-MCNC: 103 MG/DL (ref 0–150)
TSH SERPL DL<=0.05 MIU/L-ACNC: 2 UIU/ML (ref 0.27–4.2)
VLDLC SERPL-MCNC: 19 MG/DL (ref 5–40)

## 2023-11-09 PROCEDURE — 36415 COLL VENOUS BLD VENIPUNCTURE: CPT

## 2023-11-09 PROCEDURE — 84443 ASSAY THYROID STIM HORMONE: CPT | Performed by: FAMILY MEDICINE

## 2023-11-09 PROCEDURE — 80053 COMPREHEN METABOLIC PANEL: CPT | Performed by: FAMILY MEDICINE

## 2023-11-09 PROCEDURE — 80061 LIPID PANEL: CPT | Performed by: FAMILY MEDICINE

## 2023-11-09 PROCEDURE — 82306 VITAMIN D 25 HYDROXY: CPT | Performed by: FAMILY MEDICINE

## 2023-11-09 PROCEDURE — 83036 HEMOGLOBIN GLYCOSYLATED A1C: CPT | Performed by: FAMILY MEDICINE

## 2023-11-30 ENCOUNTER — OFFICE (AMBULATORY)
Dept: URBAN - METROPOLITAN AREA CLINIC 64 | Facility: CLINIC | Age: 73
End: 2023-11-30

## 2023-11-30 DIAGNOSIS — K58.9 IRRITABLE BOWEL SYNDROME WITHOUT DIARRHEA: ICD-10-CM

## 2023-11-30 DIAGNOSIS — I10 ESSENTIAL (PRIMARY) HYPERTENSION: ICD-10-CM

## 2023-11-30 DIAGNOSIS — E78.00 PURE HYPERCHOLESTEROLEMIA, UNSPECIFIED: ICD-10-CM

## 2023-11-30 DIAGNOSIS — G47.30 SLEEP APNEA, UNSPECIFIED: ICD-10-CM

## 2023-11-30 DIAGNOSIS — K21.00 GASTRO-ESOPHAGEAL REFLUX DISEASE WITH ESOPHAGITIS, WITHOUT B: ICD-10-CM

## 2023-11-30 DIAGNOSIS — E66.9 OBESITY, UNSPECIFIED: ICD-10-CM

## 2023-11-30 DIAGNOSIS — K22.70 BARRETT'S ESOPHAGUS WITHOUT DYSPLASIA: ICD-10-CM

## 2023-11-30 PROCEDURE — 99439 CHRNC CARE MGMT STAF EA ADDL: CPT | Performed by: INTERNAL MEDICINE

## 2023-11-30 PROCEDURE — 99490 CHRNC CARE MGMT STAFF 1ST 20: CPT | Performed by: INTERNAL MEDICINE

## 2023-12-04 DIAGNOSIS — E03.9 ACQUIRED HYPOTHYROIDISM: ICD-10-CM

## 2023-12-04 RX ORDER — MIDODRINE HYDROCHLORIDE 5 MG/1
2.5 TABLET ORAL DAILY
Qty: 180 TABLET | Refills: 0 | Status: SHIPPED | OUTPATIENT
Start: 2023-12-04

## 2023-12-04 RX ORDER — CITALOPRAM 20 MG/1
TABLET ORAL
Qty: 28 TABLET | Refills: 3 | Status: SHIPPED | OUTPATIENT
Start: 2023-12-04

## 2023-12-04 RX ORDER — LEVOTHYROXINE SODIUM 0.03 MG/1
25 TABLET ORAL
Qty: 28 TABLET | Refills: 3 | Status: SHIPPED | OUTPATIENT
Start: 2023-12-04

## 2023-12-04 NOTE — TELEPHONE ENCOUNTER
Rx Refill Note  Requested Prescriptions     Pending Prescriptions Disp Refills    midodrine (PROAMATINE) 5 MG tablet [Pharmacy Med Name: MIDODRINE  5MG] 180 tablet 0     Sig: TAKE 1/2 TABLET BY MOUTH TWICE DAILY      Last office visit with prescribing clinician: 11/7/2023   Last telemedicine visit with prescribing clinician: Visit date not found   Next office visit with prescribing clinician: 3/7/2024                         Would you like a call back once the refill request has been completed: [] Yes [] No    If the office needs to give you a call back, can they leave a voicemail: [] Yes [] No    Barbara Rogers MA  12/04/23, 14:44 EST

## 2024-01-02 RX ORDER — MONTELUKAST SODIUM 10 MG/1
TABLET ORAL
Qty: 90 TABLET | Refills: 0 | Status: SHIPPED | OUTPATIENT
Start: 2024-01-02

## 2024-01-02 RX ORDER — ATORVASTATIN CALCIUM 10 MG/1
10 TABLET, FILM COATED ORAL DAILY
Qty: 90 TABLET | Refills: 0 | Status: SHIPPED | OUTPATIENT
Start: 2024-01-02

## 2024-01-29 RX ORDER — BUSPIRONE HYDROCHLORIDE 10 MG/1
10 TABLET ORAL 3 TIMES DAILY
Qty: 270 TABLET | Refills: 0 | Status: SHIPPED | OUTPATIENT
Start: 2024-01-29

## 2024-01-29 RX ORDER — BUPROPION HYDROCHLORIDE 150 MG/1
150 TABLET ORAL EVERY MORNING
Qty: 90 TABLET | Refills: 0 | Status: SHIPPED | OUTPATIENT
Start: 2024-01-29

## 2024-01-31 ENCOUNTER — OFFICE (AMBULATORY)
Dept: URBAN - METROPOLITAN AREA CLINIC 64 | Facility: CLINIC | Age: 74
End: 2024-01-31

## 2024-01-31 DIAGNOSIS — K22.2 ESOPHAGEAL OBSTRUCTION: ICD-10-CM

## 2024-01-31 DIAGNOSIS — E66.9 OBESITY, UNSPECIFIED: ICD-10-CM

## 2024-01-31 PROCEDURE — 99490 CHRNC CARE MGMT STAFF 1ST 20: CPT | Performed by: INTERNAL MEDICINE

## 2024-01-31 PROCEDURE — 99439 CHRNC CARE MGMT STAF EA ADDL: CPT | Performed by: INTERNAL MEDICINE

## 2024-02-01 ENCOUNTER — OFFICE VISIT (OUTPATIENT)
Dept: ORTHOPEDIC SURGERY | Facility: CLINIC | Age: 74
End: 2024-02-01
Payer: MEDICARE

## 2024-02-01 VITALS — HEART RATE: 67 BPM | HEIGHT: 58 IN | WEIGHT: 185 LBS | BODY MASS INDEX: 38.83 KG/M2

## 2024-02-01 DIAGNOSIS — M54.50 CHRONIC MIDLINE LOW BACK PAIN, UNSPECIFIED WHETHER SCIATICA PRESENT: ICD-10-CM

## 2024-02-01 DIAGNOSIS — G89.29 CHRONIC MIDLINE LOW BACK PAIN, UNSPECIFIED WHETHER SCIATICA PRESENT: ICD-10-CM

## 2024-02-01 DIAGNOSIS — M25.512 ACUTE PAIN OF LEFT SHOULDER: Primary | ICD-10-CM

## 2024-02-01 DIAGNOSIS — M19.012 OSTEOARTHRITIS OF LEFT GLENOHUMERAL JOINT: ICD-10-CM

## 2024-02-01 RX ORDER — TRIAMCINOLONE ACETONIDE 40 MG/ML
80 INJECTION, SUSPENSION INTRA-ARTICULAR; INTRAMUSCULAR ONCE
Status: COMPLETED | OUTPATIENT
Start: 2024-02-01 | End: 2024-02-01

## 2024-02-01 RX ADMIN — TRIAMCINOLONE ACETONIDE 80 MG: 40 INJECTION, SUSPENSION INTRA-ARTICULAR; INTRAMUSCULAR at 15:59

## 2024-02-01 NOTE — PROGRESS NOTES
Patient ID: Charleen Barnett is a 73 y.o. female.    Chief Complaint:    Chief Complaint   Patient presents with    Left Shoulder - Initial Evaluation, Pain     Pain 6        HPI:  This is a 73-year-old female here with longstanding left shoulder pain.  She denies trauma has developed pain deep in the shoulder pain at night difficulty lifting overhead she has had good response with previous steroid injection   no recent treatment, also has longstanding back pain  Past Medical History:   Diagnosis Date    Allergic 1970's    Anemia 1950's    Anxiety     Arthritis     Asthma 02/13/2020    Ramos's esophagus 02/15/2018    Cancer Carcinoma Insitu cervical    Cataract     Cholelithiasis Removed 2000's    Coronary artery disease     Depression 08/27/2018    Fibromyalgia 12/14/2011    Fibromyalgia, primary 1990's I think    GERD (gastroesophageal reflux disease)     Gout 02/13/2020    Headache     HL (hearing loss) Age related    Hyperlipidemia     Hypertension     Hypothyroidism 2022    Joint pain     Low back pain 2970's    Obesity Lifelong since puberty    Obstructive sleep apnea 10/16/2014    Osteopenia Five years appx    Osteoporosis     fosamax(SE), on prolia(4/10/18-6/18/21, 2/1/22)    Otitis media     Pedal edema 10/22/2018    Pneumonia 2020    Presence of left artificial knee joint 03/14/2019    Scoliosis 1961    Seasonal allergies     ST elevation (STEMI) myocardial infarction 02/13/2020    Status post percutaneous transluminal coronary angioplasty 06/10/2014    Substance abuse     Tachycardia     Urge incontinence 08/27/2018    Urinary tract infection     Visual impairment Catsracts    Vitamin D deficiency 03/19/2015       Past Surgical History:   Procedure Laterality Date    BLADDER SURGERY      BLADDER SUSPENSION  04/2023    CARDIAC CATHETERIZATION N/A 05/29/2021    Procedure: Left Heart Cath and coronary angiogram;  Surgeon: Yazmin Matta MD;  Location: [facility] INVASIVE LOCATION;  Service:  Cardiovascular;  Laterality: N/A;    CARDIAC CATHETERIZATION  2021    Procedure: Functional Flow Reserve (iFR);  Surgeon: Bryan Patel MD;  Location: [facility] CATH INVASIVE LOCATION;  Service: Cardiology;;    CARDIAC CATHETERIZATION N/A 2021    Procedure: Percutaneous Coronary Intervention;  Surgeon: Bryan Patel MD;  Location: [facility] CATH INVASIVE LOCATION;  Service: Cardiology;  Laterality: N/A;    CARDIAC CATHETERIZATION N/A 2021    Procedure: Stent FRANCOISE coronary;  Surgeon: Bryan Patel MD;  Location: [facility] CATH INVASIVE LOCATION;  Service: Cardiology;  Laterality: N/A;    CAROTID STENT      CHOLECYSTECTOMY      COLONOSCOPY  ??    CORONARY STENT PLACEMENT  ??    FRACTURE SURGERY      HYSTERECTOMY      INGUINAL HERNIA REPAIR      JOINT REPLACEMENT  L-knee    KNEE SURGERY      OTHER SURGICAL HISTORY      STENT    SUBTOTAL HYSTERECTOMY         Family History   Problem Relation Age of Onset    No Known Problems Mother     Heart attack Father         Vein bypass in legs    Cancer Father             No Known Problems Sister     No Known Problems Brother     No Known Problems Maternal Aunt     No Known Problems Maternal Uncle     No Known Problems Paternal Aunt     No Known Problems Paternal Uncle     Heart attack Maternal Grandmother         Heart atrack in her 80s    Heart attack Maternal Grandfather         Heart attack in his 60s    Heart attack Paternal Grandmother         Type 1 diabetic, heart attack    Heart attack Paternal Grandfather         Heart attack in his 50s or 60s    Heart disease Sister         Blockages monitored with medication    Hypertension Sister         Coronary heart issues    Hyperlipidemia Brother         Extremely high triglycerides and cholesterol    Hypertension Brother         High blood pressure    Heart attack Brother         Passed from heart attack age 54    Hyperlipidemia Brother         Unknown     "Hypertension Brother     Heart attack Brother         Passed from heart attack age 44    Hyperlipidemia Brother         Had heart disease for years before passing    Hypertension Brother     Heart attack Brother          from heart failure in his 60s    Anemia Neg Hx     Arrhythmia Neg Hx     Asthma Neg Hx     Clotting disorder Neg Hx     Fainting Neg Hx     Heart failure Neg Hx           Social History     Occupational History    Not on file   Tobacco Use    Smoking status: Never     Passive exposure: Never    Smokeless tobacco: Never    Tobacco comments:     Parents and husbands heavy smokers   Vaping Use    Vaping Use: Never used   Substance and Sexual Activity    Alcohol use: Not Currently     Comment: Recovery since  drank from age [age]    Drug use: Not Currently     Frequency: 1.0 times per week     Types: Amphetamines     Comment: Short term use/over use diet pills in the 70's    Sexual activity: Not Currently     Partners: Male     Birth control/protection: None, Hysterectomy      Review of Systems   Cardiovascular:  Negative for chest pain.   Musculoskeletal:  Positive for arthralgias.       Objective:    Pulse 67   Ht 147.3 cm (58\")   Wt 83.9 kg (185 lb)   BMI 38.67 kg/m²     Physical Examination:  Left shoulder demonstrates intact skin moderate pain over the bicep groove passive elevation 140 external rotation 30 internal rotation S1 with pain and weakness on Speed, [place], and supraspinatus testing.  Belly press and liftoff are 4/5.  Sensory and motor exam are intact all distributions. Radial pulse is palpable and capillary refill is less than two seconds to all digits.    Imaging:  left Shoulder X-Ray  Indication: Shoulder pain denies trauma  AP Y and Lateral views  Findings: Moderate joint space narrowing  no bony lesion  Soft tissues normal  decreased joint spaces  Hardware appropriately positioned not applicable      no prior studies available for comparison.    Assessment:  Left " shoulder pain with osteoarthritis  Back pain    Plan:  Options discussed for the shoulder, I recommend injection after today's evaluation. Risks and benefits of the injection were discussed. Under sterile technique and after timeout and verbal consent I injected 80 mg of Kenalog and 2 mL of 1% Lidocaine in the shoulder and subacromial space. It was well tolerated.  I ordered physical therapy including aqua therapy for her back see me as needed      Procedures         Disclaimer: Part of this note may be an electronic transcription/translation of spoken language to printed text using the Dragon Dictation System

## 2024-02-14 ENCOUNTER — TREATMENT (OUTPATIENT)
Dept: PHYSICAL THERAPY | Facility: CLINIC | Age: 74
End: 2024-02-14
Payer: MEDICARE

## 2024-02-14 DIAGNOSIS — M54.50 CHRONIC MIDLINE LOW BACK PAIN, UNSPECIFIED WHETHER SCIATICA PRESENT: Primary | ICD-10-CM

## 2024-02-14 DIAGNOSIS — R26.2 DIFFICULTY IN WALKING: ICD-10-CM

## 2024-02-14 DIAGNOSIS — G89.29 CHRONIC MIDLINE LOW BACK PAIN, UNSPECIFIED WHETHER SCIATICA PRESENT: Primary | ICD-10-CM

## 2024-02-19 ENCOUNTER — TREATMENT (OUTPATIENT)
Dept: PHYSICAL THERAPY | Facility: CLINIC | Age: 74
End: 2024-02-19
Payer: MEDICARE

## 2024-02-19 DIAGNOSIS — G89.29 CHRONIC MIDLINE LOW BACK PAIN, UNSPECIFIED WHETHER SCIATICA PRESENT: Primary | ICD-10-CM

## 2024-02-19 DIAGNOSIS — R26.2 DIFFICULTY IN WALKING: ICD-10-CM

## 2024-02-19 DIAGNOSIS — M54.50 CHRONIC MIDLINE LOW BACK PAIN, UNSPECIFIED WHETHER SCIATICA PRESENT: Primary | ICD-10-CM

## 2024-02-19 PROCEDURE — 97113 AQUATIC THERAPY/EXERCISES: CPT

## 2024-02-19 NOTE — PROGRESS NOTES
Physical Therapy Treatment  Note  [address] [place]   [address]     Diagnosis Plan   1. Chronic midline low back pain, unspecified whether sciatica present        2. Difficulty in walking            VISIT#: 2    Subjective   Charleen Barnett reports: current pain as 3/10 at low back upon arrival to therapy.  Pt reported she should be using walker but her  has not gone out to find in garage      Objective   - Pt used stairs to enter and exit pool    See Exercise, Manual, and Modality Logs for complete treatment.     Patient Education:    Goals- Initial evaluation on 2/14/24  Plan Goals: STG's (4 weeks)  1. Tolerate 45 minutes aquatic therapy with reduction in pain.   2. Able to ambulate x 10 min on Aquatic TM with improved gait pattern.  3. Able to tolerate initial HEP w/ progression for self management of impairments.     LTG's (by d/c- 8 weeks )  1. Independent with HEP and able to manage condition independently.  2. Pt will have ability to ambulate 10 minutes or greater for household and community mobility without provocation of pain and improved gait mechanics.   3. Pt will demonstrate (B) hip abd and extension greater 4/5 for improved postural support and function.   4. Significant improvement on Oswestry  outcome measure indicating 30 % impairment or less.    Assessment/Plan  - Assistance for safety from changing room to/from pool    - Pt reported some back discomfort toward end of session so no additional activities performed as this was first aquatic therapy session    - Jets utilized at end of session for muscular relaxation, pain modulation.     - Treatment  incorporating  principles of aquatic therapy to offload spine/joints, thermal effects to reduce pain, and hydrostatic pressure to facilitate exercise with transition to land as tolerated.     Progress per Plan of Care            Timed:         Manual Therapy:    0     mins  15467;     Therapeutic Exercise:    0     mins  97239;      Neuromuscular Jeancarlos:    0    mins  37397;    Therapeutic Activity:     0     mins  22159;     Gait Trainin     mins  22161;     Ultrasound:     0     mins  21586;    Ionto                               0    mins   89541  Self Care                       0     mins   49911  Canalith Repos               0    mins  4209  Aquatic                          57     mins 69168    Un-Timed:  Electrical Stimulation:    0     mins  25497 ( );  Dry Needling     0     mins self-pay  Traction     0     mins 33507  Low Eval     0     Mins  08791  Mod Eval     0     Mins  00608  High Eval                       0     Mins  90391  Re-Eval                           0    mins  49684    Timed Treatment:   57   mins   Total Treatment:     57   mins    Mary Ramos, PT PT, DPT, [phone]

## 2024-02-21 ENCOUNTER — TREATMENT (OUTPATIENT)
Dept: PHYSICAL THERAPY | Facility: CLINIC | Age: 74
End: 2024-02-21
Payer: MEDICARE

## 2024-02-21 DIAGNOSIS — G89.29 CHRONIC MIDLINE LOW BACK PAIN, UNSPECIFIED WHETHER SCIATICA PRESENT: Primary | ICD-10-CM

## 2024-02-21 DIAGNOSIS — M54.50 CHRONIC MIDLINE LOW BACK PAIN, UNSPECIFIED WHETHER SCIATICA PRESENT: Primary | ICD-10-CM

## 2024-02-21 DIAGNOSIS — R26.2 DIFFICULTY IN WALKING: ICD-10-CM

## 2024-02-21 NOTE — PROGRESS NOTES
Physical Therapy Treatment  Note  [address] [facility] [place]   [address]     Diagnosis Plan   1. Chronic midline low back pain, unspecified whether sciatica present        2. Difficulty in walking            VISIT#: 3    Subjective   Charleen Barnett reports: current pain as 3/10 at low back upon arrival to therapy.  Pt reported she experienced increased pain yesterday which limited her mobility and provocation of pain with transitional movements/transfers.       Objective   - Pt used stairs to enter and exit pool    See Exercise, Manual, and Modality Logs for complete treatment.     Patient Education:    Goals- Initial evaluation on 2/14/24  Plan Goals: STG's (4 weeks)  1. Tolerate 45 minutes aquatic therapy with reduction in pain.   2. Able to ambulate x 10 min on Aquatic TM with improved gait pattern.  3. Able to tolerate initial HEP w/ progression for self management of impairments.     LTG's (by d/c- 8 weeks )  1. Independent with HEP and able to manage condition independently.  2. Pt will have ability to ambulate 10 minutes or greater for household and community mobility without provocation of pain and improved gait mechanics.   3. Pt will demonstrate (B) hip abd and extension greater 4/5 for improved postural support and function.   4. Significant improvement on Oswestry  outcome measure indicating 30 % impairment or less.    Assessment/Plan  - Pt reported mild discomfort during (L) hip extension; otherwise, no complaints noted     - Jets utilized at end of session for muscular relaxation, pain modulation.     - Treatment  incorporating  principles of aquatic therapy to offload spine/joints, thermal effects to reduce pain, and hydrostatic pressure to facilitate exercise with transition to land as tolerated.     Progress per Plan of Care            Timed:         Manual Therapy:    0     mins  51153;     Therapeutic Exercise:    0     mins  33864;     Neuromuscular Jeancarlos:    0    mins  14498;    Therapeutic  Activity:     0     mins  15948;     Gait Trainin     mins  74736;     Ultrasound:     0     mins  50184;    Ionto                               0    mins   73341  Self Care                       0     mins   29680  Canalith Repos               0    mins  4209  Aquatic                          55     mins 52672    Un-Timed:  Electrical Stimulation:    0     mins  96254 ( );  Dry Needling     0     mins self-pay  Traction     0     mins 03063  Low Eval     0     Mins  15157  Mod Eval     0     Mins  43776  High Eval                       0     Mins  44274  Re-Eval                           0    mins  70711    Timed Treatment:   55   mins   Total Treatment:     55   mins    Mary Ramos, PT PT, DPT, [id number]

## 2024-02-26 ENCOUNTER — TREATMENT (OUTPATIENT)
Dept: PHYSICAL THERAPY | Facility: CLINIC | Age: 74
End: 2024-02-26
Payer: MEDICARE

## 2024-02-26 DIAGNOSIS — R26.2 DIFFICULTY IN WALKING: ICD-10-CM

## 2024-02-26 DIAGNOSIS — G89.29 CHRONIC MIDLINE LOW BACK PAIN, UNSPECIFIED WHETHER SCIATICA PRESENT: Primary | ICD-10-CM

## 2024-02-26 DIAGNOSIS — M54.50 CHRONIC MIDLINE LOW BACK PAIN, UNSPECIFIED WHETHER SCIATICA PRESENT: Primary | ICD-10-CM

## 2024-02-26 PROCEDURE — 97113 AQUATIC THERAPY/EXERCISES: CPT

## 2024-02-26 NOTE — PROGRESS NOTES
Physical Therapy Treatment  Note  [address] [facility] [place]   [address]     Diagnosis Plan   1. Chronic midline low back pain, unspecified whether sciatica present        2. Difficulty in walking            VISIT#: 4    Subjective   Charleen Barnett reports: current pain as 6-7/10 at low back upon arrival to therapy.  Pt reported she has experienced increased pain since Wednesday    Objective   - Pt used stairs to enter and exit pool    See Exercise, Manual, and Modality Logs for complete treatment.     Patient Education:    Goals- Initial evaluation on 2/14/24  Plan Goals: STG's (4 weeks)  1. Tolerate 45 minutes aquatic therapy with reduction in pain.   2. Able to ambulate x 10 min on Aquatic TM with improved gait pattern.  3. Able to tolerate initial HEP w/ progression for self management of impairments.     LTG's (by d/c- 8 weeks )  1. Independent with HEP and able to manage condition independently.  2. Pt will have ability to ambulate 10 minutes or greater for household and community mobility without provocation of pain and improved gait mechanics.   3. Pt will demonstrate (B) hip abd and extension greater 4/5 for improved postural support and function.   4. Significant improvement on Oswestry  outcome measure indicating 30 % impairment or less.    Assessment/Plan  - No new activities added this date as patient experienced moderate - severe pain since Wednesday and is uncertain of cause of increased pain    - Jets utilized at end of session for muscular relaxation, pain modulation.     - Treatment  incorporating  principles of aquatic therapy to offload spine/joints, thermal effects to reduce pain, and hydrostatic pressure to facilitate exercise with transition to land as tolerated.     Progress per Plan of Care            Timed:         Manual Therapy:    0     mins  95556;     Therapeutic Exercise:    0     mins  60787;     Neuromuscular Jeancarlos:    0    mins  03933;    Therapeutic Activity:     0     mins   85333;     Gait Trainin     mins  79021;     Ultrasound:     0     mins  54367;    Ionto                               0    mins   27944  Self Care                       0     mins   75723  Canalith Repos               0    mins  4209  Aquatic                          42     mins 17094    Un-Timed:  Electrical Stimulation:    0     mins  64545 ( );  Dry Needling     0     mins self-pay  Traction     0     mins 15587  Low Eval     0     Mins  58637  Mod Eval     0     Mins  67707  High Eval                       0     Mins  08063  Re-Eval                           0    mins  70471    Timed Treatment:   42   mins   Total Treatment:     42   mins    Mary Ramos, PT PT, DPT, [id number]

## 2024-02-28 ENCOUNTER — TREATMENT (OUTPATIENT)
Dept: PHYSICAL THERAPY | Facility: CLINIC | Age: 74
End: 2024-02-28
Payer: MEDICARE

## 2024-02-28 DIAGNOSIS — G89.29 CHRONIC MIDLINE LOW BACK PAIN, UNSPECIFIED WHETHER SCIATICA PRESENT: Primary | ICD-10-CM

## 2024-02-28 DIAGNOSIS — R26.2 DIFFICULTY IN WALKING: ICD-10-CM

## 2024-02-28 DIAGNOSIS — M54.50 CHRONIC MIDLINE LOW BACK PAIN, UNSPECIFIED WHETHER SCIATICA PRESENT: Primary | ICD-10-CM

## 2024-02-28 NOTE — PROGRESS NOTES
Physical Therapy Treatment  Note  [address] [facility] [place]   [address]     Diagnosis Plan   1. Chronic midline low back pain, unspecified whether sciatica present        2. Difficulty in walking            VISIT#: 5    Subjective   Charleen Barnett reports: current pain as 1/10 at low back upon arrival to therapy.  Patient reported she has some muscular soreness at upper back.  Patient reported she stayed in bed until 2 pm to rest prior to therapy.  Did not want to hurt.     Objective   - Pt used stairs to enter and exit pool    See Exercise, Manual, and Modality Logs for complete treatment.     Patient Education:    Goals- Initial evaluation on 2/14/24  Plan Goals: STG's (4 weeks)  1. Tolerate 45 minutes aquatic therapy with reduction in pain.   2. Able to ambulate x 10 min on Aquatic TM with improved gait pattern.  3. Able to tolerate initial HEP w/ progression for self management of impairments.     LTG's (by d/c- 8 weeks )  1. Independent with HEP and able to manage condition independently.  2. Pt will have ability to ambulate 10 minutes or greater for household and community mobility without provocation of pain and improved gait mechanics.   3. Pt will demonstrate (B) hip abd and extension greater 4/5 for improved postural support and function.   4. Significant improvement on Oswestry  outcome measure indicating 30 % impairment or less.    Assessment/Plan  - No new activities added this date as patient reported good response  to prior session.  Patient did not want to add additional activities.     - Jets utilized at end of session for muscular relaxation, pain modulation.     - Treatment  incorporating  principles of aquatic therapy to offload spine/joints, thermal effects to reduce pain, and hydrostatic pressure to facilitate exercise with transition to land as tolerated.     Progress per Plan of Care            Timed:         Manual Therapy:    0     mins  02885;     Therapeutic Exercise:    0     mins   07126;     Neuromuscular Jeancarlos:    0    mins  03823;    Therapeutic Activity:     0     mins  91077;     Gait Trainin     mins  65081;     Ultrasound:     0     mins  06229;    Ionto                               0    mins   46761  Self Care                       0     mins   63641  Canalith Repos               0    mins  4209  Aquatic                          45     mins 53580    Un-Timed:  Electrical Stimulation:    0     mins  97341 ( );  Dry Needling     0     mins self-pay  Traction     0     mins 39975  Low Eval     0     Mins  05830  Mod Eval     0     Mins  02920  High Eval                       0     Mins  54362  Re-Eval                           0    mins  75816    Timed Treatment:   45   mins   Total Treatment:     45   mins    Mary Ramos, PT PT, DPT, [id number]

## 2024-03-04 ENCOUNTER — TREATMENT (OUTPATIENT)
Dept: PHYSICAL THERAPY | Facility: CLINIC | Age: 74
End: 2024-03-04
Payer: MEDICARE

## 2024-03-04 DIAGNOSIS — R26.2 DIFFICULTY IN WALKING: ICD-10-CM

## 2024-03-04 DIAGNOSIS — M54.50 CHRONIC MIDLINE LOW BACK PAIN, UNSPECIFIED WHETHER SCIATICA PRESENT: Primary | ICD-10-CM

## 2024-03-04 DIAGNOSIS — G89.29 CHRONIC MIDLINE LOW BACK PAIN, UNSPECIFIED WHETHER SCIATICA PRESENT: Primary | ICD-10-CM

## 2024-03-04 NOTE — PROGRESS NOTES
Physical Therapy Treatment  Note  [address] [place]   [address]     Diagnosis Plan   1. Chronic midline low back pain, unspecified whether sciatica present        2. Difficulty in walking            VISIT#: 6    Subjective   Charleen Barnett reports: current pain as 5-6/10 at low back upon arrival to therapy.  Patient reported she did a lot over the weekend.  Patient reported increased pain beginning last evening which she attributed to activities over the weekend.     Objective   - Pt used stairs to enter and exit pool    See Exercise, Manual, and Modality Logs for complete treatment.     Patient Education:    Goals- Initial evaluation on 2/14/24  Plan Goals: STG's (4 weeks)  1. Tolerate 45 minutes aquatic therapy with reduction in pain.   2. Able to ambulate x 10 min on Aquatic TM with improved gait pattern.  3. Able to tolerate initial HEP w/ progression for self management of impairments.     LTG's (by d/c- 8 weeks )  1. Independent with HEP and able to manage condition independently.  2. Pt will have ability to ambulate 10 minutes or greater for household and community mobility without provocation of pain and improved gait mechanics.   3. Pt will demonstrate (B) hip abd and extension greater 4/5 for improved postural support and function.   4. Significant improvement on Oswestry  outcome measure indicating 30 % impairment or less.    Assessment/Plan  - No new activities added this date as patient reported good response  to prior session.  Patient continues to experience some muscular soreness with activity.    - Jets utilized at end of session for muscular relaxation, pain modulation.     - Treatment  incorporating  principles of aquatic therapy to offload spine/joints, thermal effects to reduce pain, and hydrostatic pressure to facilitate exercise with transition to land as tolerated.     - 2 patients in the pool  Progress per Plan of Care            Timed:         Manual Therapy:    0     mins   63094;     Therapeutic Exercise:    0     mins  72570;     Neuromuscular Jeancarlos:    0    mins  79801;    Therapeutic Activity:     0     mins  91401;     Gait Trainin     mins  12698;     Ultrasound:     0     mins  34999;    Ionto                               0    mins   37677  Self Care                       0     mins   91391  Canalith Repos               0    mins  4209  Aquatic                          30     mins 78053    Un-Timed:  Electrical Stimulation:    0     mins  86104 ( );  Dry Needling     0     mins self-pay  Traction     0     mins 84653  Low Eval     0     Mins  61918  Mod Eval     0     Mins  45826  High Eval                       0     Mins  37647  Re-Eval                           0    mins  09495    Timed Treatment:   30   mins   Total Treatment:     30   mins    Mary Ramos, PT PT, DPT, [id number]

## 2024-03-06 ENCOUNTER — TREATMENT (OUTPATIENT)
Dept: PHYSICAL THERAPY | Facility: CLINIC | Age: 74
End: 2024-03-06
Payer: MEDICARE

## 2024-03-06 DIAGNOSIS — R26.2 DIFFICULTY IN WALKING: ICD-10-CM

## 2024-03-06 DIAGNOSIS — M54.50 CHRONIC MIDLINE LOW BACK PAIN, UNSPECIFIED WHETHER SCIATICA PRESENT: Primary | ICD-10-CM

## 2024-03-06 DIAGNOSIS — G89.29 CHRONIC MIDLINE LOW BACK PAIN, UNSPECIFIED WHETHER SCIATICA PRESENT: Primary | ICD-10-CM

## 2024-03-06 NOTE — PROGRESS NOTES
Physical Therapy Treatment  Note  [address] [place] [place]   [address]     Diagnosis Plan   1. Chronic midline low back pain, unspecified whether sciatica present        2. Difficulty in walking            VISIT#: 7    Subjective   Charleen Barnett reports: current pain as 0.5/10 at low back upon arrival to therapy.  Patient reported her back was doing well last evening and this morning.  Patient reported she moved the wrong way when getting up earlier resulting in elevated pain.     Objective   - Pt used stairs to enter and exit pool    See Exercise, Manual, and Modality Logs for complete treatment.     Patient Education:    Goals- Initial evaluation on 2/14/24  Plan Goals: STG's (4 weeks)  1. Tolerate 45 minutes aquatic therapy with reduction in pain.   2. Able to ambulate x 10 min on Aquatic TM with improved gait pattern.  3. Able to tolerate initial HEP w/ progression for self management of impairments.     LTG's (by d/c- 8 weeks )  1. Independent with HEP and able to manage condition independently.  2. Pt will have ability to ambulate 10 minutes or greater for household and community mobility without provocation of pain and improved gait mechanics.   3. Pt will demonstrate (B) hip abd and extension greater 4/5 for improved postural support and function.   4. Significant improvement on Oswestry  outcome measure indicating 30 % impairment or less.    Assessment/Plan  -  Added standing horizontal add to midline with fans this date.  Patient tolerated well     - Jets utilized at end of session for muscular relaxation, pain modulation.     - Treatment  incorporating  principles of aquatic therapy to offload spine/joints, thermal effects to reduce pain, and hydrostatic pressure to facilitate exercise with transition to land as tolerated.     Progress per Plan of Care            Timed:         Manual Therapy:    0     mins  56068;     Therapeutic Exercise:    0     mins  75727;     Neuromuscular Jeancarlos:    0     mins  75877;    Therapeutic Activity:     0     mins  71814;     Gait Trainin     mins  26119;     Ultrasound:     0     mins  65778;    Ionto                               0    mins   82122  Self Care                       0     mins   58993  Canalith Repos               0    mins  4209  Aquatic                           53  mins 44806    Un-Timed:  Electrical Stimulation:    0     mins  87455 ( );  Dry Needling     0     mins self-pay  Traction     0     mins 81327  Low Eval     0     Mins  12983  Mod Eval     0     Mins  56365  High Eval                       0     Mins  53382  Re-Eval                           0    mins  99195    Timed Treatment:   53   mins   Total Treatment:      53  mins    Mary Ramos, PT PT, DPT, [id number]

## 2024-03-11 ENCOUNTER — TREATMENT (OUTPATIENT)
Dept: PHYSICAL THERAPY | Facility: CLINIC | Age: 74
End: 2024-03-11
Payer: MEDICARE

## 2024-03-11 DIAGNOSIS — G89.29 CHRONIC MIDLINE LOW BACK PAIN, UNSPECIFIED WHETHER SCIATICA PRESENT: Primary | ICD-10-CM

## 2024-03-11 DIAGNOSIS — R26.2 DIFFICULTY IN WALKING: ICD-10-CM

## 2024-03-11 DIAGNOSIS — M54.50 CHRONIC MIDLINE LOW BACK PAIN, UNSPECIFIED WHETHER SCIATICA PRESENT: Primary | ICD-10-CM

## 2024-03-11 NOTE — PROGRESS NOTES
"Physical Therapy Treatment  Note  [address] [place]   [address]     Diagnosis Plan   1. Chronic midline low back pain, unspecified whether sciatica present        2. Difficulty in walking            VISIT#: 8    Subjective   Charleen Barnett reports: \" the night after I do stuff, I am very sore and tired\". Patient reported she did well for a few days and then went back to baseline/increase pain.  Patient reported pain is making her very tired.     Objective   - Pt used stairs to enter and exit pool    See Exercise, Manual, and Modality Logs for complete treatment.     Patient Education:    Goals- Initial evaluation on 2/14/24  Plan Goals: STG's (4 weeks)  1. Tolerate 45 minutes aquatic therapy with reduction in pain.   2. Able to ambulate x 10 min on Aquatic TM with improved gait pattern.  3. Able to tolerate initial HEP w/ progression for self management of impairments.     LTG's (by d/c- 8 weeks )  1. Independent with HEP and able to manage condition independently.  2. Pt will have ability to ambulate 10 minutes or greater for household and community mobility without provocation of pain and improved gait mechanics.   3. Pt will demonstrate (B) hip abd and extension greater 4/5 for improved postural support and function.   4. Significant improvement on Oswestry  outcome measure indicating 30 % impairment or less.    Assessment/Plan  - Pt did not c/o pain provocation during aquatic therapy session.     - Jets utilized at end of session for muscular relaxation, pain modulation.     - Treatment  incorporating  principles of aquatic therapy to offload spine/joints, thermal effects to reduce pain, and hydrostatic pressure to facilitate exercise with transition to land as tolerated.     Progress per Plan of Care            Timed:         Manual Therapy:    0     mins  55083;     Therapeutic Exercise:    0     mins  92984;     Neuromuscular Jeancarlos:    0    mins  01498;    Therapeutic Activity:     0     mins  " 86588;     Gait Trainin     mins  42329;     Ultrasound:     0     mins  12909;    Ionto                               0    mins   51304  Self Care                       0     mins   41031  Canalith Repos               0    mins  4209  Aquatic                           53  mins 81348    Un-Timed:  Electrical Stimulation:    0     mins  22958 ( );  Dry Needling     0     mins self-pay  Traction     0     mins 68038  Low Eval     0     Mins  05844  Mod Eval     0     Mins  99327  High Eval                       0     Mins  86185  Re-Eval                           0    mins  26052    Timed Treatment:   53   mins   Total Treatment:      53  mins    Mary Ramos, PT PT, DPT, [id number]

## 2024-03-18 ENCOUNTER — TELEPHONE (OUTPATIENT)
Dept: PHYSICAL THERAPY | Facility: CLINIC | Age: 74
End: 2024-03-18

## 2024-03-20 ENCOUNTER — TREATMENT (OUTPATIENT)
Dept: PHYSICAL THERAPY | Facility: CLINIC | Age: 74
End: 2024-03-20
Payer: MEDICARE

## 2024-03-20 DIAGNOSIS — R26.2 DIFFICULTY IN WALKING: ICD-10-CM

## 2024-03-20 DIAGNOSIS — M54.50 CHRONIC MIDLINE LOW BACK PAIN, UNSPECIFIED WHETHER SCIATICA PRESENT: Primary | ICD-10-CM

## 2024-03-20 DIAGNOSIS — G89.29 CHRONIC MIDLINE LOW BACK PAIN, UNSPECIFIED WHETHER SCIATICA PRESENT: Primary | ICD-10-CM

## 2024-03-20 NOTE — LETTER
"Progress Note  3/20/2024  Tomás Campa MD    Re: Charleen Barnett  ________________________________________________________________    Physical Therapy  Progress Note/Reassessment  [address] [place], [place] [address], Phone: [phone]    Patient: Charleen Barnett   : 1950  Diagnosis/ICD-10 Code:  Chronic midline low back pain, unspecified whether sciatica present [M54.50, G89.29]  Referring practitioner: Tomás Campa, *  Date of Initial Visit: Type: THERAPY  Noted: 2024  Today's Date: 3/20/2024  Patient seen for 9 sessions      Subjective:   Visit Diagnosis:    ICD-10-CM ICD-9-CM   1. Chronic midline low back pain, unspecified whether sciatica present  M54.50 724.2    G89.29 338.29   2. Difficulty in walking  R26.2 719.7       Charleen Barnett reports: 0/10 upon arrival to therapy. Patient reported 1 \" twinge\" when turning/twisting her back to retrieve something under the sink.  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 Tolerate 45 minutes aquatic therapy with reduction in pain. MET  2. Able to ambulate x 10 min on Aquatic TM with improved gait pattern. PROGRESSING, pt is able to ambulate 7 minutes on aquatic " treadmill without provocation of pain  3. Able to tolerate initial HEP w/ progression for self management of impairments: MET, pt is able to stand at counter at home with perform HEP     LTG's (by d/c- 8 weeks )  1. Independent with HEP and able to manage condition independently.  2. Pt will have ability to ambulate 10 minutes or greater for household and community mobility without provocation of pain and improved gait mechanics.   3. Pt will demonstrate (B) hip abd and extension greater 4/5 for improved postural support and function.   4. Significant improvement on Oswestry  outcome measure indicating 30 % impairment or less.  Short-term goals (STG): 2/3  Long-term goals (LTG):       Recommendations: Continue as planned  Timeframe: 1 month- 16 visits recommended at initial evaluation  Prognosis to achieve goals: fair    Timed:         Manual Therapy:    0     mins  71369;     Therapeutic Exercise:    0     mins  37424;     Neuromuscular Jeancarlos:    0    mins  72538;    Therapeutic Activity:     0     mins  00479;     Gait Trainin     mins  50586;     Ultrasound:     0     mins  31432;    Ionto                               0    mins   24095  Self Care                       0     mins   10546  Aquatic                          30     mins 85104      Un-Timed:  Electrical Stimulation:    0     mins  64243 ( );  Dry Needling     0     mins self-pay  Traction     0     mins 84788  Low Eval     0     Mins  02739  Mod Eval     0     Mins  11628  High Eval                       0     Mins  20788  Re-Eval                           0    mins  13785      Timed Treatment:   30   mins   Total Treatment:     30   mins      PT Signature: Mary Ramos PT  [place] License #: [id number]

## 2024-03-20 NOTE — PROGRESS NOTES
"Physical Therapy  Progress Note/Reassessment  [address] [place], [place] [address], Phone: [phone]    Patient: Charleen Barnett   : 1950  Diagnosis/ICD-10 Code:  Chronic midline low back pain, unspecified whether sciatica present [M54.50, G89.29]  Referring practitioner: Tomás Campa, *  Date of Initial Visit: Type: THERAPY  Noted: 2024  Today's Date: 3/20/2024  Patient seen for 9 sessions      Subjective:   Visit Diagnosis:    ICD-10-CM ICD-9-CM   1. Chronic midline low back pain, unspecified whether sciatica present  M54.50 724.2    G89.29 338.29   2. Difficulty in walking  R26.2 719.7       Charleen Barnett reports: 0/10 upon arrival to therapy. Patient reported 1 \" twinge\" when turning/twisting her back to retrieve something under the sink.  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 Tolerate 45 minutes aquatic therapy with reduction in pain. MET  2. Able to ambulate x 10 min on Aquatic TM with improved gait pattern. PROGRESSING, pt is able to ambulate 7 minutes on aquatic treadmill without provocation of pain  3. Able to tolerate initial HEP w/ progression for self management of impairments: MET, pt is able to stand " at counter at home with perform HEP     LTG's (by d/c- 8 weeks )  1. Independent with HEP and able to manage condition independently.  2. Pt will have ability to ambulate 10 minutes or greater for household and community mobility without provocation of pain and improved gait mechanics.   3. Pt will demonstrate (B) hip abd and extension greater 4/5 for improved postural support and function.   4. Significant improvement on Oswestry  outcome measure indicating 30 % impairment or less.  Short-term goals (STG): 2/3  Long-term goals (LTG):       Recommendations: Continue as planned  Timeframe: 1 month- 16 visits recommended at initial evaluation  Prognosis to achieve goals: fair    Timed:         Manual Therapy:    0     mins  86317;     Therapeutic Exercise:    0     mins  88639;     Neuromuscular Jeancarlos:    0    mins  44593;    Therapeutic Activity:     0     mins  62797;     Gait Trainin     mins  32606;     Ultrasound:     0     mins  35982;    Ionto                               0    mins   03150  Self Care                       0     mins   75331  Aquatic                          30     mins 46685      Un-Timed:  Electrical Stimulation:    0     mins  37764 ( );  Dry Needling     0     mins self-pay  Traction     0     mins 10194  Low Eval     0     Mins  36739  Mod Eval     0     Mins  34984  High Eval                       0     Mins  02181  Re-Eval                           0    mins  76083      Timed Treatment:   30   mins   Total Treatment:     30   mins      PT Signature: Mary Ramos PT  [place] License #: [id number]

## 2024-03-25 ENCOUNTER — TREATMENT (OUTPATIENT)
Dept: PHYSICAL THERAPY | Facility: CLINIC | Age: 74
End: 2024-03-25
Payer: MEDICARE

## 2024-03-25 ENCOUNTER — OFFICE VISIT (OUTPATIENT)
Dept: CARDIOLOGY | Facility: CLINIC | Age: 74
End: 2024-03-25
Payer: MEDICARE

## 2024-03-25 VITALS
WEIGHT: 181 LBS | OXYGEN SATURATION: 98 % | HEART RATE: 53 BPM | SYSTOLIC BLOOD PRESSURE: 163 MMHG | BODY MASS INDEX: 37.99 KG/M2 | DIASTOLIC BLOOD PRESSURE: 82 MMHG | HEIGHT: 58 IN

## 2024-03-25 DIAGNOSIS — R00.2 PALPITATIONS: ICD-10-CM

## 2024-03-25 DIAGNOSIS — R07.89 CHEST DISCOMFORT: Primary | ICD-10-CM

## 2024-03-25 DIAGNOSIS — R26.2 DIFFICULTY IN WALKING: ICD-10-CM

## 2024-03-25 DIAGNOSIS — I10 ESSENTIAL HYPERTENSION: ICD-10-CM

## 2024-03-25 DIAGNOSIS — M54.50 CHRONIC MIDLINE LOW BACK PAIN, UNSPECIFIED WHETHER SCIATICA PRESENT: Primary | ICD-10-CM

## 2024-03-25 DIAGNOSIS — G89.29 CHRONIC MIDLINE LOW BACK PAIN, UNSPECIFIED WHETHER SCIATICA PRESENT: Primary | ICD-10-CM

## 2024-03-25 DIAGNOSIS — R55 NEAR SYNCOPE: ICD-10-CM

## 2024-03-25 DIAGNOSIS — Z98.61 STATUS POST PERCUTANEOUS TRANSLUMINAL CORONARY ANGIOPLASTY: ICD-10-CM

## 2024-03-25 DIAGNOSIS — E78.2 MIXED HYPERLIPIDEMIA: ICD-10-CM

## 2024-03-25 PROCEDURE — 1160F RVW MEDS BY RX/DR IN RCRD: CPT | Performed by: INTERNAL MEDICINE

## 2024-03-25 PROCEDURE — 3077F SYST BP >= 140 MM HG: CPT | Performed by: INTERNAL MEDICINE

## 2024-03-25 PROCEDURE — 1159F MED LIST DOCD IN RCRD: CPT | Performed by: INTERNAL MEDICINE

## 2024-03-25 PROCEDURE — 99214 OFFICE O/P EST MOD 30 MIN: CPT | Performed by: INTERNAL MEDICINE

## 2024-03-25 PROCEDURE — 3079F DIAST BP 80-89 MM HG: CPT | Performed by: INTERNAL MEDICINE

## 2024-03-25 PROCEDURE — 93000 ELECTROCARDIOGRAM COMPLETE: CPT | Performed by: INTERNAL MEDICINE

## 2024-03-25 NOTE — PROGRESS NOTES
Physical Therapy Treatment  Note  [address] [place]   [address]     Diagnosis Plan   1. Chronic midline low back pain, unspecified whether sciatica present        2. Difficulty in walking            VISIT#: 10    Subjective   Charleen Barnett reports: increased pain starting last evening.  Patient reported she otherwise has good weekend and got a lot of work done. Patient reported pain level was 8/10 earlier, paid down and now 5.5/10       Objective     See Exercise, Manual, and Modality Logs for complete treatment.     Patient Education:    Goals- recent progress note completed on 3/20/24  Plan Goals: STG's (4 weeks)  1. Tolerate 45 minutes aquatic therapy with reduction in pain. MET  2. Able to ambulate x 10 min on Aquatic TM with improved gait pattern. PROGRESSING, pt is able to ambulate 7 minutes on aquatic treadmill without provocation of pain  3. Able to tolerate initial HEP w/ progression for self management of impairments: MET, pt is able to stand at counter at home with perform HEP     LTG's (by d/c- 8 weeks )  1. Independent with HEP and able to manage condition independently.  2. Pt will have ability to ambulate 10 minutes or greater for household and community mobility without provocation of pain and improved gait mechanics.   3. Pt will demonstrate (B) hip abd and extension greater 4/5 for improved postural support and function.   4. Significant improvement on Oswestry  outcome measure indicating 30 % impairment or less.  Short-term goals (STG): 2/3  Long-term goals (LTG):   Assessment/Plan  - Able to increase ambulation speed on TM from 1.0 mph to 1.1 mph. Pt requested increased duration on TM this date    - Pt reported she feels as if BPPV has returned.  Will perform screen on Monday 4/1/24 rather than aquatic therapy session.     - 2 patients in pool    - Treatment  incorporating  principles of aquatic therapy to offload spine/joints, thermal effects to reduce pain, and hydrostatic  pressure to facilitate exercise with transition to land as tolerated.   Progress per Plan of Care            Timed:         Manual Therapy:    0     mins  35941;     Therapeutic Exercise:    0     mins  39976;     Neuromuscular Jeancarlos:    0    mins  27368;    Therapeutic Activity:     0     mins  35432;     Gait Trainin     mins  93548;     Ultrasound:     0     mins  64650;    Ionto                               0    mins   94777  Self Care                       0     mins   20549  Canalith Repos               0    mins  4209  Aquatic                          30     mins 20425    Un-Timed:  Electrical Stimulation:    0     mins  56909 ( );  Dry Needling     0     mins self-pay  Traction     0     mins 57940  Low Eval     0     Mins  51684  Mod Eval     0     Mins  88774  High Eval                       0     Mins  49866  Re-Eval                           0    mins  37570    Timed Treatment:   30   mins   Total Treatment:     30   mins    Mary Ramos, PT PT, DPT, [id number]

## 2024-03-25 NOTE — PROGRESS NOTES
Encounter Date:03/07/2024    Last seen 11/7/2023      Patient ID: Charleen Barnett is a 73 y.o. female.    Chief Complaint:     Status post stent  Hypertension  Dyslipidemia  History of SVT.     History of present illness  Since I have last seen, the patient has been without any chest discomfort ,shortness of breath, palpitations, dizziness or syncope.  Denies having any headache ,abdominal pain ,nausea, vomiting , diarrhea constipation, loss of weight or loss of appetite.  Denies having any excessive bruising ,hematuria or blood in the stool.    Review of all systems negative except as indicated.    Reviewed ROS.    Assessment and plan  ////////////////////////  History  ==================    - status post stent to LAD 06/10/2014   status post subendocardial myocardial infarction prior to stent placement  Status post stent to LAD 5/29/2021.     Lexiscan Cardiolite lvll-gskodc-5/31/2023  Echocardiogram-9/29/2023.  Structurally and functionally normal cardiac valves.  Left atrial enlargement.  Left ventricular size and contractility is normal with ejection fraction of 60%.     Cardiac catheterization 5/29/2021 revealed  Left ventricle size and contractility normal with ejection fraction of 60%.  Left main coronary artery normal.  Left anterior descending artery has previously placed stent.  Left anterior descending artery has 80 to 90% disease just distal to the diagonal branch.  (Patient to have FFR).  IFR of 0.86  Diagonal branch has diffuse 50 to 60% disease.  Circumflex coronary artery is normal except for 30 to 40% marginal branch disease.  Right coronary artery is a large and dominant vessel and is normal.  Right common iliac external iliac and femoral arteries are normal.     -Right-sided chest pain-atypical.-Improved  EKG showed no acute changes.  Troponin levels are negative.     -Elevated D-dimer  CT scan of the chest is negative for pulmonary embolus.     Stress Cardiolite test--  5/17/2021.  Echocardiogram-normal except for left atrial enlargement 5/17/2021.      -palpitations likely SVT.  -improved on atenolol     Echocardiogram showed mild aortic regurgitation with normal left ventricular function.  5/18/2017     - hypertension dyslipidemia sleep apnea asthma fibromyalgia GERD and gout.     - Orthostatic hypotension     - family history of coronary artery disease     - allergy to penicillin sulfa and tetracycline     =======  Plan  =======  Exertional shortness of breath chest discomfort and palpitations.-Better     Status post stent to LAD 5/29/2021 (abnormal IFR).  Patient is off Plavix.  EKG shows sinus bradycardia without ischemic changes.  Left anterior fascicular block.    Stress Cardiolite test-normal-8/31/2023  Echocardiogram-as above.     Orthostatic hypotension-better     Continue metoprolol XL 25 mg twice a day.  Continue midodrine 5 mg twice a day.     Hypertension-well-controlled-     163/82  Patient is having back pain.  Sinus maintain blood pressure log.  Continue metoprolol  mg twice daily.  Patient is on midodrine 5 mg twice daily     Dyslipidemia-continue atorvastatin      Medications were reviewed and updated.     Patient has fluid retention.  Doing better.  Patient is allergic to sulfa.  Patient was given spironolactone 25 mg to be taken on a as needed basis once a day.     Follow-up in the office in 6 months.    Further plan will depend on patient's progress     Reviewed and updated-3/25/2024  //////////////////////////////               Diagnosis Plan   1. Chest discomfort        2. Essential hypertension        3. Near syncope        4. Palpitations        5. Status post percutaneous transluminal coronary angioplasty        6. Mixed hyperlipidemia          LAB RESULTS (LAST 7 DAYS)    CBC        BMP        CMP         BNP        TROPONIN        CoAg        Creatinine Clearance  CrCl cannot be calculated (Patient's most recent lab result is older than the  maximum 30 days allowed.).    ABG        Radiology  No radiology results for the last day                The following portions of the patient's history were reviewed and updated as appropriate: allergies, current medications, past family history, past medical history, past social history, past surgical history, and problem list.    Review of Systems   Cardiovascular:  Positive for leg swelling.   Respiratory:  Positive for shortness of breath.    Neurological:  Positive for light-headedness and numbness.         Current Outpatient Medications:     albuterol sulfate  (90 Base) MCG/ACT inhaler, Inhale 1 puff Every 6 (Six) Hours As Needed for Wheezing or Shortness of Air., Disp: 48 g, Rfl: 3    aspirin 81 MG tablet, Take 1 tablet by mouth Daily., Disp: , Rfl:     atorvastatin (LIPITOR) 10 MG tablet, TAKE 1 TABLET BY MOUTH DAILY, Disp: 90 tablet, Rfl: 0    buPROPion XL (WELLBUTRIN XL) 150 MG 24 hr tablet, TAKE 1 TABLET BY MOUTH EVERY MORNING, Disp: 90 tablet, Rfl: 0    busPIRone (BUSPAR) 10 MG tablet, TAKE 1 TABLET BY MOUTH 3 (THREE) TIMES A DAY., Disp: 270 tablet, Rfl: 0    cholecalciferol (Vitamin D) 25 MCG (1000 UT) tablet, Take 1 tablet by mouth Daily., Disp: , Rfl:     Cholecalciferol (Vitamin D) 50 MCG (2000 UT) tablet, Take 1 tablet by mouth Daily., Disp: , Rfl:     citalopram (CeleXA) 20 MG tablet, TAKE 1 TABLET BY MOUTH EVERY DAY, Disp: 28 tablet, Rfl: 3    fluticasone (FLONASE) 50 MCG/ACT nasal spray, 2 sprays into the nostril(s) as directed by provider Daily., Disp: 9.9 mL, Rfl: 11    gabapentin (NEURONTIN) 400 MG capsule, TAKE 1 CAPSULE BY MOUTH EVERY NIGHT AT BEDTIME (FORCE OUT OF ROBOT. NDC SPECIFIC), Disp: 90 capsule, Rfl: 1    hydrOXYzine (ATARAX) 10 MG tablet, TAKE 1 TABLET BY MOUTH EVERY NIGHT AT BEDTIME, Disp: 30 tablet, Rfl: 0    levothyroxine (SYNTHROID, LEVOTHROID) 25 MCG tablet, TAKE 1 TABLET BY MOUTH EVERY MORNING, Disp: 28 tablet, Rfl: 3    Magnesium Citrate 100 MG capsule, Take 1  capsule by mouth Daily., Disp: , Rfl:     metoprolol succinate XL (TOPROL-XL) 25 MG 24 hr tablet, Take 2 tablets by mouth Every Morning., Disp: 180 tablet, Rfl: 3    midodrine (PROAMATINE) 5 MG tablet, TAKE 1/2 TABLET BY MOUTH TWICE DAILY, Disp: 180 tablet, Rfl: 0    montelukast (SINGULAIR) 10 MG tablet, TAKE 1 TABLET BY MOUTH EVERY DAY, Disp: 90 tablet, Rfl: 0    Myrbetriq 50 MG tablet sustained-release 24 hour 24 hr tablet, , Disp: , Rfl:     spironolactone (ALDACTONE) 25 MG tablet, , Disp: , Rfl:     triamcinolone (KENALOG) 0.1 % cream, Apply 1 application topically to the appropriate area as directed 2 (Two) Times a Day., Disp: 30 g, Rfl: 0    Current Facility-Administered Medications:     denosumab (PROLIA) syringe 60 mg, 60 mg, Subcutaneous, Q6 Months, Dede Anders PA, 60 mg at 22 1345    Allergies   Allergen Reactions    Hydrocodone Hives    Chlorpheniramine-Phenylephrine Rash    Penicillins Rash    Sulfa Antibiotics Hives and Rash    Tetracycline Rash    Warfarin Rash    Pseudoephedrine Hcl Rash    Triprolidine Hcl Rash       Family History   Problem Relation Age of Onset    No Known Problems Mother     Heart attack Father         Vein bypass in legs    Cancer Father             No Known Problems Sister     No Known Problems Brother     No Known Problems Maternal Aunt     No Known Problems Maternal Uncle     No Known Problems Paternal Aunt     No Known Problems Paternal Uncle     Heart attack Maternal Grandmother         Heart atrack in her 80s    Heart attack Maternal Grandfather         Heart attack in his 60s    Heart attack Paternal Grandmother         Type 1 diabetic, heart attack    Heart attack Paternal Grandfather         Heart attack in his 50s or 60s    Heart disease Sister         Blockages monitored with medication    Hypertension Sister         Coronary heart issues    Hyperlipidemia Brother         Extremely high triglycerides and cholesterol    Hypertension Brother          High blood pressure    Heart attack Brother         Passed from heart attack age 54    Hyperlipidemia Brother         Unknown    Hypertension Brother     Heart attack Brother         Passed from heart attack age 44    Hyperlipidemia Brother         Had heart disease for years before passing    Hypertension Brother     Heart attack Brother          from heart failure in his 60s    Anemia Neg Hx     Arrhythmia Neg Hx     Asthma Neg Hx     Clotting disorder Neg Hx     Fainting Neg Hx     Heart failure Neg Hx        Past Surgical History:   Procedure Laterality Date    BLADDER SURGERY      BLADDER SUSPENSION  2023    CARDIAC CATHETERIZATION N/A 2021    Procedure: Left Heart Cath and coronary angiogram;  Surgeon: Yazmin Matta MD;  Location:  [facility] CATH INVASIVE LOCATION;  Service: Cardiovascular;  Laterality: N/A;    CARDIAC CATHETERIZATION  2021    Procedure: Functional Flow Reserve (iFR);  Surgeon: Bryan Patel MD;  Location: [facility] CATH INVASIVE LOCATION;  Service: Cardiology;;    CARDIAC CATHETERIZATION N/A 2021    Procedure: Percutaneous Coronary Intervention;  Surgeon: Bryan Patel MD;  Location: [facility] CATH INVASIVE LOCATION;  Service: Cardiology;  Laterality: N/A;    CARDIAC CATHETERIZATION N/A 2021    Procedure: Stent FRANCOISE coronary;  Surgeon: Bryan Patel MD;  Location: [facility] CATH INVASIVE LOCATION;  Service: Cardiology;  Laterality: N/A;    CAROTID STENT      CHOLECYSTECTOMY      COLONOSCOPY  ??    CORONARY STENT PLACEMENT  ??    FRACTURE SURGERY      HYSTERECTOMY      INGUINAL HERNIA REPAIR      JOINT REPLACEMENT  L-knee    KNEE SURGERY      OTHER SURGICAL HISTORY      STENT    SUBTOTAL HYSTERECTOMY         Past Medical History:   Diagnosis Date    Allergic 1970's    Anemia 1950's    Anxiety     Arthritis     Asthma 2020    Ramos's esophagus 02/15/2018    Cancer Carcinoma Insitu cervical    Cataract      Cholelithiasis Removed     Coronary artery disease     Depression 2018    Fibromyalgia 2011    Fibromyalgia, primary 's I think    GERD (gastroesophageal reflux disease)     Gout 2020    Headache     HL (hearing loss) Age related    Hyperlipidemia     Hypertension     Hypothyroidism     Joint pain     Low back pain 2970's    Obesity Lifelong since puberty    Obstructive sleep apnea 10/16/2014    Osteopenia Five years appx    Osteoporosis     fosamax(SE), on prolia(4/10/18-21, 22)    Otitis media     Pedal edema 10/22/2018    Pneumonia     Presence of left artificial knee joint 2019    Scoliosis 1961    Seasonal allergies     ST elevation (STEMI) myocardial infarction 2020    Status post percutaneous transluminal coronary angioplasty 06/10/2014    Substance abuse     Tachycardia     Urge incontinence 2018    Urinary tract infection     Visual impairment [place]    Vitamin D deficiency 2015       Family History   Problem Relation Age of Onset    No Known Problems Mother     Heart attack Father         Vein bypass in legs    Cancer Father             No Known Problems Sister     No Known Problems Brother     No Known Problems Maternal Aunt     No Known Problems Maternal Uncle     No Known Problems Paternal Aunt     No Known Problems Paternal Uncle     Heart attack Maternal Grandmother         Heart atrack in her 80s    Heart attack Maternal Grandfather         Heart attack in his 60s    Heart attack Paternal Grandmother         Type 1 diabetic, heart attack    Heart attack Paternal Grandfather         Heart attack in his 50s or 60s    Heart disease Sister         Blockages monitored with medication    Hypertension Sister         Coronary heart issues    Hyperlipidemia Brother         Extremely high triglycerides and cholesterol    Hypertension Brother         High blood pressure    Heart attack Brother         Passed from heart attack age 54  "   Hyperlipidemia Brother         Unknown    Hypertension Brother     Heart attack Brother         Passed from heart attack age 44    Hyperlipidemia Brother         Had heart disease for years before passing    Hypertension Brother     Heart attack Brother          from heart failure in his 60s    Anemia Neg Hx     Arrhythmia Neg Hx     Asthma Neg Hx     Clotting disorder Neg Hx     Fainting Neg Hx     Heart failure Neg Hx        Social History     Socioeconomic History    Marital status:    Tobacco Use    Smoking status: Never     Passive exposure: Never    Smokeless tobacco: Never    Tobacco comments:     Parents and husbands heavy smokers   Vaping Use    Vaping status: Never Used   Substance and Sexual Activity    Alcohol use: Not Currently     Comment: Recovery since  drank from age [age]    Drug use: Not Currently     Frequency: 1.0 times per week     Types: Amphetamines     Comment: Short term use/over use diet pills in the 70's    Sexual activity: Not Currently     Partners: Male     Birth control/protection: None, Hysterectomy           ECG 12 Lead    Date/Time: 3/25/2024 2:20 PM  Performed by: Yazmin Matta MD    Authorized by: Yazmin Matta MD  Comparison: compared with previous ECG   Similar to previous ECG  Comparison to previous ECG: Cardia 49/min left anterior fascicular block nonspecific ST-T wave changes normal intervals no ectopy no significant change from previous EKG        Objective:       Physical Exam    /82 (BP Location: Right arm, Patient Position: Sitting, Cuff Size: Adult)   Pulse 53   Ht 147.3 cm (58\")   Wt 82.1 kg (181 lb)   SpO2 98%   BMI 37.83 kg/m²   The patient is alert, oriented and in no distress.    Vital signs as noted above.    Head and neck revealed no carotid bruits or jugular venous distension.  No thyromegaly or lymphadenopathy is present.    Lungs clear.  No wheezing.  Breath sounds are normal bilaterally.    Heart normal first and second " heart sounds.  No murmur..  No pericardial rub is present.  No gallop is present.    Abdomen soft and nontender.  No organomegaly is present.    Extremities revealed good peripheral pulses without any pedal edema.    Skin warm and dry.    Musculoskeletal system is grossly normal except for kyphoscoliosis.    CNS grossly normal.    Reviewed and updated.

## 2024-03-27 ENCOUNTER — TREATMENT (OUTPATIENT)
Dept: PHYSICAL THERAPY | Facility: CLINIC | Age: 74
End: 2024-03-27
Payer: MEDICARE

## 2024-03-27 DIAGNOSIS — M54.50 CHRONIC MIDLINE LOW BACK PAIN, UNSPECIFIED WHETHER SCIATICA PRESENT: Primary | ICD-10-CM

## 2024-03-27 DIAGNOSIS — G89.29 CHRONIC MIDLINE LOW BACK PAIN, UNSPECIFIED WHETHER SCIATICA PRESENT: Primary | ICD-10-CM

## 2024-03-27 DIAGNOSIS — R26.2 DIFFICULTY IN WALKING: ICD-10-CM

## 2024-03-27 NOTE — PROGRESS NOTES
Physical Therapy Treatment  Note  [address] [place]   [address]     Diagnosis Plan   1. Chronic midline low back pain, unspecified whether sciatica present        2. Difficulty in walking            VISIT#: 11    Subjective   Charleen Barnett reports: she felt good the evening following previous aquatic therapy session and the day following.  Patient reported she is feeling good today upon arrival to therapy.       Objective     See Exercise, Manual, and Modality Logs for complete treatment.     Patient Education:    Goals- recent progress note completed on 3/20/24  Plan Goals: STG's (4 weeks)  1. Tolerate 45 minutes aquatic therapy with reduction in pain. MET  2. Able to ambulate x 10 min on Aquatic TM with improved gait pattern. PROGRESSING, pt is able to ambulate 7 minutes on aquatic treadmill without provocation of pain  3. Able to tolerate initial HEP w/ progression for self management of impairments: MET, pt is able to stand at counter at home with perform HEP     LTG's (by d/c- 8 weeks )  1. Independent with HEP and able to manage condition independently.  2. Pt will have ability to ambulate 10 minutes or greater for household and community mobility without provocation of pain and improved gait mechanics.   3. Pt will demonstrate (B) hip abd and extension greater 4/5 for improved postural support and function.   4. Significant improvement on Oswestry  outcome measure indicating 30 % impairment or less.  Short-term goals (STG): 2/3  Long-term goals (LTG):   Assessment/Plan  - Able to increase ambulation speed on TM from 1.1 mph to 1.3 mph. Pt requested increased duration on TM this date    - Pt reported she feels as if BPPV has returned.  Will perform screen on Monday 4/1/24 rather than aquatic therapy session.  Will also educate on land based therapy HEP pending patient tolerance.    - Treatment  incorporating  principles of aquatic therapy to offload spine/joints, thermal effects to reduce pain,  and hydrostatic pressure to facilitate exercise with transition to land as tolerated.   Progress per Plan of Care            Timed:         Manual Therapy:    0     mins  43367;     Therapeutic Exercise:    0     mins  31036;     Neuromuscular Jeancarlos:    0    mins  50222;    Therapeutic Activity:     0     mins  91807;     Gait Trainin     mins  29948;     Ultrasound:     0     mins  26326;    Ionto                               0    mins   14565  Self Care                       0     mins   79796  Canalith Repos               0    mins  4209  Aquatic                          60     mins 44155    Un-Timed:  Electrical Stimulation:    0     mins  14662 ( );  Dry Needling     0     mins self-pay  Traction     0     mins 69616  Low Eval     0     Mins  88394  Mod Eval     0     Mins  35609  High Eval                       0     Mins  33364  Re-Eval                           0    mins  15019    Timed Treatment:   60   mins   Total Treatment:     60   mins    Mary Ramos, PT PT, DPT, [id number]

## 2024-04-08 ENCOUNTER — TREATMENT (OUTPATIENT)
Dept: PHYSICAL THERAPY | Facility: CLINIC | Age: 74
End: 2024-04-08
Payer: MEDICARE

## 2024-04-08 DIAGNOSIS — R26.2 DIFFICULTY IN WALKING: ICD-10-CM

## 2024-04-08 DIAGNOSIS — G89.29 CHRONIC MIDLINE LOW BACK PAIN, UNSPECIFIED WHETHER SCIATICA PRESENT: Primary | ICD-10-CM

## 2024-04-08 DIAGNOSIS — M54.50 CHRONIC MIDLINE LOW BACK PAIN, UNSPECIFIED WHETHER SCIATICA PRESENT: Primary | ICD-10-CM

## 2024-04-08 PROCEDURE — 97113 AQUATIC THERAPY/EXERCISES: CPT

## 2024-04-08 NOTE — PROGRESS NOTES
Physical Therapy Treatment  Note  [address] [place]   [address]     Diagnosis Plan   1. Chronic midline low back pain, unspecified whether sciatica present        2. Difficulty in walking            VISIT#: 12    Subjective   Charleen Barnett reports: experiencing increased posterior hip/buttocks pain .      Objective   - entered and exited pool via steps  See Exercise, Manual, and Modality Logs for complete treatment.     Patient Education:    Goals- recent progress note completed on 3/20/24  Plan Goals: STG's (4 weeks)  1. Tolerate 45 minutes aquatic therapy with reduction in pain. MET  2. Able to ambulate x 10 min on Aquatic TM with improved gait pattern. PROGRESSING, pt is able to ambulate 7 minutes on aquatic treadmill without provocation of pain  3. Able to tolerate initial HEP w/ progression for self management of impairments: MET, pt is able to stand at counter at home with perform HEP     LTG's (by d/c- 8 weeks )  1. Independent with HEP and able to manage condition independently.  2. Pt will have ability to ambulate 10 minutes or greater for household and community mobility without provocation of pain and improved gait mechanics.   3. Pt will demonstrate (B) hip abd and extension greater 4/5 for improved postural support and function.   4. Significant improvement on Oswestry  outcome measure indicating 30 % impairment or less.  Short-term goals (STG): 2/3  Long-term goals (LTG):     Assessment/Plan  - Pt reported increased discomfort during hip exercises but was able to complete all planned activities.     - Reduced speed on TM from 1.3 mph to 1.1 mph this date due to increased posterior hip pain    - Treatment  incorporating  principles of aquatic therapy to offload spine/joints, thermal effects to reduce pain, and hydrostatic pressure to facilitate exercise with transition to land as tolerated.   Progress per Plan of Care            Timed:         Manual Therapy:    0     mins  89049;      Therapeutic Exercise:    0     mins  45247;     Neuromuscular Jeancarlos:    0    mins  55152;    Therapeutic Activity:     0     mins  37071;     Gait Trainin     mins  38343;     Ultrasound:     0     mins  99666;    Ionto                               0    mins   28405  Self Care                       0     mins   76041  Canalith Repos               0    mins  4209  Aquatic                          60     mins 66755    Un-Timed:  Electrical Stimulation:    0     mins  33100 ( );  Dry Needling     0     mins self-pay  Traction     0     mins 66602  Low Eval     0     Mins  18649  Mod Eval     0     Mins  18911  High Eval                       0     Mins  82500  Re-Eval                           0    mins  49932    Timed Treatment:   60   mins   Total Treatment:     60   mins    Mary Ramos, PT PT, DPT, [id number]

## 2024-04-11 ENCOUNTER — TREATMENT (OUTPATIENT)
Dept: PHYSICAL THERAPY | Facility: CLINIC | Age: 74
End: 2024-04-11
Payer: MEDICARE

## 2024-04-11 DIAGNOSIS — R26.2 DIFFICULTY IN WALKING: ICD-10-CM

## 2024-04-11 DIAGNOSIS — M54.50 CHRONIC MIDLINE LOW BACK PAIN, UNSPECIFIED WHETHER SCIATICA PRESENT: Primary | ICD-10-CM

## 2024-04-11 DIAGNOSIS — G89.29 CHRONIC MIDLINE LOW BACK PAIN, UNSPECIFIED WHETHER SCIATICA PRESENT: Primary | ICD-10-CM

## 2024-04-11 NOTE — LETTER
Progress Note  2024  Tomás Campa MD    Re: Charleen Barnett  ________________________________________________________________    Physical Therapy  Progress Note/Reassessment  [address], [place] [address], Phone: [phone]    Patient: Charleen Barnett   : 1950  Diagnosis/ICD-10 Code:  Chronic midline low back pain, unspecified whether sciatica present [M54.50, G89.29]  Referring practitioner: Tomás Camap, *  Date of Initial Visit: Type: THERAPY  Noted: 2024  Today's Date: 2024  Patient seen for 13 sessions      Subjective:   Visit Diagnosis:    ICD-10-CM ICD-9-CM   1. Chronic midline low back pain, unspecified whether sciatica present  M54.50 724.2    G89.29 338.29   2. Difficulty in walking  R26.2 719.7       Charleen Barnett reports: she has been experiencing dizziness/vertigo when rolling over in bed the last few weeks  Subjective Questionnaire: DHI: 82 indicates severe handicap  Clinical Progress: unchanged  Home Program Compliance: N/A  Treatment has included: manual therapy; canalith repositioning.    Subjective     Objective   Supine head center test: negative  (B) roll test: negative (B)   (L) Orte[place] Hallpike: pt reported mild dizziness, no nystagmus observed  (R) [place] Hallpike: vertigo reported, torsional nystagmus last less than 30 seconds observed.  Performed x 2 trials to right.  One before and one after Epley    Assessment/Plan  - pt presents this date with c/o dizziness/vertigo.  Screen performed this date to address complaints.   - + (R) [place] Hallpike observed indicating posterior canal BPPV  - Performed manual techniques to promote improved flexibility/mobility or neck musculature to determine if cervicogenic sources could be contributing to dizziness.     Progress toward previous goals: Partially Met    Goals  Plan Goals: STG's (4 weeks)  1. Tolerate 45 minutes aquatic therapy with reduction in pain. MET  2. Able to ambulate x 10 min on  Aquatic TM with improved gait pattern. PROGRESSING, pt is able to ambulate 7 minutes on aquatic treadmill without provocation of pain  3. Able to tolerate initial HEP w/ progression for self management of impairments: MET, pt is able to stand at counter at home with perform HEP  NEW GOAL:   - Pt will report a reduction in dizziness/vertigo by 50% and demonstrate negative (B) [place] Hallpike test for safety with bed mobility and transitional movements.      LTG's (by d/c- 8 weeks )  1. Independent with HEP and able to manage condition independently.  2. Pt will have ability to ambulate 10 minutes or greater for household and community mobility without provocation of pain and improved gait mechanics.   3. Pt will demonstrate (B) hip abd and extension greater 4/5 for improved postural support and function.   4. Significant improvement on Oswestry  outcome measure indicating 30 % impairment or less.  Short-term goals (STG): 2/4  Long-term goals (LTG):       Recommendations: Continue with recommendations add vestibular treatment/canalith repositioning to existing POC  Timeframe: 2 weeks  Prognosis to achieve goals: fair    Timed:         Manual Therapy:    23     mins  36528;     Therapeutic Exercise:    0     mins  48227;     Neuromuscular Jeancarlos:    0    mins  37954;    Therapeutic Activity:     0     mins  55766;     Gait Trainin     mins  18370;     Ultrasound:     0     mins  93888;    Ionto                               0    mins   48940  Self Care                       0     mins   88667  Aquatic                          0     mins 34993      Un-Timed:  Electrical Stimulation:    0     mins  24199 ( );  Dry Needling     0     mins self-pay  Traction     0     mins 58395  Low Eval     0     Mins  41115  Mod Eval     0     Mins  32188  High Eval                       0     Mins  13106  Re-Eval                           0    mins  18021  Canalith Repositioning   20 mins   62648    Timed Treatment:   23    mins   Total Treatment:     43  mins      PT Signature: Mary Ramos, PT  [place] License #: [id number]       Thank you for this referral.

## 2024-04-11 NOTE — PROGRESS NOTES
Physical Therapy  Progress Note/Reassessment  [address], [place] [address], Phone: [phone]    Patient: Charleen Barnett   : 1950  Diagnosis/ICD-10 Code:  Chronic midline low back pain, unspecified whether sciatica present [M54.50, G89.29]  Referring practitioner: Tomás Campa, *  Date of Initial Visit: Type: THERAPY  Noted: 2024  Today's Date: 2024  Patient seen for 13 sessions      Subjective:   Visit Diagnosis:    ICD-10-CM ICD-9-CM   1. Chronic midline low back pain, unspecified whether sciatica present  M54.50 724.2    G89.29 338.29   2. Difficulty in walking  R26.2 719.7       Charleen Barnett reports: she has been experiencing dizziness/vertigo when rolling over in bed the last few weeks  Subjective Questionnaire: DHI: 82 indicates severe handicap  Clinical Progress: unchanged  Home Program Compliance: N/A  Treatment has included: manual therapy; canalith repositioning.    Subjective     Objective   Supine head center test: negative  (B) roll test: negative (B)   (L) Orte[place] Hallpike: pt reported mild dizziness, no nystagmus observed  (R) [place] Hallpike: vertigo reported, torsional nystagmus last less than 30 seconds observed.  Performed x 2 trials to right.  One before and one after Epley    Assessment/Plan  - pt presents this date with c/o dizziness/vertigo.  Screen performed this date to address complaints.   - + (R) [place] Hallpike observed indicating posterior canal BPPV  - Performed manual techniques to promote improved flexibility/mobility or neck musculature to determine if cervicogenic sources could be contributing to dizziness.     Progress toward previous goals: Partially Met    Goals  Plan Goals: STG's (4 weeks)  1. Tolerate 45 minutes aquatic therapy with reduction in pain. MET  2. Able to ambulate x 10 min on Aquatic TM with improved gait pattern. PROGRESSING, pt is able to ambulate 7 minutes on aquatic treadmill without provocation of pain  3. Able to  tolerate initial HEP w/ progression for self management of impairments: MET, pt is able to stand at counter at home with perform HEP  NEW GOAL:   - Pt will report a reduction in dizziness/vertigo by 50% and demonstrate negative (B) [place] Hallpike test for safety with bed mobility and transitional movements.      LTG's (by d/c- 8 weeks )  1. Independent with HEP and able to manage condition independently.  2. Pt will have ability to ambulate 10 minutes or greater for household and community mobility without provocation of pain and improved gait mechanics.   3. Pt will demonstrate (B) hip abd and extension greater 4/5 for improved postural support and function.   4. Significant improvement on Oswestry  outcome measure indicating 30 % impairment or less.  Short-term goals (STG): 2/4  Long-term goals (LTG):       Recommendations: Continue with recommendations add vestibular treatment/canalith repositioning to existing POC  Timeframe: 2 weeks  Prognosis to achieve goals: fair    Timed:         Manual Therapy:    23     mins  41549;     Therapeutic Exercise:    0     mins  49634;     Neuromuscular Jeancarlos:    0    mins  98298;    Therapeutic Activity:     0     mins  17319;     Gait Trainin     mins  30683;     Ultrasound:     0     mins  36126;    Ionto                               0    mins   18505  Self Care                       0     mins   16898  Aquatic                          0     mins 43523      Un-Timed:  Electrical Stimulation:    0     mins  92190 ( );  Dry Needling     0     mins self-pay  Traction     0     mins 09979  Low Eval     0     Mins  13798  Mod Eval     0     Mins  70119  High Eval                       0     Mins  65198  Re-Eval                           0    mins  72986  Canalith Repositioning   20 mins   82212    Timed Treatment:   23   mins   Total Treatment:     43  mins      PT Signature: Mary Ramos PT  [place] License #: [id number]

## 2024-04-12 ENCOUNTER — TELEPHONE (OUTPATIENT)
Dept: FAMILY MEDICINE CLINIC | Facility: CLINIC | Age: 74
End: 2024-04-12
Payer: MEDICARE

## 2024-04-12 DIAGNOSIS — E78.2 MIXED HYPERLIPIDEMIA: Primary | ICD-10-CM

## 2024-04-12 DIAGNOSIS — E03.9 ACQUIRED HYPOTHYROIDISM: ICD-10-CM

## 2024-04-12 DIAGNOSIS — I10 PRIMARY HYPERTENSION: ICD-10-CM

## 2024-04-12 NOTE — TELEPHONE ENCOUNTER
Caller: Charleen Barnett    Relationship: Self    Best call back number:     Charleen Barnett (Self) [phone] (Mobile)       What orders are you requesting (i.e. lab or imaging): LABS IF NEEDED    In what timeframe would the patient need to come in: PRIOR TO APPT IN MAY     Where will you receive your lab/imaging services: OFFICE

## 2024-04-16 ENCOUNTER — TREATMENT (OUTPATIENT)
Dept: PHYSICAL THERAPY | Facility: CLINIC | Age: 74
End: 2024-04-16
Payer: MEDICARE

## 2024-04-16 DIAGNOSIS — M54.50 CHRONIC MIDLINE LOW BACK PAIN, UNSPECIFIED WHETHER SCIATICA PRESENT: Primary | ICD-10-CM

## 2024-04-16 DIAGNOSIS — G89.29 CHRONIC MIDLINE LOW BACK PAIN, UNSPECIFIED WHETHER SCIATICA PRESENT: Primary | ICD-10-CM

## 2024-04-16 DIAGNOSIS — R26.2 DIFFICULTY IN WALKING: ICD-10-CM

## 2024-04-16 NOTE — PROGRESS NOTES
"Physical Therapy Treatment  Note  [address] [facility] [place]   [address]     Diagnosis Plan   1. Chronic midline low back pain, unspecified whether sciatica present        2. Difficulty in walking            VISIT#: 14    Subjective   Charleen Barnett reports: minimal pain upon arrival to therapy.  Dizziness has been reduced but reported episode of (L) eye  \"blacking out\" and \" seeing things in 3 D\".  Patient has eye appt next week and will discuss at that time.  If recommended, pt stated she will make an appt with neurology to discuss/assess. One episode of vertigo when rolling over in bed reported since previous session in which canalith repositioning was performed.       Objective     See Exercise, Manual, and Modality Logs for complete treatment.     Patient Education:    Goals: Recent progress note completed on 4/11/24  Plan Goals: STG's (4 weeks)  1. Tolerate 45 minutes aquatic therapy with reduction in pain. MET  2. Able to ambulate x 10 min on Aquatic TM with improved gait pattern. PROGRESSING, pt is able to ambulate 7 minutes on aquatic treadmill without provocation of pain  3. Able to tolerate initial HEP w/ progression for self management of impairments: MET, pt is able to stand at counter at home with perform HEP  NEW GOAL:   - Pt will report a reduction in dizziness/vertigo by 50% and demonstrate negative (B) [place] Hallpike test for safety with bed mobility and transitional movements.      LTG's (by d/c- 8 weeks )  1. Independent with HEP and able to manage condition independently.  2. Pt will have ability to ambulate 10 minutes or greater for household and community mobility without provocation of pain and improved gait mechanics.   3. Pt will demonstrate (B) hip abd and extension greater 4/5 for improved postural support and function.   4. Significant improvement on Oswestry  outcome measure indicating 30 % impairment or less.    Assessment/Plan  - Manual therapy performed prior to manual therapy to " address restrictions at neck   - Pt was able to tolerate increased speed on aquatic treadmill from 1.1. to 1.3 mph  - Treatment  incorporating  principles of aquatic therapy to offload spine/joints, thermal effects to reduce pain, and hydrostatic pressure to facilitate exercise with transition to land as tolerated.     Progress per Plan of Care            Timed:         Manual Therapy:   15    mins  12977;     Therapeutic Exercise:    0     mins  16348;     Neuromuscular Jeancarlos:    0    mins  98674;    Therapeutic Activity:     0     mins  82365;     Gait Trainin     mins  37920;     Ultrasound:     0     mins  95776;    Ionto                               0    mins   25456  Self Care                       0     mins   48451  Canalith Repos               0    mins  4209  Aquatic                          53     mins 73786    Un-Timed:  Electrical Stimulation:    0     mins  61570 ( );  Dry Needling     0     mins self-pay  Traction     0     mins 39698  Low Eval     0     Mins  27385  Mod Eval     0     Mins  78262  High Eval                       0     Mins  79981  Re-Eval                           0    mins  56795    Timed Treatment:   68   mins   Total Treatment:     68   mins    Mary Ramos, PT PT, DPT, [id number]

## 2024-04-18 ENCOUNTER — TREATMENT (OUTPATIENT)
Dept: PHYSICAL THERAPY | Facility: CLINIC | Age: 74
End: 2024-04-18
Payer: MEDICARE

## 2024-04-18 DIAGNOSIS — G89.29 CHRONIC MIDLINE LOW BACK PAIN, UNSPECIFIED WHETHER SCIATICA PRESENT: Primary | ICD-10-CM

## 2024-04-18 DIAGNOSIS — R26.2 DIFFICULTY IN WALKING: ICD-10-CM

## 2024-04-18 DIAGNOSIS — M54.50 CHRONIC MIDLINE LOW BACK PAIN, UNSPECIFIED WHETHER SCIATICA PRESENT: Primary | ICD-10-CM

## 2024-04-18 NOTE — TELEPHONE ENCOUNTER
Left pt detailed vm that lab is closed. To either stop by next week in office for labs or they can be drawn by hospital outpatient lab.

## 2024-04-18 NOTE — PROGRESS NOTES
"Physical Therapy Treatment  Note  [address] [place]   [address]     Diagnosis Plan   1. Chronic midline low back pain, unspecified whether sciatica present        2. Difficulty in walking            VISIT#: 15    Subjective   Charleen Barnett reports: she was doing well until she bent over and twisted while working in her home office.  Patient reported current pain level as 4.5/10 \"right above my butt\".    Objective   - Entered and exited pool via steps    See Exercise, Manual, and Modality Logs for complete treatment.     Patient Education:    Goals: Recent progress note completed on 4/11/24  Plan Goals: STG's (4 weeks)  1. Tolerate 45 minutes aquatic therapy with reduction in pain. MET  2. Able to ambulate x 10 min on Aquatic TM with improved gait pattern. PROGRESSING, pt is able to ambulate 7 minutes on aquatic treadmill without provocation of pain  3. Able to tolerate initial HEP w/ progression for self management of impairments: MET, pt is able to stand at counter at home with perform HEP  NEW GOAL:   - Pt will report a reduction in dizziness/vertigo by 50% and demonstrate negative (B) [place] Hallpike test for safety with bed mobility and transitional movements.      LTG's (by d/c- 8 weeks )  1. Independent with HEP and able to manage condition independently.  2. Pt will have ability to ambulate 10 minutes or greater for household and community mobility without provocation of pain and improved gait mechanics.   3. Pt will demonstrate (B) hip abd and extension greater 4/5 for improved postural support and function.   4. Significant improvement on Oswestry  outcome measure indicating 30 % impairment or less.    Assessment/Plan  - Pt reported feeling good upon completion of aquatic therapy.   - Added standing hip flexor and hamstring stretch on steps to address mobility/flexibility restrictions. Cueing to perform in range that did not illicit increased pain levels.   - Treatment  incorporating  " principles of aquatic therapy to offload spine/joints, thermal effects to reduce pain, and hydrostatic pressure to facilitate exercise with transition to land as tolerated.     Progress per Plan of Care            Timed:         Manual Therapy:   0    mins  89927;     Therapeutic Exercise:    0     mins  15391;     Neuromuscular Jeancarlos:    0    mins  63037;    Therapeutic Activity:     0     mins  90791;     Gait Trainin     mins  07032;     Ultrasound:     0     mins  40772;    Ionto                               0    mins   85531  Self Care                       0     mins   20739  Canalith Repos               0    mins  4209  Aquatic                          54     mins 74411    Un-Timed:  Electrical Stimulation:    0     mins  92721 ( );  Dry Needling     0     mins self-pay  Traction     0     mins 48614  Low Eval     0     Mins  29028  Mod Eval     0     Mins  42354  High Eval                       0     Mins  54417  Re-Eval                           0    mins  60171    Timed Treatment:   54   mins   Total Treatment:       54 mins    Mary Ramos, PT PT, DPT, [id number]

## 2024-04-23 RX ORDER — GABAPENTIN 400 MG/1
CAPSULE ORAL
Qty: 90 CAPSULE | Refills: 0 | Status: SHIPPED | OUTPATIENT
Start: 2024-04-23

## 2024-04-24 ENCOUNTER — TREATMENT (OUTPATIENT)
Dept: PHYSICAL THERAPY | Facility: CLINIC | Age: 74
End: 2024-04-24
Payer: MEDICARE

## 2024-04-24 DIAGNOSIS — R26.2 DIFFICULTY IN WALKING: ICD-10-CM

## 2024-04-24 DIAGNOSIS — G89.29 CHRONIC MIDLINE LOW BACK PAIN, UNSPECIFIED WHETHER SCIATICA PRESENT: Primary | ICD-10-CM

## 2024-04-24 DIAGNOSIS — H81.11 BPPV (BENIGN PAROXYSMAL POSITIONAL VERTIGO), RIGHT: ICD-10-CM

## 2024-04-24 DIAGNOSIS — M54.50 CHRONIC MIDLINE LOW BACK PAIN, UNSPECIFIED WHETHER SCIATICA PRESENT: Primary | ICD-10-CM

## 2024-04-24 PROCEDURE — 95992 CANALITH REPOSITIONING PROC: CPT

## 2024-04-24 PROCEDURE — 97113 AQUATIC THERAPY/EXERCISES: CPT

## 2024-04-24 NOTE — PROGRESS NOTES
Physical Therapy  Discharge  [address], [place] [address], Phone: [phone]    Patient: Charleen Barnett   : 1950  Diagnosis/ICD-10 Code:  Chronic midline low back pain, unspecified whether sciatica present [M54.50, G89.29]; difficulty in walking; BPPV (R)   Referring practitioner: Tomás Campa, *  Date of Initial Visit: Type: THERAPY  Noted: 2024  Today's Date: 2024  Patient seen for 16 sessions      Subjective:   Visit Diagnosis:    ICD-10-CM ICD-9-CM   1. Chronic midline low back pain, unspecified whether sciatica present  M54.50 724.2    G89.29 338.29   2. BPPV (benign paroxysmal positional vertigo), right  H81.11 386.11   3. Difficulty in walking  R26.2 719.7       Charleen Barnett reports: she has been experiencing dizziness/vertigo when rolling over in bed the last few weeks  Subjective Questionnaire: DHI: 82 indicates severe handicap  Clinical Progress: unchanged  Home Program Compliance: N/A  Treatment has included: manual therapy; canalith repositioning.    Subjective     Objective   Supine head center test: indicates strongly right side/quick beat to right horizontal nystagmus  (R) roll test + for horizontal nystagmus, symptom report.  Lasting less than 30 seconds  (L) roll test mildly positive for horizontal nystagmus, lasting less than 30 seconds      Assessment/Plan  - pt presents this date with c/o dizziness/vertigo.  Assessment demonstrates + (R) roll test for overt horizontal nystagmus lasting less than 30 seconds.   - Updated HEP handout issued this date.  Patient verbalized understanding.  Also issued BBQ roll for self treatment of horizontal nystagmus .  - This clinic is permanently closing on 24.  Patient plans to continue with HEP at this time.     Progress toward previous goals: Partially Met    Education:  Access Code: [email or web address]  URL: [email or web address]  Date: 2024  Prepared by: Mary Ramos    Exercises  - Standing Lumbar  Extension  - 1 x daily - 7 x weekly - 1 sets - 10 reps  - Standing Hip Flexion with Counter Support  - 1 x daily - 7 x weekly - 2 sets - 10 reps  - Standing Hip Abduction with Counter Support  - 1 x daily - 7 x weekly - 2 sets - 10 reps  - Standing Hip Extension with Counter Support  - 1 x daily - 7 x weekly - 2 sets - 10 reps  - Standing March with Counter Support  - 1 x daily - 7 x weekly - 2 sets - 10 reps  - Heel Raises with Counter Support  - 1 x daily - 7 x weekly - 2 sets - 10 reps  - Heel Toe Raises with Counter Support  - 1 x daily - 7 x weekly - 2 sets - 10 reps  - Standing Shoulder External Rotation with Resistance  - 1 x daily - 7 x weekly - 2 sets - 10 reps  - Standing Tricep Extensions with Resistance  - 1 x daily - 7 x weekly - 2 sets - 10 reps  - Chest Press with Anchored Resistance  - 1 x daily - 7 x weekly - 2 sets - 10 reps  - Standing Shoulder Horizontal Adduction with Anchored Resistance  - 1 x daily - 7 x weekly - 2 sets - 10 reps  - Standing Shoulder Row with Anchored Resistance  - 1 x daily - 7 x weekly - 2 sets - 10 reps  - Knee flexion stretch  - 1 x daily - 7 x weekly - 3 sets - 20 hold  - Standing Hamstring Stretch with Step  - 1 x daily - 7 x weekly - 3 sets - 20 hold    Goals  Plan Goals: STG's (4 weeks)  1. Tolerate 45 minutes aquatic therapy with reduction in pain. MET  2. Able to ambulate x 10 min on Aquatic TM with improved gait pattern.MET  3. Able to tolerate initial HEP w/ progression for self management of impairments: MET, pt is able to stand at counter at home with perform HEP  NEW GOAL:   - Pt will report a reduction in dizziness/vertigo by 50% and demonstrate negative (B) [place] Hallpike test for safety with bed mobility and transitional movements.      LTG's (by d/c- 8 weeks )  1. Independent with HEP and able to manage condition independently. MET  2. Pt will have ability to ambulate 10 minutes or greater for household and community mobility without provocation of pain and  improved gait mechanics. PROGRESSING, pt is able to ambulate 12 minutes on aquatic treadmill, limited in community mobility    3. Pt will demonstrate (B) hip abd and extension greater 4/5 for improved postural support and function.   4. Significant improvement on Oswestry  outcome measure indicating 30 % impairment or less.  Short-term goals (STG): 3/ 4  Long-term goals (LTG):           Recommendations:       Timed:         Manual Therapy:    0     mins  88941;     Therapeutic Exercise:    0     mins  69287;     Neuromuscular Jeancarlos:    0    mins  84419;    Therapeutic Activity:     0     mins  07717;     Gait Trainin     mins  23070;     Ultrasound:     0     mins  58370;    Ionto                               0    mins   95339  Self Care                       0     mins   86198  Aquatic                         55     mins 35807      Un-Timed:  Electrical Stimulation:    0     mins  09279 ( );  Dry Needling     0     mins self-pay  Traction     0     mins 78238  Low Eval     0     Mins  10724  Mod Eval     0     Mins  54180  High Eval                       0     Mins  77789  Re-Eval                           0    mins  88522  Canalith Repositioning   20 mins   09292    Timed Treatment:   55   mins   Total Treatment:     75  mins      PT Signature: Mary Ramos PT  [place] License #: [id number]

## 2024-05-01 ENCOUNTER — OFFICE VISIT (OUTPATIENT)
Dept: FAMILY MEDICINE CLINIC | Facility: CLINIC | Age: 74
End: 2024-05-01
Payer: MEDICARE

## 2024-05-01 ENCOUNTER — LAB (OUTPATIENT)
Dept: FAMILY MEDICINE CLINIC | Facility: CLINIC | Age: 74
End: 2024-05-01
Payer: MEDICARE

## 2024-05-01 VITALS
HEART RATE: 66 BPM | SYSTOLIC BLOOD PRESSURE: 134 MMHG | BODY MASS INDEX: 36.94 KG/M2 | OXYGEN SATURATION: 97 % | WEIGHT: 176 LBS | DIASTOLIC BLOOD PRESSURE: 82 MMHG | TEMPERATURE: 97.5 F | HEIGHT: 58 IN

## 2024-05-01 DIAGNOSIS — E78.2 MIXED HYPERLIPIDEMIA: ICD-10-CM

## 2024-05-01 DIAGNOSIS — E03.9 ACQUIRED HYPOTHYROIDISM: ICD-10-CM

## 2024-05-01 DIAGNOSIS — R73.9 HYPERGLYCEMIA: ICD-10-CM

## 2024-05-01 DIAGNOSIS — F32.A DEPRESSIVE DISORDER: ICD-10-CM

## 2024-05-01 DIAGNOSIS — I10 PRIMARY HYPERTENSION: ICD-10-CM

## 2024-05-01 DIAGNOSIS — Z00.00 ENCOUNTER FOR ANNUAL WELLNESS EXAM IN MEDICARE PATIENT: Primary | ICD-10-CM

## 2024-05-01 LAB
ALBUMIN SERPL-MCNC: 4.1 G/DL (ref 3.5–5.2)
ALBUMIN/GLOB SERPL: 1.6 G/DL
ALP SERPL-CCNC: 62 U/L (ref 39–117)
ALT SERPL W P-5'-P-CCNC: 11 U/L (ref 1–33)
ANION GAP SERPL CALCULATED.3IONS-SCNC: 10.1 MMOL/L (ref 5–15)
AST SERPL-CCNC: 16 U/L (ref 1–32)
BILIRUB SERPL-MCNC: 0.3 MG/DL (ref 0–1.2)
BUN SERPL-MCNC: 13 MG/DL (ref 8–23)
BUN/CREAT SERPL: 13.3 (ref 7–25)
CALCIUM SPEC-SCNC: 9.3 MG/DL (ref 8.6–10.5)
CHLORIDE SERPL-SCNC: 104 MMOL/L (ref 98–107)
CHOLEST SERPL-MCNC: 147 MG/DL (ref 0–200)
CO2 SERPL-SCNC: 28.9 MMOL/L (ref 22–29)
CREAT SERPL-MCNC: 0.98 MG/DL (ref 0.57–1)
EGFRCR SERPLBLD CKD-EPI 2021: 61.1 ML/MIN/1.73
GLOBULIN UR ELPH-MCNC: 2.6 GM/DL
GLUCOSE SERPL-MCNC: 70 MG/DL (ref 65–99)
HDLC SERPL-MCNC: 69 MG/DL (ref 40–60)
LDLC SERPL CALC-MCNC: 65 MG/DL (ref 0–100)
LDLC/HDLC SERPL: 0.95 {RATIO}
POTASSIUM SERPL-SCNC: 4.8 MMOL/L (ref 3.5–5.2)
PROT SERPL-MCNC: 6.7 G/DL (ref 6–8.5)
SODIUM SERPL-SCNC: 143 MMOL/L (ref 136–145)
TRIGL SERPL-MCNC: 62 MG/DL (ref 0–150)
TSH SERPL DL<=0.05 MIU/L-ACNC: 2.72 UIU/ML (ref 0.27–4.2)
VLDLC SERPL-MCNC: 13 MG/DL (ref 5–40)

## 2024-05-01 PROCEDURE — 80053 COMPREHEN METABOLIC PANEL: CPT | Performed by: FAMILY MEDICINE

## 2024-05-01 PROCEDURE — 84443 ASSAY THYROID STIM HORMONE: CPT | Performed by: FAMILY MEDICINE

## 2024-05-01 PROCEDURE — 80061 LIPID PANEL: CPT | Performed by: FAMILY MEDICINE

## 2024-05-01 PROCEDURE — 36415 COLL VENOUS BLD VENIPUNCTURE: CPT

## 2024-05-01 RX ORDER — VIBEGRON 75 MG/1
1 TABLET, FILM COATED ORAL DAILY
COMMUNITY
Start: 2024-04-26

## 2024-05-01 NOTE — PATIENT INSTRUCTIONS
Keep working to lose weight through healthy eating and exercise.   Consider getting the RSV vaccine at the pharmacy  Limit salt  Drink more water  Elevate feet when possible

## 2024-05-01 NOTE — PROGRESS NOTES
The ABCs of the Annual Wellness Visit  Subsequent Medicare Wellness Visit    Subjective    Charleen Barnett is a 73 y.o. female who presents for a Subsequent Medicare Wellness Visit.    The following portions of the patient's history were reviewed and   updated as appropriate: allergies, current medications, past family history, past medical history, past social history, past surgical history, and problem list.    Compared to one year ago, the patient feels her physical   health is better.    Compared to one year ago, the patient feels her mental   health is better.    Recent Hospitalizations:  She was not admitted to the hospital during the last year.       Current Medical Providers:  Patient Care Team:  Ilsa Her MD as PCP - Yazmin Bridges MD as Consulting Physician (Cardiology)    Outpatient Medications Prior to Visit   Medication Sig Dispense Refill    aspirin 81 MG tablet Take 1 tablet by mouth Daily.      atorvastatin (LIPITOR) 10 MG tablet TAKE 1 TABLET BY MOUTH DAILY 90 tablet 0    buPROPion XL (WELLBUTRIN XL) 150 MG 24 hr tablet TAKE 1 TABLET BY MOUTH EVERY MORNING 90 tablet 0    busPIRone (BUSPAR) 10 MG tablet TAKE 1 TABLET BY MOUTH 3 (THREE) TIMES A DAY. 270 tablet 0    cholecalciferol (Vitamin D) 25 MCG (1000 UT) tablet Take 1 tablet by mouth Daily.      Cholecalciferol (Vitamin D) 50 MCG (2000 UT) tablet Take 1 tablet by mouth Daily.      citalopram (CeleXA) 20 MG tablet TAKE 1 TABLET BY MOUTH EVERY DAY 28 tablet 3    fluticasone (FLONASE) 50 MCG/ACT nasal spray 2 sprays into the nostril(s) as directed by provider Daily. 9.9 mL 11    gabapentin (NEURONTIN) 400 MG capsule TAKE 1 CAPSULE BY MOUTH EVERY NIGHT AT BEDTIME(NDC SPECIFIC FOR INSURANCE-FORCE OUT ROBOT THEN HAVE TO BE ADMINISTERED BY PHARMACIST FIX INVENTORY) 90 capsule 0    Gemtesa 75 MG tablet Take 1 tablet by mouth Daily.      hydrOXYzine (ATARAX) 10 MG tablet TAKE 1 TABLET BY MOUTH EVERY NIGHT AT BEDTIME 30 tablet  0    levothyroxine (SYNTHROID, LEVOTHROID) 25 MCG tablet TAKE 1 TABLET BY MOUTH EVERY MORNING 28 tablet 3    Magnesium Citrate 100 MG capsule Take 1 capsule by mouth Daily.      metoprolol succinate XL (TOPROL-XL) 25 MG 24 hr tablet Take 2 tablets by mouth Every Morning. 180 tablet 3    midodrine (PROAMATINE) 5 MG tablet TAKE 1/2 TABLET BY MOUTH TWICE DAILY 180 tablet 0    montelukast (SINGULAIR) 10 MG tablet TAKE 1 TABLET BY MOUTH EVERY DAY 90 tablet 0    spironolactone (ALDACTONE) 25 MG tablet       triamcinolone (KENALOG) 0.1 % cream Apply 1 application topically to the appropriate area as directed 2 (Two) Times a Day. 30 g 0    albuterol sulfate  (90 Base) MCG/ACT inhaler Inhale 1 puff Every 6 (Six) Hours As Needed for Wheezing or Shortness of Air. 48 g 3    Myrbetriq 50 MG tablet sustained-release 24 hour 24 hr tablet        Facility-Administered Medications Prior to Visit   Medication Dose Route Frequency Provider Last Rate Last Admin    denosumab (PROLIA) syringe 60 mg  60 mg Subcutaneous Q6 Months Dede Anders PA   60 mg at 08/03/22 1345       No opioid medication identified on active medication list. I have reviewed chart for other potential  high risk medication/s and harmful drug interactions in the elderly.        Aspirin is on active medication list. Aspirin use is indicated based on review of current medical condition/s. Pros and cons of this therapy have been discussed today. Benefits of this medication outweigh potential harm.  Patient has been encouraged to continue taking this medication.  .      Patient Active Problem List   Diagnosis    Anxiety    Arthritis    Asthma    Depressive disorder    Encounter for therapeutic drug monitoring    Family hx osteoporosis    Fibromyalgia    Ramos's esophagus    Gastroesophageal reflux disease    Gout    Hyperglycemia    Mixed hyperlipidemia    Primary hypertension    Obstructive sleep apnea    Age related osteoporosis    Pedal edema     "Postmenopausal status    Presence of left artificial knee joint    ST elevation (STEMI) myocardial infarction    Status post percutaneous transluminal coronary angioplasty    Urge incontinence    Vitamin D deficiency    Morbidly obese    Dyspnea    Unstable angina    Coronary artery disease involving coronary bypass graft of native heart with unstable angina pectoris    Encounter for annual wellness exam in Medicare patient    Obesity (BMI 30-39.9)    Chest pain    Aftercare following joint replacement surgery    Localized osteoarthrosis    Localized, primary osteoarthritis    Osteoarthritis of knee    Lumbar radiculopathy    Artificial knee joint present    Acquired hypothyroidism    Rash    Memory difficulties     Advance Care Planning   Advance Care Planning     Advance Directive is not on file.  ACP discussion was held with the patient during this visit. Patient has an advance directive (not in EMR), copy requested.     Objective    Vitals:    05/01/24 1339   BP: 134/82   BP Location: Left arm   Patient Position: Sitting   Cuff Size: Large Adult   Pulse: 66   Temp: 97.5 °F (36.4 °C)   TempSrc: Temporal   SpO2: 97%   Weight: 79.8 kg (176 lb)   Height: 147.3 cm (58\")   PainSc: 0-No pain     Estimated body mass index is 36.78 kg/m² as calculated from the following:    Height as of this encounter: 147.3 cm (58\").    Weight as of this encounter: 79.8 kg (176 lb).           Does the patient have evidence of cognitive impairment? No  MMSE done 30/30  Lab Results   Component Value Date    TRIG 62 05/01/2024    HDL 69 (H) 05/01/2024    LDL 65 05/01/2024    VLDL 13 05/01/2024        HEALTH RISK ASSESSMENT    Smoking Status:  Social History     Tobacco Use   Smoking Status Never    Passive exposure: Never   Smokeless Tobacco Never   Tobacco Comments    Parents and husbands heavy smokers     Alcohol Consumption:  Social History     Substance and Sexual Activity   Alcohol Use Not Currently    Comment: Recovery since 1994 " drank from age [age]     Fall Risk Screen:    LISA Fall Risk Assessment was completed, and patient is at LOW risk for falls.Assessment completed on:2024    Depression Screenin/1/2024     1:41 PM   PHQ-2/PHQ-9 Depression Screening   Little Interest or Pleasure in Doing Things 0-->not at all   Feeling Down, Depressed or Hopeless 0-->not at all   PHQ-9: Brief Depression Severity Measure Score 0       Health Habits and Functional and Cognitive Screenin/1/2024     1:42 PM   Functional & Cognitive Status   Do you have difficulty preparing food and eating? No   Do you have difficulty bathing yourself, getting dressed or grooming yourself? No   Do you have difficulty using the toilet? No   Do you have difficulty moving around from place to place? No   Do you have trouble with steps or getting out of a bed or a chair? No   Current Diet Limited Junk Food   Dental Exam Up to date   Eye Exam Up to date   Exercise (times per week) 3 times per week   Current Exercises Include Aerobics        Exercise Comment water aerobics and PT   Do you need help using the phone?  No   Are you deaf or do you have serious difficulty hearing?  No   Do you need help to go to places out of walking distance? No   Do you need help shopping? No   Do you need help preparing meals?  No   Do you need help with housework?  No   Do you need help with laundry? No   Do you need help taking your medications? No   Do you need help managing money? No   Do you ever drive or ride in a car without wearing a seat belt? No   Have you felt unusual stress, anger or loneliness in the last month? No   Who do you live with? Spouse   If you need help, do you have trouble finding someone available to you? No   Have you been bothered in the last four weeks by sexual problems? No   Do you have difficulty concentrating, remembering or making decisions? No       Age-appropriate Screening Schedule:  Refer to the list below for future screening  recommendations based on patient's age, sex and/or medical conditions. Orders for these recommended tests are listed in the plan section. The patient has been provided with a written plan.    Health Maintenance   Topic Date Due    TDAP/TD VACCINES (1 - Tdap) Never done    ZOSTER VACCINE (1 of 2) Never done    RSV Vaccine - Adults (1 - 1-dose 60+ series) Never done    COVID-19 Vaccine (3 - 2023-24 season) 09/01/2023    INFLUENZA VACCINE  08/01/2024    MAMMOGRAM  08/18/2024    BMI FOLLOWUP  02/02/2025    DXA SCAN  04/24/2025    ANNUAL WELLNESS VISIT  05/01/2025    LIPID PANEL  05/01/2025    COLORECTAL CANCER SCREENING  02/28/2033    HEPATITIS C SCREENING  Completed    Pneumococcal Vaccine 65+  Completed                  CMS Preventative Services Quick Reference  Risk Factors Identified During Encounter  Immunizations Discussed/Encouraged: RSV (Respiratory Syncytial Virus)  The above risks/problems have been discussed with the patient.  Pertinent information has been shared with the patient in the After Visit Summary.  An After Visit Summary and PPPS were made available to the patient.    Follow Up:   Next Medicare Wellness visit to be scheduled in 1 year.       Additional E&M Note during same encounter follows:  Patient has multiple medical problems which are significant and separately identifiable that require additional work above and beyond the Medicare Wellness Visit.      Chief Complaint  Medicare Wellness-subsequent (The other day bs was a little elevated 145. Bp was slightly above normal also. Got lightheaded and felt bad. )    Subjective        HPI  Charleen Barnett is also being seen today for follow up on chol, thyroid, BS and depressioni  Stressed about 's health as he has been diagnosed with parkinson's  Review of Systems   Constitutional: Negative.    Respiratory: Negative.     Cardiovascular:  Positive for leg swelling (occ edema). Negative for palpitations.       Objective   Vital Signs:  BP  "134/82 (BP Location: Left arm, Patient Position: Sitting, Cuff Size: Large Adult)   Pulse 66   Temp 97.5 °F (36.4 °C) (Temporal)   Ht 147.3 cm (58\")   Wt 79.8 kg (176 lb)   SpO2 97%   BMI 36.78 kg/m²     Physical Exam  Vitals and nursing note reviewed.   Constitutional:       Appearance: Normal appearance. She is obese.   Cardiovascular:      Rate and Rhythm: Normal rate and regular rhythm.      Heart sounds: Normal heart sounds.   Pulmonary:      Effort: Pulmonary effort is normal.      Breath sounds: Normal breath sounds.   Musculoskeletal:      Right lower leg: No edema.      Left lower leg: No edema.   Neurological:      Mental Status: She is alert and oriented to person, place, and time.   Psychiatric:         Mood and Affect: Mood normal.                         Assessment and Plan   Diagnoses and all orders for this visit:    1. Encounter for annual wellness exam in Medicare patient (Primary)    2. Mixed hyperlipidemia    3. Hyperglycemia    4. Acquired hypothyroidism    5. Depressive disorder    Other orders  -     COGNITIVE ASSESSMENT SCAN      She was counseled on the need to limit salt and elevate feet when possible to help with intermittent pedal edema  She will continue current meds for chol and thyroid  She was counseled on dietary modifications to help with her BS  Labs were put in the computer prior to her appt and pt will have them drawn today  She will continue current meds for depression  She was counseled on RSV and encouraged to get the vaccine at her pharmacy  She was counseled on the need for weight loss  She is UTD on DEXA and will need mammogram later this year       Follow Up   Return in about 1 year (around 5/1/2025) for Medicare Wellness.  Patient was given instructions and counseling regarding her condition or for health maintenance advice. Please see specific information pulled into the AVS if appropriate.           "

## 2024-05-04 DIAGNOSIS — J45.909 ASTHMA, UNSPECIFIED ASTHMA SEVERITY, UNSPECIFIED WHETHER COMPLICATED, UNSPECIFIED WHETHER PERSISTENT: Primary | ICD-10-CM

## 2024-05-05 RX ORDER — ALBUTEROL SULFATE 90 UG/1
AEROSOL, METERED RESPIRATORY (INHALATION)
Qty: 8.5 G | Refills: 2 | Status: SHIPPED | OUTPATIENT
Start: 2024-05-05

## 2024-05-21 DIAGNOSIS — E03.9 ACQUIRED HYPOTHYROIDISM: ICD-10-CM

## 2024-05-21 RX ORDER — ATORVASTATIN CALCIUM 10 MG/1
10 TABLET, FILM COATED ORAL DAILY
Qty: 28 TABLET | Refills: 2 | Status: SHIPPED | OUTPATIENT
Start: 2024-05-21

## 2024-05-21 RX ORDER — LEVOTHYROXINE SODIUM 0.03 MG/1
25 TABLET ORAL
Qty: 28 TABLET | Refills: 2 | Status: SHIPPED | OUTPATIENT
Start: 2024-05-21

## 2024-05-21 RX ORDER — MONTELUKAST SODIUM 10 MG/1
TABLET ORAL
Qty: 28 TABLET | Refills: 2 | Status: SHIPPED | OUTPATIENT
Start: 2024-05-21

## 2024-05-21 RX ORDER — CITALOPRAM 20 MG/1
TABLET ORAL
Qty: 28 TABLET | Refills: 2 | Status: SHIPPED | OUTPATIENT
Start: 2024-05-21

## 2024-05-22 RX ORDER — FLUTICASONE PROPIONATE 50 MCG
2 SPRAY, SUSPENSION (ML) NASAL DAILY
Qty: 9.9 ML | Refills: 10 | Status: SHIPPED | OUTPATIENT
Start: 2024-05-22

## 2024-06-17 RX ORDER — BUPROPION HYDROCHLORIDE 150 MG/1
150 TABLET ORAL EVERY MORNING
Qty: 90 TABLET | Refills: 0 | Status: SHIPPED | OUTPATIENT
Start: 2024-06-17

## 2024-07-12 ENCOUNTER — TELEPHONE (OUTPATIENT)
Dept: FAMILY MEDICINE CLINIC | Facility: CLINIC | Age: 74
End: 2024-07-12
Payer: MEDICARE

## 2024-07-12 NOTE — TELEPHONE ENCOUNTER
Caller: [facility]    Relationship: Other    Best call back number: [phone]    What form or medical record are you requesting: LABS RELATED TO OSTEOPOROSIS     Who is requesting this form or medical record from you:      How would you like to receive the form or medical records (pick-up, mail, fax): FAX  If fax, what is the fax number: [phone]      Timeframe paperwork needed: ASAP    Additional notes: PATIENT IS GOING TO BE SEEING DR. SONJA DURAN SOON AND THEY NEED HER MOST RECENT LAB RESULTS RELATED TO THE REASON SHE WILL BE COMING

## 2024-07-16 DIAGNOSIS — E03.9 ACQUIRED HYPOTHYROIDISM: ICD-10-CM

## 2024-07-16 RX ORDER — MIDODRINE HYDROCHLORIDE 5 MG/1
2.5 TABLET ORAL DAILY
Qty: 180 TABLET | Refills: 1 | Status: SHIPPED | OUTPATIENT
Start: 2024-07-16

## 2024-07-16 RX ORDER — LEVOTHYROXINE SODIUM 0.03 MG/1
25 TABLET ORAL
Qty: 28 TABLET | Refills: 1 | Status: SHIPPED | OUTPATIENT
Start: 2024-07-16

## 2024-07-16 RX ORDER — BUSPIRONE HYDROCHLORIDE 10 MG/1
TABLET ORAL
Qty: 270 TABLET | Refills: 0 | Status: SHIPPED | OUTPATIENT
Start: 2024-07-16

## 2024-07-16 RX ORDER — GABAPENTIN 400 MG/1
400 CAPSULE ORAL
Qty: 90 CAPSULE | Refills: 0 | Status: SHIPPED | OUTPATIENT
Start: 2024-07-16

## 2024-07-16 NOTE — TELEPHONE ENCOUNTER
Rx Refill Note  Requested Prescriptions     Signed Prescriptions Disp Refills    midodrine (PROAMATINE) 5 MG tablet 180 tablet 1     Sig: Take 0.5 tablets by mouth Daily.     Authorizing Provider: HERMAN VILLATORO     Ordering User: SUSANA MEDRANO      Last office visit with prescribing clinician: 3/25/2024   Last telemedicine visit with prescribing clinician: Visit date not found   Next office visit with prescribing clinician: 10/3/2024                         Would you like a call back once the refill request has been completed: [] Yes [] No    If the office needs to give you a call back, can they leave a voicemail: [] Yes [] No    Susana Medrano MA  07/16/24, 13:40 EDT

## 2024-08-27 ENCOUNTER — OFFICE VISIT (OUTPATIENT)
Dept: FAMILY MEDICINE CLINIC | Facility: CLINIC | Age: 74
End: 2024-08-27
Payer: MEDICARE

## 2024-08-27 ENCOUNTER — LAB (OUTPATIENT)
Dept: FAMILY MEDICINE CLINIC | Facility: CLINIC | Age: 74
End: 2024-08-27
Payer: MEDICARE

## 2024-08-27 VITALS
HEART RATE: 67 BPM | BODY MASS INDEX: 38.5 KG/M2 | TEMPERATURE: 97.7 F | WEIGHT: 183.4 LBS | DIASTOLIC BLOOD PRESSURE: 85 MMHG | HEIGHT: 58 IN | OXYGEN SATURATION: 97 % | RESPIRATION RATE: 18 BRPM | SYSTOLIC BLOOD PRESSURE: 150 MMHG

## 2024-08-27 DIAGNOSIS — R53.1 GENERAL WEAKNESS: ICD-10-CM

## 2024-08-27 DIAGNOSIS — R11.2 NAUSEA AND VOMITING, UNSPECIFIED VOMITING TYPE: ICD-10-CM

## 2024-08-27 DIAGNOSIS — R60.9 EDEMA, UNSPECIFIED TYPE: Primary | ICD-10-CM

## 2024-08-27 DIAGNOSIS — R60.9 EDEMA, UNSPECIFIED TYPE: ICD-10-CM

## 2024-08-27 DIAGNOSIS — E03.9 HYPOTHYROIDISM (ACQUIRED): ICD-10-CM

## 2024-08-27 DIAGNOSIS — R30.0 DYSURIA: ICD-10-CM

## 2024-08-27 LAB
BILIRUB BLD-MCNC: NEGATIVE MG/DL
CLARITY, POC: CLEAR
COLOR UR: YELLOW
EXPIRATION DATE: NORMAL
GLUCOSE UR STRIP-MCNC: NEGATIVE MG/DL
KETONES UR QL: NEGATIVE
LEUKOCYTE EST, POC: NEGATIVE
Lab: NORMAL
NITRITE UR-MCNC: NEGATIVE MG/ML
PH UR: 6 [PH] (ref 5–8)
PROT UR STRIP-MCNC: NEGATIVE MG/DL
RBC # UR STRIP: NEGATIVE /UL
SP GR UR: 1.03 (ref 1–1.03)
UROBILINOGEN UR QL: NORMAL

## 2024-08-27 PROCEDURE — 3077F SYST BP >= 140 MM HG: CPT | Performed by: NURSE PRACTITIONER

## 2024-08-27 PROCEDURE — 84443 ASSAY THYROID STIM HORMONE: CPT | Performed by: NURSE PRACTITIONER

## 2024-08-27 PROCEDURE — G2211 COMPLEX E/M VISIT ADD ON: HCPCS | Performed by: NURSE PRACTITIONER

## 2024-08-27 PROCEDURE — 36415 COLL VENOUS BLD VENIPUNCTURE: CPT

## 2024-08-27 PROCEDURE — 85025 COMPLETE CBC W/AUTO DIFF WBC: CPT | Performed by: NURSE PRACTITIONER

## 2024-08-27 PROCEDURE — 1126F AMNT PAIN NOTED NONE PRSNT: CPT | Performed by: NURSE PRACTITIONER

## 2024-08-27 PROCEDURE — 99214 OFFICE O/P EST MOD 30 MIN: CPT | Performed by: NURSE PRACTITIONER

## 2024-08-27 PROCEDURE — 1159F MED LIST DOCD IN RCRD: CPT | Performed by: NURSE PRACTITIONER

## 2024-08-27 PROCEDURE — 87086 URINE CULTURE/COLONY COUNT: CPT | Performed by: NURSE PRACTITIONER

## 2024-08-27 PROCEDURE — 80053 COMPREHEN METABOLIC PANEL: CPT | Performed by: NURSE PRACTITIONER

## 2024-08-27 PROCEDURE — 81003 URINALYSIS AUTO W/O SCOPE: CPT | Performed by: NURSE PRACTITIONER

## 2024-08-27 PROCEDURE — 3079F DIAST BP 80-89 MM HG: CPT | Performed by: NURSE PRACTITIONER

## 2024-08-27 PROCEDURE — 1160F RVW MEDS BY RX/DR IN RCRD: CPT | Performed by: NURSE PRACTITIONER

## 2024-08-27 NOTE — PROGRESS NOTES
Subjective   Charleen Barnett is a 74 y.o. female.       HPI   Pt is here today with concern of general weakness and all over swelling (worse in legs) since last week.  She says last night she started to urinate a lot more and feels she lost about 5 pounds of fluid overnight.  She woke today and the swelling had improved.  She continues to feel weakness.  She thought possibly she could have a UTI because she had felt some pelvic pressure.    She reports Friday about an hour after eating dinner out she had two episodes of diarrhea and N/V.  The diarrhea and vomiting have stopped but she remains with nausea.    She has hx of hypothyroidism and is on levothyroxine 25 mcg daily.  She admits she has had missed doses.    She denies any chest pain, palpitations; SOA; dizziness; headache.  Denies any fevers.  Denies any ill contacts.  No blood seen in urine or stools.       The following portions of the patient's history were reviewed and updated as appropriate: allergies, current medications, past family history, past medical history, past social history, past surgical history, and problem list.    Review of Systems   Constitutional:  Positive for fatigue. Negative for chills and fever.   Respiratory:  Negative for cough and shortness of breath.    Cardiovascular:  Positive for leg swelling. Negative for chest pain and palpitations.   Gastrointestinal:  Positive for nausea. Negative for abdominal pain, blood in stool, constipation, diarrhea and vomiting.   Genitourinary:  Positive for dysuria and pelvic pressure. Negative for frequency, hematuria, pelvic pain and urgency.   Neurological:  Positive for weakness. Negative for dizziness, light-headedness and headache.   Psychiatric/Behavioral:  Negative for depressed mood. The patient is not nervous/anxious.        Objective   Physical Exam  Vitals reviewed.   Constitutional:       General: She is not in acute distress.     Appearance: Normal appearance. She is obese.    Cardiovascular:      Rate and Rhythm: Normal rate and regular rhythm.      Pulses: Normal pulses.      Heart sounds: Normal heart sounds. No murmur heard.  Pulmonary:      Effort: Pulmonary effort is normal. No respiratory distress.      Breath sounds: Normal breath sounds. No wheezing or rhonchi.   Chest:      Chest wall: No tenderness.   Abdominal:      Tenderness: There is no right CVA tenderness or left CVA tenderness.   Musculoskeletal:      Right lower leg: No edema.      Left lower leg: No edema.   Skin:     General: Skin is warm and dry.      Findings: No erythema.   Neurological:      General: No focal deficit present.      Mental Status: She is alert and oriented to person, place, and time.   Psychiatric:         Mood and Affect: Mood normal.                  Procedures   Assessment & Plan   Diagnoses and all orders for this visit:    1. Edema, unspecified type (Primary)  Comments:  Improved today.   Labs ordered.   Continue plenty of fluids and compression socks.  Orders:  -     Comprehensive metabolic panel; Future  -     CBC w AUTO Differential; Future  -     Urine Culture - Urine, Urine, Clean Catch  -     POC Urinalysis Dipstick, Automated    2. Nausea and vomiting, unspecified vomiting type  Comments:  Labs ordered.  Orders:  -     Comprehensive metabolic panel; Future  -     CBC w AUTO Differential; Future    3. General weakness  Comments:  Labs ordered.  Orders:  -     Comprehensive metabolic panel; Future  -     CBC w AUTO Differential; Future    4. Dysuria  Comments:  UA today shows trace blood.    Sent for cx.   Will wait for cx report before starting any abx.    Increase fluids.  Orders:  -     Urine Culture - Urine, Urine, Clean Catch  -     POC Urinalysis Dipstick, Automated    5. Hypothyroidism (acquired)  Comments:  TSH ordered.  Orders:  -     TSH; Future

## 2024-08-28 LAB
ALBUMIN SERPL-MCNC: 4 G/DL (ref 3.5–5.2)
ALBUMIN/GLOB SERPL: 1.5 G/DL
ALP SERPL-CCNC: 85 U/L (ref 39–117)
ALT SERPL W P-5'-P-CCNC: 15 U/L (ref 1–33)
ANION GAP SERPL CALCULATED.3IONS-SCNC: 9.3 MMOL/L (ref 5–15)
AST SERPL-CCNC: 20 U/L (ref 1–32)
BASOPHILS # BLD AUTO: 0.04 10*3/MM3 (ref 0–0.2)
BASOPHILS NFR BLD AUTO: 0.6 % (ref 0–1.5)
BILIRUB SERPL-MCNC: 0.2 MG/DL (ref 0–1.2)
BUN SERPL-MCNC: 11 MG/DL (ref 8–23)
BUN/CREAT SERPL: 10.1 (ref 7–25)
CALCIUM SPEC-SCNC: 9.2 MG/DL (ref 8.6–10.5)
CHLORIDE SERPL-SCNC: 104 MMOL/L (ref 98–107)
CO2 SERPL-SCNC: 28.7 MMOL/L (ref 22–29)
CREAT SERPL-MCNC: 1.09 MG/DL (ref 0.57–1)
DEPRECATED RDW RBC AUTO: 39.3 FL (ref 37–54)
EGFRCR SERPLBLD CKD-EPI 2021: 53.4 ML/MIN/1.73
EOSINOPHIL # BLD AUTO: 0.2 10*3/MM3 (ref 0–0.4)
EOSINOPHIL NFR BLD AUTO: 3 % (ref 0.3–6.2)
ERYTHROCYTE [DISTWIDTH] IN BLOOD BY AUTOMATED COUNT: 12.5 % (ref 12.3–15.4)
GLOBULIN UR ELPH-MCNC: 2.6 GM/DL
GLUCOSE SERPL-MCNC: 97 MG/DL (ref 65–99)
HCT VFR BLD AUTO: 39.8 % (ref 34–46.6)
HGB BLD-MCNC: 13.2 G/DL (ref 12–15.9)
IMM GRANULOCYTES # BLD AUTO: 0.02 10*3/MM3 (ref 0–0.05)
IMM GRANULOCYTES NFR BLD AUTO: 0.3 % (ref 0–0.5)
LYMPHOCYTES # BLD AUTO: 2.64 10*3/MM3 (ref 0.7–3.1)
LYMPHOCYTES NFR BLD AUTO: 39.5 % (ref 19.6–45.3)
MCH RBC QN AUTO: 28.9 PG (ref 26.6–33)
MCHC RBC AUTO-ENTMCNC: 33.2 G/DL (ref 31.5–35.7)
MCV RBC AUTO: 87.1 FL (ref 79–97)
MONOCYTES # BLD AUTO: 0.56 10*3/MM3 (ref 0.1–0.9)
MONOCYTES NFR BLD AUTO: 8.4 % (ref 5–12)
NEUTROPHILS NFR BLD AUTO: 3.23 10*3/MM3 (ref 1.7–7)
NEUTROPHILS NFR BLD AUTO: 48.2 % (ref 42.7–76)
NRBC BLD AUTO-RTO: 0 /100 WBC (ref 0–0.2)
PLATELET # BLD AUTO: 227 10*3/MM3 (ref 140–450)
PMV BLD AUTO: 10.6 FL (ref 6–12)
POTASSIUM SERPL-SCNC: 4.8 MMOL/L (ref 3.5–5.2)
PROT SERPL-MCNC: 6.6 G/DL (ref 6–8.5)
RBC # BLD AUTO: 4.57 10*6/MM3 (ref 3.77–5.28)
SODIUM SERPL-SCNC: 142 MMOL/L (ref 136–145)
TSH SERPL DL<=0.05 MIU/L-ACNC: 2.81 UIU/ML (ref 0.27–4.2)
WBC NRBC COR # BLD AUTO: 6.69 10*3/MM3 (ref 3.4–10.8)

## 2024-08-29 LAB — BACTERIA SPEC AEROBE CULT: NORMAL

## 2024-09-09 DIAGNOSIS — J45.909 ASTHMA, UNSPECIFIED ASTHMA SEVERITY, UNSPECIFIED WHETHER COMPLICATED, UNSPECIFIED WHETHER PERSISTENT: ICD-10-CM

## 2024-09-09 DIAGNOSIS — E78.2 MIXED HYPERLIPIDEMIA: Primary | ICD-10-CM

## 2024-09-09 DIAGNOSIS — F32.A DEPRESSIVE DISORDER: ICD-10-CM

## 2024-09-09 RX ORDER — ATORVASTATIN CALCIUM 10 MG/1
10 TABLET, FILM COATED ORAL DAILY
Qty: 28 TABLET | Refills: 1 | Status: SHIPPED | OUTPATIENT
Start: 2024-09-09

## 2024-09-09 RX ORDER — METOPROLOL SUCCINATE 25 MG/1
TABLET, EXTENDED RELEASE ORAL
Qty: 180 TABLET | Refills: 2 | Status: SHIPPED | OUTPATIENT
Start: 2024-09-09

## 2024-09-09 RX ORDER — MONTELUKAST SODIUM 10 MG/1
TABLET ORAL
Qty: 28 TABLET | Refills: 1 | Status: SHIPPED | OUTPATIENT
Start: 2024-09-09

## 2024-09-09 RX ORDER — CITALOPRAM HYDROBROMIDE 20 MG/1
TABLET ORAL
Qty: 28 TABLET | Refills: 1 | Status: SHIPPED | OUTPATIENT
Start: 2024-09-09

## 2024-09-09 NOTE — TELEPHONE ENCOUNTER
Rx Refill Note  Requested Prescriptions     Pending Prescriptions Disp Refills    metoprolol succinate XL (TOPROL-XL) 25 MG 24 hr tablet [Pharmacy Med Name: METOPROLOL SUC 25MG ER^] 180 tablet 2     Sig: TAKE 2 TABLETS BY MOUTH EVERY DAY (PT REQUESTS 1AM AND 1PM)      Last office visit with prescribing clinician: 3/25/2024   Last telemedicine visit with prescribing clinician: Visit date not found   Next office visit with prescribing clinician: 10/3/2024                         Would you like a call back once the refill request has been completed: [] Yes [] No    If the office needs to give you a call back, can they leave a voicemail: [] Yes [] No    Susana Medrano MA  09/09/24, 13:14 EDT      OV NOTE STATES SHE SHOULD BE TAKING 200 MG OF METOPROLOL BID.   PLEASE VERIFY WHICH DOSE PT SHOULD BE TAKING.   Office Visit with Yazmin Matta MD (03/25/2024)

## 2024-09-19 ENCOUNTER — TELEPHONE (OUTPATIENT)
Dept: FAMILY MEDICINE CLINIC | Facility: CLINIC | Age: 74
End: 2024-09-19
Payer: MEDICARE

## 2024-10-07 RX ORDER — BUPROPION HYDROCHLORIDE 150 MG/1
150 TABLET ORAL EVERY MORNING
Qty: 28 TABLET | Refills: 3 | Status: SHIPPED | OUTPATIENT
Start: 2024-10-07

## 2024-10-09 NOTE — PROGRESS NOTES
Encounter Date:10/14/2024  Last seen 3/25/2024      Patient ID: Charleen Barnett is a 74 y.o. female.  Chief Complaint:     Status post stent  Hypertension  Dyslipidemia  History of SVT.     History of present illness  Since I have last seen, the patient has been without any chest discomfort ,shortness of breath, palpitations, dizziness or syncope.  Denies having any headache ,abdominal pain ,nausea, vomiting , diarrhea constipation, loss of weight or loss of appetite.  Denies having any excessive bruising ,hematuria or blood in the stool.    Review of all systems negative except as indicated.    Reviewed ROS.  Assessment and plan  ////////////////////////  History  ==================    - status post stent to LAD 06/10/2014   status post subendocardial myocardial infarction prior to stent placement  Status post stent to LAD 5/29/2021.     Lexiscan Cardiolite test-normal-8/31/2023    Echocardiogram-9/29/2023.  Structurally and functionally normal cardiac valves.  Left atrial enlargement.  Left ventricular size and contractility is normal with ejection fraction of 60%.     Cardiac catheterization 5/29/2021 revealed  Left ventricle size and contractility normal with ejection fraction of 60%.  Left main coronary artery normal.  Left anterior descending artery has previously placed stent.  Left anterior descending artery has 80 to 90% disease just distal to the diagonal branch.  (Patient to have FFR).  IFR of 0.86  Diagonal branch has diffuse 50 to 60% disease.  Circumflex coronary artery is normal except for 30 to 40% marginal branch disease.  Right coronary artery is a large and dominant vessel and is normal.  Right common iliac external iliac and femoral arteries are normal.     -Right-sided chest pain-atypical.-Improved  EKG showed no acute changes.  Troponin levels are negative.     -Elevated D-dimer  CT scan of the chest is negative for pulmonary embolus.     Stress Cardiolite test--  5/17/2021.  Echocardiogram-normal except for left atrial enlargement 5/17/2021.      -palpitations likely SVT.  -improved on atenolol     Echocardiogram showed mild aortic regurgitation with normal left ventricular function.  5/18/2017     - hypertension dyslipidemia sleep apnea asthma fibromyalgia GERD and gout.     - Orthostatic hypotension     - family history of coronary artery disease     - allergy to penicillin sulfa and tetracycline     =======  Plan  =======    Status post stent to LAD 5/29/2021 (abnormal IFR).  Patient is not having any angina pectoris or congestive heart failure.  Patient is off Plavix.  EKG shows sinus bradycardia without ischemic changes.  Left anterior fascicular block.-10/14/2024     Orthostatic hypotension-better  Continue metoprolol XL 25 mg twice a day.  Continue midodrine 5 mg twice a day.     Hypertension-well-controlled-     117/74  Continue metoprolol  mg twice daily.  Patient is on midodrine 5 mg twice daily     Dyslipidemia-continue atorvastatin      Medications were reviewed and updated.     Patient has fluid retention.  Doing better.  Patient was given spironolactone 25 mg to be taken on a as needed basis once a day.     Follow-up in the office in 6 months.     Further plan will depend on patient's progress     Reviewed updated 10/14/2024.  //////////////////////////////                      Diagnosis Plan   1. Chest discomfort        2. Palpitations        3. Essential hypertension        4. Status post percutaneous transluminal coronary angioplasty        5. Near syncope        6. Mixed hyperlipidemia        LAB RESULTS (LAST 7 DAYS)    CBC        BMP        CMP         BNP        TROPONIN        CoAg        Creatinine Clearance  CrCl cannot be calculated (Patient's most recent lab result is older than the maximum 30 days allowed.).    ABG        Radiology  No radiology results for the last day                The following portions of the patient's history were  reviewed and updated as appropriate: allergies, current medications, past family history, past medical history, past social history, past surgical history, and problem list.    Review of Systems   Constitutional: Positive for malaise/fatigue.   Cardiovascular:  Positive for leg swelling. Negative for chest pain, dyspnea on exertion and palpitations.   Respiratory:  Positive for shortness of breath. Negative for cough.    Gastrointestinal:  Negative for abdominal pain, nausea and vomiting.   Neurological:  Positive for dizziness, light-headedness and numbness. Negative for focal weakness and headaches.   All other systems reviewed and are negative.      Current Outpatient Medications:   •  albuterol sulfate  (90 Base) MCG/ACT inhaler, INHALE 1 PUFF EVERY 6 HOURS AS NEEDED FOR WHEEZING OR SHORTNESS OF AIR., Disp: 8.5 g, Rfl: 2  •  aspirin 81 MG tablet, Take 1 tablet by mouth Daily., Disp: , Rfl:   •  atorvastatin (LIPITOR) 10 MG tablet, TAKE 1 TABLET BY MOUTH DAILY, Disp: 28 tablet, Rfl: 1  •  buPROPion XL (WELLBUTRIN XL) 150 MG 24 hr tablet, TAKE 1 TABLET BY MOUTH EVERY MORNING, Disp: 28 tablet, Rfl: 3  •  busPIRone (BUSPAR) 10 MG tablet, TAKE 1 TABLET BY MOUTH 3 (THREE) TIMES A DAY. (PT REQUESTS 1 TWICE DAILY ), Disp: 270 tablet, Rfl: 0  •  cholecalciferol (Vitamin D) 25 MCG (1000 UT) tablet, Take 1 tablet by mouth Daily., Disp: , Rfl:   •  Cholecalciferol (Vitamin D) 50 MCG (2000 UT) tablet, Take 1 tablet by mouth Daily., Disp: , Rfl:   •  citalopram (CeleXA) 20 MG tablet, TAKE 1 TABLET BY MOUTH EVERY DAY, Disp: 28 tablet, Rfl: 1  •  fluticasone (FLONASE) 50 MCG/ACT nasal spray, USE 2 SPRAYS INTO THE NOSTRIL(S) AS DIRECTED BY PROVIDER DAILY., Disp: 9.9 mL, Rfl: 10  •  gabapentin (NEURONTIN) 400 MG capsule, TAKE 1 CAPSULE BY MOUTH EVERY NIGHT AT BEDTIME, Disp: 90 capsule, Rfl: 0  •  Gemtesa 75 MG tablet, Take 1 tablet by mouth Daily., Disp: , Rfl:   •  hydrOXYzine (ATARAX) 10 MG tablet, TAKE 1 TABLET BY MOUTH  EVERY NIGHT AT BEDTIME, Disp: 30 tablet, Rfl: 0  •  levothyroxine (SYNTHROID, LEVOTHROID) 25 MCG tablet, TAKE 1 TABLET BY MOUTH EVERY MORNING, Disp: 28 tablet, Rfl: 1  •  Magnesium Citrate 100 MG capsule, Take 1 capsule by mouth Daily., Disp: , Rfl:   •  metoprolol succinate XL (TOPROL-XL) 25 MG 24 hr tablet, TAKE 2 TABLETS BY MOUTH EVERY DAY (PT REQUESTS 1AM AND 1PM), Disp: 180 tablet, Rfl: 2  •  midodrine (PROAMATINE) 5 MG tablet, Take 0.5 tablets by mouth Daily., Disp: 180 tablet, Rfl: 1  •  montelukast (SINGULAIR) 10 MG tablet, TAKE 1 TABLET BY MOUTH EVERY DAY, Disp: 28 tablet, Rfl: 1  •  spironolactone (ALDACTONE) 25 MG tablet, , Disp: , Rfl:   •  triamcinolone (KENALOG) 0.1 % cream, Apply 1 application topically to the appropriate area as directed 2 (Two) Times a Day., Disp: 30 g, Rfl: 0    Current Facility-Administered Medications:   •  denosumab (PROLIA) syringe 60 mg, 60 mg, Subcutaneous, Q6 Months, Dede Anders PA, 60 mg at 22 1345    Allergies   Allergen Reactions   • Hydrocodone Hives   • Chlorpheniramine-Phenylephrine Rash   • Penicillins Rash   • Sulfa Antibiotics Hives and Rash   • Tetracycline Rash   • Warfarin Rash   • Pseudoephedrine Hcl Rash   • Triprolidine Hcl Rash       Family History   Problem Relation Age of Onset   • No Known Problems Mother    • Heart attack Father         Vein bypass in legs   • Cancer Father            • No Known Problems Sister    • No Known Problems Brother    • No Known Problems Maternal Aunt    • No Known Problems Maternal Uncle    • No Known Problems Paternal Aunt    • No Known Problems Paternal Uncle    • Heart attack Maternal Grandmother         Heart atrack in her 80s   • Heart attack Maternal Grandfather         Heart attack in his 60s   • Heart attack Paternal Grandmother         Type 1 diabetic, heart attack   • Heart attack Paternal Grandfather         Heart attack in his 50s or 60s   • Heart disease Sister         Blockages monitored with  medication   • Hypertension Sister         Coronary heart issues   • Hyperlipidemia Brother         Extremely high triglycerides and cholesterol   • Hypertension Brother         High blood pressure   • Heart attack Brother         Passed from heart attack age 54   • Hyperlipidemia Brother         Unknown   • Hypertension Brother    • Heart attack Brother         Passed from heart attack age 44   • Hyperlipidemia Brother         Had heart disease for years before passing   • Hypertension Brother    • Heart attack Brother          from heart failure in his 60s   • Anemia Neg Hx    • Arrhythmia Neg Hx    • Asthma Neg Hx    • Clotting disorder Neg Hx    • Fainting Neg Hx    • Heart failure Neg Hx        Past Surgical History:   Procedure Laterality Date   • BLADDER SURGERY     • BLADDER SUSPENSION  2023   • CARDIAC CATHETERIZATION N/A 2021    Procedure: Left Heart Cath and coronary angiogram;  Surgeon: Yazmin Matta MD;  Location: [facility] CATH INVASIVE LOCATION;  Service: Cardiovascular;  Laterality: N/A;   • CARDIAC CATHETERIZATION  2021    Procedure: Functional Flow Reserve (iFR);  Surgeon: Bryan Patel MD;  Location: [facility] CATH INVASIVE LOCATION;  Service: Cardiology;;   • CARDIAC CATHETERIZATION N/A 2021    Procedure: Percutaneous Coronary Intervention;  Surgeon: Bryan Patel MD;  Location: [facility] CATH INVASIVE LOCATION;  Service: Cardiology;  Laterality: N/A;   • CARDIAC CATHETERIZATION N/A 2021    Procedure: Stent FRANCOISE coronary;  Surgeon: Bryan Patel MD;  Location: [facility] CATH INVASIVE LOCATION;  Service: Cardiology;  Laterality: N/A;   • CAROTID STENT     • CHOLECYSTECTOMY     • COLONOSCOPY  ??   • CORONARY STENT PLACEMENT  ??   • FRACTURE SURGERY     • HYSTERECTOMY     • INGUINAL HERNIA REPAIR     • JOINT REPLACEMENT  L-knee   • KNEE SURGERY     • OTHER SURGICAL HISTORY      STENT   • SUBTOTAL HYSTERECTOMY         Past Medical  History:   Diagnosis Date   • Allergic    • Anemia s   • Anxiety    • Arthritis    • Asthma 2020   • Ramos's esophagus 02/15/2018   • Cancer Carcinoma Insitu cervical   • Cataract    • Cholelithiasis Removed    • Coronary artery disease    • Depression 2018   • Fibromyalgia 2011   • Fibromyalgia, primary  I think   • GERD (gastroesophageal reflux disease)    • Gout 2020   • Headache    • HL (hearing loss) Age related   • Hyperlipidemia    • Hypertension    • Hypothyroidism    • Joint pain    • Low back pain 0's   • Obesity Lifelong since puberty   • Obstructive sleep apnea 10/16/2014   • Osteopenia Five years appx   • Osteoporosis     fosamax(SE), on prolia(4/10/18-21, 22)   • Otitis media    • Pedal edema 10/22/2018   • Pneumonia    • Presence of left artificial knee joint 2019   • Scoliosis    • Seasonal allergies    • ST elevation (STEMI) myocardial infarction 2020   • Status post percutaneous transluminal coronary angioplasty 06/10/2014   • Substance abuse    • Tachycardia    • Urge incontinence 2018   • Urinary tract infection    • Visual impairment Catsracts   • Vitamin D deficiency 2015       Family History   Problem Relation Age of Onset   • No Known Problems Mother    • Heart attack Father         Vein bypass in legs   • Cancer Father            • No Known Problems Sister    • No Known Problems Brother    • No Known Problems Maternal Aunt    • No Known Problems Maternal Uncle    • No Known Problems Paternal Aunt    • No Known Problems Paternal Uncle    • Heart attack Maternal Grandmother         Heart atrack in her 80s   • Heart attack Maternal Grandfather         Heart attack in his 60s   • Heart attack Paternal Grandmother         Type 1 diabetic, heart attack   • Heart attack Paternal Grandfather         Heart attack in his 50s or 60s   • Heart disease Sister         Blockages monitored with  medication   • Hypertension Sister         Coronary heart issues   • Hyperlipidemia Brother         Extremely high triglycerides and cholesterol   • Hypertension Brother         High blood pressure   • Heart attack Brother         Passed from heart attack age 54   • Hyperlipidemia Brother         Unknown   • Hypertension Brother    • Heart attack Brother         Passed from heart attack age 44   • Hyperlipidemia Brother         Had heart disease for years before passing   • Hypertension Brother    • Heart attack Brother          from heart failure in his 60s   • Anemia Neg Hx    • Arrhythmia Neg Hx    • Asthma Neg Hx    • Clotting disorder Neg Hx    • Fainting Neg Hx    • Heart failure Neg Hx        Social History     Socioeconomic History   • Marital status:    Tobacco Use   • Smoking status: Never     Passive exposure: Never   • Smokeless tobacco: Never   • Tobacco comments:     Parents and husbands heavy smokers   Vaping Use   • Vaping status: Never Used   Substance and Sexual Activity   • Alcohol use: Not Currently     Comment: Recovery since  drank from age [age]   • Drug use: Not Currently     Frequency: 1.0 times per week     Types: Amphetamines     Comment: Short term use/over use diet pills in the 70's   • Sexual activity: Not Currently     Partners: Male     Birth control/protection: None, Hysterectomy           ECG 12 Lead    Date/Time: 10/14/2024 4:24 PM  Performed by: Yazmin Matta MD    Authorized by: Yazmin Matta MD  Comparison: compared with previous ECG   Similar to previous ECG  Comparison to previous ECG: Normal sinus rhythm nonspecific ST-T wave changes left anterior fascicular block no ectopy 62/min no significant change from previous EKG.        Objective:       Physical Exam    There were no vitals taken for this visit.  The patient is alert, oriented and in no distress.    Vital signs as noted above.    Head and neck revealed no carotid bruits or jugular venous  distension.  No thyromegaly or lymphadenopathy is present.    Lungs clear.  No wheezing.  Breath sounds are normal bilaterally.    Heart normal first and second heart sounds.  No murmur..  No pericardial rub is present.  No gallop is present.    Abdomen soft and nontender.  No organomegaly is present.    Extremities revealed good peripheral pulses without any pedal edema.    Skin warm and dry.    Musculoskeletal system is grossly normal.    CNS grossly normal.    Reviewed and updated.

## 2024-10-14 ENCOUNTER — OFFICE VISIT (OUTPATIENT)
Dept: CARDIOLOGY | Facility: CLINIC | Age: 74
End: 2024-10-14
Payer: MEDICARE

## 2024-10-14 VITALS
OXYGEN SATURATION: 96 % | SYSTOLIC BLOOD PRESSURE: 117 MMHG | DIASTOLIC BLOOD PRESSURE: 74 MMHG | BODY MASS INDEX: 38.83 KG/M2 | HEIGHT: 58 IN | WEIGHT: 185 LBS | HEART RATE: 59 BPM

## 2024-10-14 DIAGNOSIS — R55 NEAR SYNCOPE: ICD-10-CM

## 2024-10-14 DIAGNOSIS — Z98.61 STATUS POST PERCUTANEOUS TRANSLUMINAL CORONARY ANGIOPLASTY: ICD-10-CM

## 2024-10-14 DIAGNOSIS — R07.89 CHEST DISCOMFORT: Primary | ICD-10-CM

## 2024-10-14 DIAGNOSIS — E78.2 MIXED HYPERLIPIDEMIA: ICD-10-CM

## 2024-10-14 DIAGNOSIS — I10 ESSENTIAL HYPERTENSION: ICD-10-CM

## 2024-10-14 DIAGNOSIS — R00.2 PALPITATIONS: ICD-10-CM

## 2024-10-14 PROCEDURE — 3078F DIAST BP <80 MM HG: CPT | Performed by: INTERNAL MEDICINE

## 2024-10-14 PROCEDURE — 1160F RVW MEDS BY RX/DR IN RCRD: CPT | Performed by: INTERNAL MEDICINE

## 2024-10-14 PROCEDURE — 1159F MED LIST DOCD IN RCRD: CPT | Performed by: INTERNAL MEDICINE

## 2024-10-14 PROCEDURE — 99214 OFFICE O/P EST MOD 30 MIN: CPT | Performed by: INTERNAL MEDICINE

## 2024-10-14 PROCEDURE — 3074F SYST BP LT 130 MM HG: CPT | Performed by: INTERNAL MEDICINE

## 2024-10-14 PROCEDURE — 93000 ELECTROCARDIOGRAM COMPLETE: CPT | Performed by: INTERNAL MEDICINE

## 2024-11-12 DIAGNOSIS — E78.2 MIXED HYPERLIPIDEMIA: ICD-10-CM

## 2024-11-12 DIAGNOSIS — J45.909 ASTHMA, UNSPECIFIED ASTHMA SEVERITY, UNSPECIFIED WHETHER COMPLICATED, UNSPECIFIED WHETHER PERSISTENT: ICD-10-CM

## 2024-11-12 DIAGNOSIS — E03.9 ACQUIRED HYPOTHYROIDISM: ICD-10-CM

## 2024-11-12 DIAGNOSIS — F32.A DEPRESSIVE DISORDER: ICD-10-CM

## 2024-11-12 RX ORDER — MONTELUKAST SODIUM 10 MG/1
TABLET ORAL
Qty: 28 TABLET | Refills: 0 | Status: SHIPPED | OUTPATIENT
Start: 2024-11-12 | End: 2024-11-12 | Stop reason: SDUPTHER

## 2024-11-12 RX ORDER — GABAPENTIN 400 MG/1
400 CAPSULE ORAL
Qty: 90 CAPSULE | Refills: 0 | Status: SHIPPED | OUTPATIENT
Start: 2024-11-12

## 2024-11-12 RX ORDER — LEVOTHYROXINE SODIUM 25 UG/1
25 TABLET ORAL
Qty: 28 TABLET | Refills: 0 | Status: SHIPPED | OUTPATIENT
Start: 2024-11-12 | End: 2024-11-12 | Stop reason: SDUPTHER

## 2024-11-12 RX ORDER — CITALOPRAM HYDROBROMIDE 20 MG/1
TABLET ORAL
Qty: 28 TABLET | Refills: 0 | Status: SHIPPED | OUTPATIENT
Start: 2024-11-12 | End: 2024-11-12 | Stop reason: SDUPTHER

## 2024-11-12 RX ORDER — ATORVASTATIN CALCIUM 10 MG/1
10 TABLET, FILM COATED ORAL DAILY
Qty: 28 TABLET | Refills: 0 | Status: SHIPPED | OUTPATIENT
Start: 2024-11-12 | End: 2024-11-12 | Stop reason: SDUPTHER

## 2024-11-12 NOTE — TELEPHONE ENCOUNTER
[facility] pharmacist called stating that pt previously was getting rxs 28 day supply in pillpacks with them. She is now going to be getting bottles instead. Asking for qty 30 instead of rxs that were sent in today.

## 2024-11-15 RX ORDER — CITALOPRAM HYDROBROMIDE 20 MG/1
20 TABLET ORAL DAILY
Qty: 30 TABLET | Refills: 0 | Status: SHIPPED | OUTPATIENT
Start: 2024-11-15

## 2024-11-15 RX ORDER — MONTELUKAST SODIUM 10 MG/1
10 TABLET ORAL DAILY
Qty: 30 TABLET | Refills: 0 | Status: SHIPPED | OUTPATIENT
Start: 2024-11-15

## 2024-11-15 RX ORDER — ATORVASTATIN CALCIUM 10 MG/1
10 TABLET, FILM COATED ORAL DAILY
Qty: 30 TABLET | Refills: 0 | Status: SHIPPED | OUTPATIENT
Start: 2024-11-15

## 2024-11-15 RX ORDER — LEVOTHYROXINE SODIUM 25 UG/1
25 TABLET ORAL
Qty: 30 TABLET | Refills: 0 | Status: SHIPPED | OUTPATIENT
Start: 2024-11-15

## 2024-12-09 RX ORDER — BUSPIRONE HYDROCHLORIDE 10 MG/1
TABLET ORAL
Qty: 270 TABLET | Refills: 0 | Status: SHIPPED | OUTPATIENT
Start: 2024-12-09

## 2024-12-11 DIAGNOSIS — F32.A DEPRESSIVE DISORDER: ICD-10-CM

## 2024-12-11 DIAGNOSIS — E78.2 MIXED HYPERLIPIDEMIA: ICD-10-CM

## 2024-12-11 DIAGNOSIS — J45.909 ASTHMA, UNSPECIFIED ASTHMA SEVERITY, UNSPECIFIED WHETHER COMPLICATED, UNSPECIFIED WHETHER PERSISTENT: ICD-10-CM

## 2024-12-11 DIAGNOSIS — E03.9 ACQUIRED HYPOTHYROIDISM: ICD-10-CM

## 2024-12-11 RX ORDER — ATORVASTATIN CALCIUM 10 MG/1
10 TABLET, FILM COATED ORAL DAILY
Qty: 90 TABLET | Refills: 0 | Status: SHIPPED | OUTPATIENT
Start: 2024-12-11

## 2024-12-11 RX ORDER — LEVOTHYROXINE SODIUM 25 UG/1
25 TABLET ORAL EVERY MORNING
Qty: 90 TABLET | Refills: 0 | Status: SHIPPED | OUTPATIENT
Start: 2024-12-11

## 2024-12-11 RX ORDER — CITALOPRAM HYDROBROMIDE 20 MG/1
20 TABLET ORAL DAILY
Qty: 90 TABLET | Refills: 0 | Status: SHIPPED | OUTPATIENT
Start: 2024-12-11

## 2024-12-11 RX ORDER — MONTELUKAST SODIUM 10 MG/1
10 TABLET ORAL DAILY
Qty: 90 TABLET | Refills: 0 | Status: SHIPPED | OUTPATIENT
Start: 2024-12-11

## 2025-01-22 DIAGNOSIS — E03.9 ACQUIRED HYPOTHYROIDISM: ICD-10-CM

## 2025-01-22 RX ORDER — LEVOTHYROXINE SODIUM 25 UG/1
25 TABLET ORAL EVERY MORNING
Qty: 90 TABLET | Refills: 0 | Status: SHIPPED | OUTPATIENT
Start: 2025-01-22

## 2025-03-17 DIAGNOSIS — E78.2 MIXED HYPERLIPIDEMIA: ICD-10-CM

## 2025-03-17 DIAGNOSIS — J45.909 ASTHMA, UNSPECIFIED ASTHMA SEVERITY, UNSPECIFIED WHETHER COMPLICATED, UNSPECIFIED WHETHER PERSISTENT: ICD-10-CM

## 2025-03-17 DIAGNOSIS — F32.A DEPRESSIVE DISORDER: ICD-10-CM

## 2025-03-17 RX ORDER — CITALOPRAM HYDROBROMIDE 20 MG/1
20 TABLET ORAL DAILY
Qty: 90 TABLET | Refills: 0 | Status: SHIPPED | OUTPATIENT
Start: 2025-03-17

## 2025-03-17 RX ORDER — BUSPIRONE HYDROCHLORIDE 10 MG/1
TABLET ORAL
Qty: 270 TABLET | Refills: 0 | Status: SHIPPED | OUTPATIENT
Start: 2025-03-17

## 2025-03-17 RX ORDER — ATORVASTATIN CALCIUM 10 MG/1
10 TABLET, FILM COATED ORAL DAILY
Qty: 90 TABLET | Refills: 0 | Status: SHIPPED | OUTPATIENT
Start: 2025-03-17

## 2025-03-17 RX ORDER — GABAPENTIN 400 MG/1
400 CAPSULE ORAL
Qty: 90 CAPSULE | Refills: 0 | Status: SHIPPED | OUTPATIENT
Start: 2025-03-17

## 2025-03-17 RX ORDER — MONTELUKAST SODIUM 10 MG/1
10 TABLET ORAL DAILY
Qty: 90 TABLET | Refills: 0 | Status: SHIPPED | OUTPATIENT
Start: 2025-03-17

## 2025-03-18 ENCOUNTER — OFFICE VISIT (OUTPATIENT)
Dept: FAMILY MEDICINE CLINIC | Facility: CLINIC | Age: 75
End: 2025-03-18
Payer: MEDICARE

## 2025-03-18 VITALS
WEIGHT: 183 LBS | HEART RATE: 68 BPM | SYSTOLIC BLOOD PRESSURE: 128 MMHG | BODY MASS INDEX: 38.41 KG/M2 | HEIGHT: 58 IN | DIASTOLIC BLOOD PRESSURE: 70 MMHG | OXYGEN SATURATION: 98 %

## 2025-03-18 DIAGNOSIS — J45.909 ASTHMA, UNSPECIFIED ASTHMA SEVERITY, UNSPECIFIED WHETHER COMPLICATED, UNSPECIFIED WHETHER PERSISTENT: ICD-10-CM

## 2025-03-18 DIAGNOSIS — M54.50 ACUTE RIGHT-SIDED LOW BACK PAIN WITHOUT SCIATICA: Primary | ICD-10-CM

## 2025-03-18 DIAGNOSIS — R07.9 CHEST PAIN, UNSPECIFIED TYPE: ICD-10-CM

## 2025-03-18 DIAGNOSIS — N83.201 RIGHT OVARIAN CYST: ICD-10-CM

## 2025-03-18 PROCEDURE — 3078F DIAST BP <80 MM HG: CPT | Performed by: FAMILY MEDICINE

## 2025-03-18 PROCEDURE — 1126F AMNT PAIN NOTED NONE PRSNT: CPT | Performed by: FAMILY MEDICINE

## 2025-03-18 PROCEDURE — 3074F SYST BP LT 130 MM HG: CPT | Performed by: FAMILY MEDICINE

## 2025-03-18 PROCEDURE — 99214 OFFICE O/P EST MOD 30 MIN: CPT | Performed by: FAMILY MEDICINE

## 2025-03-18 PROCEDURE — 93000 ELECTROCARDIOGRAM COMPLETE: CPT | Performed by: FAMILY MEDICINE

## 2025-03-18 RX ORDER — FAMOTIDINE 40 MG/1
40 TABLET, FILM COATED ORAL DAILY
Qty: 30 TABLET | Refills: 0 | Status: SHIPPED | OUTPATIENT
Start: 2025-03-18

## 2025-03-18 RX ORDER — ALBUTEROL SULFATE 90 UG/1
2 INHALANT RESPIRATORY (INHALATION) EVERY 6 HOURS PRN
Qty: 8.5 G | Refills: 11 | Status: SHIPPED | OUTPATIENT
Start: 2025-03-18

## 2025-03-18 NOTE — PATIENT INSTRUCTIONS
Warm compresses to back  Call and make and earlier appt to see Dr. Matta   Go to the ER if the pain worsens

## 2025-03-18 NOTE — PROGRESS NOTES
Subjective   Charleen Barnett is a 74 y.o. female.     History of Present Illness  The patient presents for evaluation of right low back pain, right upper arm pain, and chest pain.    She reports experiencing a burning sensation in her right lower back, which she attributes to a fall approximately 6 weeks ago. The onset of this pain was noted 2 weeks ago. She has been managing the pain by resting in bed, which provides some relief. She does not experience any radiating pain to her legs.    Additionally, she has been experiencing intermittent pain in her right upper arm for an extended period. This pain is associated with chest discomfort that radiates to her back. The chest pain is not constant but occurs sporadically. She suspects aspiration of water as a potential cause. She describes the pain as similar to that experienced during a heart attack, although without the characteristic rotation. This symptom has been present for the past 1 to 2 days. She also reports persistent shortness of breath upon exertion, but does not believe it is exacerbated by the chest pain. She does not experience any associated sweating but admits to occasional nausea. She has not attempted to alleviate the pain with Tums. She rates the pain as 9 on a scale of 1 to 10. Currently, she is not experiencing significant pain, but notes soreness upon palpation. She is currently on lansoprazole.    Supplemental Information  She had a left knee replacement and is unsure if she is walking differently now. She has been experiencing mild intermittent pain for a while. She has been experiencing difficulty with ambulation, even while on midodrine, and considered bringing a cane to the appointment due to instability.    MEDICATIONS  Current: Lansoprazole, midodrine.       The following portions of the patient's history were reviewed and updated as appropriate: allergies, current medications, past family history, past medical history, past social  history, past surgical history, and problem list.  Past Medical History:   Diagnosis Date    Allergic 1970's    Anemia 1950's    Angina pectoris     Anxiety     Arthritis     Asthma 02/13/2020    Ramos's esophagus 02/15/2018    Cancer Carcinoma Insitu cervical    Cataract     Cholelithiasis Removed 2000's    Chronic constipation     Chronic diarrhea     Colon polyp     Coronary artery disease     Depression 08/27/2018    Fibromyalgia 12/14/2011    Fibromyalgia, primary 1990's I think    GERD (gastroesophageal reflux disease)     Gout 02/13/2020    Headache     HL (hearing loss) Age related    Hyperlipidemia     Hypertension     Hypothyroidism 2022    Injury of back     Irritable bowel syndrome     Most of my life    Joint pain     Low back pain 2970's    Obesity Lifelong since puberty    Obstructive sleep apnea 10/16/2014    Osteopenia Five years appx    Osteoporosis     fosamax(SE), on prolia(4/10/18-6/18/21, 2/1/22)    Otitis media     Pedal edema 10/22/2018    Pneumonia 2020    Presence of left artificial knee joint 03/14/2019    Scoliosis 1961    Seasonal allergies     Shortness of breath     ST elevation (STEMI) myocardial infarction 02/13/2020    Status post percutaneous transluminal coronary angioplasty 06/10/2014    Substance abuse     Tachycardia     Tremor 2015    Urge incontinence 08/27/2018    Urinary tract infection     Visual impairment Catsracts    Vitamin D deficiency 03/19/2015     Past Surgical History:   Procedure Laterality Date    BLADDER SURGERY      BLADDER SUSPENSION  04/2023    CARDIAC CATHETERIZATION N/A 05/29/2021    Procedure: Left Heart Cath and coronary angiogram;  Surgeon: Yazmin Matta MD;  Location: [facility] CATH INVASIVE LOCATION;  Service: Cardiovascular;  Laterality: N/A;    CARDIAC CATHETERIZATION  05/29/2021    Procedure: Functional Flow Reserve (iFR);  Surgeon: Bryan Patel MD;  Location: [facility] CATH INVASIVE LOCATION;  Service: Cardiology;;    CARDIAC  CATHETERIZATION N/A 2021    Procedure: Percutaneous Coronary Intervention;  Surgeon: Bryan Patel MD;  Location:  [facility] CATH INVASIVE LOCATION;  Service: Cardiology;  Laterality: N/A;    CARDIAC CATHETERIZATION N/A 2021    Procedure: Stent FRANCOISE coronary;  Surgeon: Bryan Patel MD;  Location:  [facility] CATH INVASIVE LOCATION;  Service: Cardiology;  Laterality: N/A;    CAROTID STENT      CHOLECYSTECTOMY      COLONOSCOPY  ??    CORONARY STENT PLACEMENT  ??    FRACTURE SURGERY      HYSTERECTOMY      INGUINAL HERNIA REPAIR      JOINT REPLACEMENT  L-knee    KNEE SURGERY      OTHER SURGICAL HISTORY      STENT    SUBTOTAL HYSTERECTOMY       Family History   Problem Relation Age of Onset    Alcohol abuse Mother     Anxiety disorder Mother     Depression Mother     Early death Mother     Mental illness Mother     Heart attack Father         Vein bypass in legs    Cancer Father             Alcohol abuse Father     Depression Father     No Known Problems Sister     No Known Problems Brother     No Known Problems Maternal Aunt     No Known Problems Maternal Uncle     Stroke Paternal Aunt     No Known Problems Paternal Uncle     Heart attack Maternal Grandmother         Heart atrack in her 80s    Heart attack Maternal Grandfather         Heart attack in his 60s    Alcohol abuse Maternal Grandfather     Heart attack Paternal Grandmother         Type 1 diabetic, heart attack    Diabetes Paternal Grandmother     Heart attack Paternal Grandfather         Heart attack in his 50s or 60s    Heart disease Sister         Blockages monitored with medication    Hypertension Sister         Coronary heart issues    Depression Sister     Hyperlipidemia Brother         Extremely high triglycerides and cholesterol    Hypertension Brother         High blood pressure    Heart attack Brother         Passed from heart attack age 54    Alcohol abuse Brother     Arthritis Brother     Depression  Brother     Early death Brother     Hyperlipidemia Brother         Unknown    Hypertension Brother     Heart attack Brother         Passed from heart attack age 44    Alcohol abuse Brother     Depression Brother     Early death Brother     Hyperlipidemia Brother         Had heart disease for years before passing    Hypertension Brother     Heart attack Brother          from heart failure in his 60s    Alcohol abuse Brother     Early death Brother     Mental illness Brother     Anemia Neg Hx     Arrhythmia Neg Hx     Asthma Neg Hx     Clotting disorder Neg Hx     Fainting Neg Hx     Heart failure Neg Hx      Social History     Socioeconomic History    Marital status:    Tobacco Use    Smoking status: Never     Passive exposure: Never    Smokeless tobacco: Never    Tobacco comments:     Parents and husbands heavy smokers   Vaping Use    Vaping status: Never Used   Substance and Sexual Activity    Alcohol use: Not Currently     Comment: Recovery since  drank from [age]    Drug use: Not Currently     Frequency: 1.0 times per week     Types: Amphetamines     Comment: Short term use/over use diet pills in the 70's    Sexual activity: Not Currently     Partners: Male     Birth control/protection: Post-menopausal, None, Hysterectomy         Current Outpatient Medications:     albuterol sulfate  (90 Base) MCG/ACT inhaler, Inhale 2 puffs Every 6 (Six) Hours As Needed for Wheezing., Disp: 8.5 g, Rfl: 11    aspirin 81 MG tablet, Take 1 tablet by mouth Daily., Disp: , Rfl:     atorvastatin (LIPITOR) 10 MG tablet, TAKE ONE TABLET BY MOUTH DAILY, Disp: 90 tablet, Rfl: 0    buPROPion XL (WELLBUTRIN XL) 150 MG 24 hr tablet, TAKE 1 TABLET BY MOUTH EVERY MORNING, Disp: 28 tablet, Rfl: 3    busPIRone (BUSPAR) 10 MG tablet, TAKE ONE TABLET BY MOUTH THREE TIMES A DAY. (PT REQUESTS 1 TWICE DAILY ), Disp: 270 tablet, Rfl: 0    Cholecalciferol (Vitamin D) 50 MCG ( UT) tablet, Take 1 tablet by mouth Daily., Disp: ,  "Rfl:     citalopram (CeleXA) 20 MG tablet, TAKE ONE TABLET BY MOUTH DAILY, Disp: 90 tablet, Rfl: 0    fluticasone (FLONASE) 50 MCG/ACT nasal spray, USE 2 SPRAYS INTO THE NOSTRIL(S) AS DIRECTED BY PROVIDER DAILY., Disp: 9.9 mL, Rfl: 10    gabapentin (NEURONTIN) 400 MG capsule, TAKE ONE CAPSULE BY MOUTH AT BEDTIME, Disp: 90 capsule, Rfl: 0    Gemtesa 75 MG tablet, Take 1 tablet by mouth Daily., Disp: , Rfl:     hydrOXYzine (ATARAX) 10 MG tablet, TAKE 1 TABLET BY MOUTH EVERY NIGHT AT BEDTIME, Disp: 30 tablet, Rfl: 0    levothyroxine (SYNTHROID, LEVOTHROID) 25 MCG tablet, TAKE ONE TABLET BY MOUTH EVERY MORNING, Disp: 90 tablet, Rfl: 0    Magnesium Citrate 100 MG capsule, Take 1 capsule by mouth Daily., Disp: , Rfl:     metoprolol succinate XL (TOPROL-XL) 25 MG 24 hr tablet, TAKE 2 TABLETS BY MOUTH EVERY DAY (PT REQUESTS 1AM AND 1PM), Disp: 180 tablet, Rfl: 2    midodrine (PROAMATINE) 5 MG tablet, Take 0.5 tablets by mouth Daily., Disp: 180 tablet, Rfl: 1    montelukast (SINGULAIR) 10 MG tablet, TAKE ONE TABLET BY MOUTH DAILY, Disp: 90 tablet, Rfl: 0    spironolactone (ALDACTONE) 25 MG tablet, As Needed., Disp: , Rfl:     cholecalciferol (Vitamin D) 25 MCG (1000 UT) tablet, Take 1 tablet by mouth Daily., Disp: , Rfl:     famotidine (Pepcid) 40 MG tablet, Take 1 tablet by mouth Daily. For reflux, Disp: 30 tablet, Rfl: 0    Current Facility-Administered Medications:     denosumab (PROLIA) syringe 60 mg, 60 mg, Subcutaneous, Q6 Months, Dede Anders PA, 60 mg at 08/03/22 1345    Review of Systems  /70 (BP Location: Left arm, Patient Position: Sitting, Cuff Size: Large Adult)   Pulse 68   Ht 147.3 cm (58\")   Wt 83 kg (183 lb)   SpO2 98%   BMI 38.25 kg/m²          Objective   Physical Exam  Vitals and nursing note reviewed.   Constitutional:       Appearance: Normal appearance. She is obese.   Cardiovascular:      Rate and Rhythm: Normal rate and regular rhythm.      Heart sounds: Normal heart sounds. "   Pulmonary:      Effort: Pulmonary effort is normal.      Comments: Slightly diminished breath sounds throughout  Chest:      Chest wall: No tenderness.   Abdominal:      General: Abdomen is flat. Bowel sounds are normal.      Palpations: Abdomen is soft.      Tenderness: There is no abdominal tenderness.   Musculoskeletal:      Lumbar back: Tenderness (just lateral to the spine on the right) present. No swelling, edema or spasms. Decreased range of motion.   Neurological:      Mental Status: She is alert.      Comments: LE strength 5/5       Physical Exam  Lungs were auscultated.       Results       ECG 12 Lead    Date/Time: 3/18/2025 12:56 PM  Performed by: Ilsa Her MD    Authorized by: Ilsa Her MD  Comparison: compared with previous ECG from 10/14/2024  Similar to previous ECG  Rhythm: sinus rhythm  Rate: bradycardic  Conduction: conduction normal  ST Segments: ST segments normal  T flattening: all  QRS axis: normal  Other findings: non-specific ST-T wave changes  Comments: Sinus bradycardia with nonspecific ST-T wave changes          Assessment & Plan   Problems Addressed this Visit          Cardiac and Vasculature    Chest pain       Genitourinary and Reproductive     Right ovarian cyst       Pulmonary and Pneumonias    Asthma    Relevant Medications    albuterol sulfate  (90 Base) MCG/ACT inhaler     Other Visit Diagnoses         Acute right-sided low back pain without sciatica    -  Primary    Relevant Orders    XR Spine Lumbar 2 or 3 View          Diagnoses         Codes Comments      Acute right-sided low back pain without sciatica    -  Primary ICD-10-CM: M54.50  ICD-9-CM: 724.2       Right ovarian cyst     ICD-10-CM: N83.201  ICD-9-CM: 620.2       Asthma, unspecified asthma severity, unspecified whether complicated, unspecified whether persistent     ICD-10-CM: J45.909  ICD-9-CM: 493.90       Chest pain, unspecified type     ICD-10-CM: R07.9  ICD-9-CM: 786.50            Assessment & Plan  1. Right low back pain.  The patient reports experiencing right low back pain for the past 2 weeks, which she rates as 15 out of 10 in severity. The pain is described as burning and is slightly on the right side. She recalls a fall approximately 6 weeks ago, which may have contributed to the onset of this pain. The pain is relieved somewhat by lying down.  I have ordered an L-spine x-ray for further evaluation    2. Right upper arm pain.  She experiences intermittent right upper arm pain that is associated with chest pain. The pain radiates to her back and chest and is similar to the pain she felt during a previous heart attack, although it does not rotate. She rates the pain as 9 out of 10. She has not taken any Tums or similar medications to alleviate the pain.     3.  Chest pain.  EKG is essentially unchanged except the right in comparison to one from October of last year.  I given her strict instructions that if she develops pain that will not ease she is to be in the emergency room immediately.  She has an appointment with her cardiologist middle of next month.  I have encouraged her to call the office and see if she can get an earlier appointment.  I am also going to get an x-ray.  On the off chance that this is related to reflux I have started her on some Pepcid 40 mg daily    4.  She has a history of a right ovarian cyst that needs follow-up.  I will order the ultrasound for her.  She left before I could discuss this with her but I will reach out let her know    PROCEDURE  The patient underwent a left knee replacement procedure.            Patient or patient representative verbalized consent for the use of Ambient Listening during the visit with  Ilsa Her MD for chart documentation. 3/18/2025  13:00 EDT

## 2025-03-20 ENCOUNTER — APPOINTMENT (OUTPATIENT)
Dept: GENERAL RADIOLOGY | Facility: HOSPITAL | Age: 75
End: 2025-03-20
Payer: MEDICARE

## 2025-03-20 ENCOUNTER — HOSPITAL ENCOUNTER (OUTPATIENT)
Facility: HOSPITAL | Age: 75
Setting detail: OBSERVATION
Discharge: HOME | End: 2025-03-21
Attending: EMERGENCY MEDICINE | Admitting: EMERGENCY MEDICINE
Payer: MEDICARE

## 2025-03-20 DIAGNOSIS — R07.9 CHEST PAIN, UNSPECIFIED TYPE: Primary | ICD-10-CM

## 2025-03-20 LAB
ALBUMIN SERPL-MCNC: 4 G/DL (ref 3.5–5.2)
ALBUMIN/GLOB SERPL: 1.8 G/DL
ALP SERPL-CCNC: 95 U/L (ref 39–117)
ALT SERPL W P-5'-P-CCNC: 14 U/L (ref 1–33)
ANION GAP SERPL CALCULATED.3IONS-SCNC: 10.2 MMOL/L (ref 5–15)
AST SERPL-CCNC: 20 U/L (ref 1–32)
BASOPHILS # BLD AUTO: 0.04 10*3/MM3 (ref 0–0.2)
BASOPHILS NFR BLD AUTO: 0.7 % (ref 0–1.5)
BILIRUB SERPL-MCNC: 0.5 MG/DL (ref 0–1.2)
BUN SERPL-MCNC: 10 MG/DL (ref 8–23)
BUN/CREAT SERPL: 9.4 (ref 7–25)
CALCIUM SPEC-SCNC: 9.3 MG/DL (ref 8.6–10.5)
CHLORIDE SERPL-SCNC: 100 MMOL/L (ref 98–107)
CO2 SERPL-SCNC: 28.8 MMOL/L (ref 22–29)
CREAT SERPL-MCNC: 1.06 MG/DL (ref 0.57–1)
DEPRECATED RDW RBC AUTO: 39.5 FL (ref 37–54)
EGFRCR SERPLBLD CKD-EPI 2021: 55.2 ML/MIN/1.73
EOSINOPHIL # BLD AUTO: 0.15 10*3/MM3 (ref 0–0.4)
EOSINOPHIL NFR BLD AUTO: 2.6 % (ref 0.3–6.2)
ERYTHROCYTE [DISTWIDTH] IN BLOOD BY AUTOMATED COUNT: 12.4 % (ref 12.3–15.4)
GEN 5 1HR TROPONIN T REFLEX: 15 NG/L
GLOBULIN UR ELPH-MCNC: 2.2 GM/DL
GLUCOSE SERPL-MCNC: 169 MG/DL (ref 65–99)
HCT VFR BLD AUTO: 39.8 % (ref 34–46.6)
HGB BLD-MCNC: 12.6 G/DL (ref 12–15.9)
HOLD SPECIMEN: NORMAL
IMM GRANULOCYTES # BLD AUTO: 0.01 10*3/MM3 (ref 0–0.05)
IMM GRANULOCYTES NFR BLD AUTO: 0.2 % (ref 0–0.5)
LYMPHOCYTES # BLD AUTO: 1.69 10*3/MM3 (ref 0.7–3.1)
LYMPHOCYTES NFR BLD AUTO: 28.8 % (ref 19.6–45.3)
MCH RBC QN AUTO: 27.7 PG (ref 26.6–33)
MCHC RBC AUTO-ENTMCNC: 31.7 G/DL (ref 31.5–35.7)
MCV RBC AUTO: 87.5 FL (ref 79–97)
MONOCYTES # BLD AUTO: 0.29 10*3/MM3 (ref 0.1–0.9)
MONOCYTES NFR BLD AUTO: 4.9 % (ref 5–12)
NEUTROPHILS NFR BLD AUTO: 3.69 10*3/MM3 (ref 1.7–7)
NEUTROPHILS NFR BLD AUTO: 62.8 % (ref 42.7–76)
NRBC BLD AUTO-RTO: 0 /100 WBC (ref 0–0.2)
PLATELET # BLD AUTO: 196 10*3/MM3 (ref 140–450)
PMV BLD AUTO: 10.1 FL (ref 6–12)
POTASSIUM SERPL-SCNC: 3.7 MMOL/L (ref 3.5–5.2)
PROT SERPL-MCNC: 6.2 G/DL (ref 6–8.5)
RBC # BLD AUTO: 4.55 10*6/MM3 (ref 3.77–5.28)
SODIUM SERPL-SCNC: 139 MMOL/L (ref 136–145)
TROPONIN T % DELTA: 0
TROPONIN T NUMERIC DELTA: 0 NG/L
TROPONIN T SERPL HS-MCNC: 15 NG/L
WBC NRBC COR # BLD AUTO: 5.87 10*3/MM3 (ref 3.4–10.8)
WHOLE BLOOD HOLD COAG: NORMAL

## 2025-03-20 PROCEDURE — 85025 COMPLETE CBC W/AUTO DIFF WBC: CPT | Performed by: EMERGENCY MEDICINE

## 2025-03-20 PROCEDURE — 93005 ELECTROCARDIOGRAM TRACING: CPT

## 2025-03-20 PROCEDURE — 71045 X-RAY EXAM CHEST 1 VIEW: CPT

## 2025-03-20 PROCEDURE — 99285 EMERGENCY DEPT VISIT HI MDM: CPT

## 2025-03-20 PROCEDURE — 93005 ELECTROCARDIOGRAM TRACING: CPT | Performed by: EMERGENCY MEDICINE

## 2025-03-20 PROCEDURE — 36415 COLL VENOUS BLD VENIPUNCTURE: CPT

## 2025-03-20 PROCEDURE — 80053 COMPREHEN METABOLIC PANEL: CPT | Performed by: EMERGENCY MEDICINE

## 2025-03-20 PROCEDURE — G0378 HOSPITAL OBSERVATION PER HR: HCPCS

## 2025-03-20 PROCEDURE — 84484 ASSAY OF TROPONIN QUANT: CPT | Performed by: EMERGENCY MEDICINE

## 2025-03-20 PROCEDURE — 73502 X-RAY EXAM HIP UNI 2-3 VIEWS: CPT

## 2025-03-20 RX ORDER — BISACODYL 10 MG
10 SUPPOSITORY, RECTAL RECTAL DAILY PRN
Status: DISCONTINUED | OUTPATIENT
Start: 2025-03-20 | End: 2025-03-21 | Stop reason: HOSPADM

## 2025-03-20 RX ORDER — ASPIRIN 325 MG
325 TABLET ORAL ONCE
Status: COMPLETED | OUTPATIENT
Start: 2025-03-20 | End: 2025-03-20

## 2025-03-20 RX ORDER — SODIUM CHLORIDE 9 MG/ML
40 INJECTION, SOLUTION INTRAVENOUS AS NEEDED
Status: DISCONTINUED | OUTPATIENT
Start: 2025-03-20 | End: 2025-03-21 | Stop reason: HOSPADM

## 2025-03-20 RX ORDER — ACETAMINOPHEN 325 MG/1
650 TABLET ORAL EVERY 6 HOURS PRN
Status: DISCONTINUED | OUTPATIENT
Start: 2025-03-20 | End: 2025-03-21 | Stop reason: HOSPADM

## 2025-03-20 RX ORDER — NITROGLYCERIN 0.4 MG/1
0.4 TABLET SUBLINGUAL
Status: DISCONTINUED | OUTPATIENT
Start: 2025-03-20 | End: 2025-03-21 | Stop reason: HOSPADM

## 2025-03-20 RX ORDER — AMOXICILLIN 250 MG
2 CAPSULE ORAL 2 TIMES DAILY
Status: DISCONTINUED | OUTPATIENT
Start: 2025-03-20 | End: 2025-03-21 | Stop reason: HOSPADM

## 2025-03-20 RX ORDER — MORPHINE SULFATE 2 MG/ML
1 INJECTION, SOLUTION INTRAMUSCULAR; INTRAVENOUS EVERY 4 HOURS PRN
Status: DISCONTINUED | OUTPATIENT
Start: 2025-03-20 | End: 2025-03-21 | Stop reason: HOSPADM

## 2025-03-20 RX ORDER — NALOXONE HCL 0.4 MG/ML
0.4 VIAL (ML) INJECTION
Status: DISCONTINUED | OUTPATIENT
Start: 2025-03-20 | End: 2025-03-21 | Stop reason: HOSPADM

## 2025-03-20 RX ORDER — BISACODYL 5 MG/1
5 TABLET, DELAYED RELEASE ORAL DAILY PRN
Status: DISCONTINUED | OUTPATIENT
Start: 2025-03-20 | End: 2025-03-21 | Stop reason: HOSPADM

## 2025-03-20 RX ORDER — NITROGLYCERIN 0.4 MG/1
0.4 TABLET SUBLINGUAL
Status: DISCONTINUED | OUTPATIENT
Start: 2025-03-20 | End: 2025-03-20 | Stop reason: SDUPTHER

## 2025-03-20 RX ORDER — SODIUM CHLORIDE 0.9 % (FLUSH) 0.9 %
10 SYRINGE (ML) INJECTION AS NEEDED
Status: DISCONTINUED | OUTPATIENT
Start: 2025-03-20 | End: 2025-03-21 | Stop reason: HOSPADM

## 2025-03-20 RX ORDER — SODIUM CHLORIDE 0.9 % (FLUSH) 0.9 %
10 SYRINGE (ML) INJECTION EVERY 12 HOURS SCHEDULED
Status: DISCONTINUED | OUTPATIENT
Start: 2025-03-20 | End: 2025-03-21 | Stop reason: HOSPADM

## 2025-03-20 RX ORDER — ACETAMINOPHEN 500 MG
1000 TABLET ORAL ONCE
Status: COMPLETED | OUTPATIENT
Start: 2025-03-20 | End: 2025-03-20

## 2025-03-20 RX ORDER — POLYETHYLENE GLYCOL 3350 17 G/17G
17 POWDER, FOR SOLUTION ORAL DAILY PRN
Status: DISCONTINUED | OUTPATIENT
Start: 2025-03-20 | End: 2025-03-21 | Stop reason: HOSPADM

## 2025-03-20 RX ADMIN — ACETAMINOPHEN 1000 MG: 500 TABLET, FILM COATED ORAL at 17:45

## 2025-03-20 RX ADMIN — Medication 10 ML: at 21:07

## 2025-03-20 RX ADMIN — ASPIRIN 325 MG ORAL TABLET 325 MG: 325 PILL ORAL at 17:45

## 2025-03-20 RX ADMIN — NITROGLYCERIN 1 INCH: 20 OINTMENT TOPICAL at 17:45

## 2025-03-20 NOTE — ED PROVIDER NOTES
Subjective   History of Present Illness  Chief complaint chest pain right arm pain    History of present illness 74-year-old female complains of intermittent 2-week history of pain in her chest into her right arm.  No shortness of breath no dizziness or sweating no syncope.  Denies any recent injury illness flus viruses vaccinations long car ride plane ride immobilization leg pain or swelling.  Nothing seems to trigger or make it better or worse she states that it last about 10 minutes and goes away no exertional component.  But is been constant throughout the day.      Review of Systems   Constitutional:  Negative for chills and fever.   Respiratory:  Positive for chest tightness. Negative for shortness of breath.    Cardiovascular:  Positive for chest pain. Negative for palpitations and leg swelling.   Gastrointestinal:  Negative for abdominal pain and vomiting.   Genitourinary:  Negative for difficulty urinating and dysuria.   Musculoskeletal:  Negative for back pain and neck pain.   Skin:  Negative for rash.   Neurological:  Negative for dizziness and light-headedness.   Psychiatric/Behavioral:  Negative for confusion.        Past Medical History:   Diagnosis Date    Allergic 1970's    Anemia 1950's    Angina pectoris     Anxiety     Arthritis     Asthma 02/13/2020    Ramos's esophagus 02/15/2018    Cancer Carcinoma Insitu cervical    Cataract     Cholelithiasis Removed 2000's    Chronic constipation     Chronic diarrhea     Colon polyp     Coronary artery disease     Depression 08/27/2018    Fibromyalgia 12/14/2011    Fibromyalgia, primary 1990's I think    GERD (gastroesophageal reflux disease)     Gout 02/13/2020    Headache     HL (hearing loss) Age related    Hyperlipidemia     Hypertension     Hypothyroidism 2022    Injury of back     Irritable bowel syndrome     Most of my life    Joint pain     Low back pain 2970's    Obesity Lifelong since puberty    Obstructive sleep apnea 10/16/2014    Osteopenia  Five years appx    Osteoporosis     fosamax(SE), on prolia(4/10/18-6/18/21, 2/1/22)    Otitis media     Pedal edema 10/22/2018    Pneumonia 2020    Presence of left artificial knee joint 03/14/2019    Scoliosis 1961    Seasonal allergies     Shortness of breath     ST elevation (STEMI) myocardial infarction 02/13/2020    Status post percutaneous transluminal coronary angioplasty 06/10/2014    Substance abuse     Tachycardia     Tremor 2015    Urge incontinence 08/27/2018    Urinary tract infection     Visual impairment [place]    Vitamin D deficiency 03/19/2015       Allergies   Allergen Reactions    Hydrocodone Hives    Chlorpheniramine-Phenylephrine Rash    Penicillins Rash    Sulfa Antibiotics Hives and Rash    Tetracycline Rash    Warfarin Rash    Pseudoephedrine Hcl Rash    Triprolidine Hcl Rash       Past Surgical History:   Procedure Laterality Date    BLADDER SURGERY      BLADDER SUSPENSION  04/2023    CARDIAC CATHETERIZATION N/A 05/29/2021    Procedure: Left Heart Cath and coronary angiogram;  Surgeon: Yazmin Matta MD;  Location: [facility] CATH INVASIVE LOCATION;  Service: Cardiovascular;  Laterality: N/A;    CARDIAC CATHETERIZATION  05/29/2021    Procedure: Functional Flow Reserve (iFR);  Surgeon: Bryan Patel MD;  Location: [facility] CATH INVASIVE LOCATION;  Service: Cardiology;;    CARDIAC CATHETERIZATION N/A 05/29/2021    Procedure: Percutaneous Coronary Intervention;  Surgeon: Bryan Patel MD;  Location: [facility] CATH INVASIVE LOCATION;  Service: Cardiology;  Laterality: N/A;    CARDIAC CATHETERIZATION N/A 05/29/2021    Procedure: Stent FRANCOISE coronary;  Surgeon: Bryan Patel MD;  Location: [facility] CATH INVASIVE LOCATION;  Service: Cardiology;  Laterality: N/A;    CAROTID STENT      CHOLECYSTECTOMY      COLONOSCOPY  2017??    CORONARY STENT PLACEMENT  ??    FRACTURE SURGERY  2020    HYSTERECTOMY      INGUINAL HERNIA REPAIR      JOINT REPLACEMENT  L-knee    KNEE SURGERY       OTHER SURGICAL HISTORY      STENT    SUBTOTAL HYSTERECTOMY         Family History   Problem Relation Age of Onset    Alcohol abuse Mother     Anxiety disorder Mother     Depression Mother     Early death Mother     Mental illness Mother     Heart attack Father         Vein bypass in legs    Cancer Father             Alcohol abuse Father     Depression Father     No Known Problems Sister     No Known Problems Brother     No Known Problems Maternal Aunt     No Known Problems Maternal Uncle     Stroke Paternal Aunt     No Known Problems Paternal Uncle     Heart attack Maternal Grandmother         Heart atrack in her 80s    Heart attack Maternal Grandfather         Heart attack in his 60s    Alcohol abuse Maternal Grandfather     Heart attack Paternal Grandmother         Type 1 diabetic, heart attack    Diabetes Paternal Grandmother     Heart attack Paternal Grandfather         Heart attack in his 50s or 60s    Heart disease Sister         Blockages monitored with medication    Hypertension Sister         Coronary heart issues    Depression Sister     Hyperlipidemia Brother         Extremely high triglycerides and cholesterol    Hypertension Brother         High blood pressure    Heart attack Brother         Passed from heart attack age 54    Alcohol abuse Brother     Arthritis Brother     Depression Brother     Early death Brother     Hyperlipidemia Brother         Unknown    Hypertension Brother     Heart attack Brother         Passed from heart attack age 44    Alcohol abuse Brother     Depression Brother     Early death Brother     Hyperlipidemia Brother         Had heart disease for years before passing    Hypertension Brother     Heart attack Brother          from heart failure in his 60s    Alcohol abuse Brother     Early death Brother     Mental illness Brother     Anemia Neg Hx     Arrhythmia Neg Hx     Asthma Neg Hx     Clotting disorder Neg Hx     Fainting Neg Hx     Heart failure Neg Hx         Social History     Socioeconomic History    Marital status:    Tobacco Use    Smoking status: Never     Passive exposure: Never    Smokeless tobacco: Never    Tobacco comments:     Parents and husbands heavy smokers   Vaping Use    Vaping status: Never Used   Substance and Sexual Activity    Alcohol use: Not Currently     Comment: Recovery since 1994 drank from [age]    Drug use: Not Currently     Frequency: 1.0 times per week     Types: Amphetamines     Comment: Short term use/over use diet pills in the 70's    Sexual activity: Not Currently     Partners: Male     Birth control/protection: Post-menopausal, None, Hysterectomy     Prior to Admission medications    Medication Sig Start Date End Date Taking? Authorizing Provider   albuterol sulfate  (90 Base) MCG/ACT inhaler Inhale 2 puffs Every 6 (Six) Hours As Needed for Wheezing. 3/18/25   Ilsa Her MD   aspirin 81 MG tablet Take 1 tablet by mouth Daily. 1/7/15   ProviderLucia MD   atorvastatin (LIPITOR) 10 MG tablet TAKE ONE TABLET BY MOUTH DAILY 3/17/25   Ilsa Her MD   buPROPion XL (WELLBUTRIN XL) 150 MG 24 hr tablet TAKE 1 TABLET BY MOUTH EVERY MORNING 10/7/24   Ilsa Her MD   busPIRone (BUSPAR) 10 MG tablet TAKE ONE TABLET BY MOUTH THREE TIMES A DAY. (PT REQUESTS 1 TWICE DAILY ) 3/17/25   Ilsa Her MD   Cholecalciferol (Vitamin D) 50 MCG (2000 UT) tablet Take 1 tablet by mouth Daily.    ProviderLucia MD   citalopram (CeleXA) 20 MG tablet TAKE ONE TABLET BY MOUTH DAILY 3/17/25   Ilsa Her MD   famotidine (Pepcid) 40 MG tablet Take 1 tablet by mouth Daily. For reflux 3/18/25   Ilsa Her MD   fluticasone (FLONASE) 50 MCG/ACT nasal spray USE 2 SPRAYS INTO THE NOSTRIL(S) AS DIRECTED BY PROVIDER DAILY. 5/22/24   Ilsa Her MD   gabapentin (NEURONTIN) 400 MG capsule TAKE ONE CAPSULE BY MOUTH AT BEDTIME 3/17/25    Ilsa Her MD   Gemtesa 75 MG tablet Take 1 tablet by mouth Daily. 4/26/24   Lucia Sorto MD   hydrOXYzine (ATARAX) 10 MG tablet TAKE 1 TABLET BY MOUTH EVERY NIGHT AT BEDTIME 12/7/22   Ilsa Her MD   levothyroxine (SYNTHROID, LEVOTHROID) 25 MCG tablet TAKE ONE TABLET BY MOUTH EVERY MORNING 1/22/25   Ilsa Her MD   Magnesium Citrate 100 MG capsule Take 1 capsule by mouth Daily.    Lucia Sorto MD   metoprolol succinate XL (TOPROL-XL) 25 MG 24 hr tablet TAKE 2 TABLETS BY MOUTH EVERY DAY (PT REQUESTS 1AM AND 1PM) 9/9/24   Yazmin Matta MD   midodrine (PROAMATINE) 5 MG tablet Take 0.5 tablets by mouth Daily. 7/16/24   Yazmin Matta MD   montelukast (SINGULAIR) 10 MG tablet TAKE ONE TABLET BY MOUTH DAILY 3/17/25   Ilsa Her MD   spironolactone (ALDACTONE) 25 MG tablet As Needed. 7/21/23   Lucia Sorto MD          Objective   Physical Exam  Constitutional is a 74-year-old female awake alert no distress resting comfortably.  Triage vital signs reviewed.  HEENT extraocular muscles are intact pupils equal round reactive sclera clear neck supple no adenopathy no JV no bruits lungs were clear no retraction no use of accessory heart was regular without murmur rub abdomen soft nontender good bowel sounds no pulsatile masses extremities pulses equal upper and lower extremities no edema no cords no Homans' sign no evidence of DVT skin is warm and dry without rashes neurologic awake alert follows commands motor strength normal without focal weakness  Procedures           ED Course      Results for orders placed or performed during the hospital encounter of 03/20/25   ECG 12 Lead Chest Pain    Collection Time: 03/20/25  3:58 PM   Result Value Ref Range    QT Interval 413 ms    QTC Interval 442 ms   Comprehensive Metabolic Panel    Collection Time: 03/20/25  4:20 PM    Specimen: Blood   Result Value Ref Range    Glucose 169 (H) 65 -  99 mg/dL    BUN 10 8 - 23 mg/dL    Creatinine 1.06 (H) 0.57 - 1.00 mg/dL    Sodium 139 136 - 145 mmol/L    Potassium 3.7 3.5 - 5.2 mmol/L    Chloride 100 98 - 107 mmol/L    CO2 28.8 22.0 - 29.0 mmol/L    Calcium 9.3 8.6 - 10.5 mg/dL    Total Protein 6.2 6.0 - 8.5 g/dL    Albumin 4.0 3.5 - 5.2 g/dL    ALT (SGPT) 14 1 - 33 U/L    AST (SGOT) 20 1 - 32 U/L    Alkaline Phosphatase 95 39 - 117 U/L    Total Bilirubin 0.5 0.0 - 1.2 mg/dL    Globulin 2.2 gm/dL    A/G Ratio 1.8 g/dL    BUN/Creatinine Ratio 9.4 7.0 - 25.0    Anion Gap 10.2 5.0 - 15.0 mmol/L    eGFR 55.2 (L) >60.0 mL/min/1.73   High Sensitivity Troponin T    Collection Time: 03/20/25  4:20 PM    Specimen: Blood   Result Value Ref Range    HS Troponin T 15 (H) <14 ng/L   CBC Auto Differential    Collection Time: 03/20/25  4:20 PM    Specimen: Blood   Result Value Ref Range    WBC 5.87 3.40 - 10.80 10*3/mm3    RBC 4.55 3.77 - 5.28 10*6/mm3    Hemoglobin 12.6 12.0 - 15.9 g/dL    Hematocrit 39.8 34.0 - 46.6 %    MCV 87.5 79.0 - 97.0 fL    MCH 27.7 26.6 - 33.0 pg    MCHC 31.7 31.5 - 35.7 g/dL    RDW 12.4 12.3 - 15.4 %    RDW-SD 39.5 37.0 - 54.0 fl    MPV 10.1 6.0 - 12.0 fL    Platelets 196 140 - 450 10*3/mm3    Neutrophil % 62.8 42.7 - 76.0 %    Lymphocyte % 28.8 19.6 - 45.3 %    Monocyte % 4.9 (L) 5.0 - 12.0 %    Eosinophil % 2.6 0.3 - 6.2 %    Basophil % 0.7 0.0 - 1.5 %    Immature Grans % 0.2 0.0 - 0.5 %    Neutrophils, Absolute 3.69 1.70 - 7.00 10*3/mm3    Lymphocytes, Absolute 1.69 0.70 - 3.10 10*3/mm3    Monocytes, Absolute 0.29 0.10 - 0.90 10*3/mm3    Eosinophils, Absolute 0.15 0.00 - 0.40 10*3/mm3    Basophils, Absolute 0.04 0.00 - 0.20 10*3/mm3    Immature Grans, Absolute 0.01 0.00 - 0.05 10*3/mm3    nRBC 0.0 0.0 - 0.2 /100 WBC   Gold Top - SST    Collection Time: 03/20/25  4:20 PM   Result Value Ref Range    Extra Tube Hold for add-ons.    Light Blue Top    Collection Time: 03/20/25  4:20 PM   Result Value Ref Range    Extra Tube Hold for add-ons.      XR  Chest 1 View  Result Date: 3/20/2025  Impression: 1.Cardiomegaly. No acute radiographic abnormality is identified. 2.Dextroscoliotic curvature of the thoracic spine. Electronically Signed: Markell Richey MD  3/20/2025 5:03 PM EDT  Workstation ID: [id number]    Medications   sodium chloride 0.9 % flush 10 mL (has no administration in time range)   acetaminophen (TYLENOL) tablet 1,000 mg (has no administration in time range)   aspirin tablet 325 mg (has no administration in time range)   nitroglycerin (NITROSTAT) ointment 1 inch (has no administration in time range)                   HEART Score: 6   Shared Decision Making  I discussed the findings with the patient/patient representative who is in agreement with the treatment plan and the final disposition.  Risks and benefits of discharge and/or observation/admission were discussed: Yes                       EKG my interpretation normal sinus rhythm rate 69 normal axis hypertrophy QTc of 442 abnormal EKG nothing changed from 4/17/2023.              Medical Decision Making  Medical decision making.  Patient IV established monitor placed my review of sinus rhythm.  EKG obtained my interpretation normal sinus rhythm rate of 69 normal axis no hypertrophy to normal EKG unchanged from 4/17/2023.  The patient had a chest x-ray my independent review do not see pneumonia pneumothorax failure may be an enlarged heart.  Radiology review cardiomegaly without other acute findings.  The patient had labs obtained my independent review comprehensive metabolic profile unremarkable and a creatinine of 1.06.  Patient's first troponin was 15.  CBC was unremarkable.  All labs independent reviewed by me.  Patient had an aspirin p.o. gram of Tylenol p.o. and 1 inch of Nitropaste.  Record review shows that back in April 2023 she had unremarkable stress test and echocardiogram.  She has not had any evaluation since that time patient's had previous heart issues with stent placement.  Follows  with Dr. Matta.  I do not see any evidence of acute ST elevation myocardial infarction DVT pulmonary embolism or dissection pericarditis markers pericardial effusion sepsis bacteremia based on my history and physical clinical findings although not a complete list of all possibilities.  Troponin mildly elevated 15 with repeat pending.  To be placed in observation for serial troponins and cardiac monitoring and stress testing and further workup stable otherwise unremarkable ER course.    Problems Addressed:  Chest pain, unspecified type: complicated acute illness or injury    Amount and/or Complexity of Data Reviewed  External Data Reviewed: ECG.     Details: Stress test  Labs: ordered. Decision-making details documented in ED Course.  Radiology: ordered and independent interpretation performed. Decision-making details documented in ED Course.  ECG/medicine tests: ordered and independent interpretation performed. Decision-making details documented in ED Course.    Risk  Decision regarding hospitalization.        Final diagnoses:   Chest pain, unspecified type       ED Disposition  ED Disposition       ED Disposition   Decision to Admit    Condition   --    Comment   --               No follow-up provider specified.       Medication List      No changes were made to your prescriptions during this visit.            Gutierrez Londono MD  03/20/25 [id number]

## 2025-03-20 NOTE — ED NOTES
Nursing report ED to floor  Charleen Barnett  74 y.o.  female    HPI:   Chief Complaint   Patient presents with    Chest Pain     Pt reports chest pain, mid chest and pain down right arm, states she feels like her right arm is being pinched.         Admitting doctor:   Gutierrez Londono MD    Admitting diagnosis:   The encounter diagnosis was Chest pain, unspecified type.    Code status:   Current Code Status       Date Active Code Status Order ID Comments User Context       Prior            Allergies:   Hydrocodone, Chlorpheniramine-phenylephrine, Penicillins, Sulfa antibiotics, Tetracycline, Warfarin, Pseudoephedrine hcl, and Triprolidine hcl    Isolation:  No active isolations     Fall Risk:  Fall Risk Assessment was completed, and patient is at low risk for falls.   Predictive Model Details         9 (Low) Factor Value    Calculated 3/20/2025 19:05 Age 74    Risk of Fall Model Active Peripheral IV Present     Imaging order in this encounter Present     Respiratory Rate 18     Magnesium not on file     Lakhwinder Scale not on file     Number of Distinct Medication Classes administered 2     Chloride 100 mmol/L     Creatinine 1.06 mg/dL     Cardiac Assessment X     Albumin 4 g/dL     Diastolic BP 89     Calcium 9.3 mg/dL     Total Bilirubin 0.5 mg/dL     Duration of Current Encounter 0.132 days     ALT 14 U/L     Potassium 3.7 mmol/L         Weight:       03/20/25  1554   Weight: 86.5 kg (190 lb 11.2 oz)       Intake and Output  No intake or output data in the 24 hours ending 03/20/25 1906    Diet:   Dietary Orders (From admission, onward)       Start     Ordered    03/21/25 0001  NPO Diet NPO Type: Strict NPO  Diet Effective Midnight        Question:  NPO Type  Answer:  Strict NPO    03/20/25 1846 03/21/25 0001  NPO Diet NPO Type: Sips with Meds, Ice Chips  (Procedure Panel)  Diet Effective Midnight        Question Answer Comment   NPO Type Sips with Meds    NPO Type Ice Chips        03/20/25 1846 03/20/25 1847   Diet: Cardiac; Healthy Heart (2-3 Na+); Fluid Consistency: Thin (IDDSI 0)  Diet Effective Now        References:    Diet Order Definitions   Question Answer Comment   Diets: Cardiac    Cardiac Diet: Healthy Heart (2-3 Na+)    Fluid Consistency: Thin (IDDSI 0)        03/20/25 1846                     Most recent vitals:   Vitals:    03/20/25 1732 03/20/25 1746 03/20/25 1832 03/20/25 1833   BP: 126/55 118/65 142/89    BP Location:       Patient Position:       Pulse: 59 60 55    Resp:       Temp:       TempSrc:       SpO2:  92%  90%   Weight:       Height:           Active LDAs/IV Access:   Lines, Drains & Airways       Active LDAs       Name Placement date Placement time Site Days    Peripheral IV 03/20/25 1620 Anterior;Left Forearm 03/20/25  1620  Forearm  less than 1                    Skin Condition:   Skin Assessments (last day)       None             Labs (abnormal labs have a star):   Labs Reviewed   COMPREHENSIVE METABOLIC PANEL - Abnormal; Notable for the following components:       Result Value    Glucose 169 (*)     Creatinine 1.06 (*)     eGFR 55.2 (*)     All other components within normal limits    Narrative:     GFR Categories in Chronic Kidney Disease (CKD)      GFR Category          GFR (mL/min/1.73)    Interpretation  G1                     90 or greater         Normal or high (1)  G2                      60-89                Mild decrease (1)  G3a                   45-59                Mild to moderate decrease  G3b                   30-44                Moderate to severe decrease  G4                    15-29                Severe decrease  G5                    14 or less           Kidney failure          (1)In the absence of evidence of kidney disease, neither GFR category G1 or G2 fulfill the criteria for CKD.    eGFR calculation 2021 CKD-EPI creatinine equation, which does not include race as a factor   TROPONIN - Abnormal; Notable for the following components:    HS Troponin T 15 (*)     All  other components within normal limits    Narrative:     High Sensitive Troponin T Reference Range:  <14.0 ng/L- Negative Female for AMI  <22.0 ng/L- Negative Male for AMI  >=14 - Abnormal Female indicating possible myocardial injury.  >=22 - Abnormal Male indicating possible myocardial injury.   Clinicians would have to utilize clinical acumen, EKG, Troponin, and serial changes to determine if it is an Acute Myocardial Infarction or myocardial injury due to an underlying chronic condition.        CBC WITH AUTO DIFFERENTIAL - Abnormal; Notable for the following components:    Monocyte % 4.9 (*)     All other components within normal limits   HIGH SENSITIVITIY TROPONIN T 1HR - Abnormal; Notable for the following components:    HS Troponin T 15 (*)     All other components within normal limits    Narrative:     High Sensitive Troponin T Reference Range:  <14.0 ng/L- Negative Female for AMI  <22.0 ng/L- Negative Male for AMI  >=14 - Abnormal Female indicating possible myocardial injury.  >=22 - Abnormal Male indicating possible myocardial injury.   Clinicians would have to utilize clinical acumen, EKG, Troponin, and serial changes to determine if it is an Acute Myocardial Infarction or myocardial injury due to an underlying chronic condition.        CBC AND DIFFERENTIAL    Narrative:     The following orders were created for panel order CBC & Differential.  Procedure                               Abnormality         Status                     ---------                               -----------         ------                     CBC Auto Differential[[id number]]        Abnormal            Final result                 Please view results for these tests on the individual orders.   EXTRA TUBES    Narrative:     The following orders were created for panel order Extra Tubes.  Procedure                               Abnormality         Status                     ---------                               -----------          ------                     Gold Top - SST[[id number]]                                   Final result               Light Blue Top[[id number]]                                   Final result                 Please view results for these tests on the individual orders.   [facility]   [facility]       LOC: Person, Place, Time, and Situation    Telemetry:  Observation Unit    Cardiac Monitoring Ordered: yes    EKG:   ECG 12 Lead Chest Pain   Preliminary Result   HEART RATE=69  bpm   RR Hsupywwg=076  ms   ID Jgypldvf=166  ms   P Horizontal Axis=-6  deg   P Front Axis=20  deg   QRSD Interval=94  ms   QT Dbxubazy=449  ms   AYkI=653  ms   QRS Axis=-27  deg   T Wave Axis=92  deg   - NORMAL ECG -   Sinus rhythm   Date and Time of Study:2025-03-20 15:58:32          Medications Given in the ED:   Medications   sodium chloride 0.9 % flush 10 mL (has no administration in time range)   nitroglycerin (NITROSTAT) SL tablet 0.4 mg (has no administration in time range)   sodium chloride 0.9 % flush 10 mL (has no administration in time range)   sodium chloride 0.9 % flush 10 mL (has no administration in time range)   sodium chloride 0.9 % infusion 40 mL (has no administration in time range)   morphine injection 1 mg (has no administration in time range)     And   naloxone (NARCAN) injection 0.4 mg (has no administration in time range)   sennosides-docusate (PERICOLACE) 8.6-50 MG per tablet 2 tablet (has no administration in time range)     And   polyethylene glycol (MIRALAX) packet 17 g (has no administration in time range)     And   bisacodyl (DULCOLAX) EC tablet 5 mg (has no administration in time range)     And   bisacodyl (DULCOLAX) suppository 10 mg (has no administration in time range)   acetaminophen (TYLENOL) tablet 650 mg (has no administration in time range)   acetaminophen (TYLENOL) tablet 1,000 mg (1,000 mg Oral Given 3/20/25 1745)   aspirin tablet 325 mg (325 mg Oral Given 3/20/25 1745)   nitroglycerin (NITROSTAT)  ointment 1 inch (1 inch Topical Given 3/20/25 1777)       Imaging results:  XR Hip With or Without Pelvis 2 - 3 View Right  Result Date: 3/20/2025  Impression: No radiographic evidence of an acute fracture or dislocation. Electronically Signed: Fernando Cunningham  3/20/2025 5:22 PM EDT  Workstation ID: [id number]    XR Chest 1 View  Result Date: 3/20/2025  Impression: 1.Cardiomegaly. No acute radiographic abnormality is identified. 2.Dextroscoliotic curvature of the thoracic spine. Electronically Signed: Markell Richey MD  3/20/2025 5:03 PM EDT  Workstation ID: [id number]      Social issues:   Social History     Socioeconomic History    Marital status:    Tobacco Use    Smoking status: Never     Passive exposure: Never    Smokeless tobacco: Never    Tobacco comments:     Parents and husbands heavy smokers   Vaping Use    Vaping status: Never Used   Substance and Sexual Activity    Alcohol use: Not Currently     Comment: Recovery since 1994 drank from [age]    Drug use: Not Currently     Frequency: 1.0 times per week     Types: Amphetamines     Comment: Short term use/over use diet pills in the 70's    Sexual activity: Not Currently     Partners: Male     Birth control/protection: Post-menopausal, None, Hysterectomy       NIH Stroke Scale:  Interval: (not recorded)  1a. Level of Consciousness: (not recorded)  1b. LOC Questions: (not recorded)  1c. LOC Commands: (not recorded)  2. Best Gaze: (not recorded)  3. Visual: (not recorded)  4. Facial Palsy: (not recorded)  5a. Motor Arm, Left: (not recorded)  5b. Motor Arm, Right: (not recorded)  6a. Motor Leg, Left: (not recorded)  6b. Motor Leg, Right: (not recorded)  7. Limb Ataxia: (not recorded)  8. Sensory: (not recorded)  9. Best Language: (not recorded)  10. Dysarthria: (not recorded)  11. Extinction and Inattention (formerly Neglect): (not recorded)    Total (NIH Stroke Scale): (not recorded)     Additional notable assessment information:     Nursing report ED to  floor:  JANETTE Campa RN   03/20/25 19:06 EDT

## 2025-03-21 ENCOUNTER — APPOINTMENT (OUTPATIENT)
Dept: CT IMAGING | Facility: HOSPITAL | Age: 75
End: 2025-03-21
Payer: MEDICARE

## 2025-03-21 ENCOUNTER — APPOINTMENT (OUTPATIENT)
Dept: NUCLEAR MEDICINE | Facility: HOSPITAL | Age: 75
End: 2025-03-21
Payer: MEDICARE

## 2025-03-21 ENCOUNTER — READMISSION MANAGEMENT (OUTPATIENT)
Dept: CALL CENTER | Facility: HOSPITAL | Age: 75
End: 2025-03-21
Payer: MEDICARE

## 2025-03-21 VITALS
HEIGHT: 58 IN | SYSTOLIC BLOOD PRESSURE: 130 MMHG | DIASTOLIC BLOOD PRESSURE: 68 MMHG | TEMPERATURE: 98 F | WEIGHT: 184.3 LBS | OXYGEN SATURATION: 100 % | RESPIRATION RATE: 20 BRPM | BODY MASS INDEX: 38.69 KG/M2 | HEART RATE: 57 BPM

## 2025-03-21 LAB
ANION GAP SERPL CALCULATED.3IONS-SCNC: 6.8 MMOL/L (ref 5–15)
BH CV NUCLEAR PRIOR STUDY: 3
BH CV REST NUCLEAR ISOTOPE DOSE: 10.7 MCI
BH CV STRESS BP STAGE 1: NORMAL
BH CV STRESS BP STAGE 2: NORMAL
BH CV STRESS BP STAGE 3: NORMAL
BH CV STRESS COMMENTS STAGE 1: NORMAL
BH CV STRESS COMMENTS STAGE 2: NORMAL
BH CV STRESS DOSE REGADENOSON STAGE 1: 0.4
BH CV STRESS DURATION MIN STAGE 1: 0
BH CV STRESS DURATION MIN STAGE 2: 4
BH CV STRESS DURATION SEC STAGE 1: 10
BH CV STRESS DURATION SEC STAGE 2: 0
BH CV STRESS HR STAGE 1: 80
BH CV STRESS HR STAGE 2: 90
BH CV STRESS HR STAGE 3: 87
BH CV STRESS NUCLEAR ISOTOPE DOSE: 33 MCI
BH CV STRESS PROTOCOL 1: NORMAL
BH CV STRESS RECOVERY BP: NORMAL MMHG
BH CV STRESS RECOVERY HR: 88 BPM
BH CV STRESS STAGE 1: 1
BH CV STRESS STAGE 2: 2
BH CV STRESS STAGE 3: 3
BUN SERPL-MCNC: 15 MG/DL (ref 8–23)
BUN/CREAT SERPL: 13.8 (ref 7–25)
CALCIUM SPEC-SCNC: 9.1 MG/DL (ref 8.6–10.5)
CHLORIDE SERPL-SCNC: 102 MMOL/L (ref 98–107)
CO2 SERPL-SCNC: 31.2 MMOL/L (ref 22–29)
CREAT SERPL-MCNC: 1.09 MG/DL (ref 0.57–1)
DEPRECATED RDW RBC AUTO: 39.9 FL (ref 37–54)
EGFRCR SERPLBLD CKD-EPI 2021: 53.4 ML/MIN/1.73
ERYTHROCYTE [DISTWIDTH] IN BLOOD BY AUTOMATED COUNT: 12.3 % (ref 12.3–15.4)
GLUCOSE SERPL-MCNC: 90 MG/DL (ref 65–99)
HCT VFR BLD AUTO: 37.3 % (ref 34–46.6)
HGB BLD-MCNC: 11.7 G/DL (ref 12–15.9)
MAXIMAL PREDICTED HEART RATE: 146 BPM
MCH RBC QN AUTO: 27.7 PG (ref 26.6–33)
MCHC RBC AUTO-ENTMCNC: 31.4 G/DL (ref 31.5–35.7)
MCV RBC AUTO: 88.2 FL (ref 79–97)
PERCENT MAX PREDICTED HR: 61.64 %
PLATELET # BLD AUTO: 183 10*3/MM3 (ref 140–450)
PMV BLD AUTO: 10 FL (ref 6–12)
POTASSIUM SERPL-SCNC: 4.6 MMOL/L (ref 3.5–5.2)
QT INTERVAL: 413 MS
QTC INTERVAL: 442 MS
RBC # BLD AUTO: 4.23 10*6/MM3 (ref 3.77–5.28)
SODIUM SERPL-SCNC: 140 MMOL/L (ref 136–145)
STRESS BASELINE BP: NORMAL MMHG
STRESS BASELINE HR: 59 BPM
STRESS PERCENT HR: 73 %
STRESS POST PEAK BP: NORMAL MMHG
STRESS POST PEAK HR: 90 BPM
STRESS TARGET HR: 124 BPM
WBC NRBC COR # BLD AUTO: 6.58 10*3/MM3 (ref 3.4–10.8)

## 2025-03-21 PROCEDURE — A9502 TC99M TETROFOSMIN: HCPCS | Performed by: EMERGENCY MEDICINE

## 2025-03-21 PROCEDURE — 78452 HT MUSCLE IMAGE SPECT MULT: CPT | Performed by: INTERNAL MEDICINE

## 2025-03-21 PROCEDURE — 99214 OFFICE O/P EST MOD 30 MIN: CPT | Performed by: INTERNAL MEDICINE

## 2025-03-21 PROCEDURE — 93018 CV STRESS TEST I&R ONLY: CPT | Performed by: INTERNAL MEDICINE

## 2025-03-21 PROCEDURE — G0378 HOSPITAL OBSERVATION PER HR: HCPCS

## 2025-03-21 PROCEDURE — 34310000005 TECHNETIUM TETROFOSMIN KIT: Performed by: EMERGENCY MEDICINE

## 2025-03-21 PROCEDURE — 72125 CT NECK SPINE W/O DYE: CPT

## 2025-03-21 PROCEDURE — 93017 CV STRESS TEST TRACING ONLY: CPT

## 2025-03-21 PROCEDURE — 78452 HT MUSCLE IMAGE SPECT MULT: CPT

## 2025-03-21 PROCEDURE — 85027 COMPLETE CBC AUTOMATED: CPT | Performed by: EMERGENCY MEDICINE

## 2025-03-21 PROCEDURE — 25010000002 REGADENOSON 0.4 MG/5ML SOLUTION: Performed by: EMERGENCY MEDICINE

## 2025-03-21 PROCEDURE — 80048 BASIC METABOLIC PNL TOTAL CA: CPT | Performed by: EMERGENCY MEDICINE

## 2025-03-21 PROCEDURE — 72128 CT CHEST SPINE W/O DYE: CPT

## 2025-03-21 RX ORDER — HYDRALAZINE HYDROCHLORIDE 20 MG/ML
10 INJECTION INTRAMUSCULAR; INTRAVENOUS EVERY 6 HOURS PRN
Status: DISCONTINUED | OUTPATIENT
Start: 2025-03-21 | End: 2025-03-21 | Stop reason: HOSPADM

## 2025-03-21 RX ORDER — ALBUTEROL SULFATE 0.83 MG/ML
2.5 SOLUTION RESPIRATORY (INHALATION) EVERY 6 HOURS PRN
Status: DISCONTINUED | OUTPATIENT
Start: 2025-03-21 | End: 2025-03-21 | Stop reason: HOSPADM

## 2025-03-21 RX ORDER — ASPIRIN 81 MG/1
81 TABLET ORAL DAILY
Status: DISCONTINUED | OUTPATIENT
Start: 2025-03-21 | End: 2025-03-21 | Stop reason: HOSPADM

## 2025-03-21 RX ORDER — BUPROPION HYDROCHLORIDE 150 MG/1
150 TABLET ORAL EVERY MORNING
Status: DISCONTINUED | OUTPATIENT
Start: 2025-03-22 | End: 2025-03-21 | Stop reason: HOSPADM

## 2025-03-21 RX ORDER — GABAPENTIN 400 MG/1
400 CAPSULE ORAL NIGHTLY
Status: DISCONTINUED | OUTPATIENT
Start: 2025-03-21 | End: 2025-03-21 | Stop reason: HOSPADM

## 2025-03-21 RX ORDER — METOPROLOL SUCCINATE 50 MG/1
50 TABLET, EXTENDED RELEASE ORAL
Status: DISCONTINUED | OUTPATIENT
Start: 2025-03-21 | End: 2025-03-21 | Stop reason: HOSPADM

## 2025-03-21 RX ORDER — FAMOTIDINE 20 MG/1
40 TABLET, FILM COATED ORAL DAILY
Status: DISCONTINUED | OUTPATIENT
Start: 2025-03-21 | End: 2025-03-21 | Stop reason: HOSPADM

## 2025-03-21 RX ORDER — MONTELUKAST SODIUM 10 MG/1
10 TABLET ORAL DAILY
Status: DISCONTINUED | OUTPATIENT
Start: 2025-03-21 | End: 2025-03-21 | Stop reason: HOSPADM

## 2025-03-21 RX ORDER — LEVOTHYROXINE SODIUM 25 UG/1
25 TABLET ORAL EVERY MORNING
Status: DISCONTINUED | OUTPATIENT
Start: 2025-03-22 | End: 2025-03-21 | Stop reason: HOSPADM

## 2025-03-21 RX ORDER — ALUMINA, MAGNESIA, AND SIMETHICONE 2400; 2400; 240 MG/30ML; MG/30ML; MG/30ML
15 SUSPENSION ORAL EVERY 6 HOURS PRN
Status: DISCONTINUED | OUTPATIENT
Start: 2025-03-21 | End: 2025-03-21 | Stop reason: HOSPADM

## 2025-03-21 RX ORDER — REGADENOSON 0.08 MG/ML
0.4 INJECTION, SOLUTION INTRAVENOUS
Status: COMPLETED | OUTPATIENT
Start: 2025-03-21 | End: 2025-03-21

## 2025-03-21 RX ORDER — HYDROXYZINE HYDROCHLORIDE 10 MG/1
10 TABLET, FILM COATED ORAL NIGHTLY
Status: DISCONTINUED | OUTPATIENT
Start: 2025-03-21 | End: 2025-03-21 | Stop reason: HOSPADM

## 2025-03-21 RX ORDER — BUSPIRONE HYDROCHLORIDE 5 MG/1
10 TABLET ORAL EVERY 12 HOURS SCHEDULED
Status: DISCONTINUED | OUTPATIENT
Start: 2025-03-21 | End: 2025-03-21 | Stop reason: HOSPADM

## 2025-03-21 RX ORDER — LIDOCAINE HYDROCHLORIDE 20 MG/ML
10 SOLUTION OROPHARYNGEAL ONCE
Status: DISCONTINUED | OUTPATIENT
Start: 2025-03-21 | End: 2025-03-21 | Stop reason: HOSPADM

## 2025-03-21 RX ORDER — CITALOPRAM HYDROBROMIDE 20 MG/1
20 TABLET ORAL DAILY
Status: DISCONTINUED | OUTPATIENT
Start: 2025-03-21 | End: 2025-03-21 | Stop reason: HOSPADM

## 2025-03-21 RX ORDER — ATORVASTATIN CALCIUM 10 MG/1
10 TABLET, FILM COATED ORAL DAILY
Status: DISCONTINUED | OUTPATIENT
Start: 2025-03-21 | End: 2025-03-21 | Stop reason: HOSPADM

## 2025-03-21 RX ADMIN — CITALOPRAM 20 MG: 20 TABLET, FILM COATED ORAL at 14:43

## 2025-03-21 RX ADMIN — BUSPIRONE HYDROCHLORIDE 10 MG: 5 TABLET ORAL at 14:43

## 2025-03-21 RX ADMIN — TETROFOSMIN 1 DOSE: 1.38 INJECTION, POWDER, LYOPHILIZED, FOR SOLUTION INTRAVENOUS at 08:49

## 2025-03-21 RX ADMIN — MONTELUKAST 10 MG: 10 TABLET, FILM COATED ORAL at 14:43

## 2025-03-21 RX ADMIN — REGADENOSON 0.4 MG: 0.08 INJECTION, SOLUTION INTRAVENOUS at 08:49

## 2025-03-21 RX ADMIN — ATORVASTATIN CALCIUM 10 MG: 10 TABLET ORAL at 12:09

## 2025-03-21 RX ADMIN — METOPROLOL SUCCINATE 50 MG: 50 TABLET, EXTENDED RELEASE ORAL at 12:08

## 2025-03-21 RX ADMIN — ACETAMINOPHEN 650 MG: 325 TABLET, FILM COATED ORAL at 14:43

## 2025-03-21 RX ADMIN — FAMOTIDINE 40 MG: 20 TABLET, FILM COATED ORAL at 14:43

## 2025-03-21 RX ADMIN — ASPIRIN 81 MG: 81 TABLET, COATED ORAL at 12:08

## 2025-03-21 RX ADMIN — TETROFOSMIN 1 DOSE: 1.38 INJECTION, POWDER, LYOPHILIZED, FOR SOLUTION INTRAVENOUS at 06:30

## 2025-03-21 RX ADMIN — Medication 10 ML: at 12:15

## 2025-03-21 NOTE — NURSING NOTE
Patient D/C to home with no issues.IV and arm band taken off. Medications instructions given to patient.

## 2025-03-21 NOTE — PLAN OF CARE
Goal Outcome Evaluation:         Patient has been medically cleared to discharge after receiving both ordered CT scans.

## 2025-03-21 NOTE — CONSULTS
Referring Provider: Gutierrez Londono MD  Reason for Consultation: Chest pain    Patient Care Team:  Ilsa Her MD as PCP - General  Yazmin Matta MD as Consulting Physician (Cardiology)    Chief complaint  Chest pain    Subjective .     History of present illness:  Charleen Barnett is a 74 y.o. female who presents with history of multiple cardiac and noncardiac problems was admitted to the hospital with history of right-sided sharp pinching pain without any radiation of the discomfort into the carotids or arms.  Denies having any sweating nausea vomiting.  No other associated aggravating or alleviating factors.  History of stent placement in the past.  No other associated aggravating or alleviating factors.  Cardiology consultation was requested.    ROS      Today, the patient has been without any chest discomfort ,shortness of breath, palpitations, dizziness or syncope.  Denies having any headache ,abdominal pain ,nausea, vomiting , diarrhea constipation, loss of weight or loss of appetite.  Denies having any excessive bruising ,hematuria or blood in the stool.    Review of all systems negative except as indicated.    Reviewed ROS.    History  Past Medical History:   Diagnosis Date    Allergic 1970's    Anemia 1950's    Angina pectoris     Anxiety     Arthritis     Asthma 02/13/2020    Ramos's esophagus 02/15/2018    Cancer Carcinoma Insitu cervical    Cataract     Cholelithiasis Removed 2000's    Chronic constipation     Chronic diarrhea     Colon polyp     Coronary artery disease     Depression 08/27/2018    Fibromyalgia 12/14/2011    Fibromyalgia, primary 1990's I think    GERD (gastroesophageal reflux disease)     Gout 02/13/2020    Headache     HL (hearing loss) Age related    Hyperlipidemia     Hypertension     Hypothyroidism 2022    Injury of back     Irritable bowel syndrome     Most of my life    Joint pain     Low back pain 2970's    Obesity Lifelong since puberty    Obstructive  sleep apnea 10/16/2014    Osteopenia Five years appx    Osteoporosis     fosamax(SE), on prolia(4/10/18-6/18/21, 2/1/22)    Otitis media     Pedal edema 10/22/2018    Pneumonia 2020    Presence of left artificial knee joint 03/14/2019    Scoliosis 1961    Seasonal allergies     Shortness of breath     ST elevation (STEMI) myocardial infarction 02/13/2020    Status post percutaneous transluminal coronary angioplasty 06/10/2014    Substance abuse     Tachycardia     Tremor 2015    Urge incontinence 08/27/2018    Urinary tract infection     Visual impairment [place]    Vitamin D deficiency 03/19/2015       Past Surgical History:   Procedure Laterality Date    BLADDER SURGERY      BLADDER SUSPENSION  04/2023    CARDIAC CATHETERIZATION N/A 05/29/2021    Procedure: Left Heart Cath and coronary angiogram;  Surgeon: Yazmin Matta MD;  Location: [facility] CATH INVASIVE LOCATION;  Service: Cardiovascular;  Laterality: N/A;    CARDIAC CATHETERIZATION  05/29/2021    Procedure: Functional Flow Reserve (iFR);  Surgeon: Bryan Patel MD;  Location: [facility] CATH INVASIVE LOCATION;  Service: Cardiology;;    CARDIAC CATHETERIZATION N/A 05/29/2021    Procedure: Percutaneous Coronary Intervention;  Surgeon: Bryan Patel MD;  Location: [facility] CATH INVASIVE LOCATION;  Service: Cardiology;  Laterality: N/A;    CARDIAC CATHETERIZATION N/A 05/29/2021    Procedure: Stent FRANCOISE coronary;  Surgeon: Bryan Patel MD;  Location: [facility] CATH INVASIVE LOCATION;  Service: Cardiology;  Laterality: N/A;    CAROTID STENT      CHOLECYSTECTOMY      COLONOSCOPY  2017??    CORONARY STENT PLACEMENT  ??    FRACTURE SURGERY  2020    HYSTERECTOMY      INGUINAL HERNIA REPAIR      JOINT REPLACEMENT  L-knee    KNEE SURGERY      OTHER SURGICAL HISTORY      STENT    SUBTOTAL HYSTERECTOMY  1977       Family History   Problem Relation Age of Onset    Alcohol abuse Mother     Anxiety disorder Mother     Depression Mother     Early  death Mother     Mental illness Mother     Heart attack Father         Vein bypass in legs    Cancer Father             Alcohol abuse Father     Depression Father     No Known Problems Sister     No Known Problems Brother     No Known Problems Maternal Aunt     No Known Problems Maternal Uncle     Stroke Paternal Aunt     No Known Problems Paternal Uncle     Heart attack Maternal Grandmother         Heart atrack in her 80s    Heart attack Maternal Grandfather         Heart attack in his 60s    Alcohol abuse Maternal Grandfather     Heart attack Paternal Grandmother         Type 1 diabetic, heart attack    Diabetes Paternal Grandmother     Heart attack Paternal Grandfather         Heart attack in his 50s or 60s    Heart disease Sister         Blockages monitored with medication    Hypertension Sister         Coronary heart issues    Depression Sister     Hyperlipidemia Brother         Extremely high triglycerides and cholesterol    Hypertension Brother         High blood pressure    Heart attack Brother         Passed from heart attack age 54    Alcohol abuse Brother     Arthritis Brother     Depression Brother     Early death Brother     Hyperlipidemia Brother         Unknown    Hypertension Brother     Heart attack Brother         Passed from heart attack age 44    Alcohol abuse Brother     Depression Brother     Early death Brother     Hyperlipidemia Brother         Had heart disease for years before passing    Hypertension Brother     Heart attack Brother          from heart failure in his 60s    Alcohol abuse Brother     Early death Brother     Mental illness Brother     Anemia Neg Hx     Arrhythmia Neg Hx     Asthma Neg Hx     Clotting disorder Neg Hx     Fainting Neg Hx     Heart failure Neg Hx        Social History     Tobacco Use    Smoking status: Never     Passive exposure: Never    Smokeless tobacco: Never    Tobacco comments:     Parents and husbands heavy smokers   Vaping Use    Vaping  status: Never Used   Substance Use Topics    Alcohol use: Not Currently     Comment: Recovery since 1994 drank from [age]    Drug use: Not Currently     Frequency: 1.0 times per week     Types: Amphetamines     Comment: Short term use/over use diet pills in the 70's        Medications Prior to Admission   Medication Sig Dispense Refill Last Dose/Taking    aspirin 81 MG tablet Take 1 tablet by mouth Daily.   3/20/2025    atorvastatin (LIPITOR) 10 MG tablet TAKE ONE TABLET BY MOUTH DAILY 90 tablet 0 3/19/2025    buPROPion XL (WELLBUTRIN XL) 150 MG 24 hr tablet TAKE 1 TABLET BY MOUTH EVERY MORNING 28 tablet 3 3/19/2025    busPIRone (BUSPAR) 10 MG tablet TAKE ONE TABLET BY MOUTH THREE TIMES A DAY. (PT REQUESTS 1 TWICE DAILY ) 270 tablet 0 3/19/2025    citalopram (CeleXA) 20 MG tablet TAKE ONE TABLET BY MOUTH DAILY 90 tablet 0 3/19/2025    famotidine (Pepcid) 40 MG tablet Take 1 tablet by mouth Daily. For reflux 30 tablet 0 3/19/2025    fluticasone (FLONASE) 50 MCG/ACT nasal spray USE 2 SPRAYS INTO THE NOSTRIL(S) AS DIRECTED BY PROVIDER DAILY. 9.9 mL 10 Past Week    gabapentin (NEURONTIN) 400 MG capsule TAKE ONE CAPSULE BY MOUTH AT BEDTIME 90 capsule 0 3/19/2025    Gemtesa 75 MG tablet Take 1 tablet by mouth Daily.   3/19/2025    hydrOXYzine (ATARAX) 10 MG tablet TAKE 1 TABLET BY MOUTH EVERY NIGHT AT BEDTIME 30 tablet 0 3/19/2025    levothyroxine (SYNTHROID, LEVOTHROID) 25 MCG tablet TAKE ONE TABLET BY MOUTH EVERY MORNING 90 tablet 0 3/19/2025    midodrine (PROAMATINE) 5 MG tablet Take 0.5 tablets by mouth Daily. (Patient taking differently: Take 0.5 tablets by mouth 2 (Two) Times a Day.) 180 tablet 1 3/19/2025    montelukast (SINGULAIR) 10 MG tablet TAKE ONE TABLET BY MOUTH DAILY 90 tablet 0 3/19/2025    spironolactone (ALDACTONE) 25 MG tablet As Needed.   Past Week    albuterol sulfate  (90 Base) MCG/ACT inhaler Inhale 2 puffs Every 6 (Six) Hours As Needed for Wheezing. 8.5 g 11     Cholecalciferol (Vitamin D)  "50 MCG (2000 UT) tablet Take 1 tablet by mouth Daily.       Magnesium Citrate 100 MG capsule Take 1 capsule by mouth Daily.       metoprolol succinate XL (TOPROL-XL) 25 MG 24 hr tablet TAKE 2 TABLETS BY MOUTH EVERY DAY (PT REQUESTS 1AM AND 1PM) 180 tablet 2          Hydrocodone, Chlorpheniramine-phenylephrine, Penicillins, Sulfa antibiotics, Tetracycline, Warfarin, Pseudoephedrine hcl, and Triprolidine hcl    Scheduled Meds:Lidocaine Viscous HCl, 10 mL, Mouth/Throat, Once  senna-docusate sodium, 2 tablet, Oral, BID  sodium chloride, 10 mL, Intravenous, Q12H      Continuous Infusions:   PRN Meds:.  acetaminophen    aluminum-magnesium hydroxide-simethicone    senna-docusate sodium **AND** polyethylene glycol **AND** bisacodyl **AND** bisacodyl    influenza vaccine    Morphine **AND** naloxone    nitroglycerin    [COMPLETED] Insert Peripheral IV **AND** sodium chloride    sodium chloride    sodium chloride    Objective     VITAL SIGNS  Vitals:    03/20/25 2000 03/21/25 0025 03/21/25 0418 03/21/25 1030   BP: 138/74 162/76 166/78 160/83   BP Location: Left arm Left arm Left arm Left arm   Patient Position: Lying Lying Lying Lying   Pulse: (!) 49 60 66 58   Resp: 16 15 15 18   Temp: 99.2 °F (37.3 °C) 98.1 °F (36.7 °C) 98.1 °F (36.7 °C) 97.8 °F (36.6 °C)   TempSrc: Oral Oral  Oral   SpO2: 95% 95% 94% 95%   Weight: 83.6 kg (184 lb 4.9 oz)      Height:           Flowsheet Rows      Flowsheet Row First Filed Value   Admission Height 147.3 cm (58\") Documented at 03/20/2025 1554   Admission Weight 86.5 kg (190 lb 11.2 oz) Documented at 03/20/2025 1554            No intake or output data in the 24 hours ending 03/21/25 1131     TELEMETRY: Sinus rhythm    Physical Exam:  The patient is alert, oriented and in no distress.  Vital signs as noted above.  Head and neck revealed no carotid bruits or jugular venous distention.  No thyromegaly or lymphadenopathy is present  Lungs clear.  No wheezing.  Breath sounds are normal " bilaterally.  Heart normal first and second heart sounds. No murmur.  No precordial rub is present.  No gallop is present.  Abdomen soft and nontender.  No organomegaly is present.  Extremities with good peripheral pulses without any pedal edema.  Skin warm and dry.  Musculoskeletal system is grossly normal  CNS grossly normal    Reviewed and updated.    Results Review:   I reviewed the patient's new clinical results.  Lab Results (last 24 hours)       Procedure Component Value Units Date/Time    Basic Metabolic Panel [[id number]]  (Abnormal) Collected: 03/21/25 0439    Specimen: Blood Updated: 03/21/25 0528     Glucose 90 mg/dL      BUN 15 mg/dL      Creatinine 1.09 mg/dL      Sodium 140 mmol/L      Potassium 4.6 mmol/L      Chloride 102 mmol/L      CO2 31.2 mmol/L      Calcium 9.1 mg/dL      BUN/Creatinine Ratio 13.8     Anion Gap 6.8 mmol/L      eGFR 53.4 mL/min/1.73     Narrative:      GFR Categories in Chronic Kidney Disease (CKD)      GFR Category          GFR (mL/min/1.73)    Interpretation  G1                     90 or greater         Normal or high (1)  G2                      60-89                Mild decrease (1)  G3a                   45-59                Mild to moderate decrease  G3b                   30-44                Moderate to severe decrease  G4                    15-29                Severe decrease  G5                    14 or less           Kidney failure          (1)In the absence of evidence of kidney disease, neither GFR category G1 or G2 fulfill the criteria for CKD.    eGFR calculation 2021 CKD-EPI creatinine equation, which does not include race as a factor    CBC (No Diff) [[id number]]  (Abnormal) Collected: 03/21/25 0439    Specimen: Blood Updated: 03/21/25 0456     WBC 6.58 10*3/mm3      RBC 4.23 10*6/mm3      Hemoglobin 11.7 g/dL      Hematocrit 37.3 %      MCV 88.2 fL      MCH 27.7 pg      MCHC 31.4 g/dL      RDW 12.3 %      RDW-SD 39.9 fl      MPV 10.0 fL      Platelets 183  10*3/mm3     High Sensitivity Troponin T 1Hr [[id number]]  (Abnormal) Collected: 03/20/25 1745    Specimen: Blood Updated: 03/20/25 1815     HS Troponin T 15 ng/L      Troponin T Numeric Delta 0 ng/L      Troponin T % Delta 0    Narrative:      High Sensitive Troponin T Reference Range:  <14.0 ng/L- Negative Female for AMI  <22.0 ng/L- Negative Male for AMI  >=14 - Abnormal Female indicating possible myocardial injury.  >=22 - Abnormal Male indicating possible myocardial injury.   Clinicians would have to utilize clinical acumen, EKG, Troponin, and serial changes to determine if it is an Acute Myocardial Infarction or myocardial injury due to an underlying chronic condition.         Comprehensive Metabolic Panel [[id number]]  (Abnormal) Collected: 03/20/25 1620    Specimen: Blood Updated: 03/20/25 1657     Glucose 169 mg/dL      BUN 10 mg/dL      Creatinine 1.06 mg/dL      Sodium 139 mmol/L      Potassium 3.7 mmol/L      Chloride 100 mmol/L      CO2 28.8 mmol/L      Calcium 9.3 mg/dL      Total Protein 6.2 g/dL      Albumin 4.0 g/dL      ALT (SGPT) 14 U/L      AST (SGOT) 20 U/L      Alkaline Phosphatase 95 U/L      Total Bilirubin 0.5 mg/dL      Globulin 2.2 gm/dL      A/G Ratio 1.8 g/dL      BUN/Creatinine Ratio 9.4     Anion Gap 10.2 mmol/L      eGFR 55.2 mL/min/1.73     Narrative:      GFR Categories in Chronic Kidney Disease (CKD)      GFR Category          GFR (mL/min/1.73)    Interpretation  G1                     90 or greater         Normal or high (1)  G2                      60-89                Mild decrease (1)  G3a                   45-59                Mild to moderate decrease  G3b                   30-44                Moderate to severe decrease  G4                    15-29                Severe decrease  G5                    14 or less           Kidney failure          (1)In the absence of evidence of kidney disease, neither GFR category G1 or G2 fulfill the criteria for CKD.    eGFR calculation  2021 CKD-EPI creatinine equation, which does not include race as a factor    High Sensitivity Troponin T [[id number]]  (Abnormal) Collected: 03/20/25 1620    Specimen: Blood Updated: 03/20/25 1657     HS Troponin T 15 ng/L     Narrative:      High Sensitive Troponin T Reference Range:  <14.0 ng/L- Negative Female for AMI  <22.0 ng/L- Negative Male for AMI  >=14 - Abnormal Female indicating possible myocardial injury.  >=22 - Abnormal Male indicating possible myocardial injury.   Clinicians would have to utilize clinical acumen, EKG, Troponin, and serial changes to determine if it is an Acute Myocardial Infarction or myocardial injury due to an underlying chronic condition.         Extra Tubes [[id number]] Collected: 03/20/25 1620    Specimen: Blood, Venous Line Updated: 03/20/25 1632    Narrative:      The following orders were created for panel order Extra Tubes.  Procedure                               Abnormality         Status                     ---------                               -----------         ------                     Gold Top - SST[[id number]]                                   Final result               Light Blue Top[[id number]]                                   Final result                 Please view results for these tests on the individual orders.    Gold Top - SST [[id number]] Collected: 03/20/25 1620    Specimen: Blood Updated: 03/20/25 1632     Extra Tube Hold for add-ons.     Comment: Auto resulted.       Light Blue Top [[id number]] Collected: 03/20/25 1620    Specimen: Blood Updated: 03/20/25 1632     Extra Tube Hold for add-ons.     Comment: Auto resulted       CBC & Differential [[id number]]  (Abnormal) Collected: 03/20/25 1620    Specimen: Blood Updated: 03/20/25 1630    Narrative:      The following orders were created for panel order CBC & Differential.  Procedure                               Abnormality         Status                     ---------                                -----------         ------                     CBC Auto Differential[[id number]]        Abnormal            Final result                 Please view results for these tests on the individual orders.    CBC Auto Differential [[id number]]  (Abnormal) Collected: 03/20/25 1620    Specimen: Blood Updated: 03/20/25 1630     WBC 5.87 10*3/mm3      RBC 4.55 10*6/mm3      Hemoglobin 12.6 g/dL      Hematocrit 39.8 %      MCV 87.5 fL      MCH 27.7 pg      MCHC 31.7 g/dL      RDW 12.4 %      RDW-SD 39.5 fl      MPV 10.1 fL      Platelets 196 10*3/mm3      Neutrophil % 62.8 %      Lymphocyte % 28.8 %      Monocyte % 4.9 %      Eosinophil % 2.6 %      Basophil % 0.7 %      Immature Grans % 0.2 %      Neutrophils, Absolute 3.69 10*3/mm3      Lymphocytes, Absolute 1.69 10*3/mm3      Monocytes, Absolute 0.29 10*3/mm3      Eosinophils, Absolute 0.15 10*3/mm3      Basophils, Absolute 0.04 10*3/mm3      Immature Grans, Absolute 0.01 10*3/mm3      nRBC 0.0 /100 WBC             Imaging Results (Last 24 Hours)       Procedure Component Value Units Date/Time    XR Hip With or Without Pelvis 2 - 3 View Right [[id number]] Collected: 03/20/25 1721     Updated: 03/20/25 1725    Narrative:      XR HIP W OR WO PELVIS 2-3 VIEW RIGHT    Date of Exam: 3/20/2025 4:49 PM EDT    Indication: Pain in right hip    Comparison: CT abdomen pelvis 6/10/2024    Findings:  No acute fracture or dislocation.    Mild degenerative changes of the right hip, sacroiliac joint, and pubic symphysis.    No focal soft tissue abnormality appreciated.      Impression:      Impression:  No radiographic evidence of an acute fracture or dislocation.      Electronically Signed: Fernando Cunningham    3/20/2025 5:22 PM EDT    Workstation ID: [id number]    XR Chest 1 View [[id number]] Collected: 03/20/25 1659     Updated: 03/20/25 1705    Narrative:      XR CHEST 1 VW    Date of Exam: 3/20/2025 4:50 PM EDT    Indication: Chest pain    Comparison: 6/20/2021    Findings:  Cardiomegaly is  magnified but appears enlarged. This appears similar since 6/20/2021. No consolidation or mass is seen. No pleural effusion or pneumothorax is seen. No acute osseous lesion is seen. There is dextroscoliotic curvature of the visualized   spine.      Impression:      Impression:  1.Cardiomegaly. No acute radiographic abnormality is identified.  2.Dextroscoliotic curvature of the thoracic spine.      Electronically Signed: Markell Richey MD    3/20/2025 5:03 PM EDT    Workstation ID: [id number]        LAB RESULTS (LAST 7 DAYS)    CBC  Results from last 7 days   Lab Units 03/21/25  0439 03/20/25  1620   WBC 10*3/mm3 6.58 5.87   RBC 10*6/mm3 4.23 4.55   HEMOGLOBIN g/dL 11.7* 12.6   HEMATOCRIT % 37.3 39.8   MCV fL 88.2 87.5   PLATELETS 10*3/mm3 183 196       BMP  Results from last 7 days   Lab Units 03/21/25  0439 03/20/25  1620   SODIUM mmol/L 140 139   POTASSIUM mmol/L 4.6 3.7   CHLORIDE mmol/L 102 100   CO2 mmol/L 31.2* 28.8   BUN mg/dL 15 10   CREATININE mg/dL 1.09* 1.06*   GLUCOSE mg/dL 90 169*       CMP   Results from last 7 days   Lab Units 03/21/25  0439 03/20/25  1620   SODIUM mmol/L 140 139   POTASSIUM mmol/L 4.6 3.7   CHLORIDE mmol/L 102 100   CO2 mmol/L 31.2* 28.8   BUN mg/dL 15 10   CREATININE mg/dL 1.09* 1.06*   GLUCOSE mg/dL 90 169*   ALBUMIN g/dL  --  4.0   BILIRUBIN mg/dL  --  0.5   ALK PHOS U/L  --  95   AST (SGOT) U/L  --  20   ALT (SGPT) U/L  --  14         BNP        TROPONIN  Results from last 7 days   Lab Units 03/20/25  1745   HSTROP T ng/L 15*       CoAg        Creatinine Clearance  Estimated Creatinine Clearance: 44.1 mL/min (A) (by C-G formula based on SCr of 1.09 mg/dL (H)).    ABG        Radiology  XR Hip With or Without Pelvis 2 - 3 View Right  Result Date: 3/20/2025  Impression: No radiographic evidence of an acute fracture or dislocation. Electronically Signed: Fernando Cunningham  3/20/2025 5:22 PM EDT  Workstation ID: [id number]    XR Chest 1 View  Result Date: 3/20/2025  Impression:  1.Cardiomegaly. No acute radiographic abnormality is identified. 2.Dextroscoliotic curvature of the thoracic spine. Electronically Signed: Markell Richey MD  3/20/2025 5:03 PM EDT  Workstation ID: [id number]        EKG      I personally viewed and interpreted the patient's EKG/Telemetry data: Sinus rhythm nonspecific ST-T wave changes    ECHOCARDIOGRAM:    Results for orders placed during the hospital encounter of 09/29/23    Adult Transthoracic Echo Complete W/ Cont if Necessary Per Protocol    Interpretation Summary    Left ventricular ejection fraction appears to be 56 - 60%.    Indications  Chest pain  Palpitations    Technically satisfactory study.  Mitral valve is structurally normal.  Tricuspid valve is structurally normal.  Aortic valve is structurally normal.  Pulmonic valve could not be well visualized.  No evidence for mitral tricuspid or aortic regurgitation is seen by Doppler study.  Left atrium is enlarged.  Right atrium is normal in size.  Left ventricle is normal in size and contractility with ejection fraction of 60%.  Right ventricle is normal in size.  Atrial septum is intact.  Aorta is normal.  No pericardial effusion or intracardiac thrombus is seen.    Impression  Structurally and functionally normal cardiac valves.  Left atrial enlargement.  Left ventricular size and contractility is normal with ejection fraction of 60%.      STRESS TEST  Results for orders placed during the hospital encounter of 08/31/23    Stress Test With Myocardial Perfusion One Day    Interpretation Summary  Indications  Chest pain    This study was performed under the direct supervision of Nani Hancock nurse practitioner.    Resting ECG    The patient was injected with Lexiscan intravenously while constantly monitoring electrocardiogram and vital signs.  Patient did not have any chest discomfort ST abnormalities or ectopy with injection of Lexiscan.    Cardiolite was used as an imaging agent.    Cardiolite images  showed uniform distribution of radionuclide without any evidence for myocardial ischemia.    Gated SPECT images revealed normal left ventricle size and contractility with ejection fraction of 74%.    Impression  ========  Lexiscan Cardiolite test is negative for myocardial ischemia.    Gated SPECT images revealed normal left ventricular size and contractility with ejection fraction of 74%.        Cardiolite (Tc-99m sestamibi) stress test    HEART CATHETERIZATION  Results for orders placed during the hospital encounter of 05/28/21    Cardiac Catheterization/Vascular Study    Narrative  PCI PROCEDURE NOTE      DATE OF PROCEDURE: May 29, 2021     :  Dr.Surender Patel.    INDICATION FOR PROCEDURE:  Unstable angina ,  Coronary artery disease, hypertension, dyslipidemia, positive family history.    PROCEDURE PERFORMED:      Description of procedure:  Under conscious sedation and local anesthesia, existing 5 Syrian sheath was changed out a 6 Syrian sheath and a 6 Syrian XB 3 5 guide was used to engage the left coronary ostium and IFR wire was used to cross the lesion after giving intra-arterial heparin and nitro.  And IFR was critical at 0.86 therefore it was subjected to PTCA and drug-eluting stent with 3 x 28 Xience with reduction of lesion to 0 with rest ALONSO-3 flow..  Patient tolerated procedure well complications were none.    Blood loss: 5 cc  Complications: none.  No dissection.  No no reflow, no perforation.        CORONARY INTERVENTION FINDINGS:      Segment #proximal LAD  Culprit Lesion:  [x]  Yes  []  No  % Pre-Stenosis: 80% with an IFR of 0.86  Pre-Proc TIMIFlow: 3  % Post-Stenosis: 0  Post-Proc TIMIFlow: 3  Non-High/Non-C:                          High/C:ya  Lesion Length (mm):  Primary Device Used: 3 x 28 Xience FRANCOISE        Conscious Sedation:   [x]  Yes   []  No  No Flow Phenom:       []  Yes  [x]  No  Dissection:  []  Yes  [x]  No  Acute Closure: []  Yes  [x]  No  Perforation:  []  Yes  [x]   No    Complications:  none  Estimated Blood Loss:  5cc.    Conclusion:  IFR of LAD critical at 0.86 therefore was subjected to drug-eluting stent to proximal LAD with reduction of lesion to 0 with rest Flow      Plan:  We will give dual antiplatelet therapy with aspirin and Plavix  Aggressive risk factor modification medical management      OTHER:     Assessment & Plan     Principal Problem:    Chest pain    ////////////////////////  History  ==================  - Chest pain-atypical  EKG showed no acute changes.  Troponin levels are negative.    -Status post stent to LAD 5/29/2021     status post stent to LAD 06/10/2014   status post subendocardial myocardial infarction prior to stent placement     Lexiscan Cardiolite test-normal-8/31/2023     Echocardiogram-9/29/2023.  Structurally and functionally normal cardiac valves.  Left atrial enlargement.  Left ventricular size and contractility is normal with ejection fraction of 60%.     Cardiac catheterization 5/29/2021 revealed  Left ventricle size and contractility normal with ejection fraction of 60%.  Left main coronary artery normal.  Left anterior descending artery has previously placed stent.  Left anterior descending artery has 80 to 90% disease just distal to the diagonal branch.  (Patient to have FFR).  IFR of 0.86  Diagonal branch has diffuse 50 to 60% disease.  Circumflex coronary artery is normal except for 30 to 40% marginal branch disease.  Right coronary artery is a large and dominant vessel and is normal.  Right common iliac external iliac and femoral arteries are normal.     -Right-sided chest pain-atypical.-Improved  EKG showed no acute changes.  Troponin levels are negative.     -Elevated D-dimer  CT scan of the chest is negative for pulmonary embolus.     Stress Cardiolite test-- 5/17/2021.  Echocardiogram-normal except for left atrial enlargement 5/17/2021.      -palpitations likely SVT.  -improved on atenolol     Echocardiogram showed mild aortic  regurgitation with normal left ventricular function.  5/18/2017     - hypertension dyslipidemia sleep apnea asthma fibromyalgia GERD and gout.     - Orthostatic hypotension     - family history of coronary artery disease     - allergy to penicillin sulfa and tetracycline     =======  Plan  =======   - Chest pain-atypical  EKG showed no acute changes.  Troponin levels are negative.    Status post stent to LAD 2014 and 2021    Echocardiogram  Stress Cardiolite test    History of orthostatic hypotension-better  Continue metoprolol XL 25 mg twice a day.  Continue midodrine 5 mg twice a day.    Hypertension-well-controlled    Dyslipidemia-continue atorvastatin    Medications were reviewed and updated.     Further plan will depend on patient's progress.    Reviewed and updated 3/21/2025.  //////////////////////////////  Stress Cardiolite test 3/21/2025    Lexiscan Cardiolite test is negative for myocardial ischemia.     Gated SPECT images revealed normal left ventricular size and contractility with ejection fraction of 70%.       Yazmin Matta MD  03/21/25  11:31 EDT

## 2025-03-21 NOTE — PLAN OF CARE
Problem: Adult Inpatient Plan of Care  Goal: Absence of Hospital-Acquired Illness or Injury  Intervention: Identify and Manage Fall Risk  Recent Flowsheet Documentation  Taken 3/21/2025 0000 by Gosia Mcdonald RN  Safety Promotion/Fall Prevention: safety round/check completed  Taken 3/20/2025 2200 by Gosia Mcdonald, RN  Safety Promotion/Fall Prevention: safety round/check completed  Taken 3/20/2025 2010 by Gosia Mcdonald RN  Safety Promotion/Fall Prevention: safety round/check completed  Intervention: Prevent Skin Injury  Recent Flowsheet Documentation  Taken 3/20/2025 2010 by Gosia Mcdonald, RN  Body Position: position changed independently  Skin Protection: transparent dressing maintained  Goal: Optimal Comfort and Wellbeing  Intervention: Provide Person-Centered Care  Recent Flowsheet Documentation  Taken 3/20/2025 2010 by Gosia Mcdonald RN  Trust Relationship/Rapport:   care explained   choices provided   emotional support provided   empathic listening provided  Goal: Readiness for Transition of Care  Intervention: Mutually Develop Transition Plan  Recent Flowsheet Documentation  Taken 3/20/2025 2011 by Gosia Mcdonald RN  Transportation Anticipated: family or friend will provide  Patient/Family Anticipated Services at Transition: none  Patient/Family Anticipates Transition to: home with family  Taken 3/20/2025 2009 by Gosia Mcdonald, RN  Equipment Currently Used at Home: none     Problem: Comorbidity Management  Goal: Blood Pressure in Desired Range  Intervention: Maintain Blood Pressure Management  Recent Flowsheet Documentation  Taken 3/20/2025 2010 by Gosia Mcdonald, RN  Medication Review/Management: medications reviewed   Goal Outcome Evaluation:

## 2025-03-21 NOTE — CASE MANAGEMENT/SOCIAL WORK
Discharge Planning Assessment   [facility]     Patient Name: Charleen Barnett  MRN: [id number]  Today's Date: 3/21/2025    Admit Date: 3/20/2025    Plan: Home with spouse.   Discharge Needs Assessment       Row Name 03/21/25 1303       Living Environment    People in Home spouse    Current Living Arrangements home    Potentially Unsafe Housing Conditions none    In the past 12 months has the electric, gas, oil, or water company threatened to shut off services in your home? No    Primary Care Provided by self    Provides Primary Care For no one    Family Caregiver if Needed spouse    Quality of Family Relationships involved;helpful;supportive    Able to Return to Prior Arrangements yes       Resource/Environmental Concerns    Resource/Environmental Concerns none    Transportation Concerns none       Transportation Needs    In the past 12 months, has lack of transportation kept you from medical appointments or from getting medications? no    In the past 12 months, has lack of transportation kept you from meetings, work, or from getting things needed for daily living? No       Food Insecurity    Within the past 12 months, you worried that your food would run out before you got the money to buy more. Never true    Within the past 12 months, the food you bought just didn't last and you didn't have money to get more. Never true       Transition Planning    Patient/Family Anticipates Transition to home with family    Patient/Family Anticipated Services at Transition none    Transportation Anticipated family or friend will provide       Discharge Needs Assessment    Readmission Within the Last 30 Days no previous admission in last 30 days    Equipment Currently Used at Home none    Concerns to be Addressed discharge planning    Do you want help finding or keeping work or a job? I do not need or want help    Do you want help with school or training? For example, starting or completing job training or getting a high school  diploma, GED or equivalent No    Anticipated Changes Related to Illness none    Equipment Needed After Discharge none                   Discharge Plan       Row Name 03/21/25 1300       Plan    Plan Home with spouse.    Patient/Family in Agreement with Plan yes    Plan Comments CM met with patient at bedside, patient A/O x3. Patient lives at home with spouse. Patient drives, spouse will transport at discharge. Patient performs ADLs independently. PCP and pharmacy confirmed, declined [facility]. Denies DME use. Denies HH and OPPT services. Denies financial assistance needs for medication, food, or utilities.                Demographic Summary       Row Name 03/21/25 1303       General Information    Admission Type observation    Arrived From emergency department    Required Notices Provided Observation Status Notice    Referral Source admission list    Reason for Consult discharge planning    Preferred Language English                   Functional Status       Row Name 03/21/25 1309       Functional Status    Usual Activity Tolerance good    Current Activity Tolerance good       Functional Status, IADL    Medications independent    Meal Preparation independent    Housekeeping independent    Laundry independent    Shopping independent    If for any reason you need help with day-to-day activities such as bathing, preparing meals, shopping, managing finances, etc., do you get the help you need? I get all the help I need             MADDY Mitchell, RN, [facility]  Care Coordination Supervisor   [address] [address]  Phone: [phone]  Fax: [phone]

## 2025-03-21 NOTE — DISCHARGE SUMMARY
"[facility]    Ilsa Her MD    CHIEF COMPLAINT:     Chest pain    HISTORY OF PRESENT ILLNESS:    Obtained from admitting physician HPI on 3/20/2025:  History of present illness 74-year-old female complains of intermittent 2-week history of pain in her chest into her right arm.  No shortness of breath no dizziness or sweating no syncope.  Denies any recent injury illness flus viruses vaccinations long car ride plane ride immobilization leg pain or swelling.  Nothing seems to trigger or make it better or worse she states that it last about 10 minutes and goes away no exertional component.  But is been constant throughout the day.    03/21/25:  Patient confirms the HPI noted above including approximately 1 and half week history of some intermittent right sided chest pain described as a squeezing type pain which radiated towards her right shoulder and arm which has become more constant over the past 2 days.  She notes some baseline dyspnea which is unchanged as well as a recent nonproductive cough and some nausea without vomiting.  No fever, palpitations are noted.  Patient's PCP did attempt Pepcid therapy outpatient to try and address her symptoms but she has noted no perceptible difference with this    Following return from stress test patient reports persistent right sided chest pain and states that it is \"in the same place as when she had her heart attack\".            Past Medical History:   Diagnosis Date    Allergic 1970's    Anemia 1950's    Angina pectoris     Anxiety     Arthritis     Asthma 02/13/2020    Ramos's esophagus 02/15/2018    Cancer Carcinoma Insitu cervical    Cataract     Cholelithiasis Removed 2000's    Chronic constipation     Chronic diarrhea     Colon polyp     Coronary artery disease     Depression 08/27/2018    Fibromyalgia 12/14/2011    Fibromyalgia, primary 1990's I think    GERD (gastroesophageal reflux disease)     Gout 02/13/2020    Headache     " HL (hearing loss) Age related    Hyperlipidemia     Hypertension     Hypothyroidism 2022    Injury of back     Irritable bowel syndrome     Most of my life    Joint pain     Low back pain 2970's    Obesity Lifelong since puberty    Obstructive sleep apnea 10/16/2014    Osteopenia Five years appx    Osteoporosis     fosamax(SE), on prolia(4/10/18-6/18/21, 2/1/22)    Otitis media     Pedal edema 10/22/2018    Pneumonia 2020    Presence of left artificial knee joint 03/14/2019    Scoliosis 1961    Seasonal allergies     Shortness of breath     ST elevation (STEMI) myocardial infarction 02/13/2020    Status post percutaneous transluminal coronary angioplasty 06/10/2014    Substance abuse     Tachycardia     Tremor 2015    Urge incontinence 08/27/2018    Urinary tract infection     Visual impairment [place]    Vitamin D deficiency 03/19/2015     Past Surgical History:   Procedure Laterality Date    BLADDER SURGERY      BLADDER SUSPENSION  04/2023    CARDIAC CATHETERIZATION N/A 05/29/2021    Procedure: Left Heart Cath and coronary angiogram;  Surgeon: Yazmin Matta MD;  Location: [facility] CATH INVASIVE LOCATION;  Service: Cardiovascular;  Laterality: N/A;    CARDIAC CATHETERIZATION  05/29/2021    Procedure: Functional Flow Reserve (iFR);  Surgeon: Bryan Patel MD;  Location: [facility] CATH INVASIVE LOCATION;  Service: Cardiology;;    CARDIAC CATHETERIZATION N/A 05/29/2021    Procedure: Percutaneous Coronary Intervention;  Surgeon: Bryan Patel MD;  Location: [facility] CATH INVASIVE LOCATION;  Service: Cardiology;  Laterality: N/A;    CARDIAC CATHETERIZATION N/A 05/29/2021    Procedure: Stent FRANCOISE coronary;  Surgeon: Bryan Patel MD;  Location: [facility] CATH INVASIVE LOCATION;  Service: Cardiology;  Laterality: N/A;    CAROTID STENT      CHOLECYSTECTOMY      COLONOSCOPY  2017??    CORONARY STENT PLACEMENT  ??    FRACTURE SURGERY  2020    HYSTERECTOMY      INGUINAL HERNIA REPAIR      JOINT  REPLACEMENT  L-knee    KNEE SURGERY      OTHER SURGICAL HISTORY      STENT    SUBTOTAL HYSTERECTOMY       Family History   Problem Relation Age of Onset    Alcohol abuse Mother     Anxiety disorder Mother     Depression Mother     Early death Mother     Mental illness Mother     Heart attack Father         Vein bypass in legs    Cancer Father             Alcohol abuse Father     Depression Father     No Known Problems Sister     No Known Problems Brother     No Known Problems Maternal Aunt     No Known Problems Maternal Uncle     Stroke Paternal Aunt     No Known Problems Paternal Uncle     Heart attack Maternal Grandmother         Heart atrack in her 80s    Heart attack Maternal Grandfather         Heart attack in his 60s    Alcohol abuse Maternal Grandfather     Heart attack Paternal Grandmother         Type 1 diabetic, heart attack    Diabetes Paternal Grandmother     Heart attack Paternal Grandfather         Heart attack in his 50s or 60s    Heart disease Sister         Blockages monitored with medication    Hypertension Sister         Coronary heart issues    Depression Sister     Hyperlipidemia Brother         Extremely high triglycerides and cholesterol    Hypertension Brother         High blood pressure    Heart attack Brother         Passed from heart attack age 54    Alcohol abuse Brother     Arthritis Brother     Depression Brother     Early death Brother     Hyperlipidemia Brother         Unknown    Hypertension Brother     Heart attack Brother         Passed from heart attack age 44    Alcohol abuse Brother     Depression Brother     Early death Brother     Hyperlipidemia Brother         Had heart disease for years before passing    Hypertension Brother     Heart attack Brother          from heart failure in his 60s    Alcohol abuse Brother     Early death Brother     Mental illness Brother     Anemia Neg Hx     Arrhythmia Neg Hx     Asthma Neg Hx     Clotting disorder Neg Hx      Fainting Neg Hx     Heart failure Neg Hx      Social History     Tobacco Use    Smoking status: Never     Passive exposure: Never    Smokeless tobacco: Never    Tobacco comments:     Parents and husbands heavy smokers   Vaping Use    Vaping status: Never Used   Substance Use Topics    Alcohol use: Not Currently     Comment: Recovery since 1994 drank from [age]    Drug use: Not Currently     Frequency: 1.0 times per week     Types: Amphetamines     Comment: Short term use/over use diet pills in the 70's     No medications prior to admission.     Allergies:  Hydrocodone, Chlorpheniramine-phenylephrine, Penicillins, Sulfa antibiotics, Tetracycline, Warfarin, Pseudoephedrine hcl, and Triprolidine hcl    Immunization History   Administered Date(s) Administered    COVID-19 (MODERNA) 1st,2nd,3rd Dose Monovalent 08/08/2021, 09/10/2021    Fluad Quad 65+ 12/10/2020    Fluzone  >6mos 10/26/2014    Fluzone High-Dose 65+yrs 11/01/2023    Pneumococcal Conjugate 13-Valent (PCV13) 03/30/2017    Pneumococcal Polysaccharide (PPSV23) 12/10/2020           REVIEW OF SYSTEMS:    Review of Systems   Constitutional: Negative.   HENT: Negative.     Eyes: Negative.    Cardiovascular:  Positive for chest pain.   Respiratory:  Positive for cough and shortness of breath. Negative for sputum production.    Skin: Negative.    Musculoskeletal:  Positive for joint pain.   Gastrointestinal:  Positive for nausea. Negative for vomiting.   Genitourinary: Negative.    Neurological: Negative.    Psychiatric/Behavioral: Negative.       Vital Signs  Temp:  [98 °F (36.7 °C)] 98 °F (36.7 °C)  Heart Rate:  [57-64] 57  Resp:  [20] 20  BP: (130-157)/(68-84) 130/68          Physical Exam:  Physical Exam  Vitals reviewed.   Constitutional:       General: She is not in acute distress.     Appearance: Normal appearance. She is obese. She is not ill-appearing, toxic-appearing or diaphoretic.   HENT:      Head: Normocephalic.      Right Ear: External ear normal.       Left Ear: External ear normal.      Nose: Nose normal.      Mouth/Throat:      Mouth: Mucous membranes are moist.   Eyes:      Extraocular Movements: Extraocular movements intact.   Cardiovascular:      Rate and Rhythm: Normal rate and regular rhythm.      Pulses: Normal pulses.   Pulmonary:      Effort: Pulmonary effort is normal.      Breath sounds: Normal breath sounds.   Abdominal:      General: Bowel sounds are normal.      Palpations: Abdomen is soft.   Musculoskeletal:      Cervical back: Normal range of motion.      Right lower leg: No edema.      Left lower leg: No edema.   Skin:     General: Skin is warm and dry.      Capillary Refill: Capillary refill takes less than 2 seconds.   Neurological:      General: No focal deficit present.      Mental Status: She is alert.   Psychiatric:         Mood and Affect: Mood normal.         Behavior: Behavior normal.         Thought Content: Thought content normal.         Judgment: Judgment normal.       Emotional Behavior:   Normal   Debilities:  None  Results Review:    I reviewed the patient's new clinical results.  Lab Results (most recent)       Procedure Component Value Units Date/Time    Basic Metabolic Panel [[id number]]  (Abnormal) Collected: 03/21/25 0439    Specimen: Blood Updated: 03/21/25 0528     Glucose 90 mg/dL      BUN 15 mg/dL      Creatinine 1.09 mg/dL      Sodium 140 mmol/L      Potassium 4.6 mmol/L      Chloride 102 mmol/L      CO2 31.2 mmol/L      Calcium 9.1 mg/dL      BUN/Creatinine Ratio 13.8     Anion Gap 6.8 mmol/L      eGFR 53.4 mL/min/1.73     Narrative:      GFR Categories in Chronic Kidney Disease (CKD)      GFR Category          GFR (mL/min/1.73)    Interpretation  G1                     90 or greater         Normal or high (1)  G2                      60-89                Mild decrease (1)  G3a                   45-59                Mild to moderate decrease  G3b                   30-44                Moderate to severe decrease  G4                     15-29                Severe decrease  G5                    14 or less           Kidney failure          (1)In the absence of evidence of kidney disease, neither GFR category G1 or G2 fulfill the criteria for CKD.    eGFR calculation 2021 CKD-EPI creatinine equation, which does not include race as a factor    CBC (No Diff) [[id number]]  (Abnormal) Collected: 03/21/25 0439    Specimen: Blood Updated: 03/21/25 0456     WBC 6.58 10*3/mm3      RBC 4.23 10*6/mm3      Hemoglobin 11.7 g/dL      Hematocrit 37.3 %      MCV 88.2 fL      MCH 27.7 pg      MCHC 31.4 g/dL      RDW 12.3 %      RDW-SD 39.9 fl      MPV 10.0 fL      Platelets 183 10*3/mm3     High Sensitivity Troponin T 1Hr [[id number]]  (Abnormal) Collected: 03/20/25 1745    Specimen: Blood Updated: 03/20/25 1815     HS Troponin T 15 ng/L      Troponin T Numeric Delta 0 ng/L      Troponin T % Delta 0    Narrative:      High Sensitive Troponin T Reference Range:  <14.0 ng/L- Negative Female for AMI  <22.0 ng/L- Negative Male for AMI  >=14 - Abnormal Female indicating possible myocardial injury.  >=22 - Abnormal Male indicating possible myocardial injury.   Clinicians would have to utilize clinical acumen, EKG, Troponin, and serial changes to determine if it is an Acute Myocardial Infarction or myocardial injury due to an underlying chronic condition.         Comprehensive Metabolic Panel [[id number]]  (Abnormal) Collected: 03/20/25 1620    Specimen: Blood Updated: 03/20/25 1657     Glucose 169 mg/dL      BUN 10 mg/dL      Creatinine 1.06 mg/dL      Sodium 139 mmol/L      Potassium 3.7 mmol/L      Chloride 100 mmol/L      CO2 28.8 mmol/L      Calcium 9.3 mg/dL      Total Protein 6.2 g/dL      Albumin 4.0 g/dL      ALT (SGPT) 14 U/L      AST (SGOT) 20 U/L      Alkaline Phosphatase 95 U/L      Total Bilirubin 0.5 mg/dL      Globulin 2.2 gm/dL      A/G Ratio 1.8 g/dL      BUN/Creatinine Ratio 9.4     Anion Gap 10.2 mmol/L      eGFR 55.2 mL/min/1.73      Narrative:      GFR Categories in Chronic Kidney Disease (CKD)      GFR Category          GFR (mL/min/1.73)    Interpretation  G1                     90 or greater         Normal or high (1)  G2                      60-89                Mild decrease (1)  G3a                   45-59                Mild to moderate decrease  G3b                   30-44                Moderate to severe decrease  G4                    15-29                Severe decrease  G5                    14 or less           Kidney failure          (1)In the absence of evidence of kidney disease, neither GFR category G1 or G2 fulfill the criteria for CKD.    eGFR calculation 2021 CKD-EPI creatinine equation, which does not include race as a factor    High Sensitivity Troponin T [[id number]]  (Abnormal) Collected: 03/20/25 1620    Specimen: Blood Updated: 03/20/25 1657     HS Troponin T 15 ng/L     Narrative:      High Sensitive Troponin T Reference Range:  <14.0 ng/L- Negative Female for AMI  <22.0 ng/L- Negative Male for AMI  >=14 - Abnormal Female indicating possible myocardial injury.  >=22 - Abnormal Male indicating possible myocardial injury.   Clinicians would have to utilize clinical acumen, EKG, Troponin, and serial changes to determine if it is an Acute Myocardial Infarction or myocardial injury due to an underlying chronic condition.         Extra Tubes [[id number]] Collected: 03/20/25 1620    Specimen: Blood, Venous Line Updated: 03/20/25 1632    Narrative:      The following orders were created for panel order Extra Tubes.  Procedure                               Abnormality         Status                     ---------                               -----------         ------                     Gold Top - [facility][[id number]]                                   Final result               Light Blue Top[[id number]]                                   Final result                 Please view results for these tests on the individual orders.    Gold  Top - [facility] [[id number]] Collected: 03/20/25 1620    Specimen: Blood Updated: 03/20/25 1632     Extra Tube Hold for add-ons.     Comment: Auto resulted.       Light Blue Top [[id number]] Collected: 03/20/25 1620    Specimen: Blood Updated: 03/20/25 1632     Extra Tube Hold for add-ons.     Comment: Auto resulted       CBC & Differential [[id number]]  (Abnormal) Collected: 03/20/25 1620    Specimen: Blood Updated: 03/20/25 1630    Narrative:      The following orders were created for panel order CBC & Differential.  Procedure                               Abnormality         Status                     ---------                               -----------         ------                     CBC Auto Differential[[id number]]        Abnormal            Final result                 Please view results for these tests on the individual orders.    CBC Auto Differential [[id number]]  (Abnormal) Collected: 03/20/25 1620    Specimen: Blood Updated: 03/20/25 1630     WBC 5.87 10*3/mm3      RBC 4.55 10*6/mm3      Hemoglobin 12.6 g/dL      Hematocrit 39.8 %      MCV 87.5 fL      MCH 27.7 pg      MCHC 31.7 g/dL      RDW 12.4 %      RDW-SD 39.5 fl      MPV 10.1 fL      Platelets 196 10*3/mm3      Neutrophil % 62.8 %      Lymphocyte % 28.8 %      Monocyte % 4.9 %      Eosinophil % 2.6 %      Basophil % 0.7 %      Immature Grans % 0.2 %      Neutrophils, Absolute 3.69 10*3/mm3      Lymphocytes, Absolute 1.69 10*3/mm3      Monocytes, Absolute 0.29 10*3/mm3      Eosinophils, Absolute 0.15 10*3/mm3      Basophils, Absolute 0.04 10*3/mm3      Immature Grans, Absolute 0.01 10*3/mm3      nRBC 0.0 /100 WBC             Imaging Results (Most Recent)       Procedure Component Value Units Date/Time    CT Thoracic Spine Without Contrast [[id number]] Collected: 03/21/25 2225     Updated: 03/21/25 2230    Narrative:      CT THORACIC SPINE WO CONTRAST    Date of Exam: 3/21/2025 6:25 PM EDT    Indication: Back, right shoulder and right chest pain following  recent fall.    Comparison: None available.    Technique: Axial CT images were obtained of the thoracic spine without contrast administration.  Sagittal and coronal reconstructions were performed.  Automated exposure control and iterative reconstruction methods were used.    Findings: The visualized mandible is intact. The clavicles are intact. The visualized right ribs are intact. Healed left anterior sixth rib fracture. Severe scoliotic curvature of the thoracolumbar spine. Severe atheromatous disease of the coronary   vessels. No consolidation. No pneumothorax or pleural effusion. The airway is clear. The thyroid gland is normal. Degenerative changes. Thoracic vertebral body heights are maintained.      Impression:      No acute fractures are demonstrated.      Electronically Signed: Michael Castrejon MD    3/21/2025 10:28 PM EDT    Workstation ID: [id number]    CT Cervical Spine Without Contrast [[id number]] Collected: 03/21/25 2218     Updated: 03/21/25 2223    Narrative:        CT CERVICAL SPINE WO CONTRAST    Date of Exam: 3/21/2025 6:25 PM EDT    Indication: Back, right shoulder and right chest pain following recent fall.    Comparison: None available.    Technique: Axial CT images were obtained of the cervical spine without contrast administration.  Sagittal and coronal reconstructions were performed.  Automated exposure control and iterative reconstruction methods were used.    Findings: Atheromatous disease of the carotid siphons. The skull base is intact. The visualized mandible is intact. The visualized superior ribs are intact. The medial clavicles are intact. The thyroid gland is normal. The airway is clear. C1 and the   odontoid process are intact. No epidural hematoma. The spinous processes are intact. Multilevel facet disease. The transverse processes are intact.      Impression:      No cervical spine fracture.      Electronically Signed: Michael Castrejon MD    3/21/2025 10:21 PM EDT    Workstation ID:  [id number]    XR Hip With or Without Pelvis 2 - 3 View Right [[id number]] Collected: 03/20/25 1721     Updated: 03/20/25 1725    Narrative:      XR HIP W OR WO PELVIS 2-3 VIEW RIGHT    Date of Exam: 3/20/2025 4:49 PM EDT    Indication: Pain in right hip    Comparison: CT abdomen pelvis 6/10/2024    Findings:  No acute fracture or dislocation.    Mild degenerative changes of the right hip, sacroiliac joint, and pubic symphysis.    No focal soft tissue abnormality appreciated.      Impression:      Impression:  No radiographic evidence of an acute fracture or dislocation.      Electronically Signed: Fernando Cunningham    3/20/2025 5:22 PM EDT    Workstation ID: [id number]    XR Chest 1 View [[id number]] Collected: 03/20/25 1659     Updated: 03/20/25 1705    Narrative:      XR CHEST 1 VW    Date of Exam: 3/20/2025 4:50 PM EDT    Indication: Chest pain    Comparison: 6/20/2021    Findings:  Cardiomegaly is magnified but appears enlarged. This appears similar since 6/20/2021. No consolidation or mass is seen. No pleural effusion or pneumothorax is seen. No acute osseous lesion is seen. There is dextroscoliotic curvature of the visualized   spine.      Impression:      Impression:  1.Cardiomegaly. No acute radiographic abnormality is identified.  2.Dextroscoliotic curvature of the thoracic spine.      Electronically Signed: Markell Richey MD    3/20/2025 5:03 PM EDT    Workstation ID: [id number]          reviewed    ECG/EMG Results (most recent)       Procedure Component Value Units Date/Time    ECG 12 Lead Chest Pain [[id number]] Collected: 03/20/25 1558     Updated: 03/21/25 0747     QT Interval 413 ms      QTC Interval 442 ms     Narrative:      HEART RATE=69  bpm  RR Dsncqbyr=095  ms  SC Ybivmqld=311  ms  P Horizontal Axis=-6  deg  P Front Axis=20  deg  QRSD Interval=94  ms  QT Vjdtcvov=801  ms  CMmT=166  ms  QRS Axis=-27  deg  T Wave Axis=92  deg  - NORMAL ECG -  Sinus rhythm  When compared with ECG of 17-Apr-2023  15:59:17,  Significant axis, voltage or hypertrophy change  Electronically Signed By: Gutierrez Londono (RADHA) 2025-03-21 07:47:03  Date and Time of Study:2025-03-20 15:58:32    Telemetry Scan [[id number]] Resulted: 03/20/25     Updated: 03/21/25 1626    Telemetry Scan [[id number]] Resulted: 03/20/25     Updated: 03/21/25 2005    Telemetry Scan [[id number]] Resulted: 03/20/25     Updated: 03/21/25 2006          reviewed    Results for orders placed during the hospital encounter of 06/28/21    Duplex Carotid Ultrasound CAR    Interpretation Summary  · Proximal right internal carotid artery is normal.  · Proximal left internal carotid artery plaque without significant stenosis.      Results for orders placed during the hospital encounter of 09/29/23    Adult Transthoracic Echo Complete W/ Cont if Necessary Per Protocol    Interpretation Summary    Left ventricular ejection fraction appears to be 56 - 60%.    Indications  Chest pain  Palpitations    Technically satisfactory study.  Mitral valve is structurally normal.  Tricuspid valve is structurally normal.  Aortic valve is structurally normal.  Pulmonic valve could not be well visualized.  No evidence for mitral tricuspid or aortic regurgitation is seen by Doppler study.  Left atrium is enlarged.  Right atrium is normal in size.  Left ventricle is normal in size and contractility with ejection fraction of 60%.  Right ventricle is normal in size.  Atrial septum is intact.  Aorta is normal.  No pericardial effusion or intracardiac thrombus is seen.    Impression  Structurally and functionally normal cardiac valves.  Left atrial enlargement.  Left ventricular size and contractility is normal with ejection fraction of 60%.      Microbiology Results (last 10 days)       ** No results found for the last 240 hours. **            Assessment & Plan     Chest pain       Chest pain  Lab Results   Component Value Date    TROPONINT 15 (H) 03/20/2025    TROPONINT 15 (H) 03/20/2025     "TROPONINT <0.010 06/24/2021   -Chest X-ray: Cardiomegaly dextroscoliosis  -EKG: Sinus rhythm at 69 without obvious acute changes and a QTc of 442 ms  -In the ED pt given 325 mg aspirin, Nitropaste and Tylenol  -Stress Test reported with normal myocardial perfusion imaging with normal size and contractility of left ventricle and an EF of 70%  -Following completion of stress testing patient reported recurrent pain and cardiology was consulted who recommended echocardiogram however this could not be completed on the day of discharge and will be ordered outpatient  -GI cocktail ordered  -Patient  -CT of cervical and thoracic spine ordered  -Telemetry  -Continue aspirin, statin and beta-blocker    Hypothyroidism  -Levothyroxine    Hyperlipidemia  -Statin    Depression/anxiety  -Celexa, BuSpar and Wellbutrin    I discussed the patients findings and my recommendations with patient and nursing staff.     Discharge Diagnosis:      Chest pain      Hospital Course  Patient is a 74 y.o. female presented with right-sided chest and arm pain with an HPI noted above.  Serial troponins were assessed remained stable at 15 with chest x-ray showing cardiomegaly and EKG showing sinus rhythm at 69 without obvious acute changes and a QTc of 442 ms.  She was continued on telemetry without other significant events reported.  Patient was given 325 mg aspirin as well as Nitropaste and Tylenol in the ED with some improvement in pain noted.  Stress testing was performed on 3/21/2025 and upon return patient noted her pain was again persistent and was \"in the same place as when I had my heart attack\".  Patient was given a GI cocktail and cardiology was subsequently consulted who ordered echocardiogram.  Unfortunately echocardiogram could not be completed on the day of discharge and this was discussed with cardiologist who noted can be completed outpatient.  On discussing with results with patient she did mention a somewhat recent fall with pain " in her chest seeming to radiate in the area where she began to feel injury.  This was approximately 3 weeks prior however pain at the site has continued to worsen.  CT of cervical and thoracic spine was obtained with no acute findings noted.  At this time patient is felt to be in good condition for discharge with close follow-up with her PCP as well as cardiology on an outpatient basis.  Her full testing/results and plan were discussed with patient along with concerning/alarm symptoms for which to call 911/return to the ED.  All questions were answered and she verbalizes her understanding and agreement.    Past Medical History:     Past Medical History:   Diagnosis Date    Allergic 1970's    Anemia 1950's    Angina pectoris     Anxiety     Arthritis     Asthma 02/13/2020    Ramos's esophagus 02/15/2018    Cancer Carcinoma Insitu cervical    Cataract     Cholelithiasis Removed 2000's    Chronic constipation     Chronic diarrhea     Colon polyp     Coronary artery disease     Depression 08/27/2018    Fibromyalgia 12/14/2011    Fibromyalgia, primary 1990's I think    GERD (gastroesophageal reflux disease)     Gout 02/13/2020    Headache     HL (hearing loss) Age related    Hyperlipidemia     Hypertension     Hypothyroidism 2022    Injury of back     Irritable bowel syndrome     Most of my life    Joint pain     Low back pain 2970's    Obesity Lifelong since puberty    Obstructive sleep apnea 10/16/2014    Osteopenia Five years appx    Osteoporosis     fosamax(SE), on prolia(4/10/18-6/18/21, 2/1/22)    Otitis media     Pedal edema 10/22/2018    Pneumonia 2020    Presence of left artificial knee joint 03/14/2019    Scoliosis 1961    Seasonal allergies     Shortness of breath     ST elevation (STEMI) myocardial infarction 02/13/2020    Status post percutaneous transluminal coronary angioplasty 06/10/2014    Substance abuse     Tachycardia     Tremor 2015    Urge incontinence 08/27/2018    Urinary tract infection      Visual impairment [place]    Vitamin D deficiency 03/19/2015       Past Surgical History:     Past Surgical History:   Procedure Laterality Date    BLADDER SURGERY      BLADDER SUSPENSION  04/2023    CARDIAC CATHETERIZATION N/A 05/29/2021    Procedure: Left Heart Cath and coronary angiogram;  Surgeon: Yazmin Matta MD;  Location: [facility] CATH INVASIVE LOCATION;  Service: Cardiovascular;  Laterality: N/A;    CARDIAC CATHETERIZATION  05/29/2021    Procedure: Functional Flow Reserve (iFR);  Surgeon: Bryan Patel MD;  Location: [facility] CATH INVASIVE LOCATION;  Service: Cardiology;;    CARDIAC CATHETERIZATION N/A 05/29/2021    Procedure: Percutaneous Coronary Intervention;  Surgeon: Bryan Patel MD;  Location: [facility] CATH INVASIVE LOCATION;  Service: Cardiology;  Laterality: N/A;    CARDIAC CATHETERIZATION N/A 05/29/2021    Procedure: Stent FRANCOISE coronary;  Surgeon: Bryan Patel MD;  Location: [facility] CATH INVASIVE LOCATION;  Service: Cardiology;  Laterality: N/A;    CAROTID STENT      CHOLECYSTECTOMY      COLONOSCOPY  2017??    CORONARY STENT PLACEMENT  ??    FRACTURE SURGERY  2020    HYSTERECTOMY      INGUINAL HERNIA REPAIR      JOINT REPLACEMENT  L-knee    KNEE SURGERY      OTHER SURGICAL HISTORY      STENT    SUBTOTAL HYSTERECTOMY  1977       Social History:   Social History     Socioeconomic History    Marital status:    Tobacco Use    Smoking status: Never     Passive exposure: Never    Smokeless tobacco: Never    Tobacco comments:     Parents and husbands heavy smokers   Vaping Use    Vaping status: Never Used   Substance and Sexual Activity    Alcohol use: Not Currently     Comment: Recovery since 1994 drank from [age]    Drug use: Not Currently     Frequency: 1.0 times per week     Types: Amphetamines     Comment: Short term use/over use diet pills in the 70's    Sexual activity: Not Currently     Partners: Male     Birth control/protection: Post-menopausal, None,  Hysterectomy       Procedures Performed         Consults:   Consults       No orders found from 2/19/2025 to 3/21/2025.            Condition on Discharge:     Stable    Discharge Disposition  Home or Self Care    Discharge Medications     Discharge Medications        Continue These Medications        Instructions Start Date   albuterol sulfate  (90 Base) MCG/ACT inhaler  Commonly known as: PROVENTIL HFA;VENTOLIN HFA;PROAIR HFA   2 puffs, Inhalation, Every 6 Hours PRN      aspirin 81 MG tablet   81 mg, Daily      atorvastatin 10 MG tablet  Commonly known as: LIPITOR   10 mg, Oral, Daily      buPROPion  MG 24 hr tablet  Commonly known as: WELLBUTRIN XL   150 mg, Oral, Every Morning      busPIRone 10 MG tablet  Commonly known as: BUSPAR   TAKE ONE TABLET BY MOUTH THREE TIMES A DAY. (PT REQUESTS 1 TWICE DAILY )      citalopram 20 MG tablet  Commonly known as: CeleXA   20 mg, Oral, Daily      famotidine 40 MG tablet  Commonly known as: Pepcid   40 mg, Oral, Daily, For reflux      fluticasone 50 MCG/ACT nasal spray  Commonly known as: FLONASE   2 sprays, Each Nare, Daily, use 2 sprays in the nostril(s) as directed by provider daily      gabapentin 400 MG capsule  Commonly known as: NEURONTIN   400 mg, Oral, Every Night at Bedtime      Gemtesa 75 MG tablet  Generic drug: Vibegron   1 tablet, Daily      hydrOXYzine 10 MG tablet  Commonly known as: ATARAX   TAKE 1 TABLET BY MOUTH EVERY NIGHT AT BEDTIME      levothyroxine 25 MCG tablet  Commonly known as: SYNTHROID, LEVOTHROID   25 mcg, Oral, Every Morning      Magnesium Citrate 100 MG capsule   1 capsule, Daily      metoprolol succinate XL 25 MG 24 hr tablet  Commonly known as: TOPROL-XL   TAKE 2 TABLETS BY MOUTH EVERY DAY (PT REQUESTS 1AM AND 1PM)      midodrine 5 MG tablet  Commonly known as: PROAMATINE   2.5 mg, Oral, Daily      montelukast 10 MG tablet  Commonly known as: SINGULAIR   10 mg, Oral, Daily      spironolactone 25 MG tablet  Commonly known as:  ALDACTONE   As Needed.      Vitamin D 50 MCG (2000 UT) tablet   2,000 Units, Daily               Discharge Diet:     Activity at Discharge:     Follow-up Appointments  Future Appointments   Date Time Provider Department Center   4/2/2025  2:30 PM [facility]/OUTPATIENT [facility]   4/14/2025  4:15 PM Yazmin Matta MD [facility]   5/6/2025  3:00 PM Ilsa Her MD [facility]     Additional Instructions for the Follow-ups that You Need to Schedule       Discharge Follow-up with PCP   As directed       Currently Documented PCP:    Ilsa Her MD    PCP Phone Number:    [phone]     Follow Up Details: 5 to 7 days        Discharge Follow-up with Specified Provider: Cardiology; 1 Month   As directed      To: Cardiology   Follow Up: 1 Month                Test Results Pending at Discharge  Pending Results       None             Risk for Readmission (LACE) Score: 4 (3/21/2025  6:00 AM)      Greater than 30 minutes spent in discharge activities for this patient    Signature:Electronically signed by Juan Manrique PA-C, 03/22/25, 2:41 PM EDT.

## 2025-03-22 NOTE — OUTREACH NOTE
Prep Survey      Flowsheet Row Responses   [facility] facility patient discharged from? [facility]   Is LACE score < 7 ? Yes   Eligibility TCM   Discharge diagnosis chest pain,   Does the patient have one of the following disease processes/diagnoses(primary or secondary)? Other   Does the patient have Home health ordered? No   Is there a DME ordered? No   Prep survey completed? Yes            Lillian HOUGH - Registered Nurse

## 2025-03-24 ENCOUNTER — TRANSITIONAL CARE MANAGEMENT TELEPHONE ENCOUNTER (OUTPATIENT)
Dept: CALL CENTER | Facility: HOSPITAL | Age: 75
End: 2025-03-24
Payer: MEDICARE

## 2025-03-24 NOTE — OUTREACH NOTE
Call Center TCM Note      Flowsheet Row Responses   [facility] patient discharged from? [facility]   Does the patient have one of the following disease processes/diagnoses(primary or secondary)? Other   TCM attempt successful? No   Unsuccessful attempts Attempt 1  [Attempted patient, spouse, and Son Ji]            Starla Jones RN    3/24/2025, 09:08 EDT

## 2025-03-24 NOTE — OUTREACH NOTE
Call Center TCM Note      Flowsheet Row Responses   [facility] patient discharged from? [facility]   Does the patient have one of the following disease processes/diagnoses(primary or secondary)? Other   TCM attempt successful? Yes   Call start time 1212   Call end time 1213   Discharge diagnosis chest pain,   Person spoke with today (if not patient) and relationship Patient   Meds reviewed with patient/caregiver? Yes   Prescription comments No concerns or questions noted.   Comments 4/3/2025 10:15 AM  HOSPITAL FOLLOW UP 30 min [facility] Gregg Her MD   Does the patient have an appointment with their PCP within 7-14 days of discharge? No   Nursing Interventions Assisted patient with making appointment per protocol   Has home health visited the patient within 72 hours of discharge? N/A   Psychosocial issues? No   Did the patient receive a copy of their discharge instructions? Yes   Nursing interventions Reviewed instructions with patient   What is the patient's perception of their health status since discharge? Improving   Is the patient/caregiver able to teach back signs and symptoms related to disease process for when to call PCP? Yes   Is the patient/caregiver able to teach back signs and symptoms related to disease process for when to call 911? Yes   Is the patient/caregiver able to teach back the hierarchy of who to call/visit for symptoms/problems? PCP, Specialist, Home health nurse, Urgent Care, ED, 911 Yes   TCM call completed? Yes   Wrap up additional comments Patient states she is doing well. No concerns or questions noted.   Call end time 1213   Would this patient benefit from a Referral to Amb Social Work? No   Is the patient interested in additional calls from an ambulatory ? No            Starla Jonse RN    3/24/2025, 12:17 EDT

## 2025-03-24 NOTE — CASE MANAGEMENT/SOCIAL WORK
Case Management Discharge Note      Final Note: Routine home         Selected Continued Care - Discharged on 3/21/2025 Admission date: 3/20/2025 - Discharge disposition: Home or Self Care         Transportation Services  Private: Car    Final Discharge Disposition Code: 01 - home or self-care

## 2025-04-09 RX ORDER — GABAPENTIN 400 MG/1
400 CAPSULE ORAL
Qty: 90 CAPSULE | Refills: 0 | Status: ON HOLD | OUTPATIENT
Start: 2025-04-09

## 2025-04-09 NOTE — TELEPHONE ENCOUNTER
Caller: DONAVAN LYONS    Relationship: Emergency Contact    Best call back number: [phone]    Requested Prescriptions:   Requested Prescriptions     Pending Prescriptions Disp Refills    gabapentin (NEURONTIN) 400 MG capsule 90 capsule 0     Sig: Take 1 capsule by mouth every night at bedtime.      Pharmacy where request should be sent: [address] [phone] [place] [phone] [facility]     Last office visit with prescribing clinician: 3/18/2025   Last telemedicine visit with prescribing clinician: Visit date not found   Next office visit with prescribing clinician: 5/6/2025     Additional details provided by patient: PT IS OUT OF MEDICATION.     Does the patient have less than a 3 day supply:  [x] Yes  [] No    Would you like a call back once the refill request has been completed: [] Yes [x] No    Rachna Dorsey Rep   04/09/25 14:17 EDT

## 2025-04-10 ENCOUNTER — OFFICE VISIT (OUTPATIENT)
Dept: ORTHOPEDIC SURGERY | Facility: CLINIC | Age: 75
End: 2025-04-10
Payer: MEDICARE

## 2025-04-10 VITALS — OXYGEN SATURATION: 97 % | BODY MASS INDEX: 38.62 KG/M2 | HEART RATE: 86 BPM | WEIGHT: 184 LBS | HEIGHT: 58 IN

## 2025-04-10 DIAGNOSIS — M25.512 CHRONIC PAIN OF BOTH SHOULDERS: Primary | ICD-10-CM

## 2025-04-10 DIAGNOSIS — G89.29 CHRONIC PAIN OF BOTH SHOULDERS: Primary | ICD-10-CM

## 2025-04-10 DIAGNOSIS — M25.511 ACUTE PAIN OF RIGHT SHOULDER: ICD-10-CM

## 2025-04-10 DIAGNOSIS — M19.012 OSTEOARTHRITIS OF LEFT GLENOHUMERAL JOINT: ICD-10-CM

## 2025-04-10 DIAGNOSIS — M25.511 CHRONIC PAIN OF BOTH SHOULDERS: Primary | ICD-10-CM

## 2025-04-10 RX ORDER — TRIAMCINOLONE ACETONIDE 40 MG/ML
80 INJECTION, SUSPENSION INTRA-ARTICULAR; INTRAMUSCULAR ONCE
Status: COMPLETED | OUTPATIENT
Start: 2025-04-10 | End: 2025-04-10

## 2025-04-10 RX ADMIN — TRIAMCINOLONE ACETONIDE 80 MG: 40 INJECTION, SUSPENSION INTRA-ARTICULAR; INTRAMUSCULAR at 14:38

## 2025-04-10 NOTE — PATIENT INSTRUCTIONS
MRI follow-up instructions    Today at your office visit, Dr. Campa recommended an MRI (magnetic resonance imaging) to evaluate your joint pain.  This requires a precertification process, which our office will do, and then we will contact you when it is approved and go over scheduling options.  We typically recommend these to be performed at [facility] or [facility].  If for some reason it is performed elsewhere please arrange to have that facility give you a disc with your images on it so Dr. Campa can review it at your follow-up visit.    When checking out today we recommend making an appointment to go over your results in approximately two weeks.  If your MRI is done sooner than that we would be happy to schedule you sooner to go over your results, just contact us at [phone] or through the [facility] portal to let us know your MRI is completed.  Seeing you in person for the results gives us the best opportunity to look at your images together and explain the diagnosis and treatment options to best help you.

## 2025-04-10 NOTE — PROGRESS NOTES
Patient ID: Charleen Barnett is a 74 y.o. female.    Chief Complaint:    Chief Complaint   Patient presents with    Left Shoulder - Initial Evaluation    Right Shoulder - Initial Evaluation       HPI:  This is a 74-year-old female here with bilateral shoulder pain.  She had steroid injection for arthritis on the left side over a year ago with good results until now.  She is having trouble sleeping on that shoulder.  The right shoulder is actually hurting worse today though however over the bicep groove and worse with overhead activity  Past Medical History:   Diagnosis Date    Allergic 1970's    Anemia 1950's    Angina pectoris     Anxiety 1980's    Arthritis 1990's    Arthritis of neck     Asthma 02/13/2020    Ramos's esophagus 02/15/2018    Cancer Carcinoma Insitu cervical    Carcinoma insitu cervix    Cataract     Removed both eyes in Jan. 2023    Cholelithiasis Removed 2000's    Chronic constipation     Chronic diarrhea     Colon polyp 2000's    Coronary artery disease 2006?    CTS (carpal tunnel syndrome)     Depression 08/27/2018    Dislocation of finger     Fibromyalgia 12/14/2011    Fibromyalgia, primary 1990's I think    Fracture of wrist     R HAND LAST 3 DIGITS    Fracture, finger     GERD (gastroesophageal reflux disease)     Take Lansaprazole    Gout 02/13/2020    Headache     HL (hearing loss) Age related    Hyperlipidemia     Hypertension     Hypothyroidism 2022    Injury of back     Irritable bowel syndrome     Most of my life    Joint pain     Knee swelling     Low back pain 1970's    Low back strain     Lumbosacral disc disease     Neck strain ??    My neck is always pulling    Obesity Lifelong since puberty    Obstructive sleep apnea 10/16/2014    Osteopenia Five years appx    Dr. DURAN GIVES SHOTS    Osteoporosis     fosamax(SE), on prolia(4/10/18-6/18/21, 2/1/22)    Otitis media     Pedal edema 10/22/2018    Periarthritis of shoulder     Pneumonia 2020    Presence of left artificial knee  joint 03/14/2019    Rotator cuff syndrome     Scoliosis 1961    Seasonal allergies     Shortness of breath     ST elevation (STEMI) myocardial infarction 02/13/2020    Status post percutaneous transluminal coronary angioplasty 06/10/2014    Substance abuse     Tachycardia     Tear of meniscus of knee     Thoracic disc disorder     Alignment totally out    Tremor 2015    Urge incontinence 08/27/2018    Urinary tract infection     Visual impairment Cataracts    Removed 1/2023 removed    Vitamin D deficiency 03/19/2015       Past Surgical History:   Procedure Laterality Date    BLADDER SURGERY      BLADDER SUSPENSION  04/2023    CARDIAC CATHETERIZATION N/A 05/29/2021    Procedure: Left Heart Cath and coronary angiogram;  Surgeon: Yazmin Matta MD;  Location: [facility] CATH INVASIVE LOCATION;  Service: Cardiovascular;  Laterality: N/A;    CARDIAC CATHETERIZATION  05/29/2021    Procedure: Functional Flow Reserve (iFR);  Surgeon: Bryan Patel MD;  Location: [facility] CATH INVASIVE LOCATION;  Service: Cardiology;;    CARDIAC CATHETERIZATION N/A 05/29/2021    Procedure: Percutaneous Coronary Intervention;  Surgeon: Bryan Patel MD;  Location: [facility] CATH INVASIVE LOCATION;  Service: Cardiology;  Laterality: N/A;    CARDIAC CATHETERIZATION N/A 05/29/2021    Procedure: Stent FRANCOISE coronary;  Surgeon: Bryan Patel MD;  Location: [facility] CATH INVASIVE LOCATION;  Service: Cardiology;  Laterality: N/A;    CAROTID STENT      CHOLECYSTECTOMY      COLONOSCOPY  2017??    Scheduled for march 2023?    CORONARY STENT PLACEMENT  ??    FRACTURE SURGERY  2020    Hand surgery [facility]    HAND SURGERY      HYSTERECTOMY      INGUINAL HERNIA REPAIR      JOINT REPLACEMENT  L-knee    KNEE SURGERY      OTHER SURGICAL HISTORY      STENT    SUBTOTAL HYSTERECTOMY  1977    Carcinoma insitu Cervix    TRIGGER POINT INJECTION         Family History   Problem Relation Age of Onset    Alcohol abuse Mother      Anxiety disorder Mother     Depression Mother     Early death Mother     Mental illness Mother     Heart attack Father         Vein bypass in legs    Cancer Father             Alcohol abuse Father     Depression Father     No Known Problems Sister     No Known Problems Brother     No Known Problems Maternal Aunt     No Known Problems Maternal Uncle     Stroke Paternal Aunt     No Known Problems Paternal Uncle     Heart attack Maternal Grandmother         Heart atrack in her 80s    Rheumatologic disease Maternal Grandmother     Scoliosis Maternal Grandmother     Heart attack Maternal Grandfather         Heart attack in his 60s    Alcohol abuse Maternal Grandfather     Heart attack Paternal Grandmother         Type 1 diabetic, heart attack    Diabetes Paternal Grandmother     Heart attack Paternal Grandfather         Heart attack in his 50s or 60s    Heart disease Sister         Blockages monitored with medication    Hypertension Sister         Coronary heart issues    Depression Sister     Hyperlipidemia Brother         Extremely high triglycerides and cholesterol    Hypertension Brother         High blood pressure    Heart attack Brother         Passed from heart attack age 54    Alcohol abuse Brother     Arthritis Brother     Depression Brother     Early death Brother     Hyperlipidemia Brother         Unknown    Hypertension Brother     Heart attack Brother         Passed from heart attack age 44    Alcohol abuse Brother     Depression Brother     Early death Brother     Hyperlipidemia Brother         Had heart disease for years before passing    Hypertension Brother     Heart attack Brother          from heart failure in his 60s    Alcohol abuse Brother     Early death Brother     Mental illness Brother     Anemia Neg Hx     Arrhythmia Neg Hx     Asthma Neg Hx     Clotting disorder Neg Hx     Fainting Neg Hx     Heart failure Neg Hx           Social History     Occupational History    Not on file    Tobacco Use    Smoking status: Never     Passive exposure: Never    Smokeless tobacco: Never    Tobacco comments:     Parents and husbands heavy smokers   Vaping Use    Vaping status: Never Used   Substance and Sexual Activity    Alcohol use: Not Currently     Comment: Recovery since 1994 drank from [age]    Drug use: Not Currently     Frequency: 1.0 times per week     Types: Amphetamines     Comment: Short term use/over use diet pills in the 70's    Sexual activity: Not Currently     Partners: Male     Birth control/protection: Post-menopausal, None, Hysterectomy, Partner of same sex      Review of Systems   Cardiovascular:  Negative for chest pain.   Musculoskeletal:  Positive for arthralgias.       Objective:    There were no vitals taken for this visit.    Physical Examination:  Both shoulders are examined with moderate bicep groove tenderness on the right passive elevation 160 on the right abduction 130 external rotation 40 internal rotation S1 with pain and weakness on Speed [place] supraspinatus testing.  Belly press and liftoff are 4/5.  Left side passive elevation 170 abduction 130 external rotation 30 internal rotation S1 with negative Speed Worthington supraspinatus test.  Belly press and liftoff are 5/5.  Sensory and motor exam are intact all distributions. Radial pulse is palpable and capillary refill is less than two seconds to all digits.    Imaging:  bilateral Shoulder X-Ray  Indication: Bilateral shoulder pain no prior surgery  AP Y and Lateral views  Findings: Moderate joint space narrowing of the left overall unchanged well-maintained joint spaces on the right  no bony lesion  Soft tissues normal  decreased joint spaces  Hardware appropriately positioned not applicable      yes prior studies available for comparison.    Assessment:  Left shoulder osteoarthritis  Right shoulder pain    Plan:  For the left shoulder, I recommend injection after today's evaluation. Risks and benefits of the injection  were discussed. Under sterile technique and after timeout and verbal consent I injected 80 mg of Kenalog and 2 mL of 1% Lidocaine in the left shoulder and subacromial space. It was well tolerated.  For the right shoulder MRI      Procedures         Disclaimer: Part of this note may be an electronic transcription/translation of spoken language to printed text using the Dragon Dictation System

## 2025-04-11 ENCOUNTER — OFFICE VISIT (OUTPATIENT)
Dept: FAMILY MEDICINE CLINIC | Facility: CLINIC | Age: 75
End: 2025-04-11
Payer: MEDICARE

## 2025-04-11 VITALS
SYSTOLIC BLOOD PRESSURE: 177 MMHG | HEART RATE: 65 BPM | WEIGHT: 187.6 LBS | TEMPERATURE: 98 F | OXYGEN SATURATION: 98 % | HEIGHT: 58 IN | DIASTOLIC BLOOD PRESSURE: 87 MMHG | BODY MASS INDEX: 39.38 KG/M2

## 2025-04-11 DIAGNOSIS — Z09 HOSPITAL DISCHARGE FOLLOW-UP: Primary | ICD-10-CM

## 2025-04-11 DIAGNOSIS — I77.9 BILATERAL CAROTID ARTERY DISEASE, UNSPECIFIED TYPE: ICD-10-CM

## 2025-04-11 DIAGNOSIS — M54.50 ACUTE RIGHT-SIDED LOW BACK PAIN WITHOUT SCIATICA: ICD-10-CM

## 2025-04-11 DIAGNOSIS — R07.9 CHEST PAIN, UNSPECIFIED TYPE: ICD-10-CM

## 2025-04-11 DIAGNOSIS — M81.0 AGE RELATED OSTEOPOROSIS, UNSPECIFIED PATHOLOGICAL FRACTURE PRESENCE: ICD-10-CM

## 2025-04-11 PROCEDURE — 3079F DIAST BP 80-89 MM HG: CPT

## 2025-04-11 PROCEDURE — 1111F DSCHRG MED/CURRENT MED MERGE: CPT

## 2025-04-11 PROCEDURE — 99214 OFFICE O/P EST MOD 30 MIN: CPT

## 2025-04-11 PROCEDURE — 1160F RVW MEDS BY RX/DR IN RCRD: CPT

## 2025-04-11 PROCEDURE — 3077F SYST BP >= 140 MM HG: CPT

## 2025-04-11 PROCEDURE — 1159F MED LIST DOCD IN RCRD: CPT

## 2025-04-11 PROCEDURE — 1126F AMNT PAIN NOTED NONE PRSNT: CPT

## 2025-04-11 NOTE — PROGRESS NOTES
"Transitional Care Follow Up Visit  Subjective     Charleen Barnett is a 74 y.o. female who presents for a transitional care management visit.    Within 48 business hours after discharge our office contacted her via telephone to coordinate her care and needs.      I reviewed and discussed the details of that call along with the discharge summary, hospital problems, inpatient lab results, inpatient diagnostic studies, and consultation reports with Charleen.     Current outpatient and discharge medications have been reconciled for the patient.  Reviewed by: MANISH Allred          6/25/2021     7:30 PM   Date of TCM Phone Call   [facility]   Date of Admission 6/24/2021   Date of Discharge 6/25/2021   Discharge Disposition Home or Self Care     Risk for Readmission (LACE) No data recorded    History of Present Illness   Course During Hospital Stay:      \"Patient is a 74 y.o. female presented with right-sided chest and arm pain with an HPI noted above.  Serial troponins were assessed remained stable at 15 with chest x-ray showing cardiomegaly and EKG showing sinus rhythm at 69 without obvious acute changes and a QTc of 442 ms.  She was continued on telemetry without other significant events reported.  Patient was given 325 mg aspirin as well as Nitropaste and Tylenol in the ED with some improvement in pain noted.  Stress testing was performed on 3/21/2025 and upon return patient noted her pain was again persistent and was \"in the same place as when I had my heart attack\".  Patient was given a GI cocktail and cardiology was subsequently consulted who ordered echocardiogram.  Unfortunately echocardiogram could not be completed on the day of discharge and this was discussed with cardiologist who noted can be completed outpatient.  On discussing with results with patient she did mention a somewhat recent fall with pain in her chest seeming to radiate in the area where she began to feel injury.  This was " "approximately 3 weeks prior however pain at the site has continued to worsen.  CT of cervical and thoracic spine was obtained with no acute findings noted.  At this time patient is felt to be in good condition for discharge with close follow-up with her PCP as well as cardiology on an outpatient basis.  Her full testing/results and plan were discussed with patient along with concerning/alarm symptoms for which to call 911/return to the ED.  All questions were answered and she verbalizes her understanding and agreement.\"      The following portions of the patient's history were reviewed and updated as appropriate: allergies, current medications, past family history, past medical history, past social history, past surgical history, and problem list.    Pt presents today following up on 03/20 hospital visit for chest pain. Chest no longer hurts. If she lays on her left side her right arm and shoulder fall asleep. Dr. Campa is getting an MRI of right shoulder. He also gave her a steroid shot. Fell 2 weeks before this pain started.     Right SI joint hurts when she gets in bed. She is overwhelmed by this new pain and her chronic pain. She is tired of being in pain. She is tearful about this.     She says she had been in a deep depression. The fall kind of pulled her out of it. She says this started after they tore down the place she was doing aquatherapy. She states she can't find anywhere else that does it outside of [place].     She is no longer taking her prolia.    Review of Systems    Objective   /87 (BP Location: Right arm, Patient Position: Sitting, Cuff Size: Large Adult)   Pulse 65   Temp 98 °F (36.7 °C) (Temporal)   Ht 147.3 cm (57.99\")   Wt 85.1 kg (187 lb 9.6 oz)   SpO2 98%   BMI 39.22 kg/m²   Physical Exam  Vitals reviewed.   Constitutional:       General: She is not in acute distress.     Appearance: Normal appearance. She is obese. She is not ill-appearing, toxic-appearing or diaphoretic. "   Cardiovascular:      Rate and Rhythm: Normal rate and regular rhythm.      Pulses: Normal pulses.      Heart sounds: Normal heart sounds.   Pulmonary:      Effort: Pulmonary effort is normal. No respiratory distress.      Breath sounds: Normal breath sounds.   Musculoskeletal:         General: Tenderness (right shoulder, right SI joint) present.   Skin:     General: Skin is warm and dry.      Capillary Refill: Capillary refill takes less than 2 seconds.   Neurological:      General: No focal deficit present.      Mental Status: She is alert and oriented to person, place, and time.   Psychiatric:         Attention and Perception: Attention and perception normal.         Mood and Affect: Mood normal. Mood is depressed. Affect is tearful.         Speech: Speech normal.         Behavior: Behavior normal. Behavior is cooperative.         Thought Content: Thought content normal. Thought content does not include homicidal or suicidal ideation. Thought content does not include homicidal or suicidal plan.         Cognition and Memory: Cognition normal.         Judgment: Judgment normal.         Assessment & Plan   Diagnoses and all orders for this visit:    1. Hospital discharge follow-up (Primary)    2. Acute right-sided low back pain without sciatica    3. Chest pain, unspecified type    4. Bilateral carotid artery disease, unspecified type  -     Ambulatory Referral to Vascular Surgery    5. Age related osteoporosis, unspecified pathological fracture presence  -     DEXA Bone Density Axial; Future

## 2025-04-12 NOTE — PROGRESS NOTES
"Chief Complaint  new patient (memory)    Subjective            Charleen Barnett presents to [facility] NEUROLOGY for Memory  History of Present Illness     New patient here for memory she states she has been having problems w/ her short term memory x's 2 yrs and it has been progressively getting worse.   Example: not remembering if she took her medications,   she states her sister has frontal lobe dementia/personality disorder.   Patient is currently not taking any medications for memory.    Pt forgets how to play a game she has known for years.    She forgets what people have said to her    She was treated for mel in the past, snores loudly restless sleep       Maximum   Score Patient's   Score Questions   5 4 \"What is the year?Season?Date?Day of the week?Month?\"   5 5 \"Where are we now: State?County?Town/city?Hospital?Floor?\"   3 3 3 Unrelated objects Number of trials:___   5 5 Count backward from 100 by sevens or spell WORLD backwards   3 3 Name 3 things from above   2 2 Identify 2 objects   1 1 Repeat the phrase: No ifs, ands,or buts.   3 3 Take paper in right hand, fold it in half, and put it on the floor.   1 1 Please read this and do what it says. \"Close your eyes\"   1 1 Make up and write a sentence about anything. Noun and verb   1 1 Copy this picture 10 angles must be present.   30 29 Total MMSE           Family History   Problem Relation Age of Onset   • No Known Problems Mother    • Heart attack Father         Vein bypass in legs   • Cancer Father            • No Known Problems Sister    • No Known Problems Brother    • No Known Problems Maternal Aunt    • No Known Problems Maternal Uncle    • No Known Problems Paternal Aunt    • No Known Problems Paternal Uncle    • Heart attack Maternal Grandmother         Heart atrack in her 80s   • Heart attack Maternal Grandfather         Heart attack in his 60s   • Heart attack Paternal Grandmother         Type 1 diabetic, heart attack   • " DISPLAY PLAN FREE TEXT Heart attack Paternal Grandfather         Heart attack in his 50s or 60s   • Heart disease Sister         Blockages monitored with medication   • Hypertension Sister         Coronary heart issues   • Hyperlipidemia Brother         Extremely high triglycerides and cholesterol   • Hypertension Brother         Hogh blood pressure   • Heart attack Brother         Passed from heart attack age 54   • Hyperlipidemia Brother         Unknown   • Hypertension Brother    • Heart attack Brother         Passed from heart attack age 44   • Hyperlipidemia Brother         Had heart disease for years before passing   • Hypertension Brother    • Heart attack Brother          from heart failure in his 60s   • Anemia Neg Hx    • Arrhythmia Neg Hx    • Asthma Neg Hx    • Clotting disorder Neg Hx    • Fainting Neg Hx    • Heart failure Neg Hx        Past Medical History:   Diagnosis Date   • Allergic s   • Anemia 's   • Anxiety    • Arthritis    • Asthma 2020   • Ramos's esophagus 02/15/2018   • Cancer ([facility]) Carcinoma Insitu cervical   • Cataract    • Cholelithiasis Removed    • Coronary artery disease    • Depression 2018   • Fibromyalgia 2011   • Fibromyalgia, primary s I think   • GERD (gastroesophageal reflux disease)    • Gout 2020   • Headache    • HL (hearing loss) Age related   • Hyperlipidemia    • Hypertension    • Hypothyroidism    • Joint pain    • Low back pain 2970's   • Obesity Lifelong since puberty   • Obstructive sleep apnea 10/16/2014   • Osteopenia Five years appx   • Osteoporosis     fosamax(SE), on prolia(4/10/18-21, 22)   • Otitis media    • Pedal edema 10/22/2018   • Pneumonia    • Presence of left artificial knee joint 2019   • Scoliosis 1   • Seasonal allergies    • ST elevation (STEMI) myocardial infarction ([facility]) 2020   • Status post percutaneous transluminal coronary angioplasty 06/10/2014   • Substance abuse ([facility])    •  Tachycardia    • Urge incontinence 08/27/2018   • Urinary tract infection    • Visual impairment Catsracts   • Vitamin D deficiency 03/19/2015       Social History     Socioeconomic History   • Marital status:    Tobacco Use   • Smoking status: Never   • Smokeless tobacco: Never   • Tobacco comments:     Parents and husbands heavy smokers   Vaping Use   • Vaping Use: Never used   Substance and Sexual Activity   • Alcohol use: Not Currently     Comment: Recovery since 1994 drank from age [age]   • Drug use: Not Currently     Frequency: 1.0 times per week     Types: Amphetamines     Comment: Short term use/over use diet pills in the 70's   • Sexual activity: Not Currently     Partners: Male     Birth control/protection: None, Hysterectomy         Current Outpatient Medications:   •  albuterol sulfate  (90 Base) MCG/ACT inhaler, Inhale 1 puff Every 6 (Six) Hours As Needed for Wheezing or Shortness of Air., Disp: , Rfl:   •  aspirin 81 MG tablet, Take 81 mg by mouth Daily., Disp: , Rfl:   •  atorvastatin (LIPITOR) 10 MG tablet, TAKE 1 TABLET BY MOUTH DAILY, Disp: 90 tablet, Rfl: 2  •  Biotin 10 MG tablet, Take 10 mg by mouth Daily., Disp: , Rfl:   •  buPROPion XL (WELLBUTRIN XL) 150 MG 24 hr tablet, TAKE 1 TABLET BY MOUTH EVERY MORNING, Disp: 28 tablet, Rfl: 0  •  busPIRone (BUSPAR) 7.5 MG tablet, TAKE 1 TABLET BY MOUTH TWICE DAILY, Disp: 180 tablet, Rfl: 0  •  citalopram (CeleXA) 20 MG tablet, TAKE 1 TABLET BY MOUTH EVERY DAY, Disp: 90 tablet, Rfl: 2  •  diclofenac (VOLTAREN) 0.1 % ophthalmic solution, , Disp: , Rfl:   •  gabapentin (NEURONTIN) 400 MG capsule, Take 1 capsule by mouth every night at bedtime., Disp: 90 capsule, Rfl: 3  •  hydrOXYzine (ATARAX) 10 MG tablet, TAKE 1 TABLET BY MOUTH EVERY NIGHT AT BEDTIME, Disp: 30 tablet, Rfl: 0  •  lansoprazole (PREVACID) 15 MG capsule, Take 15 mg by mouth Daily., Disp: , Rfl:   •  levothyroxine (SYNTHROID, LEVOTHROID) 25 MCG tablet, TAKE 1 TABLET BY MOUTH  "EVERY MORNING, Disp: 28 tablet, Rfl: 0  •  metoprolol succinate XL (TOPROL-XL) 25 MG 24 hr tablet, TAKE 1 TABLET BY MOUTH EVERY MORNING, Disp: 90 tablet, Rfl: 2  •  montelukast (SINGULAIR) 10 MG tablet, TAKE 1 TABLET BY MOUTH EVERY DAY, Disp: 90 tablet, Rfl: 2  •  ofloxacin (OCUFLOX) 0.3 % ophthalmic solution, , Disp: , Rfl:   •  oxybutynin (DITROPAN) 5 MG tablet, TAKE 1 TABLET BY MOUTH TWICE DAILY, Disp: 56 tablet, Rfl: 0  •  prednisoLONE acetate (PRED FORTE) 1 % ophthalmic suspension, , Disp: , Rfl:   •  triamcinolone (KENALOG) 0.1 % cream, Apply 1 application topically to the appropriate area as directed 2 (Two) Times a Day., Disp: 30 g, Rfl: 0  •  vitamin D (ERGOCALCIFEROL) 1.25 MG (49348 UT) capsule capsule, TAKE 1 CAPSULE BY MOUTH EVERY WEEK, Disp: 13 capsule, Rfl: 1  •  midodrine (PROAMATINE) 5 MG tablet, , Disp: , Rfl:     Current Facility-Administered Medications:   •  denosumab (PROLIA) syringe 60 mg, 60 mg, Subcutaneous, Q6 Months, Dede Anders PA, 60 mg at 08/03/22 1345    Review of Systems   Constitutional: Positive for chills and fatigue.   Eyes: Positive for itching.   Respiratory: Positive for apnea and shortness of breath.    Cardiovascular: Positive for leg swelling.   Gastrointestinal: Positive for abdominal distention and abdominal pain.   Neurological: Positive for dizziness, light-headedness and headaches.   Psychiatric/Behavioral: Positive for agitation and confusion. The patient is nervous/anxious.    All other systems reviewed and are negative.           Objective   Vital Signs:   /83   Pulse 65   Temp 97.7 °F (36.5 °C) (Infrared)   Ht 147.3 cm (58\")   Wt 89.8 kg (198 lb)   BMI 41.38 kg/m²     Physical Exam  Vitals reviewed.   HENT:      Head: Normocephalic.   Pulmonary:      Effort: Pulmonary effort is normal. No respiratory distress.   Neurological:      General: No focal deficit present.      Mental Status: She is alert and oriented to person, place, and time. "   Psychiatric:         Mood and Affect: Mood normal.        Result Review :                Neurologic Exam     Mental Status   Oriented to person, place, and time.              Assessment and Plan    Diagnoses and all orders for this visit:    1. Obstructive sleep apnea (Primary)    2. Memory difficulties      Will check labs for contributions to memory impairment    Will check mri brain     Obtain hst for possible mel    Follow Up   Return in about 3 months (around 3/12/2023).  Patient was given instructions and counseling regarding her condition or for health maintenance advice. Please see specific information pulled into the AVS if appropriate.         This document has been electronically signed by Joseph Seipel, MD on December 12, 2022 11:47 EST

## 2025-04-14 ENCOUNTER — APPOINTMENT (OUTPATIENT)
Dept: GENERAL RADIOLOGY | Facility: HOSPITAL | Age: 75
End: 2025-04-14
Payer: MEDICARE

## 2025-04-14 ENCOUNTER — HOSPITAL ENCOUNTER (INPATIENT)
Facility: HOSPITAL | Age: 75
LOS: 1 days | Discharge: HOME | End: 2025-04-15
Attending: EMERGENCY MEDICINE | Admitting: FAMILY MEDICINE
Payer: MEDICARE

## 2025-04-14 DIAGNOSIS — J45.901 ACUTE SEVERE EXACERBATION OF ASTHMA: Primary | ICD-10-CM

## 2025-04-14 DIAGNOSIS — R09.02 HYPOXEMIA: ICD-10-CM

## 2025-04-14 DIAGNOSIS — J90 PLEURAL EFFUSION, BILATERAL: ICD-10-CM

## 2025-04-14 DIAGNOSIS — J18.9 PNEUMONIA OF BOTH LOWER LOBES DUE TO INFECTIOUS ORGANISM: ICD-10-CM

## 2025-04-14 LAB
ALBUMIN SERPL-MCNC: 4.1 G/DL (ref 3.5–5.2)
ALBUMIN/GLOB SERPL: 1.6 G/DL
ALP SERPL-CCNC: 88 U/L (ref 39–117)
ALT SERPL W P-5'-P-CCNC: 15 U/L (ref 1–33)
ANION GAP SERPL CALCULATED.3IONS-SCNC: 12.9 MMOL/L (ref 5–15)
AST SERPL-CCNC: 22 U/L (ref 1–32)
B PARAPERT DNA SPEC QL NAA+PROBE: NOT DETECTED
B PERT DNA SPEC QL NAA+PROBE: NOT DETECTED
BASOPHILS # BLD AUTO: 0.01 10*3/MM3 (ref 0–0.2)
BASOPHILS NFR BLD AUTO: 0.1 % (ref 0–1.5)
BILIRUB SERPL-MCNC: 0.2 MG/DL (ref 0–1.2)
BUN SERPL-MCNC: 20 MG/DL (ref 8–23)
BUN/CREAT SERPL: 20.6 (ref 7–25)
C PNEUM DNA NPH QL NAA+NON-PROBE: NOT DETECTED
CALCIUM SPEC-SCNC: 8.9 MG/DL (ref 8.6–10.5)
CHLORIDE SERPL-SCNC: 102 MMOL/L (ref 98–107)
CO2 SERPL-SCNC: 24.1 MMOL/L (ref 22–29)
CREAT SERPL-MCNC: 0.97 MG/DL (ref 0.57–1)
D-LACTATE SERPL-SCNC: 1.3 MMOL/L (ref 0.3–2)
DEPRECATED RDW RBC AUTO: 40.2 FL (ref 37–54)
EGFRCR SERPLBLD CKD-EPI 2021: 61.4 ML/MIN/1.73
EOSINOPHIL # BLD AUTO: 0.03 10*3/MM3 (ref 0–0.4)
EOSINOPHIL NFR BLD AUTO: 0.3 % (ref 0.3–6.2)
ERYTHROCYTE [DISTWIDTH] IN BLOOD BY AUTOMATED COUNT: 12.6 % (ref 12.3–15.4)
FLUAV SUBTYP SPEC NAA+PROBE: NOT DETECTED
FLUBV RNA ISLT QL NAA+PROBE: NOT DETECTED
GEN 5 1HR TROPONIN T REFLEX: 29 NG/L
GLOBULIN UR ELPH-MCNC: 2.5 GM/DL
GLUCOSE SERPL-MCNC: 163 MG/DL (ref 65–99)
HADV DNA SPEC NAA+PROBE: NOT DETECTED
HCOV 229E RNA SPEC QL NAA+PROBE: NOT DETECTED
HCOV HKU1 RNA SPEC QL NAA+PROBE: NOT DETECTED
HCOV NL63 RNA SPEC QL NAA+PROBE: NOT DETECTED
HCOV OC43 RNA SPEC QL NAA+PROBE: NOT DETECTED
HCT VFR BLD AUTO: 39.5 % (ref 34–46.6)
HGB BLD-MCNC: 12.5 G/DL (ref 12–15.9)
HMPV RNA NPH QL NAA+NON-PROBE: NOT DETECTED
HPIV1 RNA ISLT QL NAA+PROBE: NOT DETECTED
HPIV2 RNA SPEC QL NAA+PROBE: NOT DETECTED
HPIV3 RNA NPH QL NAA+PROBE: NOT DETECTED
HPIV4 P GENE NPH QL NAA+PROBE: NOT DETECTED
IMM GRANULOCYTES # BLD AUTO: 0.04 10*3/MM3 (ref 0–0.05)
IMM GRANULOCYTES NFR BLD AUTO: 0.4 % (ref 0–0.5)
LYMPHOCYTES # BLD AUTO: 1.93 10*3/MM3 (ref 0.7–3.1)
LYMPHOCYTES NFR BLD AUTO: 20.1 % (ref 19.6–45.3)
M PNEUMO IGG SER IA-ACNC: NOT DETECTED
MCH RBC QN AUTO: 27.8 PG (ref 26.6–33)
MCHC RBC AUTO-ENTMCNC: 31.6 G/DL (ref 31.5–35.7)
MCV RBC AUTO: 87.8 FL (ref 79–97)
MONOCYTES # BLD AUTO: 0.52 10*3/MM3 (ref 0.1–0.9)
MONOCYTES NFR BLD AUTO: 5.4 % (ref 5–12)
NEUTROPHILS NFR BLD AUTO: 7.06 10*3/MM3 (ref 1.7–7)
NEUTROPHILS NFR BLD AUTO: 73.7 % (ref 42.7–76)
NRBC BLD AUTO-RTO: 0 /100 WBC (ref 0–0.2)
NT-PROBNP SERPL-MCNC: 2980 PG/ML (ref 0–900)
PLATELET # BLD AUTO: 244 10*3/MM3 (ref 140–450)
PMV BLD AUTO: 10.5 FL (ref 6–12)
POTASSIUM SERPL-SCNC: 4 MMOL/L (ref 3.5–5.2)
PROCALCITONIN SERPL-MCNC: 0.03 NG/ML (ref 0–0.25)
PROT SERPL-MCNC: 6.6 G/DL (ref 6–8.5)
QT INTERVAL: 436 MS
QTC INTERVAL: 464 MS
RBC # BLD AUTO: 4.5 10*6/MM3 (ref 3.77–5.28)
RHINOVIRUS RNA SPEC NAA+PROBE: NOT DETECTED
RSV RNA NPH QL NAA+NON-PROBE: NOT DETECTED
SARS-COV-2 RNA RESP QL NAA+PROBE: NOT DETECTED
SODIUM SERPL-SCNC: 139 MMOL/L (ref 136–145)
TROPONIN T % DELTA: 16
TROPONIN T NUMERIC DELTA: 4 NG/L
TROPONIN T SERPL HS-MCNC: 25 NG/L
WBC NRBC COR # BLD AUTO: 9.59 10*3/MM3 (ref 3.4–10.8)
WHOLE BLOOD HOLD COAG: NORMAL

## 2025-04-14 PROCEDURE — 99222 1ST HOSP IP/OBS MODERATE 55: CPT | Performed by: INTERNAL MEDICINE

## 2025-04-14 PROCEDURE — 83605 ASSAY OF LACTIC ACID: CPT

## 2025-04-14 PROCEDURE — 87040 BLOOD CULTURE FOR BACTERIA: CPT | Performed by: EMERGENCY MEDICINE

## 2025-04-14 PROCEDURE — 80053 COMPREHEN METABOLIC PANEL: CPT | Performed by: EMERGENCY MEDICINE

## 2025-04-14 PROCEDURE — 25010000002 CEFTRIAXONE PER 250 MG: Performed by: EMERGENCY MEDICINE

## 2025-04-14 PROCEDURE — 94761 N-INVAS EAR/PLS OXIMETRY MLT: CPT

## 2025-04-14 PROCEDURE — 36415 COLL VENOUS BLD VENIPUNCTURE: CPT

## 2025-04-14 PROCEDURE — 99285 EMERGENCY DEPT VISIT HI MDM: CPT

## 2025-04-14 PROCEDURE — 85025 COMPLETE CBC W/AUTO DIFF WBC: CPT | Performed by: EMERGENCY MEDICINE

## 2025-04-14 PROCEDURE — 25010000002 ENOXAPARIN PER 10 MG: Performed by: NURSE PRACTITIONER

## 2025-04-14 PROCEDURE — 71045 X-RAY EXAM CHEST 1 VIEW: CPT

## 2025-04-14 PROCEDURE — 93005 ELECTROCARDIOGRAM TRACING: CPT | Performed by: EMERGENCY MEDICINE

## 2025-04-14 PROCEDURE — 94799 UNLISTED PULMONARY SVC/PX: CPT

## 2025-04-14 PROCEDURE — 25010000002 BUMETANIDE PER 0.5 MG: Performed by: EMERGENCY MEDICINE

## 2025-04-14 PROCEDURE — 25010000002 METHYLPREDNISOLONE PER 125 MG: Performed by: EMERGENCY MEDICINE

## 2025-04-14 PROCEDURE — 83880 ASSAY OF NATRIURETIC PEPTIDE: CPT | Performed by: EMERGENCY MEDICINE

## 2025-04-14 PROCEDURE — 84145 PROCALCITONIN (PCT): CPT | Performed by: EMERGENCY MEDICINE

## 2025-04-14 PROCEDURE — 84484 ASSAY OF TROPONIN QUANT: CPT | Performed by: EMERGENCY MEDICINE

## 2025-04-14 PROCEDURE — 94640 AIRWAY INHALATION TREATMENT: CPT

## 2025-04-14 PROCEDURE — 0202U NFCT DS 22 TRGT SARS-COV-2: CPT | Performed by: EMERGENCY MEDICINE

## 2025-04-14 PROCEDURE — 25010000002 BUMETANIDE PER 0.5 MG: Performed by: NURSE PRACTITIONER

## 2025-04-14 RX ORDER — ACETAMINOPHEN 325 MG/1
650 TABLET ORAL EVERY 6 HOURS PRN
Status: DISCONTINUED | OUTPATIENT
Start: 2025-04-14 | End: 2025-04-15 | Stop reason: HOSPADM

## 2025-04-14 RX ORDER — LEVOTHYROXINE SODIUM 25 UG/1
25 TABLET ORAL
Status: DISCONTINUED | OUTPATIENT
Start: 2025-04-14 | End: 2025-04-15 | Stop reason: HOSPADM

## 2025-04-14 RX ORDER — BUSPIRONE HYDROCHLORIDE 10 MG/1
10 TABLET ORAL 2 TIMES DAILY
COMMUNITY

## 2025-04-14 RX ORDER — AMOXICILLIN 250 MG
2 CAPSULE ORAL 2 TIMES DAILY PRN
Status: DISCONTINUED | OUTPATIENT
Start: 2025-04-14 | End: 2025-04-15 | Stop reason: HOSPADM

## 2025-04-14 RX ORDER — METHYLPREDNISOLONE SODIUM SUCCINATE 40 MG/ML
40 INJECTION, POWDER, LYOPHILIZED, FOR SOLUTION INTRAMUSCULAR; INTRAVENOUS EVERY 12 HOURS SCHEDULED
Status: DISCONTINUED | OUTPATIENT
Start: 2025-04-15 | End: 2025-04-15 | Stop reason: HOSPADM

## 2025-04-14 RX ORDER — MIDODRINE HYDROCHLORIDE 5 MG/1
2.5 TABLET ORAL 2 TIMES DAILY
COMMUNITY

## 2025-04-14 RX ORDER — NITROGLYCERIN 0.4 MG/1
0.4 TABLET SUBLINGUAL
Status: DISCONTINUED | OUTPATIENT
Start: 2025-04-14 | End: 2025-04-15 | Stop reason: HOSPADM

## 2025-04-14 RX ORDER — SODIUM CHLORIDE 9 MG/ML
40 INJECTION, SOLUTION INTRAVENOUS AS NEEDED
Status: DISCONTINUED | OUTPATIENT
Start: 2025-04-14 | End: 2025-04-15 | Stop reason: HOSPADM

## 2025-04-14 RX ORDER — METOPROLOL SUCCINATE 25 MG/1
25 TABLET, EXTENDED RELEASE ORAL DAILY
COMMUNITY

## 2025-04-14 RX ORDER — ASPIRIN 81 MG/1
81 TABLET ORAL DAILY
Status: DISCONTINUED | OUTPATIENT
Start: 2025-04-15 | End: 2025-04-15 | Stop reason: HOSPADM

## 2025-04-14 RX ORDER — SODIUM CHLORIDE 0.9 % (FLUSH) 0.9 %
10 SYRINGE (ML) INJECTION AS NEEDED
Status: DISCONTINUED | OUTPATIENT
Start: 2025-04-14 | End: 2025-04-15 | Stop reason: HOSPADM

## 2025-04-14 RX ORDER — POLYETHYLENE GLYCOL 3350 17 G/17G
17 POWDER, FOR SOLUTION ORAL DAILY PRN
Status: DISCONTINUED | OUTPATIENT
Start: 2025-04-14 | End: 2025-04-15 | Stop reason: HOSPADM

## 2025-04-14 RX ORDER — OXYBUTYNIN CHLORIDE 10 MG/1
10 TABLET, EXTENDED RELEASE ORAL DAILY
Status: DISCONTINUED | OUTPATIENT
Start: 2025-04-14 | End: 2025-04-15 | Stop reason: HOSPADM

## 2025-04-14 RX ORDER — BUPROPION HYDROCHLORIDE 150 MG/1
150 TABLET ORAL EVERY MORNING
Status: DISCONTINUED | OUTPATIENT
Start: 2025-04-14 | End: 2025-04-15 | Stop reason: HOSPADM

## 2025-04-14 RX ORDER — SODIUM CHLORIDE 0.9 % (FLUSH) 0.9 %
10 SYRINGE (ML) INJECTION EVERY 12 HOURS SCHEDULED
Status: DISCONTINUED | OUTPATIENT
Start: 2025-04-14 | End: 2025-04-15 | Stop reason: HOSPADM

## 2025-04-14 RX ORDER — CITALOPRAM HYDROBROMIDE 20 MG/1
20 TABLET ORAL DAILY
Status: DISCONTINUED | OUTPATIENT
Start: 2025-04-14 | End: 2025-04-15 | Stop reason: HOSPADM

## 2025-04-14 RX ORDER — CHOLECALCIFEROL (VITAMIN D3) 25 MCG
2000 TABLET ORAL DAILY
Status: DISCONTINUED | OUTPATIENT
Start: 2025-04-14 | End: 2025-04-15 | Stop reason: HOSPADM

## 2025-04-14 RX ORDER — IPRATROPIUM BROMIDE AND ALBUTEROL SULFATE 2.5; .5 MG/3ML; MG/3ML
3 SOLUTION RESPIRATORY (INHALATION) ONCE
Status: COMPLETED | OUTPATIENT
Start: 2025-04-14 | End: 2025-04-14

## 2025-04-14 RX ORDER — BISACODYL 10 MG
10 SUPPOSITORY, RECTAL RECTAL DAILY PRN
Status: DISCONTINUED | OUTPATIENT
Start: 2025-04-14 | End: 2025-04-15 | Stop reason: HOSPADM

## 2025-04-14 RX ORDER — BUMETANIDE 0.25 MG/ML
1 INJECTION, SOLUTION INTRAMUSCULAR; INTRAVENOUS ONCE
Status: COMPLETED | OUTPATIENT
Start: 2025-04-14 | End: 2025-04-14

## 2025-04-14 RX ORDER — HYDROXYZINE HYDROCHLORIDE 10 MG/1
10 TABLET, FILM COATED ORAL NIGHTLY
Status: DISCONTINUED | OUTPATIENT
Start: 2025-04-14 | End: 2025-04-15 | Stop reason: HOSPADM

## 2025-04-14 RX ORDER — METHYLPREDNISOLONE SODIUM SUCCINATE 125 MG/2ML
125 INJECTION, POWDER, LYOPHILIZED, FOR SOLUTION INTRAMUSCULAR; INTRAVENOUS ONCE
Status: COMPLETED | OUTPATIENT
Start: 2025-04-14 | End: 2025-04-14

## 2025-04-14 RX ORDER — BISACODYL 5 MG/1
5 TABLET, DELAYED RELEASE ORAL DAILY PRN
Status: DISCONTINUED | OUTPATIENT
Start: 2025-04-14 | End: 2025-04-15 | Stop reason: HOSPADM

## 2025-04-14 RX ORDER — ENOXAPARIN SODIUM 100 MG/ML
40 INJECTION SUBCUTANEOUS DAILY
Status: DISCONTINUED | OUTPATIENT
Start: 2025-04-14 | End: 2025-04-15 | Stop reason: HOSPADM

## 2025-04-14 RX ORDER — IPRATROPIUM BROMIDE AND ALBUTEROL SULFATE 2.5; .5 MG/3ML; MG/3ML
3 SOLUTION RESPIRATORY (INHALATION)
Status: DISCONTINUED | OUTPATIENT
Start: 2025-04-14 | End: 2025-04-15 | Stop reason: HOSPADM

## 2025-04-14 RX ORDER — BUMETANIDE 0.25 MG/ML
1 INJECTION, SOLUTION INTRAMUSCULAR; INTRAVENOUS EVERY 12 HOURS
Status: DISCONTINUED | OUTPATIENT
Start: 2025-04-14 | End: 2025-04-15 | Stop reason: HOSPADM

## 2025-04-14 RX ORDER — LABETALOL HYDROCHLORIDE 5 MG/ML
20 INJECTION, SOLUTION INTRAVENOUS ONCE
Status: COMPLETED | OUTPATIENT
Start: 2025-04-14 | End: 2025-04-14

## 2025-04-14 RX ORDER — ATORVASTATIN CALCIUM 10 MG/1
10 TABLET, FILM COATED ORAL DAILY
Status: DISCONTINUED | OUTPATIENT
Start: 2025-04-14 | End: 2025-04-15 | Stop reason: HOSPADM

## 2025-04-14 RX ORDER — GABAPENTIN 400 MG/1
400 CAPSULE ORAL NIGHTLY
Status: DISCONTINUED | OUTPATIENT
Start: 2025-04-14 | End: 2025-04-15 | Stop reason: HOSPADM

## 2025-04-14 RX ADMIN — IPRATROPIUM BROMIDE AND ALBUTEROL SULFATE 3 ML: .5; 3 SOLUTION RESPIRATORY (INHALATION) at 06:56

## 2025-04-14 RX ADMIN — IPRATROPIUM BROMIDE AND ALBUTEROL SULFATE 3 ML: .5; 3 SOLUTION RESPIRATORY (INHALATION) at 11:51

## 2025-04-14 RX ADMIN — ATORVASTATIN CALCIUM 10 MG: 10 TABLET ORAL at 20:40

## 2025-04-14 RX ADMIN — OXYBUTYNIN CHLORIDE 10 MG: 10 TABLET, EXTENDED RELEASE ORAL at 12:01

## 2025-04-14 RX ADMIN — ENOXAPARIN SODIUM 40 MG: 100 INJECTION SUBCUTANEOUS at 17:10

## 2025-04-14 RX ADMIN — IPRATROPIUM BROMIDE AND ALBUTEROL SULFATE 3 ML: .5; 3 SOLUTION RESPIRATORY (INHALATION) at 19:54

## 2025-04-14 RX ADMIN — Medication 20 MG: at 05:16

## 2025-04-14 RX ADMIN — BUPROPION HYDROCHLORIDE 150 MG: 150 TABLET, EXTENDED RELEASE ORAL at 12:01

## 2025-04-14 RX ADMIN — Medication 10 ML: at 20:41

## 2025-04-14 RX ADMIN — CEFTRIAXONE 2000 MG: 2 INJECTION, POWDER, FOR SOLUTION INTRAMUSCULAR; INTRAVENOUS at 06:47

## 2025-04-14 RX ADMIN — Medication 10 ML: at 08:21

## 2025-04-14 RX ADMIN — METHYLPREDNISOLONE SODIUM SUCCINATE 125 MG: 125 INJECTION, POWDER, FOR SOLUTION INTRAMUSCULAR; INTRAVENOUS at 05:16

## 2025-04-14 RX ADMIN — Medication 2000 UNITS: at 12:01

## 2025-04-14 RX ADMIN — Medication 10 ML: at 20:47

## 2025-04-14 RX ADMIN — IPRATROPIUM BROMIDE AND ALBUTEROL SULFATE 3 ML: .5; 3 SOLUTION RESPIRATORY (INHALATION) at 15:40

## 2025-04-14 RX ADMIN — HYDROXYZINE HYDROCHLORIDE 10 MG: 10 TABLET ORAL at 20:46

## 2025-04-14 RX ADMIN — GABAPENTIN 400 MG: 400 CAPSULE ORAL at 20:40

## 2025-04-14 RX ADMIN — ACETAMINOPHEN 650 MG: 325 TABLET, FILM COATED ORAL at 20:40

## 2025-04-14 RX ADMIN — ACETAMINOPHEN 650 MG: 325 TABLET, FILM COATED ORAL at 08:21

## 2025-04-14 RX ADMIN — LEVOTHYROXINE SODIUM 25 MCG: 0.03 TABLET ORAL at 12:02

## 2025-04-14 RX ADMIN — CITALOPRAM 20 MG: 20 TABLET, FILM COATED ORAL at 12:02

## 2025-04-14 RX ADMIN — BUMETANIDE 1 MG: 0.25 INJECTION INTRAMUSCULAR; INTRAVENOUS at 06:20

## 2025-04-14 RX ADMIN — BUMETANIDE 1 MG: 0.25 INJECTION INTRAMUSCULAR; INTRAVENOUS at 17:02

## 2025-04-14 NOTE — CASE MANAGEMENT/SOCIAL WORK
Discharge Planning Assessment   [place]     Patient Name: Charleen Barnett  MRN: [id number]  Today's Date: 4/14/2025    Admit Date: 4/14/2025    Plan: Routine home with spouse. Watch for home oxygen needs.   Discharge Needs Assessment       Row Name 04/14/25 0830       Living Environment    People in Home spouse    Name(s) of People in Home Dottie    Current Living Arrangements home    Potentially Unsafe Housing Conditions none    In the past 12 months has the electric, gas, oil, or water company threatened to shut off services in your home? No    Primary Care Provided by self    Provides Primary Care For no one    Family Caregiver if Needed spouse    Family Caregiver Names Dottie    Quality of Family Relationships helpful;involved    Able to Return to Prior Arrangements yes       Resource/Environmental Concerns    Resource/Environmental Concerns none    Transportation Concerns none       Transportation Needs    In the past 12 months, has lack of transportation kept you from medical appointments or from getting medications? no    In the past 12 months, has lack of transportation kept you from meetings, work, or from getting things needed for daily living? No       Food Insecurity    Within the past 12 months, you worried that your food would run out before you got the money to buy more. Never true    Within the past 12 months, the food you bought just didn't last and you didn't have money to get more. Never true       Transition Planning    Patient/Family Anticipates Transition to home with family    Patient/Family Anticipated Services at Transition none    Transportation Anticipated family or friend will provide       Discharge Needs Assessment    Readmission Within the Last 30 Days no previous admission in last 30 days    Equipment Currently Used at Home cpap    Concerns to be Addressed denies needs/concerns at this time    Do you want help finding or keeping work or a job? Patient declined    Do you want help with  school or training? For example, starting or completing job training or getting a high school diploma, GED or equivalent Patient declined    Anticipated Changes Related to Illness none    Equipment Needed After Discharge none                   Discharge Plan       Row Name 04/14/25 0831       Plan    Plan Routine home with spouse. Watch for home oxygen needs.    Patient/Family in Agreement with Plan yes    Plan Comments CM met with patient and spouse, Dottie at bedside. Confirmed PCP, insurance, and pharmacy.  Meds to beds enrolled. Patient denies any difficulty affording medications. Patient not current with any therapies. Patient states she still drives but struggles to do household chores. Her CPAP is through [employer]. No other DME stated. Confirmed transportation at discharge will be her spouse Dottie. D/C Barriers: IV rocephin, 2L O2, IV bumex/steroids.              Demographic Summary       Row Name 04/14/25 0829       General Information    Admission Type inpatient    Arrived From emergency department    Referral Source admission list    Reason for Consult discharge planning    Preferred Language English       Contact Information    Permission Granted to Share Info With                    Functional Status       Row Name 04/14/25 0830       Functional Status    Usual Activity Tolerance good    Current Activity Tolerance moderate       Functional Status, IADL    Medications independent    Meal Preparation independent    Housekeeping assistive person    Laundry assistive person    Shopping assistive person    If for any reason you need help with day-to-day activities such as bathing, preparing meals, shopping, managing finances, etc., do you get the help you need? I could use a little more help    IADL Comments Doesn't clean house well             Thelma Hale RN     Office: [phone]

## 2025-04-14 NOTE — PROGRESS NOTES
Illness Severity/Patient Summary  [x]  Stable  []  Watcher  []  Unstable    Social: family at bedside  Arrived from location: home  Significant Shift Events: pt became SOA. EMS said pt was 80% on room air. Nonrebreather and nebulizer given in route by EMS. Pt is now on 2L nasal cannula. Pt does not normally wear O2 at home. Gave bumex. Gave labetalol. Getting rocephin. Gave Tylenol        Action List  Review chart for any abnormal results.  Pneumonia and pleural effusion   BNP 2980  Troponin 25--->29        Situation Awareness and Contingency Planning  IF pts O2 drops THEN turn oxygen up

## 2025-04-14 NOTE — ED PROVIDER NOTES
Subjective   History of Present Illness  74-year-old female reports increasing shortness of breath in the last 2 days the patient states she has used her 's oxygen with only minimal improvement.  The patient states that she has had no symptoms of orthopnea.  She states that he has had some chest pain and tightness.  She was brought by EMS to have room air saturations of only 80%.  EMS also reported accessory muscle use the patient was given a mini neb and route to the hospital and this she states helped her she was also placed on a nonrebreather mask by EMS.  The patient reports that she has not had any lower extremity swelling but does report symptoms of orthopnea and PND.  She denies night sweats or hemoptysis  The patient has recently been noncompliant with her CPAP mask  Thinks that her cough is been productive only of frothy sputum  Review of Systems   Respiratory:  Positive for cough, chest tightness and shortness of breath.    Cardiovascular:  Positive for chest pain. Negative for leg swelling.       Past Medical History:   Diagnosis Date    Allergic 1970's    Anemia 1950's    Angina pectoris     Anxiety 1980's    Arthritis 1990's    Arthritis of neck     Asthma 02/13/2020    Ramos's esophagus 02/15/2018    Cancer Carcinoma Insitu cervical    Carcinoma insitu cervix    Cataract     Removed both eyes in Jan. 2023    Cholelithiasis Removed 2000's    Chronic constipation     Chronic diarrhea     Colon polyp 2000's    Coronary artery disease 2006?    CTS (carpal tunnel syndrome)     Depression 08/27/2018    Dislocation of finger     Fibromyalgia 12/14/2011    Fibromyalgia, primary 1990's I think    Fracture of wrist     R HAND LAST 3 DIGITS    Fracture, finger     GERD (gastroesophageal reflux disease)     Take Lansaprazole    Gout 02/13/2020    Headache     HL (hearing loss) Age related    Hyperlipidemia     Hypertension     Hypothyroidism 2022    Injury of back     Irritable bowel syndrome     Most of  my life    Joint pain     Knee swelling     Low back pain 1970's    Low back strain     Lumbosacral disc disease     Neck strain ??    My neck is always pulling    Obesity Lifelong since puberty    Obstructive sleep apnea 10/16/2014    Osteopenia Five years appx    Dr. DURAN GIVES SHOTS    Osteoporosis     fosamax(SE), on prolia(4/10/18-6/18/21, 2/1/22)    Otitis media     Pedal edema 10/22/2018    Periarthritis of shoulder     Pneumonia 2020    Presence of left artificial knee joint 03/14/2019    Rotator cuff syndrome     Scoliosis 1961    Seasonal allergies     Shortness of breath     ST elevation (STEMI) myocardial infarction 02/13/2020    Status post percutaneous transluminal coronary angioplasty 06/10/2014    Substance abuse     Tachycardia     Tear of meniscus of knee     Thoracic disc disorder     Alignment totally out    Tremor 2015    Urge incontinence 08/27/2018    Urinary tract infection     Visual impairment Cataracts    Removed 1/2023 removed    Vitamin D deficiency 03/19/2015     States she has had at least 2 stents in the past.  The patient had a Myoview in March that showed an EF of 70%  Allergies   Allergen Reactions    Hydrocodone Hives    Chlorpheniramine-Phenylephrine Rash    Penicillins Rash    Sulfa Antibiotics Hives and Rash    Tetracycline Rash    Warfarin Rash    Pseudoephedrine Hcl Rash    Triprolidine Hcl Rash       Past Surgical History:   Procedure Laterality Date    BLADDER SURGERY      BLADDER SUSPENSION  04/2023    CARDIAC CATHETERIZATION N/A 05/29/2021    Procedure: Left Heart Cath and coronary angiogram;  Surgeon: Yazmin Matta MD;  Location: [facility] CATH INVASIVE LOCATION;  Service: Cardiovascular;  Laterality: N/A;    CARDIAC CATHETERIZATION  05/29/2021    Procedure: Functional Flow Reserve (iFR);  Surgeon: Bryan Patel MD;  Location: [facility] CATH INVASIVE LOCATION;  Service: Cardiology;;    CARDIAC CATHETERIZATION N/A 05/29/2021    Procedure: Percutaneous Coronary  Intervention;  Surgeon: Bryan Patel MD;  Location:  [facility] CATH INVASIVE LOCATION;  Service: Cardiology;  Laterality: N/A;    CARDIAC CATHETERIZATION N/A 2021    Procedure: Stent FRANCOISE coronary;  Surgeon: Bryan Patel MD;  Location:  [facility] CATH INVASIVE LOCATION;  Service: Cardiology;  Laterality: N/A;    CAROTID STENT      CHOLECYSTECTOMY      COLONOSCOPY  ??    Scheduled for 2023?    CORONARY STENT PLACEMENT  ??    FRACTURE SURGERY      Hand surgery [facility] [employer] [facility]    HAND SURGERY      HYSTERECTOMY      INGUINAL HERNIA REPAIR      JOINT REPLACEMENT  L-knee    KNEE SURGERY      OTHER SURGICAL HISTORY      STENT    SUBTOTAL HYSTERECTOMY      Carcinoma insitu Cervix    TRIGGER POINT INJECTION         Family History   Problem Relation Age of Onset    Alcohol abuse Mother     Anxiety disorder Mother     Depression Mother     Early death Mother     Mental illness Mother     Heart attack Father         Vein bypass in legs    Cancer Father             Alcohol abuse Father     Depression Father     No Known Problems Sister     No Known Problems Brother     No Known Problems Maternal Aunt     No Known Problems Maternal Uncle     Stroke Paternal Aunt     No Known Problems Paternal Uncle     Heart attack Maternal Grandmother         Heart atrack in her 80s    Rheumatologic disease Maternal Grandmother     Scoliosis Maternal Grandmother     Heart attack Maternal Grandfather         Heart attack in his 60s    Alcohol abuse Maternal Grandfather     Heart attack Paternal Grandmother         Type 1 diabetic, heart attack    Diabetes Paternal Grandmother     Heart attack Paternal Grandfather         Heart attack in his 50s or 60s    Heart disease Sister         Blockages monitored with medication    Hypertension Sister         Coronary heart issues    Depression Sister     Hyperlipidemia Brother         Extremely high triglycerides and cholesterol    Hypertension Brother          High blood pressure    Heart attack Brother         Passed from heart attack age 54    Alcohol abuse Brother     Arthritis Brother     Depression Brother     Early death Brother     Hyperlipidemia Brother         Unknown    Hypertension Brother     Heart attack Brother         Passed from heart attack age 44    Alcohol abuse Brother     Depression Brother     Early death Brother     Hyperlipidemia Brother         Had heart disease for years before passing    Hypertension Brother     Heart attack Brother          from heart failure in his 60s    Alcohol abuse Brother     Early death Brother     Mental illness Brother     Anemia Neg Hx     Arrhythmia Neg Hx     Asthma Neg Hx     Clotting disorder Neg Hx     Fainting Neg Hx     Heart failure Neg Hx        Social History     Socioeconomic History    Marital status:    Tobacco Use    Smoking status: Never     Passive exposure: Never    Smokeless tobacco: Never    Tobacco comments:     Parents and husbands heavy smokers   Vaping Use    Vaping status: Never Used   Substance and Sexual Activity    Alcohol use: Not Currently     Comment: Recovery since  drank from -44    Drug use: Not Currently     Frequency: 1.0 times per week     Types: Amphetamines     Comment: Short term use/over use diet pills in the 70's    Sexual activity: Not Currently     Partners: Male     Birth control/protection: Post-menopausal, None, Hysterectomy           Objective   Physical Exam  Alert Gautam Coma Scale 15 rapidly desaturates with transfers and standing   HEENT: Pupils equal and reactive to light. Conjunctivae are not injected. Normal tympanic membranes. Oropharynx and nares are normal.   Neck: Supple. Midline trachea. No JVD. No goiter.   Chest: Bibasilar Rales are identified there are scattered expiratory wheezes bilaterally and equal breath sounds bilaterally, regular rate and rhythm without murmur or rub.   Abdomen: Positive bowel sounds, nontender, nondistended. No  rebound or peritoneal signs. No CVA tenderness.   Extremities no clubbing. cyanosis or edema. Motor sensory exam is normal. The full range of motion is intact   Skin: Warm and dry, no rashes or petechia.   Lymphatic: No regional lymphadenopathy. No calf pain, swelling or Homans sign    Procedures           ED Course      Labs Reviewed   COMPREHENSIVE METABOLIC PANEL - Abnormal; Notable for the following components:       Result Value    Glucose 163 (*)     All other components within normal limits    Narrative:     GFR Categories in Chronic Kidney Disease (CKD)      GFR Category          GFR (mL/min/1.73)    Interpretation  G1                     90 or greater         Normal or high (1)  G2                      60-89                Mild decrease (1)  G3a                   45-59                Mild to moderate decrease  G3b                   30-44                Moderate to severe decrease  G4                    15-29                Severe decrease  G5                    14 or less           Kidney failure          (1)In the absence of evidence of kidney disease, neither GFR category G1 or G2 fulfill the criteria for CKD.    eGFR calculation 2021 CKD-EPI creatinine equation, which does not include race as a factor   TROPONIN - Abnormal; Notable for the following components:    HS Troponin T 25 (*)     All other components within normal limits    Narrative:     High Sensitive Troponin T Reference Range:  <14.0 ng/L- Negative Female for AMI  <22.0 ng/L- Negative Male for AMI  >=14 - Abnormal Female indicating possible myocardial injury.  >=22 - Abnormal Male indicating possible myocardial injury.   Clinicians would have to utilize clinical acumen, EKG, Troponin, and serial changes to determine if it is an Acute Myocardial Infarction or myocardial injury due to an underlying chronic condition.        BNP (IN-HOUSE) - Abnormal; Notable for the following components:    proBNP 2,980.0 (*)     All other components within  normal limits    Narrative:     This assay is used as an aid in the diagnosis of individuals suspected of having heart failure. It can be used as an aid in the diagnosis of acute decompensated heart failure (ADHF) in patients presenting with signs and symptoms of ADHF to the emergency department (ED). In addition, NT-proBNP of <300 pg/mL indicates ADHF is not likely.    Age Range Result Interpretation  NT-proBNP Concentration (pg/mL:      <50             Positive            >450                   Gray                 300-450                    Negative             <300    50-75           Positive            >900                  Gray                300-900                  Negative            <300      >75             Positive            >1800                  Gray                300-1800                  Negative            <300   CBC WITH AUTO DIFFERENTIAL - Abnormal; Notable for the following components:    Neutrophils, Absolute 7.06 (*)     All other components within normal limits   HIGH SENSITIVITIY TROPONIN T 1HR - Abnormal; Notable for the following components:    HS Troponin T 29 (*)     All other components within normal limits    Narrative:     High Sensitive Troponin T Reference Range:  <14.0 ng/L- Negative Female for AMI  <22.0 ng/L- Negative Male for AMI  >=14 - Abnormal Female indicating possible myocardial injury.  >=22 - Abnormal Male indicating possible myocardial injury.   Clinicians would have to utilize clinical acumen, EKG, Troponin, and serial changes to determine if it is an Acute Myocardial Infarction or myocardial injury due to an underlying chronic condition.        RESPIRATORY PANEL PCR W/ COVID-19 (SARS-COV-2), NP SWAB IN UTM/VTP, 2 HR TAT - Normal    Narrative:     In the setting of a positive respiratory panel with a viral infection PLUS a negative procalcitonin without other underlying concern for bacterial infection, consider observing off antibiotics or discontinuation of  antibiotics and continue supportive care. If the respiratory panel is positive for atypical bacterial infection (Bordetella pertussis, Chlamydophila pneumoniae, or Mycoplasma pneumoniae), consider antibiotic de-escalation to target atypical bacterial infection.   POC LACTATE - Normal   BLOOD CULTURE   BLOOD CULTURE   PROCALCITONIN   POC LACTATE   CBC AND DIFFERENTIAL    Narrative:     The following orders were created for panel order CBC & Differential.  Procedure                               Abnormality         Status                     ---------                               -----------         ------                     CBC Auto Differential[[id number]]        Abnormal            Final result                 Please view results for these tests on the individual orders.   EXTRA TUBES    Narrative:     The following orders were created for panel order Extra Tubes.  Procedure                               Abnormality         Status                     ---------                               -----------         ------                     Light Blue Top[[id number]]                                   Final result                 Please view results for these tests on the individual orders.   LIGHT BLUE TOP     Medications   ipratropium-albuterol (DUO-NEB) nebulizer solution 3 mL (has no administration in time range)   cefTRIAXone (ROCEPHIN) 2,000 mg in sodium chloride 0.9 % 100 mL MBP (has no administration in time range)   methylPREDNISolone sodium succinate (SOLU-Medrol) injection 125 mg (125 mg Intravenous Given 4/14/25 0516)   labetalol (NORMODYNE,TRANDATE) injection 20 mg (20 mg Intravenous Given 4/14/25 0516)   bumetanide (BUMEX) injection 1 mg (1 mg Intravenous Given 4/14/25 0620)     XR Chest 1 View  Result Date: 4/14/2025  Impression: Bibasilar pneumonia with small bilateral pleural effusions. Electronically Signed: Sol Reese MD  4/14/2025 5:33 AM EDT  Workstation ID: [id number]                                                      Medical Decision Making  Normal EF noted in March.  The patient was given Bumex in the emergency department and was given ceftriaxone after cultures had been obtained the hospitalist service was notified the patient will be admitted the patient stated to void on the bedside commode was noted to desaturate very quickly into the upper 80s on her nasal cannula.  The patient was agreeable with hospitalization and this plan to treat    Amount and/or Complexity of Data Reviewed  Independent Historian: EMS  Labs: ordered. Decision-making details documented in ED Course.  Radiology: ordered and independent interpretation performed.  ECG/medicine tests: ordered and independent interpretation performed.     Details: PVCs, sinus rhythm, otherwise similar to previous EKG of 3/20/2025    Risk  OTC drugs.  Prescription drug management.  Decision regarding hospitalization.        Final diagnoses:   Acute severe exacerbation of asthma   Pneumonia of both lower lobes due to infectious organism   Pleural effusion, bilateral   Hypoxemia       ED Disposition  ED Disposition       ED Disposition   Decision to Admit    Condition   --    Comment   Level of Care: Telemetry [5]   Diagnosis: Bilateral pneumonia [[id number]]   Admitting Physician: LUZ MARINA DONIS [[id number]]   Attending Physician: LUZ MARINA DONIS [[id number]]   Isolate for COVID?: No [0]   Certification: I Certify That Inpatient Hospital Services Are Medically Necessary For Greater Than 2 Midnights                 No follow-up provider specified.       Medication List      No changes were made to your prescriptions during this visit.            Marcial Hunt MD  04/14/25 0646

## 2025-04-14 NOTE — CONSULTS
Referring Provider: Bret Goyal MD  Reason for Consultation:  Shortness of breath    Patient Care Team:  Ilsa Her MD as PCP - General  Yazmin Matta MD as Consulting Physician (Cardiology)    Chief complaint  Shortness of breath    Subjective .     History of present illness:  Charleen Barnett is a 74 y.o. female who presents with history of increasing shortness of breath over the last couple days.  Patient was using 's oxygen.  Oxygen saturation was apparently low.  Denies having any chest discomfort.  Patient had an appointment in the office but she came to the hospital.  No other associated aggravating or alleviating factors.  Patient apparently has been noncompliant with CPAP mask.    ROS      Today, the patient has been without any chest discomfort ,, palpitations, dizziness or syncope.  Denies having any headache ,abdominal pain ,nausea, vomiting , diarrhea constipation, loss of weight or loss of appetite.  Denies having any excessive bruising ,hematuria or blood in the stool.    Review of all systems negative except as indicated.    Reviewed ROS.    History  Past Medical History:   Diagnosis Date    Allergic 1970's    Anemia 1950's    Angina pectoris     Anxiety 1980's    Arthritis 1990's    Arthritis of neck     Asthma 02/13/2020    Ramos's esophagus 02/15/2018    Cancer Carcinoma Insitu cervical    Carcinoma insitu cervix    Cataract     Removed both eyes in Jan. 2023    Cholelithiasis Removed 2000's    Chronic constipation     Chronic diarrhea     Colon polyp 2000's    Coronary artery disease 2006?    CTS (carpal tunnel syndrome)     Depression 08/27/2018    Dislocation of finger     Fibromyalgia 12/14/2011    Fibromyalgia, primary 1990's I think    Fracture of wrist     R HAND LAST 3 DIGITS    Fracture, finger     GERD (gastroesophageal reflux disease)     Take Lansaprazole    Gout 02/13/2020    Headache     HL (hearing loss) Age related    Hyperlipidemia      Hypertension     Hypothyroidism 2022    Injury of back     Irritable bowel syndrome     Most of my life    Joint pain     Knee swelling     Low back pain 1970's    Low back strain     Lumbosacral disc disease     Neck strain ??    My neck is always pulling    Obesity Lifelong since puberty    Obstructive sleep apnea 10/16/2014    Osteopenia Five years appx    Dr. DURAN GIVES SHOTS    Osteoporosis     fosamax(SE), on prolia(4/10/18-6/18/21, 2/1/22)    Otitis media     Pedal edema 10/22/2018    Periarthritis of shoulder     Pneumonia 2020    Presence of left artificial knee joint 03/14/2019    Rotator cuff syndrome     Scoliosis 1961    Seasonal allergies     Shortness of breath     ST elevation (STEMI) myocardial infarction 02/13/2020    Status post percutaneous transluminal coronary angioplasty 06/10/2014    Substance abuse     Tachycardia     Tear of meniscus of knee     Thoracic disc disorder     Alignment totally out    Tremor 2015    Urge incontinence 08/27/2018    Urinary tract infection     Visual impairment Cataracts    Removed 1/2023 removed    Vitamin D deficiency 03/19/2015       Past Surgical History:   Procedure Laterality Date    BLADDER SURGERY      BLADDER SUSPENSION  04/2023    CARDIAC CATHETERIZATION N/A 05/29/2021    Procedure: Left Heart Cath and coronary angiogram;  Surgeon: Yazmin Matta MD;  Location: [facility] CATH INVASIVE LOCATION;  Service: Cardiovascular;  Laterality: N/A;    CARDIAC CATHETERIZATION  05/29/2021    Procedure: Functional Flow Reserve (iFR);  Surgeon: Bryan Patel MD;  Location: [facility] CATH INVASIVE LOCATION;  Service: Cardiology;;    CARDIAC CATHETERIZATION N/A 05/29/2021    Procedure: Percutaneous Coronary Intervention;  Surgeon: Bryan Patel MD;  Location: [facility] CATH INVASIVE LOCATION;  Service: Cardiology;  Laterality: N/A;    CARDIAC CATHETERIZATION N/A 05/29/2021    Procedure: Stent FRANCOISE coronary;  Surgeon: Bryan Patel MD;   Location: [facility] CATH INVASIVE LOCATION;  Service: Cardiology;  Laterality: N/A;    CAROTID STENT      CHOLECYSTECTOMY      COLONOSCOPY  2017??    Scheduled for 2023?    CORONARY STENT PLACEMENT  ??    FRACTURE SURGERY      Hand surgery [employer]    HAND SURGERY      HYSTERECTOMY      INGUINAL HERNIA REPAIR      JOINT REPLACEMENT  L-knee    KNEE SURGERY      OTHER SURGICAL HISTORY      STENT    SUBTOTAL HYSTERECTOMY      Carcinoma insitu Cervix    TRIGGER POINT INJECTION         Family History   Problem Relation Age of Onset    Alcohol abuse Mother     Anxiety disorder Mother     Depression Mother     Early death Mother     Mental illness Mother     Heart attack Father         Vein bypass in legs    Cancer Father             Alcohol abuse Father     Depression Father     No Known Problems Sister     No Known Problems Brother     No Known Problems Maternal Aunt     No Known Problems Maternal Uncle     Stroke Paternal Aunt     No Known Problems Paternal Uncle     Heart attack Maternal Grandmother         Heart atrack in her 80s    Rheumatologic disease Maternal Grandmother     Scoliosis Maternal Grandmother     Heart attack Maternal Grandfather         Heart attack in his 60s    Alcohol abuse Maternal Grandfather     Heart attack Paternal Grandmother         Type 1 diabetic, heart attack    Diabetes Paternal Grandmother     Heart attack Paternal Grandfather         Heart attack in his 50s or 60s    Heart disease Sister         Blockages monitored with medication    Hypertension Sister         Coronary heart issues    Depression Sister     Hyperlipidemia Brother         Extremely high triglycerides and cholesterol    Hypertension Brother         High blood pressure    Heart attack Brother         Passed from heart attack age 54    Alcohol abuse Brother     Arthritis Brother     Depression Brother     Early death Brother     Hyperlipidemia Brother         Unknown    Hypertension Brother      Heart attack Brother         Passed from heart attack age 44    Alcohol abuse Brother     Depression Brother     Early death Brother     Hyperlipidemia Brother         Had heart disease for years before passing    Hypertension Brother     Heart attack Brother          from heart failure in his 60s    Alcohol abuse Brother     Early death Brother     Mental illness Brother     Anemia Neg Hx     Arrhythmia Neg Hx     Asthma Neg Hx     Clotting disorder Neg Hx     Fainting Neg Hx     Heart failure Neg Hx        Social History     Tobacco Use    Smoking status: Never     Passive exposure: Never    Smokeless tobacco: Never    Tobacco comments:     Parents and husbands heavy smokers   Vaping Use    Vaping status: Never Used   Substance Use Topics    Alcohol use: Not Currently     Comment: Recovery since  drank from [age]    Drug use: Not Currently     Frequency: 1.0 times per week     Types: Amphetamines     Comment: Short term use/over use diet pills in the 's        Medications Prior to Admission   Medication Sig Dispense Refill Last Dose/Taking    albuterol sulfate  (90 Base) MCG/ACT inhaler Inhale 2 puffs Every 6 (Six) Hours As Needed for Wheezing. 8.5 g 11 Taking As Needed    aspirin 81 MG tablet Take 1 tablet by mouth Daily.   2025    atorvastatin (LIPITOR) 10 MG tablet TAKE ONE TABLET BY MOUTH DAILY 90 tablet 0 2025    BIOTIN PO Take 1 tablet by mouth Daily.   2025    buPROPion XL (WELLBUTRIN XL) 150 MG 24 hr tablet TAKE 1 TABLET BY MOUTH EVERY MORNING 28 tablet 3 2025    busPIRone (BUSPAR) 10 MG tablet Take 1 tablet by mouth 2 (Two) Times a Day.   2025    Cholecalciferol (Vitamin D) 50 MCG (2000 UT) tablet Take 1 tablet by mouth Daily.   2025    citalopram (CeleXA) 20 MG tablet TAKE ONE TABLET BY MOUTH DAILY 90 tablet 0 2025    fluticasone (FLONASE) 50 MCG/ACT nasal spray USE 2 SPRAYS INTO THE NOSTRIL(S) AS DIRECTED BY PROVIDER DAILY. 9.9 mL 10 2025     gabapentin (NEURONTIN) 400 MG capsule Take 1 capsule by mouth every night at bedtime. 90 capsule 0 4/13/2025    Gemtesa 75 MG tablet Take 1 tablet by mouth Every Other Day.   4/13/2025    hydrOXYzine (ATARAX) 10 MG tablet TAKE 1 TABLET BY MOUTH EVERY NIGHT AT BEDTIME 30 tablet 0 4/13/2025    levothyroxine (SYNTHROID, LEVOTHROID) 25 MCG tablet TAKE ONE TABLET BY MOUTH EVERY MORNING 90 tablet 0 4/13/2025    Magnesium Citrate 100 MG capsule Take 1 capsule by mouth Daily.   4/13/2025    metoprolol succinate XL (TOPROL-XL) 25 MG 24 hr tablet Take 1 tablet by mouth Daily.   4/13/2025    midodrine (PROAMATINE) 5 MG tablet Take 0.5 tablets by mouth 2 (Two) Times a Day.   4/13/2025    montelukast (SINGULAIR) 10 MG tablet TAKE ONE TABLET BY MOUTH DAILY 90 tablet 0 4/13/2025    vitamin E 100 UNIT capsule Take 1 capsule by mouth Daily.   4/13/2025    Zinc Acetate, Oral, (ZINC ACETATE PO) Take 1 tablet by mouth Daily.   4/13/2025    spironolactone (ALDACTONE) 25 MG tablet As Needed.            Hydrocodone, Chlorpheniramine-phenylephrine, Penicillins, Sulfa antibiotics, Tetracycline, Warfarin, Pseudoephedrine hcl, and Triprolidine hcl    Scheduled Meds:[START ON 4/15/2025] aspirin, 81 mg, Oral, Daily  atorvastatin, 10 mg, Oral, Daily  bumetanide, 1 mg, Intravenous, Q12H  buPROPion XL, 150 mg, Oral, QAM  [START ON 4/15/2025] cefTRIAXone, 2,000 mg, Intravenous, Daily  cholecalciferol, 2,000 Units, Oral, Daily  citalopram, 20 mg, Oral, Daily  gabapentin, 400 mg, Oral, Nightly  hydrOXYzine, 10 mg, Oral, Nightly  ipratropium-albuterol, 3 mL, Nebulization, 4x Daily - RT  levothyroxine, 25 mcg, Oral, Q AM  [START ON 4/15/2025] methylPREDNISolone sodium succinate, 40 mg, Intravenous, Q12H  oxybutynin XL, 10 mg, Oral, Daily  sodium chloride, 10 mL, Intravenous, Q12H  sodium chloride, 10 mL, Intravenous, Q12H      Continuous Infusions:   PRN Meds:.  acetaminophen    senna-docusate sodium **AND** polyethylene glycol **AND** bisacodyl  "**AND** bisacodyl    Calcium Replacement - Follow Nurse / BPA Driven Protocol    Magnesium Standard Dose Replacement - Follow Nurse / BPA Driven Protocol    nitroglycerin    Phosphorus Replacement - Follow Nurse / BPA Driven Protocol    Potassium Replacement - Follow Nurse / BPA Driven Protocol    sodium chloride    sodium chloride    sodium chloride    sodium chloride    Objective     VITAL SIGNS  Vitals:    04/14/25 0900 04/14/25 1015 04/14/25 1151 04/14/25 1154   BP: 166/83 144/74     BP Location:  Left arm     Patient Position:  Lying     Pulse: 65 59 58 56   Resp: 18 20 16 16   Temp:  98.3 °F (36.8 °C)     TempSrc:  Oral     SpO2: 96% 97% 96% 99%   Weight:  85.3 kg (188 lb)     Height:  147.3 cm (58\")         Flowsheet Rows      Flowsheet Row First Filed Value   Admission Height 147.3 cm (58\") Documented at 04/14/2025 0453   Admission Weight 85.3 kg (188 lb) Documented at 04/14/2025 0453            No intake or output data in the 24 hours ending 04/14/25 1257     TELEMETRY: Sinus rhythm    Physical Exam:  The patient is alert, oriented and in no distress.  Vital signs as noted above.     Head and neck revealed no carotid bruits or jugular venous distension.  No thyromegaly or lymphadenopathy is present.     Lungs clear.  No wheezing.  Breath sounds are normal bilaterally.     Heart normal first and second heart sounds.  No murmur..  No pericardial rub is present.  No gallop is present.     Abdomen soft and nontender.  No organomegaly is present.     Extremities revealed good peripheral pulses without any pedal edema.     Skin warm and dry.     Musculoskeletal system is grossly normal.     CNS grossly normal.    Reviewed and updated.      Results Review:   I reviewed the patient's new clinical results.  Lab Results (last 24 hours)       Procedure Component Value Units Date/Time    Procalcitonin [[id number]]  (Normal) Collected: 04/14/25 0618    Specimen: Blood Updated: 04/14/25 0718     Procalcitonin 0.03 ng/mL  " "   Narrative:      As a Marker for Sepsis (Non-Neonates):    1. <0.5 ng/mL represents a low risk of severe sepsis and/or septic shock.  2. >2 ng/mL represents a high risk of severe sepsis and/or septic shock.    As a Marker for Lower Respiratory Tract Infections that require antibiotic therapy:    PCT on Admission    Antibiotic Therapy       6-12 Hrs later    >0.5                Strongly Recommended  >0.25 - <0.5        Recommended   0.1 - 0.25          Discouraged              Remeasure/reassess PCT  <0.1                Strongly Discouraged     Remeasure/reassess PCT    As 28 day mortality risk marker: \"Change in Procalcitonin Result\" (>80% or <=80%) if Day 0 (or Day 1) and Day 4 values are available. Refer to http://www.[email or web address] [employer]pct-calculator.com    Change in PCT <=80%  A decrease of PCT levels below or equal to 80% defines a positive change in PCT test result representing a higher risk for 28-day all-cause mortality of patients diagnosed with severe sepsis for septic shock.    Change in PCT >80%  A decrease of PCT levels of more than 80% defines a negative change in PCT result representing a lower risk for 28-day all-cause mortality of patients diagnosed with severe sepsis or septic shock.       High Sensitivity Troponin T 1Hr [[id number]]  (Abnormal) Collected: 04/14/25 0618    Specimen: Blood Updated: 04/14/25 0644     HS Troponin T 29 ng/L      Troponin T Numeric Delta 4 ng/L      Troponin T % Delta 16    Narrative:      High Sensitive Troponin T Reference Range:  <14.0 ng/L- Negative Female for AMI  <22.0 ng/L- Negative Male for AMI  >=14 - Abnormal Female indicating possible myocardial injury.  >=22 - Abnormal Male indicating possible myocardial injury.   Clinicians would have to utilize clinical acumen, EKG, Troponin, and serial changes to determine if it is an Acute Myocardial Infarction or myocardial injury due to an underlying chronic condition.         Respiratory Panel PCR w/COVID-19(SARS-CoV-2) " ALMITA/RODNEY/RADHA/PAD/COR/TALA In-House, NP Swab in UTM/VTM, 2 HR TAT - Swab, Nasopharynx [[id number]]  (Normal) Collected: 04/14/25 0508    Specimen: Swab from Nasopharynx Updated: 04/14/25 0637     ADENOVIRUS, PCR Not Detected     Coronavirus 229E Not Detected     Coronavirus HKU1 Not Detected     Coronavirus NL63 Not Detected     Coronavirus OC43 Not Detected     COVID19 Not Detected     Human Metapneumovirus Not Detected     Human Rhinovirus/Enterovirus Not Detected     Influenza A PCR Not Detected     Influenza B PCR Not Detected     Parainfluenza Virus 1 Not Detected     Parainfluenza Virus 2 Not Detected     Parainfluenza Virus 3 Not Detected     Parainfluenza Virus 4 Not Detected     RSV, PCR Not Detected     Bordetella pertussis pcr Not Detected     Bordetella parapertussis PCR Not Detected     Chlamydophila pneumoniae PCR Not Detected     Mycoplasma pneumo by PCR Not Detected    Narrative:      In the setting of a positive respiratory panel with a viral infection PLUS a negative procalcitonin without other underlying concern for bacterial infection, consider observing off antibiotics or discontinuation of antibiotics and continue supportive care. If the respiratory panel is positive for atypical bacterial infection (Bordetella pertussis, Chlamydophila pneumoniae, or Mycoplasma pneumoniae), consider antibiotic de-escalation to target atypical bacterial infection.    Comprehensive Metabolic Panel [[id number]]  (Abnormal) Collected: 04/14/25 0508    Specimen: Blood Updated: 04/14/25 0611     Glucose 163 mg/dL      BUN 20 mg/dL      Creatinine 0.97 mg/dL      Sodium 139 mmol/L      Potassium 4.0 mmol/L      Chloride 102 mmol/L      CO2 24.1 mmol/L      Calcium 8.9 mg/dL      Total Protein 6.6 g/dL      Albumin 4.1 g/dL      ALT (SGPT) 15 U/L      AST (SGOT) 22 U/L      Alkaline Phosphatase 88 U/L      Total Bilirubin 0.2 mg/dL      Globulin 2.5 gm/dL      A/G Ratio 1.6 g/dL      BUN/Creatinine Ratio 20.6     Anion Gap  12.9 mmol/L      eGFR 61.4 mL/min/1.73     Narrative:      GFR Categories in Chronic Kidney Disease (CKD)      GFR Category          GFR (mL/min/1.73)    Interpretation  G1                     90 or greater         Normal or high (1)  G2                      60-89                Mild decrease (1)  G3a                   45-59                Mild to moderate decrease  G3b                   30-44                Moderate to severe decrease  G4                    15-29                Severe decrease  G5                    14 or less           Kidney failure          (1)In the absence of evidence of kidney disease, neither GFR category G1 or G2 fulfill the criteria for CKD.    eGFR calculation 2021 CKD-EPI creatinine equation, which does not include race as a factor    High Sensitivity Troponin T [[id number]]  (Abnormal) Collected: 04/14/25 0508    Specimen: Blood Updated: 04/14/25 0611     HS Troponin T 25 ng/L     Narrative:      High Sensitive Troponin T Reference Range:  <14.0 ng/L- Negative Female for AMI  <22.0 ng/L- Negative Male for AMI  >=14 - Abnormal Female indicating possible myocardial injury.  >=22 - Abnormal Male indicating possible myocardial injury.   Clinicians would have to utilize clinical acumen, EKG, Troponin, and serial changes to determine if it is an Acute Myocardial Infarction or myocardial injury due to an underlying chronic condition.         BNP [[id number]]  (Abnormal) Collected: 04/14/25 0508    Specimen: Blood Updated: 04/14/25 0611     proBNP 2,980.0 pg/mL     Narrative:      This assay is used as an aid in the diagnosis of individuals suspected of having heart failure. It can be used as an aid in the diagnosis of acute decompensated heart failure (ADHF) in patients presenting with signs and symptoms of ADHF to the emergency department (ED). In addition, NT-proBNP of <300 pg/mL indicates ADHF is not likely.    Age Range Result Interpretation  NT-proBNP Concentration (pg/mL:      <50              Positive            >450                   Gray                 300-450                    Negative             <300    50-75           Positive            >900                  Gray                300-900                  Negative            <300      >75             Positive            >1800                  Gray                300-1800                  Negative            <300    CBC & Differential [[id number]]  (Abnormal) Collected: 04/14/25 0508    Specimen: Blood Updated: 04/14/25 0552    Narrative:      The following orders were created for panel order CBC & Differential.  Procedure                               Abnormality         Status                     ---------                               -----------         ------                     CBC Auto Differential[[id number]]        Abnormal            Final result                 Please view results for these tests on the individual orders.    CBC Auto Differential [[id number]]  (Abnormal) Collected: 04/14/25 0508    Specimen: Blood Updated: 04/14/25 0552     WBC 9.59 10*3/mm3      RBC 4.50 10*6/mm3      Hemoglobin 12.5 g/dL      Hematocrit 39.5 %      MCV 87.8 fL      MCH 27.8 pg      MCHC 31.6 g/dL      RDW 12.6 %      RDW-SD 40.2 fl      MPV 10.5 fL      Platelets 244 10*3/mm3      Neutrophil % 73.7 %      Lymphocyte % 20.1 %      Monocyte % 5.4 %      Eosinophil % 0.3 %      Basophil % 0.1 %      Immature Grans % 0.4 %      Neutrophils, Absolute 7.06 10*3/mm3      Lymphocytes, Absolute 1.93 10*3/mm3      Monocytes, Absolute 0.52 10*3/mm3      Eosinophils, Absolute 0.03 10*3/mm3      Basophils, Absolute 0.01 10*3/mm3      Immature Grans, Absolute 0.04 10*3/mm3      nRBC 0.0 /100 WBC     Extra Tubes [[id number]] Collected: 04/14/25 0508    Specimen: Blood, Venous Line Updated: 04/14/25 0545    Narrative:      The following orders were created for panel order Extra Tubes.  Procedure                               Abnormality         Status                      ---------                               -----------         ------                     Light Blue Top[[id number]]                                   Final result                 Please view results for these tests on the individual orders.    Light Blue Top [[id number]] Collected: 04/14/25 0508    Specimen: Blood Updated: 04/14/25 0545     Extra Tube Hold for add-ons.     Comment: Auto resulted       Blood Culture - Blood, Arm, Left [[id number]] Collected: 04/14/25 0508    Specimen: Blood from Arm, Left Updated: 04/14/25 0542    Blood Culture - Blood, Arm, Right [[id number]] Collected: 04/14/25 0518    Specimen: Blood from Arm, Right Updated: 04/14/25 0524    POC Lactate [[id number]]  (Normal) Collected: 04/14/25 0512    Specimen: Blood Updated: 04/14/25 0514     Lactate 1.3 mmol/L      Comment: Serial Number: [id number]:  [id number]               Imaging Results (Last 24 Hours)       Procedure Component Value Units Date/Time    XR Chest 1 View [[id number]] Collected: 04/14/25 0532     Updated: 04/14/25 0535    Narrative:      XR CHEST 1 VW    Date of Exam: 4/14/2025 5:20 AM EDT    Indication: hypoxemia, wheezing    Comparison: 3/20/2025.    Findings:  Patchy airspace disease present within the lung bases bilaterally. Small bilateral pleural effusions present.. No pneumothorax. The pulmonary vasculature appears within normal limits. The heart appears enlarged, stable. No acute osseous abnormality   identified.      Impression:      Impression:  Bibasilar pneumonia with small bilateral pleural effusions.          Electronically Signed: Sol Reese MD    4/14/2025 5:33 AM EDT    Workstation ID: [id number]        LAB RESULTS (LAST 7 DAYS)    CBC  Results from last 7 days   Lab Units 04/14/25  0508   WBC 10*3/mm3 9.59   RBC 10*6/mm3 4.50   HEMOGLOBIN g/dL 12.5   HEMATOCRIT % 39.5   MCV fL 87.8   PLATELETS 10*3/mm3 244       BMP  Results from last 7 days   Lab Units 04/14/25  0508   SODIUM mmol/L 139   POTASSIUM mmol/L  4.0   CHLORIDE mmol/L 102   CO2 mmol/L 24.1   BUN mg/dL 20   CREATININE mg/dL 0.97   GLUCOSE mg/dL 163*       CMP   Results from last 7 days   Lab Units 04/14/25  0508   SODIUM mmol/L 139   POTASSIUM mmol/L 4.0   CHLORIDE mmol/L 102   CO2 mmol/L 24.1   BUN mg/dL 20   CREATININE mg/dL 0.97   GLUCOSE mg/dL 163*   ALBUMIN g/dL 4.1   BILIRUBIN mg/dL 0.2   ALK PHOS U/L 88   AST (SGOT) U/L 22   ALT (SGPT) U/L 15         BNP        TROPONIN  Results from last 7 days   Lab Units 04/14/25  0618   HSTROP T ng/L 29*       CoAg        Creatinine Clearance  Estimated Creatinine Clearance: 50.1 mL/min (by C-G formula based on SCr of 0.97 mg/dL).    ABG        Radiology  XR Chest 1 View  Result Date: 4/14/2025  Impression: Bibasilar pneumonia with small bilateral pleural effusions. Electronically Signed: Sol Reese MD  4/14/2025 5:33 AM EDT  Workstation ID: [id number]        EKG      I personally viewed and interpreted the patient's EKG/Telemetry data: Sinus rhythm    ECHOCARDIOGRAM:    Results for orders placed during the hospital encounter of 09/29/23    Adult Transthoracic Echo Complete W/ Cont if Necessary Per Protocol    Interpretation Summary    Left ventricular ejection fraction appears to be 56 - 60%.    Indications  Chest pain  Palpitations    Technically satisfactory study.  Mitral valve is structurally normal.  Tricuspid valve is structurally normal.  Aortic valve is structurally normal.  Pulmonic valve could not be well visualized.  No evidence for mitral tricuspid or aortic regurgitation is seen by Doppler study.  Left atrium is enlarged.  Right atrium is normal in size.  Left ventricle is normal in size and contractility with ejection fraction of 60%.  Right ventricle is normal in size.  Atrial septum is intact.  Aorta is normal.  No pericardial effusion or intracardiac thrombus is seen.    Impression  Structurally and functionally normal cardiac valves.  Left atrial enlargement.  Left ventricular size and  contractility is normal with ejection fraction of 60%.      STRESS TEST  Results for orders placed during the hospital encounter of 03/20/25    Stress Test With Myocardial Perfusion One Day    Interpretation Summary  Indications  Chest pain  Status post stent    This study was performed under the direct supervision of Samreen SAVAGE.    Resting ECG  Sinus rhythm    The patient was injected with Lexiscan intravenously while constantly monitoring electrocardiogram and vital signs.  Patient had mild chest discomfort.  Patient did not have any ST abnormalities or ectopy with injection of Lexiscan.    Cardiolite was used as an imaging agent.    Cardiolite images showed uniform distribution of radionuclide without any evidence for myocardial ischemia.    Gated SPECT images revealed normal left ventricle size and contractility with ejection fraction of 70%.    Impression  ========  Lexiscan Cardiolite test is negative for myocardial ischemia.    Gated SPECT images revealed normal left ventricular size and contractility with ejection fraction of 70%.        Cardiolite (Tc-99m sestamibi) stress test    HEART CATHETERIZATION  Results for orders placed during the hospital encounter of 05/28/21    Cardiac Catheterization/Vascular Study    Narrative  PCI PROCEDURE NOTE      DATE OF PROCEDURE: May 29, 2021     :  Dr.Surender Patel.    INDICATION FOR PROCEDURE:  Unstable angina ,  Coronary artery disease, hypertension, dyslipidemia, positive family history.    PROCEDURE PERFORMED:      Description of procedure:  Under conscious sedation and local anesthesia, existing 5 Bhutanese sheath was changed out a 6 Bhutanese sheath and a 6 Bhutanese XB 3 5 guide was used to engage the left coronary ostium and IFR wire was used to cross the lesion after giving intra-arterial heparin and nitro.  And IFR was critical at 0.86 therefore it was subjected to PTCA and drug-eluting stent with 3 x 28 Xience with reduction of lesion to 0 with rest  ALONSO-3 flow..  Patient tolerated procedure well complications were none.    Blood loss: 5 cc  Complications: none.  No dissection.  No no reflow, no perforation.        CORONARY INTERVENTION FINDINGS:      Segment #proximal LAD  Culprit Lesion:  [x]  Yes  []  No  % Pre-Stenosis: 80% with an IFR of 0.86  Pre-Proc TIMIFlow: 3  % Post-Stenosis: 0  Post-Proc TIMIFlow: 3  Non-High/Non-C:                          High/C:ya  Lesion Length (mm):  Primary Device Used: 3 x 28 Xience FRANCOISE        Conscious Sedation:   [x]  Yes   []  No  No Flow Phenom:       []  Yes  [x]  No  Dissection:  []  Yes  [x]  No  Acute Closure: []  Yes  [x]  No  Perforation:  []  Yes  [x]  No    Complications:  none  Estimated Blood Loss:  5cc.    Conclusion:  IFR of LAD critical at 0.86 therefore was subjected to drug-eluting stent to proximal LAD with reduction of lesion to 0 with rest ALONSO-3 flow      Plan:  We will give dual antiplatelet therapy with aspirin and Plavix  Aggressive risk factor modification medical management      OTHER:     Assessment & Plan     Principal Problem:    Bilateral pneumonia  Active Problems:    Hypoxia    //////////////////////  History  ==================    -Status post stent to LAD 5/29/2021      status post stent to LAD 06/10/2014   status post subendocardial myocardial infarction prior to stent placement     Lexiscan Cardiolite test-normal-8/31/2023     Echocardiogram-9/29/2023.  Structurally and functionally normal cardiac valves.  Left atrial enlargement.  Left ventricular size and contractility is normal with ejection fraction of 60%.     Cardiac catheterization 5/29/2021 revealed  Left ventricle size and contractility normal with ejection fraction of 60%.  Left main coronary artery normal.  Left anterior descending artery has previously placed stent.  Left anterior descending artery has 80 to 90% disease just distal to the diagonal branch.  (Patient to have FFR).  IFR of 0.86  Diagonal branch has diffuse 50 to  60% disease.  Circumflex coronary artery is normal except for 30 to 40% marginal branch disease.  Right coronary artery is a large and dominant vessel and is normal.  Right common iliac external iliac and femoral arteries are normal.     -Right-sided chest pain-atypical.-Improved  EKG showed no acute changes.  Troponin levels are negative.     -Elevated D-dimer  CT scan of the chest is negative for pulmonary embolus.     Stress Cardiolite test-- 5/17/2021.  Echocardiogram-normal except for left atrial enlargement 5/17/2021.      -palpitations likely SVT.  -improved on atenolol     Echocardiogram showed mild aortic regurgitation with normal left ventricular function.  5/18/2017     - hypertension dyslipidemia sleep apnea asthma fibromyalgia GERD and gout.     - Orthostatic hypotension     - family history of coronary artery disease     - allergy to penicillin sulfa and tetracycline     =======  Plan  =======  Shortness of breath.  Low oxygen saturations.  Appears to be pulmonary than cardiac.  Mild elevation of proBNP.  2980  Troponin 25 and 29.    Status post stent to LAD 2014 and 2021.  Patient is not having any angina pectoris or congestive heart failure.    Stress Cardiolite test 3/21/2025     Lexiscan Cardiolite test is negative for myocardial ischemia.     Gated SPECT images revealed normal left ventricular size and contractility with ejection fraction of 70%.    History of orthostatic hypotension-better.  Continue metoprolol XL 25 mg twice a day.  Continue midodrine 5 mg twice a day.     Hypertension well-controlled.    Dyslipidemia-continue atorvastatin.    Medications were reviewed and updated.    Further plan will depend on patient's progress.    Reviewed and updated 4/14/2025.  //////////////////////////////    Yazmin Matta MD  04/14/25  12:57 EDT

## 2025-04-14 NOTE — PLAN OF CARE
Goal Outcome Evaluation:  Plan of Care Reviewed With: patient        Progress: no change  Outcome Evaluation: Patient was admitted from the ER for acute asthma exacerbation and bilateral lower lobe PNA. Has been resting well on 2L of oxygen, (normally on room air). On IV rocephin, solu-medrol, and bumex. RVP negative. Cardiology consulted. No other issues at this time. Continuing to monitor.

## 2025-04-14 NOTE — Clinical Note
Level of Care: Telemetry [5]   Diagnosis: Bilateral pneumonia [708108]   Admitting Physician: LUZ MARINA DONIS [[id number]]   Attending Physician: LUZ MARINA DONIS [[id number]]   Isolate for COVID?: No [0]   Certification: I Certify That Inpatient Hospital Services Are Medically Necessary For Greater Than 2 Midnights

## 2025-04-14 NOTE — ED NOTES
2 hours ago, pt became SOA. EMS said pt was 80% on room air. Pt is now on 2L nasal cannula. Pt does not normally wear O2 at home.

## 2025-04-15 ENCOUNTER — READMISSION MANAGEMENT (OUTPATIENT)
Dept: CALL CENTER | Facility: HOSPITAL | Age: 75
End: 2025-04-15
Payer: MEDICARE

## 2025-04-15 VITALS
BODY MASS INDEX: 39.47 KG/M2 | DIASTOLIC BLOOD PRESSURE: 86 MMHG | OXYGEN SATURATION: 100 % | HEART RATE: 59 BPM | RESPIRATION RATE: 16 BRPM | TEMPERATURE: 98.3 F | HEIGHT: 58 IN | WEIGHT: 188 LBS | SYSTOLIC BLOOD PRESSURE: 182 MMHG

## 2025-04-15 LAB
ALBUMIN SERPL-MCNC: 4.1 G/DL (ref 3.5–5.2)
ALBUMIN/GLOB SERPL: 2 G/DL
ALP SERPL-CCNC: 86 U/L (ref 39–117)
ALT SERPL W P-5'-P-CCNC: 14 U/L (ref 1–33)
ANION GAP SERPL CALCULATED.3IONS-SCNC: 11.6 MMOL/L (ref 5–15)
AST SERPL-CCNC: 15 U/L (ref 1–32)
BASOPHILS # BLD AUTO: 0.01 10*3/MM3 (ref 0–0.2)
BASOPHILS NFR BLD AUTO: 0.1 % (ref 0–1.5)
BILIRUB SERPL-MCNC: <0.2 MG/DL (ref 0–1.2)
BUN SERPL-MCNC: 21 MG/DL (ref 8–23)
BUN/CREAT SERPL: 20.2 (ref 7–25)
CALCIUM SPEC-SCNC: 8.9 MG/DL (ref 8.6–10.5)
CHLORIDE SERPL-SCNC: 102 MMOL/L (ref 98–107)
CO2 SERPL-SCNC: 28.4 MMOL/L (ref 22–29)
CREAT SERPL-MCNC: 1.04 MG/DL (ref 0.57–1)
DEPRECATED RDW RBC AUTO: 40.3 FL (ref 37–54)
EGFRCR SERPLBLD CKD-EPI 2021: 56.5 ML/MIN/1.73
EOSINOPHIL # BLD AUTO: 0 10*3/MM3 (ref 0–0.4)
EOSINOPHIL NFR BLD AUTO: 0 % (ref 0.3–6.2)
ERYTHROCYTE [DISTWIDTH] IN BLOOD BY AUTOMATED COUNT: 12.7 % (ref 12.3–15.4)
GLOBULIN UR ELPH-MCNC: 2.1 GM/DL
GLUCOSE SERPL-MCNC: 136 MG/DL (ref 65–99)
HCT VFR BLD AUTO: 37.3 % (ref 34–46.6)
HGB BLD-MCNC: 11.9 G/DL (ref 12–15.9)
IMM GRANULOCYTES # BLD AUTO: 0.06 10*3/MM3 (ref 0–0.05)
IMM GRANULOCYTES NFR BLD AUTO: 0.6 % (ref 0–0.5)
LYMPHOCYTES # BLD AUTO: 0.8 10*3/MM3 (ref 0.7–3.1)
LYMPHOCYTES NFR BLD AUTO: 7.3 % (ref 19.6–45.3)
MCH RBC QN AUTO: 28 PG (ref 26.6–33)
MCHC RBC AUTO-ENTMCNC: 31.9 G/DL (ref 31.5–35.7)
MCV RBC AUTO: 87.8 FL (ref 79–97)
MONOCYTES # BLD AUTO: 0.68 10*3/MM3 (ref 0.1–0.9)
MONOCYTES NFR BLD AUTO: 6.2 % (ref 5–12)
NEUTROPHILS NFR BLD AUTO: 85.8 % (ref 42.7–76)
NEUTROPHILS NFR BLD AUTO: 9.34 10*3/MM3 (ref 1.7–7)
NRBC BLD AUTO-RTO: 0 /100 WBC (ref 0–0.2)
PLATELET # BLD AUTO: 202 10*3/MM3 (ref 140–450)
PMV BLD AUTO: 11 FL (ref 6–12)
POTASSIUM SERPL-SCNC: 3.8 MMOL/L (ref 3.5–5.2)
PROT SERPL-MCNC: 6.2 G/DL (ref 6–8.5)
RBC # BLD AUTO: 4.25 10*6/MM3 (ref 3.77–5.28)
SODIUM SERPL-SCNC: 142 MMOL/L (ref 136–145)
WBC NRBC COR # BLD AUTO: 10.89 10*3/MM3 (ref 3.4–10.8)

## 2025-04-15 PROCEDURE — 99232 SBSQ HOSP IP/OBS MODERATE 35: CPT | Performed by: INTERNAL MEDICINE

## 2025-04-15 PROCEDURE — 80053 COMPREHEN METABOLIC PANEL: CPT | Performed by: NURSE PRACTITIONER

## 2025-04-15 PROCEDURE — 94799 UNLISTED PULMONARY SVC/PX: CPT

## 2025-04-15 PROCEDURE — 94761 N-INVAS EAR/PLS OXIMETRY MLT: CPT

## 2025-04-15 PROCEDURE — 25010000002 BUMETANIDE PER 0.5 MG: Performed by: NURSE PRACTITIONER

## 2025-04-15 PROCEDURE — 25010000002 CEFTRIAXONE PER 250 MG: Performed by: NURSE PRACTITIONER

## 2025-04-15 PROCEDURE — 25010000002 METHYLPREDNISOLONE PER 40 MG: Performed by: NURSE PRACTITIONER

## 2025-04-15 PROCEDURE — 85025 COMPLETE CBC W/AUTO DIFF WBC: CPT | Performed by: NURSE PRACTITIONER

## 2025-04-15 PROCEDURE — 97161 PT EVAL LOW COMPLEX 20 MIN: CPT

## 2025-04-15 PROCEDURE — 94618 PULMONARY STRESS TESTING: CPT

## 2025-04-15 RX ORDER — PREDNISONE 20 MG/1
40 TABLET ORAL DAILY
Qty: 10 TABLET | Refills: 0 | Status: SHIPPED | OUTPATIENT
Start: 2025-04-15 | End: 2025-04-21

## 2025-04-15 RX ORDER — CEFUROXIME AXETIL 500 MG/1
500 TABLET ORAL 2 TIMES DAILY
Qty: 20 TABLET | Refills: 0 | Status: SHIPPED | OUTPATIENT
Start: 2025-04-15 | End: 2025-04-25

## 2025-04-15 RX ADMIN — OXYBUTYNIN CHLORIDE 10 MG: 10 TABLET, EXTENDED RELEASE ORAL at 08:12

## 2025-04-15 RX ADMIN — CEFTRIAXONE 2000 MG: 2 INJECTION, POWDER, FOR SOLUTION INTRAMUSCULAR; INTRAVENOUS at 08:12

## 2025-04-15 RX ADMIN — ASPIRIN 81 MG: 81 TABLET, COATED ORAL at 08:12

## 2025-04-15 RX ADMIN — Medication 2000 UNITS: at 08:12

## 2025-04-15 RX ADMIN — IPRATROPIUM BROMIDE AND ALBUTEROL SULFATE 3 ML: .5; 3 SOLUTION RESPIRATORY (INHALATION) at 09:01

## 2025-04-15 RX ADMIN — CITALOPRAM 20 MG: 20 TABLET, FILM COATED ORAL at 08:12

## 2025-04-15 RX ADMIN — METHYLPREDNISOLONE SODIUM SUCCINATE 40 MG: 40 INJECTION, POWDER, FOR SOLUTION INTRAMUSCULAR; INTRAVENOUS at 08:12

## 2025-04-15 RX ADMIN — BUMETANIDE 1 MG: 0.25 INJECTION INTRAMUSCULAR; INTRAVENOUS at 05:58

## 2025-04-15 RX ADMIN — LEVOTHYROXINE SODIUM 25 MCG: 0.03 TABLET ORAL at 05:58

## 2025-04-15 RX ADMIN — Medication 10 ML: at 08:15

## 2025-04-15 RX ADMIN — BUPROPION HYDROCHLORIDE 150 MG: 150 TABLET, EXTENDED RELEASE ORAL at 08:12

## 2025-04-15 NOTE — OUTREACH NOTE
Prep Survey      Flowsheet Row Responses   Scientologist [facility] patient discharged from? [facility]   Is LACE score < 7 ? No   Eligibility [facility]   Date of Admission 04/14/25   Date of Discharge 04/15/25   Discharge Disposition Home or Self Care   Discharge diagnosis Bilateral pneumonia   Does the patient have one of the following disease processes/diagnoses(primary or secondary)? Pneumonia   Does the patient have Home health ordered? No   Is there a DME ordered? No   Prep survey completed? Yes            Lillian HOUGH - Registered Nurse

## 2025-04-15 NOTE — THERAPY EVALUATION
Patient Name: Charleen Barnett  : 1950    MRN: [id number]                              Today's Date: 4/15/2025       Admit Date: 2025    Visit Dx:     ICD-10-CM ICD-9-CM   1. Acute severe exacerbation of asthma  J45.901 493.92   2. Pneumonia of both lower lobes due to infectious organism  J18.9 486   3. Pleural effusion, bilateral  J90 511.9   4. Hypoxemia  R09.02 799.02     Patient Active Problem List   Diagnosis    Anxiety    Arthritis    Asthma    Depressive disorder    Encounter for therapeutic drug monitoring    Family hx osteoporosis    Fibromyalgia    Ramos's esophagus    Gastroesophageal reflux disease    Gout    Hyperglycemia    Mixed hyperlipidemia    Primary hypertension    Obstructive sleep apnea    Age related osteoporosis    Pedal edema    Postmenopausal status    Presence of left artificial knee joint    ST elevation (STEMI) myocardial infarction    Status post percutaneous transluminal coronary angioplasty    Urge incontinence    Vitamin D deficiency    Morbidly obese    Dyspnea    Unstable angina    Coronary artery disease involving coronary bypass graft of native heart with unstable angina pectoris    Encounter for annual wellness exam in Medicare patient    Obesity (BMI 30-39.9)    Chest pain    Aftercare following joint replacement surgery    Localized osteoarthrosis    Localized, primary osteoarthritis    Osteoarthritis of knee    Lumbar radiculopathy    Artificial knee joint present    Acquired hypothyroidism    Rash    Memory difficulties    Right ovarian cyst    Bilateral pneumonia    Hypoxia     Past Medical History:   Diagnosis Date    Allergic 's    Anemia 's    Angina pectoris     Anxiety 's    Arthritis 's    Arthritis of neck     Asthma 2020    Ramos's esophagus 02/15/2018    Cancer Carcinoma Insitu cervical    Carcinoma insitu cervix    Cataract     Removed both eyes in 2023    Cholelithiasis Removed 's    Chronic constipation     Chronic  diarrhea     Colon polyp 2000's    Coronary artery disease 2006?    CTS (carpal tunnel syndrome)     Depression 08/27/2018    Dislocation of finger     Fibromyalgia 12/14/2011    Fibromyalgia, primary 1990's I think    Fracture of wrist     R HAND LAST 3 DIGITS    Fracture, finger     GERD (gastroesophageal reflux disease)     Take Lansaprazole    Gout 02/13/2020    Headache     HL (hearing loss) Age related    Hyperlipidemia     Hypertension     Hypothyroidism 2022    Injury of back     Irritable bowel syndrome     Most of my life    Joint pain     Knee swelling     Low back pain 1970's    Low back strain     Lumbosacral disc disease     Neck strain ??    My neck is always pulling    Obesity Lifelong since puberty    Obstructive sleep apnea 10/16/2014    Osteopenia Five years appx    Dr. DURAN GIVES SHOTS    Osteoporosis     fosamax(SE), on prolia(4/10/18-6/18/21, 2/1/22)    Otitis media     Pedal edema 10/22/2018    Periarthritis of shoulder     Pneumonia 2020    Presence of left artificial knee joint 03/14/2019    Rotator cuff syndrome     Scoliosis 1961    Seasonal allergies     Shortness of breath     ST elevation (STEMI) myocardial infarction 02/13/2020    Status post percutaneous transluminal coronary angioplasty 06/10/2014    Substance abuse     Tachycardia     Tear of meniscus of knee     Thoracic disc disorder     Alignment totally out    Tremor 2015    Urge incontinence 08/27/2018    Urinary tract infection     Visual impairment Cataracts    Removed 1/2023 removed    Vitamin D deficiency 03/19/2015     Past Surgical History:   Procedure Laterality Date    BLADDER SURGERY      BLADDER SUSPENSION  04/2023    CARDIAC CATHETERIZATION N/A 05/29/2021    Procedure: Left Heart Cath and coronary angiogram;  Surgeon: Yazmin Matta MD;  Location: [facility] CATH INVASIVE LOCATION;  Service: Cardiovascular;  Laterality: N/A;    CARDIAC CATHETERIZATION  05/29/2021    Procedure: Functional Flow Reserve (iFR);   Surgeon: Bryan Patel MD;  Location: [facility] CATH INVASIVE LOCATION;  Service: Cardiology;;    CARDIAC CATHETERIZATION N/A 05/29/2021    Procedure: Percutaneous Coronary Intervention;  Surgeon: Bryan Patel MD;  Location: [facility] CATH INVASIVE LOCATION;  Service: Cardiology;  Laterality: N/A;    CARDIAC CATHETERIZATION N/A 05/29/2021    Procedure: Stent FRANCOISE coronary;  Surgeon: Bryan Patel MD;  Location: [facility] CATH INVASIVE LOCATION;  Service: Cardiology;  Laterality: N/A;    CAROTID STENT      CHOLECYSTECTOMY      COLONOSCOPY  2017??    Scheduled for march 2023?    CORONARY STENT PLACEMENT  ??    FRACTURE SURGERY  2020    Hand surgery [facility]    HAND SURGERY      HYSTERECTOMY      INGUINAL HERNIA REPAIR      JOINT REPLACEMENT  L-knee    KNEE SURGERY      OTHER SURGICAL HISTORY      STENT    SUBTOTAL HYSTERECTOMY  1977    Carcinoma insitu Cervix    TRIGGER POINT INJECTION        General Information       Row Name 04/15/25 0956          Physical Therapy Time and Intention    Document Type evaluation  -BR     Mode of Treatment physical therapy  -BR       Row Name 04/15/25 0956          General Information    Patient Profile Reviewed yes  -BR     Prior Level of Function independent:;all household mobility;community mobility;gait;transfer;driving  -BR     Existing Precautions/Restrictions oxygen therapy device and L/min  -BR     Barriers to Rehab none identified  -BR       Row Name 04/15/25 0956          Living Environment    Current Living Arrangements home  -BR     People in Home spouse  -BR       Row Name 04/15/25 0956          Home Main Entrance    Number of Stairs, Main Entrance four  -BR     Stair Railings, Main Entrance railings safe and in good condition  -BR       Row Name 04/15/25 0956          Stairs Within Home, Primary    Number of Stairs, Within Home, Primary one  -BR       Row Name 04/15/25 0956          Cognition    Orientation Status (Cognition) oriented x 4   -BR       Row Name 04/15/25 0956          Safety Issues/Impairments Affecting Functional Mobility    Impairments Affecting Function (Mobility) endurance/activity tolerance  -BR               User Key  (r) = Recorded By, (t) = Taken By, (c) = Cosigned By      Initials Name Provider Type    Gretta Spence PT Physical Therapist                   Mobility       Row Name 04/15/25 0956          Bed Mobility    Bed Mobility supine-sit  -BR     Supine-Sit [place] (Bed Mobility) standby assist  -BR     Assistive Device (Bed Mobility) head of bed elevated  -BR       Row Name 04/15/25 0956          Sit-Stand Transfer    Sit-Stand [place] (Transfers) standby assist  -BR       Row Name 04/15/25 0956          Gait/Stairs (Locomotion)    [place] Level (Gait) standby assist  -BR     Patient was able to Ambulate yes  -BR     Distance in Feet (Gait) 200  -BR     Bilateral Gait Deviations decreased arm swing  -BR               User Key  (r) = Recorded By, (t) = Taken By, (c) = Cosigned By      Initials Name Provider Type    Gretta Spence PT Physical Therapist                   Obj/Interventions       Row Name 04/15/25 0957          Range of Motion Comprehensive    General Range of Motion bilateral lower extremity ROM WFL  -BR       Row Name 04/15/25 0957          Strength Comprehensive (MMT)    General Manual Muscle Testing (MMT) Assessment no strength deficits identified  -BR       Row Name 04/15/25 0957          Balance    Balance Assessment sitting dynamic balance;standing static balance;sitting static balance  -BR     Static Sitting Balance independent  -BR     Dynamic Sitting Balance independent  -BR     Position, Sitting Balance unsupported  -BR     Static Standing Balance set-up  -BR     Position/Device Used, Standing Balance unsupported  -BR       Row Name 04/15/25 0957          Sensory Assessment (Somatosensory)    Sensory Assessment (Somatosensory) sensation intact  -BR               User Key   (r) = Recorded By, (t) = Taken By, (c) = Cosigned By      Initials Name Provider Type    BR Gretta Brady, PT Physical Therapist                   Goals/Plan    No documentation.                  Clinical Impression       Row Name 04/15/25 0957          Pain    Pretreatment Pain Rating 0/10 - no pain  -BR     Posttreatment Pain Rating 0/10 - no pain  -BR       Row Name 04/15/25 0957          Plan of Care Review    Plan of Care Reviewed With patient  -BR     Outcome Evaluation Charleen Barnett is a 74 y.o. female with a CMH of Ramos's esophagitis, cervical carcinoma, depression, fibromyalgia, GERD, hyperlipidemia, hypertension, hypothyroidism, STEMI, KAYLIE, asthma who presented to [facility] on 4/14/2025 with shortness of breath.  This has been ongoing and progressively worsening over the last 2 days.  Patient is typically on room air at baseline, however has been using her 's oxygen for supplementation.. Chest x-ray showed bibasilar pneumonia with small bilateral pleural effusions.CT scan of the chest is negative for pulmonary embolus. Pt A and O x 4 and on room air. Pt resides with spouse in [facility] with 4 MERY with rail. Pt is typically independent with community mobility without AD. Pt transfers with SBA and she ambulated 200 feet with SBA of1 with stable vitals. PT has no mobility concerns for this pt. PT recommendation is Home with Assist. PT will complete orders.  -BR       Row Name 04/15/25 0957          Therapy Assessment/Plan (PT)    Criteria for Skilled Interventions Met (PT) no;no problems identified which require skilled intervention  -BR     Therapy Frequency (PT) evaluation only  -BR       Row Name 04/15/25 0957          Vital Signs    Pre Systolic BP Rehab 168  pt reports that is a normal BP for her  -BR     Pre Treatment Diastolic BP 87  -BR     Pretreatment Heart Rate (beats/min) 68  -BR     Intratreatment Heart Rate (beats/min) 96  -BR     Posttreatment Heart Rate (beats/min) 85  -BR      Pre SpO2 (%) 96  -BR     O2 Delivery Pre Treatment room air  -BR     Intra SpO2 (%) 95  -BR     O2 Delivery Intra Treatment room air  -BR     Post SpO2 (%) 96  -BR     O2 Delivery Post Treatment room air  -BR     Pre Patient Position Supine  -BR     Intra Patient Position Standing  -BR     Post Patient Position Sitting  -BR       Row Name 04/15/25 0957          Positioning and Restraints    Pre-Treatment Position in bed  -BR     Post Treatment Position chair  -BR     In Chair notified nsg;reclined;call light within reach;encouraged to call for assist  -BR               User Key  (r) = Recorded By, (t) = Taken By, (c) = Cosigned By      Initials Name Provider Type    Gretta Spence, PT Physical Therapist                   Outcome Measures       Row Name 04/15/25 1002 04/15/25 0812       How much help from another person do you currently need...    Turning from your back to your side while in flat bed without using bedrails? 4  -BR 4  -ES    Moving from lying on back to sitting on the side of a flat bed without bedrails? 4  -BR 4  -ES    Moving to and from a bed to a chair (including a wheelchair)? 4  -BR 4  -ES    Standing up from a chair using your arms (e.g., wheelchair, bedside chair)? 4  -BR 4  -ES    Climbing 3-5 steps with a railing? 3  -BR 4  -ES    To walk in hospital room? 4  -BR 4  -ES    AM-PAC 6 Clicks Score (PT) 23  -BR 24  -ES    Highest Level of Mobility Goal 7 --> Walk 25 feet or more  -BR 8 --> Walked 250 feet or more  -ES      Row Name 04/15/25 1002          Functional Assessment    Outcome Measure Options AM-PAC 6 Clicks Basic Mobility (PT)  -BR               User Key  (r) = Recorded By, (t) = Taken By, (c) = Cosigned By      Initials Name Provider Type    Mary Aden RN Registered Nurse    Gretta Spence PT Physical Therapist                                 Physical Therapy Education       Title: PT OT SLP Therapies (Done)       Topic: Physical Therapy (Done)       Point:  Mobility training (Done)       Learning Progress Summary            Patient LIZZ Funk D, VU,DU by BR at 4/15/2025 1002                      Point: Body mechanics (Done)       Learning Progress Summary            Patient LIZZ Funk D, VU,DU by BR at 4/15/2025 1002                      Point: Precautions (Done)       Learning Progress Summary            Patient LIZZ Funk D, JU,DU by BR at 4/15/2025 1002                                      User Key       Initials Effective Dates Name Provider Type Discipline    BR 02/01/22 -  Gretta Brady, PT Physical Therapist PT                  PT Recommendation and Plan     Outcome Evaluation: Charleen Barnett is a 74 y.o. female with a CMH of Ramos's esophagitis, cervical carcinoma, depression, fibromyalgia, GERD, hyperlipidemia, hypertension, hypothyroidism, STEMI, KAYLIE, asthma who presented to [facility] on 4/14/2025 with shortness of breath.  This has been ongoing and progressively worsening over the last 2 days.  Patient is typically on room air at baseline, however has been using her 's oxygen for supplementation.. Chest x-ray showed bibasilar pneumonia with small bilateral pleural effusions.CT scan of the chest is negative for pulmonary embolus. Pt A and O x 4 and on room air. Pt resides with spouse in [facility] with 4 MERY with rail. Pt is typically independent with community mobility without AD. Pt transfers with SBA and she ambulated 200 feet with SBA of1 with stable vitals. PT has no mobility concerns for this pt. PT recommendation is Home with Assist. PT will complete orders.     Time Calculation:         PT Charges       Row Name 04/15/25 1002             Time Calculation    Start Time 0928  -BR      Stop Time 0955  -BR      Time Calculation (min) 27 min  -BR      PT Received On 04/15/25  -BR         Time Calculation- PT    Total Timed Code Minutes- PT 0 minute(s)  -BR                User Key  (r) = Recorded By, (t) = Taken By, (c) = Cosigned By       Initials Name Provider Type    BR Gretta Brady, PT Physical Therapist                  Therapy Charges for Today       Code Description Service Date Service Provider Modifiers Qty    [id number] HC PT EVAL LOW COMPLEXITY 4 4/15/2025 Gretta Brady, PT GP 1            PT G-Codes  Outcome Measure Options: AM-PAC 6 Clicks Basic Mobility (PT)  AM-PAC 6 Clicks Score (PT): 23  PT Discharge Summary  Anticipated Discharge Disposition (PT): home with assist    Gretta Brady, JUDAH  4/15/2025

## 2025-04-15 NOTE — PROGRESS NOTES
Referring Provider: Bret Goyal MD    Reason for follow-up:  Shortness of breath     Patient Care Team:  Ilsa Her MD as PCP - General  Yazmin Matta MD as Consulting Physician (Cardiology)    Subjective .      ROS    Today, the patient has been without any chest discomfort ,shortness of breath, palpitations, dizziness or syncope.  Denies having any headache ,abdominal pain ,nausea, vomiting , diarrhea constipation, loss of weight or loss of appetite.  Denies having any excessive bruising ,hematuria or blood in the stool.    Review of all systems negative except as indicated.    Reviewed ROS.    History  Past Medical History:   Diagnosis Date    Allergic 1970's    Anemia 1950's    Angina pectoris     Anxiety 1980's    Arthritis 1990's    Arthritis of neck     Asthma 02/13/2020    Ramos's esophagus 02/15/2018    Cancer Carcinoma Insitu cervical    Carcinoma insitu cervix    Cataract     Removed both eyes in Jan. 2023    Cholelithiasis Removed 2000's    Chronic constipation     Chronic diarrhea     Colon polyp 2000's    Coronary artery disease 2006?    CTS (carpal tunnel syndrome)     Depression 08/27/2018    Dislocation of finger     Fibromyalgia 12/14/2011    Fibromyalgia, primary 1990's I think    Fracture of wrist     R HAND LAST 3 DIGITS    Fracture, finger     GERD (gastroesophageal reflux disease)     Take Lansaprazole    Gout 02/13/2020    Headache     HL (hearing loss) Age related    Hyperlipidemia     Hypertension     Hypothyroidism 2022    Injury of back     Irritable bowel syndrome     Most of my life    Joint pain     Knee swelling     Low back pain 1970's    Low back strain     Lumbosacral disc disease     Neck strain ??    My neck is always pulling    Obesity Lifelong since puberty    Obstructive sleep apnea 10/16/2014    Osteopenia Five years appx    Dr. DURAN GIVES SHOTS    Osteoporosis     fosamax(SE), on prolia(4/10/18-6/18/21, 2/1/22)    Otitis media     Pedal edema  10/22/2018    Periarthritis of shoulder     Pneumonia 2020    Presence of left artificial knee joint 03/14/2019    Rotator cuff syndrome     Scoliosis 1961    Seasonal allergies     Shortness of breath     ST elevation (STEMI) myocardial infarction 02/13/2020    Status post percutaneous transluminal coronary angioplasty 06/10/2014    Substance abuse     Tachycardia     Tear of meniscus of knee     Thoracic disc disorder     Alignment totally out    Tremor 2015    Urge incontinence 08/27/2018    Urinary tract infection     Visual impairment Cataracts    Removed 1/2023 removed    Vitamin D deficiency 03/19/2015       Past Surgical History:   Procedure Laterality Date    BLADDER SURGERY      BLADDER SUSPENSION  04/2023    CARDIAC CATHETERIZATION N/A 05/29/2021    Procedure: Left Heart Cath and coronary angiogram;  Surgeon: Yazmin Matta MD;  Location: [facility] CATH INVASIVE LOCATION;  Service: Cardiovascular;  Laterality: N/A;    CARDIAC CATHETERIZATION  05/29/2021    Procedure: Functional Flow Reserve (iFR);  Surgeon: Bryan Patel MD;  Location: [facility] CATH INVASIVE LOCATION;  Service: Cardiology;;    CARDIAC CATHETERIZATION N/A 05/29/2021    Procedure: Percutaneous Coronary Intervention;  Surgeon: Bryan Patel MD;  Location: [facility] CATH INVASIVE LOCATION;  Service: Cardiology;  Laterality: N/A;    CARDIAC CATHETERIZATION N/A 05/29/2021    Procedure: Stent FRANCOISE coronary;  Surgeon: Bryan Patel MD;  Location: [facility] CATH INVASIVE LOCATION;  Service: Cardiology;  Laterality: N/A;    CAROTID STENT      CHOLECYSTECTOMY      COLONOSCOPY  2017??    Scheduled for march 2023?    CORONARY STENT PLACEMENT  ??    FRACTURE SURGERY  2020    Hand surgery [facility]    HAND SURGERY      HYSTERECTOMY      INGUINAL HERNIA REPAIR      JOINT REPLACEMENT  L-knee    KNEE SURGERY      OTHER SURGICAL HISTORY      STENT    SUBTOTAL HYSTERECTOMY  1977    Carcinoma insitu Cervix    TRIGGER POINT  INJECTION         Family History   Problem Relation Age of Onset    Alcohol abuse Mother     Anxiety disorder Mother     Depression Mother     Early death Mother     Mental illness Mother     Heart attack Father         Vein bypass in legs    Cancer Father             Alcohol abuse Father     Depression Father     No Known Problems Sister     No Known Problems Brother     No Known Problems Maternal Aunt     No Known Problems Maternal Uncle     Stroke Paternal Aunt     No Known Problems Paternal Uncle     Heart attack Maternal Grandmother         Heart atrack in her 80s    Rheumatologic disease Maternal Grandmother     Scoliosis Maternal Grandmother     Heart attack Maternal Grandfather         Heart attack in his 60s    Alcohol abuse Maternal Grandfather     Heart attack Paternal Grandmother         Type 1 diabetic, heart attack    Diabetes Paternal Grandmother     Heart attack Paternal Grandfather         Heart attack in his 50s or 60s    Heart disease Sister         Blockages monitored with medication    Hypertension Sister         Coronary heart issues    Depression Sister     Hyperlipidemia Brother         Extremely high triglycerides and cholesterol    Hypertension Brother         High blood pressure    Heart attack Brother         Passed from heart attack age 54    Alcohol abuse Brother     Arthritis Brother     Depression Brother     Early death Brother     Hyperlipidemia Brother         Unknown    Hypertension Brother     Heart attack Brother         Passed from heart attack age 44    Alcohol abuse Brother     Depression Brother     Early death Brother     Hyperlipidemia Brother         Had heart disease for years before passing    Hypertension Brother     Heart attack Brother          from heart failure in his 60s    Alcohol abuse Brother     Early death Brother     Mental illness Brother     Anemia Neg Hx     Arrhythmia Neg Hx     Asthma Neg Hx     Clotting disorder Neg Hx     Fainting Neg Hx      Heart failure Neg Hx        Social History     Tobacco Use    Smoking status: Never     Passive exposure: Never    Smokeless tobacco: Never    Tobacco comments:     Parents and husbands heavy smokers   Vaping Use    Vaping status: Never Used   Substance Use Topics    Alcohol use: Not Currently     Comment: Recovery since 1994 drank from [age]    Drug use: Not Currently     Frequency: 1.0 times per week     Types: Amphetamines     Comment: Short term use/over use diet pills in the 70's        Medications Prior to Admission   Medication Sig Dispense Refill Last Dose/Taking    albuterol sulfate  (90 Base) MCG/ACT inhaler Inhale 2 puffs Every 6 (Six) Hours As Needed for Wheezing. 8.5 g 11 Taking As Needed    aspirin 81 MG tablet Take 1 tablet by mouth Daily.   4/13/2025    atorvastatin (LIPITOR) 10 MG tablet TAKE ONE TABLET BY MOUTH DAILY 90 tablet 0 4/13/2025    BIOTIN PO Take 1 tablet by mouth Daily.   4/13/2025    buPROPion XL (WELLBUTRIN XL) 150 MG 24 hr tablet TAKE 1 TABLET BY MOUTH EVERY MORNING 28 tablet 3 4/13/2025    busPIRone (BUSPAR) 10 MG tablet Take 1 tablet by mouth 2 (Two) Times a Day.   4/13/2025    Cholecalciferol (Vitamin D) 50 MCG (2000 UT) tablet Take 1 tablet by mouth Daily.   4/13/2025    citalopram (CeleXA) 20 MG tablet TAKE ONE TABLET BY MOUTH DAILY 90 tablet 0 4/13/2025    fluticasone (FLONASE) 50 MCG/ACT nasal spray USE 2 SPRAYS INTO THE NOSTRIL(S) AS DIRECTED BY PROVIDER DAILY. 9.9 mL 10 4/13/2025    gabapentin (NEURONTIN) 400 MG capsule Take 1 capsule by mouth every night at bedtime. 90 capsule 0 4/13/2025    Gemtesa 75 MG tablet Take 1 tablet by mouth Every Other Day.   4/13/2025    hydrOXYzine (ATARAX) 10 MG tablet TAKE 1 TABLET BY MOUTH EVERY NIGHT AT BEDTIME 30 tablet 0 4/13/2025    levothyroxine (SYNTHROID, LEVOTHROID) 25 MCG tablet TAKE ONE TABLET BY MOUTH EVERY MORNING 90 tablet 0 4/13/2025    Magnesium Citrate 100 MG capsule Take 1 capsule by mouth Daily.   4/13/2025     metoprolol succinate XL (TOPROL-XL) 25 MG 24 hr tablet Take 1 tablet by mouth Daily.   4/13/2025    midodrine (PROAMATINE) 5 MG tablet Take 0.5 tablets by mouth 2 (Two) Times a Day.   4/13/2025    montelukast (SINGULAIR) 10 MG tablet TAKE ONE TABLET BY MOUTH DAILY 90 tablet 0 4/13/2025    vitamin E 100 UNIT capsule Take 1 capsule by mouth Daily.   4/13/2025    Zinc Acetate, Oral, (ZINC ACETATE PO) Take 1 tablet by mouth Daily.   4/13/2025    spironolactone (ALDACTONE) 25 MG tablet As Needed.          Allergies  Hydrocodone, Chlorpheniramine-phenylephrine, Penicillins, Sulfa antibiotics, Tetracycline, Warfarin, Pseudoephedrine hcl, and Triprolidine hcl    Scheduled Meds:aspirin, 81 mg, Oral, Daily  atorvastatin, 10 mg, Oral, Daily  bumetanide, 1 mg, Intravenous, Q12H  buPROPion XL, 150 mg, Oral, QAM  cefTRIAXone, 2,000 mg, Intravenous, Daily  cholecalciferol, 2,000 Units, Oral, Daily  citalopram, 20 mg, Oral, Daily  enoxaparin sodium, 40 mg, Subcutaneous, Daily  gabapentin, 400 mg, Oral, Nightly  hydrOXYzine, 10 mg, Oral, Nightly  ipratropium-albuterol, 3 mL, Nebulization, 4x Daily - RT  levothyroxine, 25 mcg, Oral, Q AM  methylPREDNISolone sodium succinate, 40 mg, Intravenous, Q12H  oxybutynin XL, 10 mg, Oral, Daily  sodium chloride, 10 mL, Intravenous, Q12H  sodium chloride, 10 mL, Intravenous, Q12H      Continuous Infusions:   PRN Meds:.  acetaminophen    senna-docusate sodium **AND** polyethylene glycol **AND** bisacodyl **AND** bisacodyl    Calcium Replacement - Follow Nurse / BPA Driven Protocol    Magnesium Standard Dose Replacement - Follow Nurse / BPA Driven Protocol    nitroglycerin    Phosphorus Replacement - Follow Nurse / BPA Driven Protocol    Potassium Replacement - Follow Nurse / BPA Driven Protocol    sodium chloride    sodium chloride    sodium chloride    sodium chloride    Objective     VITAL SIGNS  Vitals:    04/14/25 2000 04/14/25 2032 04/14/25 2331 04/15/25 0344   BP:  128/72 178/92 175/92   BP  "Location:  Left arm Left arm Left arm   Patient Position:  Lying Lying Lying   Pulse: 70      Resp: 20 17 22 18   Temp:  98 °F (36.7 °C) 98.4 °F (36.9 °C) 98.4 °F (36.9 °C)   TempSrc:  Oral Oral    SpO2: 99%      Weight:       Height:           Flowsheet Rows      Flowsheet Row First Filed Value   Admission Height 147.3 cm (58\") Documented at 04/14/2025 0453   Admission Weight 85.3 kg (188 lb) Documented at 04/14/2025 0453              Intake/Output Summary (Last 24 hours) at 4/15/2025 0646  Last data filed at 4/14/2025 1700  Gross per 24 hour   Intake 480 ml   Output --   Net 480 ml        TELEMETRY: Sinus rhythm    Physical Exam:  The patient is alert, oriented and in no distress.  Vital signs as noted above.     Head and neck revealed no carotid bruits or jugular venous distension.  No thyromegaly or lymphadenopathy is present.     Lungs clear.  No wheezing.  Breath sounds are normal bilaterally.     Heart normal first and second heart sounds.  No murmur..  No pericardial rub is present.  No gallop is present.     Abdomen soft and nontender.  No organomegaly is present.     Extremities revealed good peripheral pulses without any pedal edema.     Skin warm and dry.     Musculoskeletal system is grossly normal.     CNS grossly normal.    Reviewed and updated.    Results Review:   I reviewed the patient's new clinical results.  Lab Results (last 24 hours)       Procedure Component Value Units Date/Time    Blood Culture - Blood, Arm, Left [[id number]]  (Normal) Collected: 04/14/25 0508    Specimen: Blood from Arm, Left Updated: 04/15/25 0545     Blood Culture No growth at 24 hours    Blood Culture - Blood, Arm, Right [[id number]]  (Normal) Collected: 04/14/25 0518    Specimen: Blood from Arm, Right Updated: 04/15/25 0530     Blood Culture No growth at 24 hours    Comprehensive Metabolic Panel [[id number]]  (Abnormal) Collected: 04/15/25 0058    Specimen: Blood from Arm, Left Updated: 04/15/25 0244     Glucose 136 mg/dL  "     BUN 21 mg/dL      Creatinine 1.04 mg/dL      Sodium 142 mmol/L      Potassium 3.8 mmol/L      Chloride 102 mmol/L      CO2 28.4 mmol/L      Calcium 8.9 mg/dL      Total Protein 6.2 g/dL      Albumin 4.1 g/dL      ALT (SGPT) 14 U/L      AST (SGOT) 15 U/L      Alkaline Phosphatase 86 U/L      Total Bilirubin <0.2 mg/dL      Globulin 2.1 gm/dL      A/G Ratio 2.0 g/dL      BUN/Creatinine Ratio 20.2     Anion Gap 11.6 mmol/L      eGFR 56.5 mL/min/1.73     Narrative:      GFR Categories in Chronic Kidney Disease (CKD)      GFR Category          GFR (mL/min/1.73)    Interpretation  G1                     90 or greater         Normal or high (1)  G2                      60-89                Mild decrease (1)  G3a                   45-59                Mild to moderate decrease  G3b                   30-44                Moderate to severe decrease  G4                    15-29                Severe decrease  G5                    14 or less           Kidney failure          (1)In the absence of evidence of kidney disease, neither GFR category G1 or G2 fulfill the criteria for CKD.    eGFR calculation 2021 CKD-EPI creatinine equation, which does not include race as a factor    CBC Auto Differential [[id number]]  (Abnormal) Collected: 04/15/25 0058    Specimen: Blood from Arm, Left Updated: 04/15/25 0226     WBC 10.89 10*3/mm3      RBC 4.25 10*6/mm3      Hemoglobin 11.9 g/dL      Hematocrit 37.3 %      MCV 87.8 fL      MCH 28.0 pg      MCHC 31.9 g/dL      RDW 12.7 %      RDW-SD 40.3 fl      MPV 11.0 fL      Platelets 202 10*3/mm3      Neutrophil % 85.8 %      Lymphocyte % 7.3 %      Monocyte % 6.2 %      Eosinophil % 0.0 %      Basophil % 0.1 %      Immature Grans % 0.6 %      Neutrophils, Absolute 9.34 10*3/mm3      Lymphocytes, Absolute 0.80 10*3/mm3      Monocytes, Absolute 0.68 10*3/mm3      Eosinophils, Absolute 0.00 10*3/mm3      Basophils, Absolute 0.01 10*3/mm3      Immature Grans, Absolute 0.06 10*3/mm3      nRBC  "0.0 /100 WBC     Procalcitonin [[id number]]  (Normal) Collected: 04/14/25 0618    Specimen: Blood Updated: 04/14/25 0718     Procalcitonin 0.03 ng/mL     Narrative:      As a Marker for Sepsis (Non-Neonates):    1. <0.5 ng/mL represents a low risk of severe sepsis and/or septic shock.  2. >2 ng/mL represents a high risk of severe sepsis and/or septic shock.    As a Marker for Lower Respiratory Tract Infections that require antibiotic therapy:    PCT on Admission    Antibiotic Therapy       6-12 Hrs later    >0.5                Strongly Recommended  >0.25 - <0.5        Recommended   0.1 - 0.25          Discouraged              Remeasure/reassess PCT  <0.1                Strongly Discouraged     Remeasure/reassess PCT    As 28 day mortality risk marker: \"Change in Procalcitonin Result\" (>80% or <=80%) if Day 0 (or Day 1) and Day 4 values are available. Refer to http://www.[facility] [place]-pct-calculator.com    Change in PCT <=80%  A decrease of PCT levels below or equal to 80% defines a positive change in PCT test result representing a higher risk for 28-day all-cause mortality of patients diagnosed with severe sepsis for septic shock.    Change in PCT >80%  A decrease of PCT levels of more than 80% defines a negative change in PCT result representing a lower risk for 28-day all-cause mortality of patients diagnosed with severe sepsis or septic shock.               Imaging Results (Last 24 Hours)       ** No results found for the last 24 hours. **        LAB RESULTS (LAST 7 DAYS)    CBC  Results from last 7 days   Lab Units 04/15/25  0058 04/14/25  0508   WBC 10*3/mm3 10.89* 9.59   RBC 10*6/mm3 4.25 4.50   HEMOGLOBIN g/dL 11.9* 12.5   HEMATOCRIT % 37.3 39.5   MCV fL 87.8 87.8   PLATELETS 10*3/mm3 202 244       BMP  Results from last 7 days   Lab Units 04/15/25  0058 04/14/25  0508   SODIUM mmol/L 142 139   POTASSIUM mmol/L 3.8 4.0   CHLORIDE mmol/L 102 102   CO2 mmol/L 28.4 24.1   BUN mg/dL 21 20   CREATININE mg/dL 1.04* 0.97 "   GLUCOSE mg/dL 136* 163*       CMP   Results from last 7 days   Lab Units 04/15/25  0058 04/14/25  0508   SODIUM mmol/L 142 139   POTASSIUM mmol/L 3.8 4.0   CHLORIDE mmol/L 102 102   CO2 mmol/L 28.4 24.1   BUN mg/dL 21 20   CREATININE mg/dL 1.04* 0.97   GLUCOSE mg/dL 136* 163*   ALBUMIN g/dL 4.1 4.1   BILIRUBIN mg/dL <0.2 0.2   ALK PHOS U/L 86 88   AST (SGOT) U/L 15 22   ALT (SGPT) U/L 14 15         BNP        TROPONIN  Results from last 7 days   Lab Units 04/14/25  0618   HSTROP T ng/L 29*       CoAg        Creatinine Clearance  Estimated Creatinine Clearance: 46.8 mL/min (A) (by C-G formula based on SCr of 1.04 mg/dL (H)).    ABG        Radiology  XR Chest 1 View  Result Date: 4/14/2025  Impression: Bibasilar pneumonia with small bilateral pleural effusions. Electronically Signed: Sol Reese MD  4/14/2025 5:33 AM EDT  Workstation ID: [id number]          EKG      I personally viewed and interpreted the patient's EKG/Telemetry data: Sinus rhythm    ECHOCARDIOGRAM:    Results for orders placed during the hospital encounter of 09/29/23    Adult Transthoracic Echo Complete W/ Cont if Necessary Per Protocol    Interpretation Summary    Left ventricular ejection fraction appears to be 56 - 60%.    Indications  Chest pain  Palpitations    Technically satisfactory study.  Mitral valve is structurally normal.  Tricuspid valve is structurally normal.  Aortic valve is structurally normal.  Pulmonic valve could not be well visualized.  No evidence for mitral tricuspid or aortic regurgitation is seen by Doppler study.  Left atrium is enlarged.  Right atrium is normal in size.  Left ventricle is normal in size and contractility with ejection fraction of 60%.  Right ventricle is normal in size.  Atrial septum is intact.  Aorta is normal.  No pericardial effusion or intracardiac thrombus is seen.    Impression  Structurally and functionally normal cardiac valves.  Left atrial enlargement.  Left ventricular size and contractility  is normal with ejection fraction of 60%.          STRESS TEST  Results for orders placed during the hospital encounter of 03/20/25    Stress Test With Myocardial Perfusion One Day    Interpretation Summary  Indications  Chest pain  Status post stent    This study was performed under the direct supervision of Samreen SAVAGE.    Resting ECG  Sinus rhythm    The patient was injected with Lexiscan intravenously while constantly monitoring electrocardiogram and vital signs.  Patient had mild chest discomfort.  Patient did not have any ST abnormalities or ectopy with injection of Lexiscan.    Cardiolite was used as an imaging agent.    Cardiolite images showed uniform distribution of radionuclide without any evidence for myocardial ischemia.    Gated SPECT images revealed normal left ventricle size and contractility with ejection fraction of 70%.    Impression  ========  Lexiscan Cardiolite test is negative for myocardial ischemia.    Gated SPECT images revealed normal left ventricular size and contractility with ejection fraction of 70%.        Cardiolite (Tc-99m sestamibi) stress test    CARDIAC CATHETERIZATION  Results for orders placed during the hospital encounter of 05/28/21    Cardiac Catheterization/Vascular Study    Narrative  PCI PROCEDURE NOTE      DATE OF PROCEDURE: May 29, 2021     :  Dr.Surender Patel.    INDICATION FOR PROCEDURE:  Unstable angina ,  Coronary artery disease, hypertension, dyslipidemia, positive family history.    PROCEDURE PERFORMED:      Description of procedure:  Under conscious sedation and local anesthesia, existing 5 Tanzanian sheath was changed out a 6 Tanzanian sheath and a 6 Tanzanian XB 3 5 guide was used to engage the left coronary ostium and IFR wire was used to cross the lesion after giving intra-arterial heparin and nitro.  And IFR was critical at 0.86 therefore it was subjected to PTCA and drug-eluting stent with 3 x 28 Xience with reduction of lesion to 0 with rest ALONSO-3 flow..   Patient tolerated procedure well complications were none.    Blood loss: 5 cc  Complications: none.  No dissection.  No no reflow, no perforation.        CORONARY INTERVENTION FINDINGS:      Segment #proximal LAD  Culprit Lesion:  [x]  Yes  []  No  % Pre-Stenosis: 80% with an IFR of 0.86  Pre-Proc TIMIFlow: 3  % Post-Stenosis: 0  Post-Proc TIMIFlow: 3  Non-High/Non-C:                          High/C:ya  Lesion Length (mm):  Primary Device Used: 3 x 28 Xience FRANCOISE        Conscious Sedation:   [x]  Yes   []  No  No Flow Phenom:       []  Yes  [x]  No  Dissection:  []  Yes  [x]  No  Acute Closure: []  Yes  [x]  No  Perforation:  []  Yes  [x]  No    Complications:  none  Estimated Blood Loss:  5cc.    Conclusion:  IFR of LAD critical at 0.86 therefore was subjected to drug-eluting stent to proximal LAD with reduction of lesion to 0 with rest ALONSO-3 flow      Plan:  We will give dual antiplatelet therapy with aspirin and Plavix  Aggressive risk factor modification medical management                OTHER:         Assessment & Plan     Principal Problem:    Bilateral pneumonia  Active Problems:    Hypoxia    //////////////////////  History  ==================     -Status post stent to LAD 5/29/2021      status post stent to LAD 06/10/2014   status post subendocardial myocardial infarction prior to stent placement     Lexiscan Cardiolite test-normal-8/31/2023     Echocardiogram-9/29/2023.  Structurally and functionally normal cardiac valves.  Left atrial enlargement.  Left ventricular size and contractility is normal with ejection fraction of 60%.     Cardiac catheterization 5/29/2021 revealed  Left ventricle size and contractility normal with ejection fraction of 60%.  Left main coronary artery normal.  Left anterior descending artery has previously placed stent.  Left anterior descending artery has 80 to 90% disease just distal to the diagonal branch.  (Patient to have FFR).  IFR of 0.86  Diagonal branch has diffuse 50 to  60% disease.  Circumflex coronary artery is normal except for 30 to 40% marginal branch disease.  Right coronary artery is a large and dominant vessel and is normal.  Right common iliac external iliac and femoral arteries are normal.     -Right-sided chest pain-atypical.-Improved  EKG showed no acute changes.  Troponin levels are negative.     -Elevated D-dimer  CT scan of the chest is negative for pulmonary embolus.     Stress Cardiolite test-- 5/17/2021.  Echocardiogram-normal except for left atrial enlargement 5/17/2021.      -palpitations likely SVT.  -improved on atenolol     Echocardiogram showed mild aortic regurgitation with normal left ventricular function.  5/18/2017     - hypertension dyslipidemia sleep apnea asthma fibromyalgia GERD and gout.     - Orthostatic hypotension     - family history of coronary artery disease     - allergy to penicillin sulfa and tetracycline     =======  Plan  =======  Shortness of breath  Low oxygen saturations.  Appears to be pulmonary than cardiac.  Mild elevation of proBNP.  2980  Troponin 25 and 29.     Status post stent to LAD 2014 and 2021.  Patient is not having any angina pectoris or congestive heart failure.    Stress Cardiolite test 3/21/2025     Lexiscan Cardiolite test is negative for myocardial ischemia.     Gated SPECT images revealed normal left ventricular size and contractility with ejection fraction of 70%.     History of orthostatic hypotension-better.  Continue metoprolol XL 25 mg twice a day.  Continue midodrine 5 mg twice a day.     Hypertension-well-controlled    Dyslipidemia-continue atorvastatin.    Renal function  BUNs/creatinine 21/1.04    Medications were reviewed and updated.    Further plan will depend on patient's progress.    Reviewed and updated 4/15/2025.  //////////////////////////////             Yazmin Matta MD  04/15/25  06:46 EDT

## 2025-04-15 NOTE — PROCEDURES
Exercise Oximetry    Patient Name:Charleen Barnett   MRN: [id number]   Date: 04/15/25             ROOM AIR BASELINE   SpO2% 95   Heart Rate 67   Blood Pressure      EXERCISE ON ROOM AIR SpO2% EXERCISE ON O2 @  LPM SpO2%   1 MINUTE 95 1 MINUTE    2 MINUTES 94 2 MINUTES    3 MINUTES 94 3 MINUTES    4 MINUTES 91 4 MINUTES    5 MINUTES 93 5 MINUTES    6 MINUTES 93 6 MINUTES               Distance Walked: 6 minutes in hallway/room Distance Walked   Dyspnea (Abad Scale)   Dyspnea (Abad Scale)   Fatigue (Abad Scale)   Fatigue (Abad Scale)   SpO2% Post Exercise  96 SpO2% Post Exercise   HR Post Exercise  70 HR Post Exercise   Time to Recovery   Time to Recovery     Comments: Patient completed walk on room air and tolerated well

## 2025-04-15 NOTE — PLAN OF CARE
Goal Outcome Evaluation:  Plan of Care Reviewed With: patient        Improving      Problem: Adult Inpatient Plan of Care  Goal: Absence of Hospital-Acquired Illness or Injury  Intervention: Identify and Manage Fall Risk  Recent Flowsheet Documentation  Taken 4/15/2025 0812 by Mary Howell RN  Safety Promotion/Fall Prevention:   safety round/check completed   room organization consistent   nonskid shoes/slippers when out of bed   lighting adjusted   fall prevention program maintained   clutter free environment maintained   assistive device/personal items within reach   activity supervised  Intervention: Prevent Skin Injury  Recent Flowsheet Documentation  Taken 4/15/2025 0812 by Mary Howell RN  Body Position: position changed independently  Intervention: Prevent and Manage VTE (Venous Thromboembolism) Risk  Recent Flowsheet Documentation  Taken 4/15/2025 0812 by Mary Howell RN  VTE Prevention/Management:   bilateral   SCDs (sequential compression devices) off  Intervention: Prevent Infection  Recent Flowsheet Documentation  Taken 4/15/2025 0812 by Mary Howell RN  Infection Prevention:   hand hygiene promoted   personal protective equipment utilized   rest/sleep promoted   single patient room provided  Goal: Optimal Comfort and Wellbeing  Intervention: Provide Person-Centered Care  Recent Flowsheet Documentation  Taken 4/15/2025 0812 by Mary Howell RN  Trust Relationship/Rapport:   care explained   choices provided   emotional support provided   empathic listening provided   questions answered   reassurance provided   thoughts/feelings acknowledged     Problem: Comorbidity Management  Goal: Maintenance of Asthma Control  Intervention: Maintain Asthma Symptom Control  Recent Flowsheet Documentation  Taken 4/15/2025 0812 by Mary Howell RN  Medication Review/Management: medications reviewed  Goal: Blood Pressure in Desired Range  Intervention: Maintain Blood Pressure Management  Recent  Flowsheet Documentation  Taken 4/15/2025 0812 by Mary Howell RN  Medication Review/Management: medications reviewed     Problem: Fall Injury Risk  Goal: Absence of Fall and Fall-Related Injury  Intervention: Identify and Manage Contributors  Recent Flowsheet Documentation  Taken 4/15/2025 0812 by Mary Howell, RN  Medication Review/Management: medications reviewed  Self-Care Promotion: independence encouraged  Intervention: Promote Injury-Free Environment  Recent Flowsheet Documentation  Taken 4/15/2025 0812 by Mary Howell, RN  Safety Promotion/Fall Prevention:   safety round/check completed   room organization consistent   nonskid shoes/slippers when out of bed   lighting adjusted   fall prevention program maintained   clutter free environment maintained   assistive device/personal items within reach   activity supervised

## 2025-04-15 NOTE — PLAN OF CARE
Goal Outcome Evaluation:  Plan of Care Reviewed With: patient  Charleen Barnett is a 74 y.o. female with a CMH of Ramos's esophagitis, cervical carcinoma, depression, fibromyalgia, GERD, hyperlipidemia, hypertension, hypothyroidism, STEMI, KAYLIE, asthma who presented to [facility] on 4/14/2025 with shortness of breath.  This has been ongoing and progressively worsening over the last 2 days.  Patient is typically on room air at baseline, however has been using her 's oxygen for supplementation.. Chest x-ray showed bibasilar pneumonia with small bilateral pleural effusions.CT scan of the chest is negative for pulmonary embolus. Pt A and O x 4 and on room air. Pt resides with spouse in [facility] with 4 MERY with rail. Pt is typically independent with community mobility without AD. Pt transfers with SBA and she ambulated 200 feet with SBA of1 with stable vitals. PT has no mobility concerns for this pt. PT recommendation is Home with Assist. PT will complete orders.               Anticipated Discharge Disposition (PT): home with assist

## 2025-04-15 NOTE — DISCHARGE SUMMARY
[facility] Medicine Services  Discharge Summary    Date of Service: 4/15/2025  Patient Name: Charleen Barnett  : 1950  MRN: [id number]    Date of Admission: 2025  Discharge Diagnosis: Bilateral pneumonia  Date of Discharge: 4/15/2025  Primary Care Physician: Ilsa Her MD      Presenting Problem:   Hypoxemia [R09.02]  Hypoxia [R09.02]  Bilateral pneumonia [J18.9]  Pleural effusion, bilateral [J90]  Acute severe exacerbation of asthma [J45.901]  Pneumonia of both lower lobes due to infectious organism [J18.9]    Active and Resolved Hospital Problems:  Active Hospital Problems    Diagnosis POA    **Bilateral pneumonia [J18.9] Yes    Hypoxia [R09.02] Yes      Resolved Hospital Problems   No resolved problems to display.         Hospital Course     HPI:    Chief Complaint: Hypoxia     History of Present Illness: Charleen Barnett is a 74 y.o. female with a CMH of Ramos's esophagitis, cervical carcinoma, depression, fibromyalgia, GERD, hyperlipidemia, hypertension, hypothyroidism, STEMI, KAYLIE, asthma who presented to [facility] on 2025 with shortness of breath.  This has been ongoing and progressively worsening over the last 2 days.  Patient is typically on room air at baseline, however has been using her 's oxygen for supplementation..  Patient denies any orthopnea.  She did state that she had some chest pain and tightness.  Upon arrival to the emergency department patient's oxygen saturations were 80%.  Patient has previously been noncompliant with CPAP mask.     Upon arrival to the emergency department patient was placed on 6 L of O2, gradually titrated down to 2 L of O2.  Patient was given antimicrobial therapy, as well as aerosols.  Labs were remarkable for troponin 25, 29 respectively.  BNP 2,980.0.Chest x-ray showed bibasilar pneumonia with small bilateral pleural effusions.    Hospital Course:    Patient was admitted with above.  Patient was noted  to have hypoxia on day of admission.  She was placed on 2 L O2 via nasal cannula.  Overnight she has now been weaned to room air at rest.  She was continued on IV antibiotics as well as IV steroids.  This morning she feels much better.  She wishes to be discharged home.  Pending a formal 6-minute O2 evaluation as well as physical and Occupational Therapy evaluation, before patient be discharged home later this afternoon.  I have given her a prescription for Ceftin 500 mg p.o. twice daily for 10 days as well as prednisone 40 mg daily.  She was seen evaluated by cardiology who felt as though her dyspnea as well as hypoxia were more likely to be pulmonary in origin.  No acute intervention was recommended from their standpoint.  Labs otherwise appear stable.  Patient does have a mild elevation of her white count.  This is likely steroid-induced.  I have asked her to follow-up with her primary care provider in the next 7 to 10 days.  She is to have a repeat chest x-ray in 4 weeks to ensure resolution of aforementioned bilateral infiltrate.      DISCHARGE Follow Up Recommendations for labs and diagnostics:         Day of Discharge     Vital Signs:  Temp:  [97.8 °F (36.6 °C)-98.4 °F (36.9 °C)] 98.3 °F (36.8 °C)  Heart Rate:  [56-70] 59  Resp:  [14-22] 16  BP: (128-182)/(72-92) 182/86  Flow (L/min) (Oxygen Therapy):  [2] 2    Physical Exam:  Physical Exam       Pertinent  and/or Most Recent Results     LAB RESULTS:      Lab 04/15/25  0058 04/14/25  0618 04/14/25  0512 04/14/25  0508   WBC 10.89*  --   --  9.59   HEMOGLOBIN 11.9*  --   --  12.5   HEMATOCRIT 37.3  --   --  39.5   PLATELETS 202  --   --  244   NEUTROS ABS 9.34*  --   --  7.06*   IMMATURE GRANS (ABS) 0.06*  --   --  0.04   LYMPHS ABS 0.80  --   --  1.93   MONOS ABS 0.68  --   --  0.52   EOS ABS 0.00  --   --  0.03   MCV 87.8  --   --  87.8   PROCALCITONIN  --  0.03  --   --    LACTATE  --   --  1.3  --          Lab 04/15/25  0058 04/14/25  0508   SODIUM 142 139    POTASSIUM 3.8 4.0   CHLORIDE 102 102   CO2 28.4 24.1   ANION GAP 11.6 12.9   BUN 21 20   CREATININE 1.04* 0.97   EGFR 56.5* 61.4   GLUCOSE 136* 163*   CALCIUM 8.9 8.9         Lab 04/15/25  0058 04/14/25  0508   TOTAL PROTEIN 6.2 6.6   ALBUMIN 4.1 4.1   GLOBULIN 2.1 2.5   ALT (SGPT) 14 15   AST (SGOT) 15 22   BILIRUBIN <0.2 0.2   ALK PHOS 86 88         Lab 04/14/25  0618 04/14/25  0508   PROBNP  --  2,980.0*   HSTROP T 29* 25*                 Brief Urine Lab Results  (Last result in the past 365 days)        Color   Clarity   Blood   Leuk Est   Nitrite   Protein   CREAT   Urine HCG        08/27/24 1543 Yellow   Clear   Negative   Negative   Negative   Negative                 Microbiology Results (last 10 days)       Procedure Component Value - Date/Time    Blood Culture - Blood, Arm, Right [[id number]]  (Normal) Collected: 04/14/25 0518    Lab Status: Preliminary result Specimen: Blood from Arm, Right Updated: 04/15/25 0530     Blood Culture No growth at 24 hours    Respiratory Panel PCR w/COVID-19(SARS-CoV-2) AL[facility]A/RODNEY/[facility]/PAD/COR/TALA In-House, NP Swab in UTM/VTM, 2 HR TAT - Swab, Nasopharynx [[id number]]  (Normal) Collected: 04/14/25 0508    Lab Status: Final result Specimen: Swab from Nasopharynx Updated: 04/14/25 0637     ADENOVIRUS, PCR Not Detected     Coronavirus 229E Not Detected     Coronavirus HKU1 Not Detected     Coronavirus NL63 Not Detected     Coronavirus OC43 Not Detected     COVID19 Not Detected     Human Metapneumovirus Not Detected     Human Rhinovirus/Enterovirus Not Detected     Influenza A PCR Not Detected     Influenza B PCR Not Detected     Parainfluenza Virus 1 Not Detected     Parainfluenza Virus 2 Not Detected     Parainfluenza Virus 3 Not Detected     Parainfluenza Virus 4 Not Detected     RSV, PCR Not Detected     Bordetella pertussis pcr Not Detected     Bordetella parapertussis PCR Not Detected     Chlamydophila pneumoniae PCR Not Detected     Mycoplasma pneumo by PCR Not Detected     Narrative:      In the setting of a positive respiratory panel with a viral infection PLUS a negative procalcitonin without other underlying concern for bacterial infection, consider observing off antibiotics or discontinuation of antibiotics and continue supportive care. If the respiratory panel is positive for atypical bacterial infection (Bordetella pertussis, Chlamydophila pneumoniae, or Mycoplasma pneumoniae), consider antibiotic de-escalation to target atypical bacterial infection.    Blood Culture - Blood, Arm, Left [[id number]]  (Normal) Collected: 04/14/25 050    Lab Status: Preliminary result Specimen: Blood from Arm, Left Updated: 04/15/25 0587     Blood Culture No growth at 24 hours            XR Chest 1 View  Result Date: 4/14/2025  Impression: Impression: Bibasilar pneumonia with small bilateral pleural effusions. Electronically Signed: Sol Reese MD  4/14/2025 5:33 AM EDT  Workstation ID: [id number]    CT Thoracic Spine Without Contrast  Result Date: 3/21/2025  Impression: No acute fractures are demonstrated. Electronically Signed: Michael Castrejon MD  3/21/2025 10:28 PM EDT  Workstation ID: [id number]    CT Cervical Spine Without Contrast  Result Date: 3/21/2025  Impression: No cervical spine fracture. Electronically Signed: Michael Castrejon MD  3/21/2025 10:21 PM EDT  Workstation ID: [id number]    XR Hip With or Without Pelvis 2 - 3 View Right  Result Date: 3/20/2025  Impression: Impression: No radiographic evidence of an acute fracture or dislocation. Electronically Signed: Fernando Cunningham  3/20/2025 5:22 PM EDT  Workstation ID: [id number]    XR Chest 1 View  Result Date: 3/20/2025  Impression: Impression: 1.Cardiomegaly. No acute radiographic abnormality is identified. 2.Dextroscoliotic curvature of the thoracic spine. Electronically Signed: Markell Richey MD  3/20/2025 5:03 PM EDT  Workstation ID: [id number]      Results for orders placed during the hospital encounter of 06/28/21    Duplex Carotid Ultrasound  CAR    Interpretation Summary  · Proximal right internal carotid artery is normal.  · Proximal left internal carotid artery plaque without significant stenosis.      Results for orders placed during the hospital encounter of 06/28/21    Duplex Carotid Ultrasound CAR    Interpretation Summary  · Proximal right internal carotid artery is normal.  · Proximal left internal carotid artery plaque without significant stenosis.      Results for orders placed during the hospital encounter of 09/29/23    Adult Transthoracic Echo Complete W/ Cont if Necessary Per Protocol    Interpretation Summary    Left ventricular ejection fraction appears to be 56 - 60%.    Indications  Chest pain  Palpitations    Technically satisfactory study.  Mitral valve is structurally normal.  Tricuspid valve is structurally normal.  Aortic valve is structurally normal.  Pulmonic valve could not be well visualized.  No evidence for mitral tricuspid or aortic regurgitation is seen by Doppler study.  Left atrium is enlarged.  Right atrium is normal in size.  Left ventricle is normal in size and contractility with ejection fraction of 60%.  Right ventricle is normal in size.  Atrial septum is intact.  Aorta is normal.  No pericardial effusion or intracardiac thrombus is seen.    Impression  Structurally and functionally normal cardiac valves.  Left atrial enlargement.  Left ventricular size and contractility is normal with ejection fraction of 60%.      Labs Pending at Discharge:  Pending Results       None            Procedures Performed           Consults:   Consults       Date and Time Order Name Status Description    4/14/2025 11:32 AM Inpatient Cardiology Consult                Discharge Details        Discharge Medications        New Medications        Instructions Start Date   cefuroxime 500 MG tablet  Commonly known as: CEFTIN   500 mg, Oral, 2 Times Daily      predniSONE 20 MG tablet  Commonly known as: DELTASONE   40 mg, Oral, Daily              Continue These Medications        Instructions Start Date   albuterol sulfate  (90 Base) MCG/ACT inhaler  Commonly known as: PROVENTIL HFA;VENTOLIN HFA;PROAIR HFA   2 puffs, Inhalation, Every 6 Hours PRN      aspirin 81 MG tablet   81 mg, Daily      atorvastatin 10 MG tablet  Commonly known as: LIPITOR   10 mg, Oral, Daily      BIOTIN PO   1 tablet, Daily      buPROPion  MG 24 hr tablet  Commonly known as: WELLBUTRIN XL   150 mg, Oral, Every Morning      busPIRone 10 MG tablet  Commonly known as: BUSPAR   10 mg, 2 Times Daily      citalopram 20 MG tablet  Commonly known as: CeleXA   20 mg, Oral, Daily      fluticasone 50 MCG/ACT nasal spray  Commonly known as: FLONASE   2 sprays, Each Nare, Daily, use 2 sprays in the nostril(s) as directed by provider daily      gabapentin 400 MG capsule  Commonly known as: NEURONTIN   400 mg, Oral, Every Night at Bedtime      Gemtesa 75 MG tablet  Generic drug: Vibegron   1 tablet, Every Other Day      hydrOXYzine 10 MG tablet  Commonly known as: ATARAX   TAKE 1 TABLET BY MOUTH EVERY NIGHT AT BEDTIME      levothyroxine 25 MCG tablet  Commonly known as: SYNTHROID, LEVOTHROID   25 mcg, Oral, Every Morning      Magnesium Citrate 100 MG capsule   1 capsule, Daily      metoprolol succinate XL 25 MG 24 hr tablet  Commonly known as: TOPROL-XL   25 mg, Daily      midodrine 5 MG tablet  Commonly known as: PROAMATINE   2.5 mg, 2 Times Daily      montelukast 10 MG tablet  Commonly known as: SINGULAIR   10 mg, Oral, Daily      spironolactone 25 MG tablet  Commonly known as: ALDACTONE   As Needed.      Vitamin D 50 MCG (2000 UT) tablet   2,000 Units, Daily      vitamin E 100 UNIT capsule   100 Units, Daily      ZINC ACETATE PO   1 tablet, Daily               Allergies   Allergen Reactions    Hydrocodone Hives    Chlorpheniramine-Phenylephrine Rash    Penicillins Rash    Sulfa Antibiotics Hives and Rash    Tetracycline Rash    Warfarin Rash    Pseudoephedrine Hcl Rash     Triprolidine Hcl Rash         Discharge Disposition:   Home or Self Care    Diet:  Hospital:  Diet Order   Procedures    Diet: Regular/House; Fluid Consistency: Thin (IDDSI 0)         Discharge Activity:         CODE STATUS:  Code Status and Medical Interventions: CPR (Attempt to Resuscitate); Full Support   Ordered at: 04/14/25 1614     Code Status (Patient has no pulse and is not breathing):    CPR (Attempt to Resuscitate)     Medical Interventions (Patient has pulse or is breathing):    Full Support         Future Appointments   Date Time Provider Department Center   5/1/2025  1:00 PM Juan Jose Gastelum MD [facility]   5/6/2025  3:00 PM lIsa Her MD [facility]           Time spent on Discharge including face to face service:  45 minutes    Signature: Electronically signed by Bret Goyal MD, 04/15/25, 09:20 EDT.  [facility] Hospitalist Team

## 2025-04-15 NOTE — CASE MANAGEMENT/SOCIAL WORK
Case Management Discharge Note      Final Note: Routine home         Selected Continued Care - Discharged on 4/15/2025 Admission date: 4/14/2025 - Discharge disposition: Home or Self Care       Transportation Services  Private: Car    Final Discharge Disposition Code: 01 - home or self-care

## 2025-04-16 ENCOUNTER — TRANSITIONAL CARE MANAGEMENT TELEPHONE ENCOUNTER (OUTPATIENT)
Dept: CALL CENTER | Facility: HOSPITAL | Age: 75
End: 2025-04-16
Payer: MEDICARE

## 2025-04-16 NOTE — OUTREACH NOTE
Call Center TCM Note      Flowsheet Row Responses   [facility]   Does the patient have one of the following disease processes/diagnoses(primary or secondary)? Pneumonia   TCM attempt successful? No   Unsuccessful attempts Attempt 1  [Hannah on verbal release,  no answer]            Trinidad GUERRERO - Registered Nurse    4/16/2025, 13:45 EDT

## 2025-04-16 NOTE — OUTREACH NOTE
"Call Center TCM Note      Flowsheet Row Responses   [facility] patient discharged from? [facility]   Does the patient have one of the following disease processes/diagnoses(primary or secondary)? Pneumonia   TCM attempt successful? Yes   Call start time 1415   Call end time 1420   Discharge diagnosis Bilateral pneumonia   Meds reviewed with patient/caregiver? Yes   Is the patient having any side effects they believe may be caused by any medication additions or changes? No   Does the patient have all medications ordered at discharge? Yes   Is the patient taking all medications as directed (includes completed medication regime)? Yes   Comments Hosp dc fu apt on 4/21/25   Does the patient have an appointment with their PCP within 7-14 days of discharge? Yes   Pulse Ox monitoring None   Psychosocial issues? No   Did the patient receive a copy of their discharge instructions? Yes   Nursing interventions Reviewed instructions with patient   What is the patient's perception of their health status since discharge? Same  [\"chest burns a little but not cardiac related/pain\"]   If the patient is a current smoker, are they able to teach back resources for cessation? Not a smoker   Is the patient/caregiver able to teach back the hierarchy of who to call/visit for symptoms/problems? PCP, Specialist, Home health nurse, Urgent Care, ED, 911 Yes   Is the patient/caregiver able to teach back signs and symptoms of worsening condition: Fever/chills, Shortness of breath, Chest pain   Is the patient/caregiver able to teach back importance of completing antibiotic course of treatment? Yes   TCM call completed? Yes   Call end time 1420            Trinidad GUERRERO - Registered Nurse    4/16/2025, 14:21 EDT        "

## 2025-04-17 ENCOUNTER — PATIENT OUTREACH (OUTPATIENT)
Dept: CASE MANAGEMENT | Facility: CLINIC | Age: 75
End: 2025-04-17
Payer: MEDICARE

## 2025-04-17 DIAGNOSIS — E66.9 OBESITY (BMI 30-39.9): Primary | ICD-10-CM

## 2025-04-17 DIAGNOSIS — E03.9 ACQUIRED HYPOTHYROIDISM: ICD-10-CM

## 2025-04-17 RX ORDER — LEVOTHYROXINE SODIUM 25 UG/1
25 TABLET ORAL EVERY MORNING
Qty: 90 TABLET | Refills: 0 | Status: SHIPPED | OUTPATIENT
Start: 2025-04-17

## 2025-04-17 NOTE — OUTREACH NOTE
"AMBULATORY CASE MANAGEMENT NOTE    Names and Relationships of Patient/Support Persons: Contact: Charleen Barnett \"Maggie\"; Relationship: Self -     ACM called and spoke with patient. Identified self and roll. Explained and offered CCM program and patient declined at this time. Reports she is doing better since hospital stay no needs at this time  Encouraged patient to reach out in future if needs arise.         Susana MIKE  Ambulatory Case Management    4/17/2025, 15:24 EDT  "

## 2025-04-17 NOTE — TELEPHONE ENCOUNTER
-  infant, AGA, IDM, maternal gestational HTN, GBS positive with IAP adequate  - Breast and formula feeding well with stable weight -7.4%. Appreciate lactation support  - Glucose x 24 hours stable, latest 65, 58, 76, 54  - Needs car seat test prior to discharge, due today/tonight  - PCP [facility]   Lv: 4/11/25  Appt: 4/21/25

## 2025-04-18 DIAGNOSIS — I65.29 STENOSIS OF CAROTID ARTERY, UNSPECIFIED LATERALITY: Primary | ICD-10-CM

## 2025-04-19 LAB
BACTERIA SPEC AEROBE CULT: NORMAL
BACTERIA SPEC AEROBE CULT: NORMAL

## 2025-04-21 ENCOUNTER — OFFICE VISIT (OUTPATIENT)
Dept: FAMILY MEDICINE CLINIC | Facility: CLINIC | Age: 75
End: 2025-04-21
Payer: MEDICARE

## 2025-04-21 ENCOUNTER — TELEPHONE (OUTPATIENT)
Dept: FAMILY MEDICINE CLINIC | Facility: CLINIC | Age: 75
End: 2025-04-21
Payer: MEDICARE

## 2025-04-21 VITALS
OXYGEN SATURATION: 95 % | HEART RATE: 67 BPM | DIASTOLIC BLOOD PRESSURE: 90 MMHG | WEIGHT: 187 LBS | SYSTOLIC BLOOD PRESSURE: 192 MMHG | BODY MASS INDEX: 39.08 KG/M2 | TEMPERATURE: 98.4 F

## 2025-04-21 DIAGNOSIS — J18.9 PNEUMONIA OF BOTH LUNGS DUE TO INFECTIOUS ORGANISM, UNSPECIFIED PART OF LUNG: Primary | ICD-10-CM

## 2025-04-21 DIAGNOSIS — I65.22 OCCLUSION AND STENOSIS OF LEFT CAROTID ARTERY: ICD-10-CM

## 2025-04-21 DIAGNOSIS — I65.29 STENOSIS OF CAROTID ARTERY, UNSPECIFIED LATERALITY: Primary | ICD-10-CM

## 2025-04-21 NOTE — TELEPHONE ENCOUNTER
When vascular looked at recent films the blockages documented were not in her carotid arteries  I ordered new ultrasounds of her carotid arteries last week

## 2025-04-21 NOTE — TELEPHONE ENCOUNTER
"Pt came in to the office to see Zaina today. After her visit at checkout I was asked to speak to the patient as to why her referral to vascular was canceled. Per pt she really needs this based off of her recent imaging in the hospital and family history. She wants to speak to you directly about why it was not neccessary as she is very concerned. Per pt \"So if I stroke out, then that's okay?\"   I let the patient know that you are out of the office on Mondays.   "

## 2025-04-21 NOTE — PROGRESS NOTES
Transitional Care Follow Up Visit  Subjective     Charleen Barnett is a 74 y.o. female who presents for a transitional care management visit.    Within 48 business hours after discharge our office contacted her via telephone to coordinate her care and needs.      I reviewed and discussed the details of that call along with the discharge summary, hospital problems, inpatient lab results, inpatient diagnostic studies, and consultation reports with Charleen.     Current outpatient and discharge medications have been reconciled for the patient.  Reviewed by: MANISH Peres          4/15/2025     7:33 PM   Date of TCM Phone Call   [facility]   Date of Admission 4/14/2025   Date of Discharge 4/15/2025   Discharge Disposition Home or Self Care     Risk for Readmission (LACE) Score: 8 (4/15/2025  6:00 AM)      History of Present Illness  Patient is here today for [facility] admission follow-up.  Patient was admitted from April 14 through April 15.  To the ER with complaints of shortness of breath.  Chest x-ray showed bilateral pneumonia with pleural effusions.  Patient was hypoxic and was placed on 2 L nasal cannula  She was able to be weaned down to room air  She received IV antibiotics and steroids  She was given Ceftin 500 mg twice daily for 10 days and prednisone daily  Evaluated her and did not see any changes were needed.  She is after repeat chest x-ray at the 4-week [address]  See hospital note below.  Pts BP is quite elevated today  She states she took her last steroid and it is always high with them  She has been taking her midodrine  Will monitor at home and message.   She states she has been fatigued  She reports the SOA has improved.   She has ventolin as needed  Takes her [facility] Course     HPI:     Chief Complaint: Hypoxia     History of Present Illness: Charleen Barnett is a 74 y.o. female with a CMH of Ramos's esophagitis, cervical carcinoma, depression,  fibromyalgia, GERD, hyperlipidemia, hypertension, hypothyroidism, STEMI, KAYLIE, asthma who presented to [facility] on 4/14/2025 with shortness of breath.  This has been ongoing and progressively worsening over the last 2 days.  Patient is typically on room air at baseline, however has been using her 's oxygen for supplementation..  Patient denies any orthopnea.  She did state that she had some chest pain and tightness.  Upon arrival to the emergency department patient's oxygen saturations were 80%.  Patient has previously been noncompliant with CPAP mask.     Upon arrival to the emergency department patient was placed on 6 L of O2, gradually titrated down to 2 L of O2.  Patient was given antimicrobial therapy, as well as aerosols.  Labs were remarkable for troponin 25, 29 respectively.  BNP 2,980.0.Chest x-ray showed bibasilar pneumonia with small bilateral pleural effusions.     Hospital Course:     Patient was admitted with above.  Patient was noted to have hypoxia on day of admission.  She was placed on 2 L O2 via nasal cannula.  Overnight she has now been weaned to room air at rest.  She was continued on IV antibiotics as well as IV steroids.  This morning she feels much better.  She wishes to be discharged home.  Pending a formal 6-minute O2 evaluation as well as physical and Occupational Therapy evaluation, before patient be discharged home later this afternoon.  I have given her a prescription for Ceftin 500 mg p.o. twice daily for 10 days as well as prednisone 40 mg daily.  She was seen evaluated by cardiology who felt as though her dyspnea as well as hypoxia were more likely to be pulmonary in origin.  No acute intervention was recommended from their standpoint.  Labs otherwise appear stable.  Patient does have a mild elevation of her white count.  This is likely steroid-induced.  I have asked her to follow-up with her primary care provider in the next 7 to 10 days.  She is to have a repeat  chest x-ray in 4 weeks to ensure resolution of aforementioned bilateral infiltrate.          Course During Hospital Stay:  see HPI     The following portions of the patient's history were reviewed and updated as appropriate: allergies, current medications, past family history, past medical history, past social history, past surgical history, and problem list.    Review of Systems   Constitutional:  Positive for fatigue. Negative for chills and fever.   Respiratory:  Positive for shortness of breath. Negative for cough and chest tightness.    Cardiovascular:  Negative for chest pain and palpitations.   Neurological:  Positive for headaches. Negative for dizziness.       Objective   BP (!) 192/90 (BP Location: Left arm, Patient Position: Sitting, Cuff Size: Large Adult)   Pulse 67   Temp 98.4 °F (36.9 °C) (Tympanic)   Wt 84.8 kg (187 lb)   SpO2 95%   BMI 39.08 kg/m²   Physical Exam  Constitutional:       General: She is not in acute distress.     Appearance: Normal appearance. She is not ill-appearing.   HENT:      Head: Normocephalic and atraumatic.   Eyes:      Extraocular Movements: Extraocular movements intact.   Cardiovascular:      Rate and Rhythm: Normal rate and regular rhythm.      Heart sounds: No murmur heard.  Pulmonary:      Effort: Pulmonary effort is normal. No respiratory distress.      Breath sounds: Decreased breath sounds present. No wheezing or rhonchi.   Neurological:      General: No focal deficit present.      Mental Status: She is alert and oriented to person, place, and time.   Psychiatric:         Mood and Affect: Mood normal.         Behavior: Behavior normal.         Thought Content: Thought content normal.         Judgment: Judgment normal.         Assessment & Plan   Problems Addressed this Visit          Pulmonary and Pneumonias    Bilateral pneumonia - Primary    Relevant Orders    XR Chest PA & Lateral     Diagnoses         Codes Comments      Pneumonia of both lungs due to  infectious organism, unspecified part of lung    -  Primary ICD-10-CM: J18.9  ICD-9-CM: 483.8 completed antibiotic and steroid  starting to feel better  will repeat CXR in 4-6 wks  call if no imp  monitor BP and message readings

## 2025-04-25 ENCOUNTER — READMISSION MANAGEMENT (OUTPATIENT)
Dept: CALL CENTER | Facility: HOSPITAL | Age: 75
End: 2025-04-25
Payer: MEDICARE

## 2025-04-25 NOTE — OUTREACH NOTE
COPD/PN Week 2 Survey      Flowsheet Row Responses   [facility] facility patient discharged from? [facility]   Does the patient have one of the following disease processes/diagnoses(primary or secondary)? Pneumonia   Week 2 attempt successful? No   Unsuccessful attempts Attempt 1   Revoke Other transitional program  [Followed by ACM]            Delia TAPIA - Registered Nurse

## 2025-04-30 ENCOUNTER — HOSPITAL ENCOUNTER (OUTPATIENT)
Dept: GENERAL RADIOLOGY | Facility: HOSPITAL | Age: 75
Discharge: HOME | End: 2025-04-30
Payer: MEDICARE

## 2025-04-30 ENCOUNTER — HOSPITAL ENCOUNTER (OUTPATIENT)
Dept: CARDIOLOGY | Facility: HOSPITAL | Age: 75
Discharge: HOME | End: 2025-04-30
Payer: MEDICARE

## 2025-04-30 DIAGNOSIS — I65.22 OCCLUSION AND STENOSIS OF LEFT CAROTID ARTERY: ICD-10-CM

## 2025-04-30 DIAGNOSIS — J18.9 PNEUMONIA OF BOTH LUNGS DUE TO INFECTIOUS ORGANISM, UNSPECIFIED PART OF LUNG: ICD-10-CM

## 2025-04-30 DIAGNOSIS — I65.29 STENOSIS OF CAROTID ARTERY, UNSPECIFIED LATERALITY: ICD-10-CM

## 2025-04-30 PROBLEM — I77.9 BILATERAL CAROTID ARTERY DISEASE: Status: ACTIVE | Noted: 2025-04-30

## 2025-04-30 LAB
BH CV XLRA MEAS LEFT CAROTID BULB EDV: -11 CM/SEC
BH CV XLRA MEAS LEFT CAROTID BULB PSV: -49.9 CM/SEC
BH CV XLRA MEAS LEFT DIST CCA EDV: -15.4 CM/SEC
BH CV XLRA MEAS LEFT DIST CCA PSV: -67 CM/SEC
BH CV XLRA MEAS LEFT DIST ICA EDV: -22.5 CM/SEC
BH CV XLRA MEAS LEFT DIST ICA PSV: -55.4 CM/SEC
BH CV XLRA MEAS LEFT ICA/CCA RATIO: 0.66
BH CV XLRA MEAS LEFT PROX CCA EDV: -18.7 CM/SEC
BH CV XLRA MEAS LEFT PROX CCA PSV: -90.5 CM/SEC
BH CV XLRA MEAS LEFT PROX ECA PSV: -92.2 CM/SEC
BH CV XLRA MEAS LEFT PROX ICA EDV: -24.7 CM/SEC
BH CV XLRA MEAS LEFT PROX ICA PSV: -59.8 CM/SEC
BH CV XLRA MEAS LEFT PROX SCLA PSV: 102 CM/SEC
BH CV XLRA MEAS LEFT VERTEBRAL A EDV: -13.2 CM/SEC
BH CV XLRA MEAS LEFT VERTEBRAL A PSV: -43.4 CM/SEC
BH CV XLRA MEAS RIGHT CAROTID BULB EDV: -11.9 CM/SEC
BH CV XLRA MEAS RIGHT CAROTID BULB PSV: -47 CM/SEC
BH CV XLRA MEAS RIGHT DIST CCA EDV: -17.1 CM/SEC
BH CV XLRA MEAS RIGHT DIST CCA PSV: -82.1 CM/SEC
BH CV XLRA MEAS RIGHT DIST ICA EDV: -25.5 CM/SEC
BH CV XLRA MEAS RIGHT DIST ICA PSV: -71.6 CM/SEC
BH CV XLRA MEAS RIGHT ICA/CCA RATIO: 0.87
BH CV XLRA MEAS RIGHT PROX CCA EDV: -12.7 CM/SEC
BH CV XLRA MEAS RIGHT PROX CCA PSV: -59.3 CM/SEC
BH CV XLRA MEAS RIGHT PROX ECA EDV: -8.2 CM/SEC
BH CV XLRA MEAS RIGHT PROX ECA PSV: -84.5 CM/SEC
BH CV XLRA MEAS RIGHT PROX ICA EDV: -15.4 CM/SEC
BH CV XLRA MEAS RIGHT PROX ICA PSV: -45.7 CM/SEC
BH CV XLRA MEAS RIGHT VERTEBRAL A EDV: -13.2 CM/SEC
BH CV XLRA MEAS RIGHT VERTEBRAL A PSV: -45.7 CM/SEC
LEFT ARM BP: NORMAL MMHG
RIGHT ARM BP: NORMAL MMHG

## 2025-04-30 PROCEDURE — 71046 X-RAY EXAM CHEST 2 VIEWS: CPT

## 2025-04-30 PROCEDURE — 93880 EXTRACRANIAL BILAT STUDY: CPT

## 2025-05-01 ENCOUNTER — OFFICE VISIT (OUTPATIENT)
Dept: ORTHOPEDIC SURGERY | Facility: CLINIC | Age: 75
End: 2025-05-01
Payer: MEDICARE

## 2025-05-01 VITALS — WEIGHT: 187 LBS | RESPIRATION RATE: 20 BRPM | BODY MASS INDEX: 39.25 KG/M2 | OXYGEN SATURATION: 97 % | HEIGHT: 58 IN

## 2025-05-01 DIAGNOSIS — Z96.652 S/P TKR (TOTAL KNEE REPLACEMENT), LEFT: Primary | ICD-10-CM
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
## 2025-05-01 DIAGNOSIS — S80.02XA CONTUSION OF LEFT PATELLA, INITIAL ENCOUNTER: ICD-10-CM

## 2025-05-01 NOTE — PROGRESS NOTES
Subjective:     Patient ID: Charleen Barnett is a 74 y.o. female.    Chief Complaint:    Pain    Charleen Barnett returns to clinic today for evaluation of left knee pain.  The patient states she had a total knee arthroplasty done about 6 or 7 years ago by Dr. Ghotra.  She states that she had about 5 years of persistent neuropathic pain following surgery and that gradually resolved.  She states about 2 to 3 months ago she fell directly on a flexed knee and since then has had anterior knee pain.  She has not seen anybody for yet.  She states that it is getting better but she is worried that she knocked something loose.     Social History     Occupational History    Not on file   Tobacco Use    Smoking status: Never     Passive exposure: Never    Smokeless tobacco: Never    Tobacco comments:     Parents and husbands heavy smokers   Vaping Use    Vaping status: Never Used   Substance and Sexual Activity    Alcohol use: Not Currently     Comment: Recovery since 1994 drank from [age]    Drug use: Not Currently     Frequency: 1.0 times per week     Types: Amphetamines     Comment: Short term use/over use diet pills in the 70's    Sexual activity: Not Currently     Partners: Male     Birth control/protection: Post-menopausal, None, Hysterectomy      Past Medical History:   Diagnosis Date    Allergic 1970's    Anemia 1950's    Angina pectoris     Anxiety 1980's    Arthritis 1990's    Arthritis of neck     Asthma 02/13/2020    Ramos's esophagus 02/15/2018    Cancer Carcinoma Insitu cervical    Carcinoma insitu cervix    Cataract     Removed both eyes in Jan. 2023    Cervical disc disorder     Cholelithiasis Removed 2000's    Chronic constipation     Chronic diarrhea     Colon polyp 2000's    Coronary artery disease 2006?    CTS (carpal tunnel syndrome)     Depression 08/27/2018    Dislocation of finger     Fibromyalgia 12/14/2011    Fibromyalgia, primary 1990's I think    Fracture of wrist     R HAND LAST 3 DIGITS     Fracture, finger     GERD (gastroesophageal reflux disease)     Take Lansaprazole    Gout 02/13/2020    Headache     Hip arthrosis     HL (hearing loss) Age related    Hyperlipidemia     Hypertension     Hypothyroidism 2022    Injury of back     Irritable bowel syndrome     Most of my life    Joint pain     Knee swelling     Low back pain 1970's    Low back strain     Lumbosacral disc disease     Neck strain ??    My neck is always pulling    Obesity Lifelong since puberty    Obstructive sleep apnea 10/16/2014    Osteopenia Five years appx    Dr. DURAN GIVES SHOTS    Osteoporosis     fosamax(SE), on prolia(4/10/18-6/18/21, 2/1/22)    Otitis media     Pedal edema 10/22/2018    Periarthritis of shoulder     Pneumonia 2020    Presence of left artificial knee joint 03/14/2019    Rotator cuff syndrome     Scoliosis 1961    Seasonal allergies     Shortness of breath     ST elevation (STEMI) myocardial infarction 02/13/2020    Status post percutaneous transluminal coronary angioplasty 06/10/2014    Substance abuse     Tachycardia     Tear of meniscus of knee     Thoracic disc disorder     Alignment totally out    Tremor 2015    Urge incontinence 08/27/2018    Urinary tract infection     Visual impairment Cataracts    Removed 1/2023 removed    Vitamin D deficiency 03/19/2015     Past Surgical History:   Procedure Laterality Date    BLADDER SURGERY      BLADDER SUSPENSION  04/2023    CARDIAC CATHETERIZATION N/A 05/29/2021    Procedure: Left Heart Cath and coronary angiogram;  Surgeon: Yazmin Matta MD;  Location: [facility] CATH INVASIVE LOCATION;  Service: Cardiovascular;  Laterality: N/A;    CARDIAC CATHETERIZATION  05/29/2021    Procedure: Functional Flow Reserve (iFR);  Surgeon: Bryan Patel MD;  Location: [facility] CATH INVASIVE LOCATION;  Service: Cardiology;;    CARDIAC CATHETERIZATION N/A 05/29/2021    Procedure: Percutaneous Coronary Intervention;  Surgeon: Bryan Patel MD;  Location: [facility]  CATH INVASIVE LOCATION;  Service: Cardiology;  Laterality: N/A;    CARDIAC CATHETERIZATION N/A 2021    Procedure: Stent FRANCOISE coronary;  Surgeon: Bryan Patel MD;  Location: [facility] CATH INVASIVE LOCATION;  Service: Cardiology;  Laterality: N/A;    CAROTID STENT      CHOLECYSTECTOMY      COLONOSCOPY  ??    Scheduled for 2023?    CORONARY STENT PLACEMENT  ??    FRACTURE SURGERY      Hand surgery [facility] [employer] [facility]    HAND SURGERY      HYSTERECTOMY      INGUINAL HERNIA REPAIR      JOINT REPLACEMENT  L-knee    KNEE SURGERY      OTHER SURGICAL HISTORY      STENT    SUBTOTAL HYSTERECTOMY      Carcinoma insitu Cervix    TRIGGER POINT INJECTION         Family History   Problem Relation Age of Onset    Alcohol abuse Mother     Anxiety disorder Mother     Depression Mother     Early death Mother     Mental illness Mother     Heart attack Father         Vein bypass in legs    Cancer Father             Alcohol abuse Father     Depression Father     No Known Problems Sister     No Known Problems Brother     No Known Problems Maternal Aunt     No Known Problems Maternal Uncle     Stroke Paternal Aunt     No Known Problems Paternal Uncle     Heart attack Maternal Grandmother         Heart atrack in her 80s    Rheumatologic disease Maternal Grandmother     Scoliosis Maternal Grandmother     Heart attack Maternal Grandfather         Heart attack in his 60s    Alcohol abuse Maternal Grandfather     Heart attack Paternal Grandmother         Type 1 diabetic, heart attack    Diabetes Paternal Grandmother     Heart attack Paternal Grandfather         Heart attack in his 50s or 60s    Heart disease Sister         Blockages monitored with medication    Hypertension Sister         Coronary heart issues    Depression Sister     Hyperlipidemia Brother         Extremely high triglycerides and cholesterol    Hypertension Brother         High blood pressure    Heart attack Brother         Passed from heart  "attack age 54    Alcohol abuse Brother     Arthritis Brother     Depression Brother     Early death Brother     Hyperlipidemia Brother         Unknown    Hypertension Brother     Heart attack Brother         Passed from heart attack age 44    Alcohol abuse Brother     Depression Brother     Early death Brother     Hyperlipidemia Brother         Had heart disease for years before passing    Hypertension Brother     Heart attack Brother          from heart failure in his 60s    Alcohol abuse Brother     Early death Brother     Mental illness Brother     Anemia Neg Hx     Arrhythmia Neg Hx     Asthma Neg Hx     Clotting disorder Neg Hx     Fainting Neg Hx     Heart failure Neg Hx                  Objective:  Vitals:    25 1237   Resp: 20   SpO2: 97%   Weight: 84.8 kg (187 lb)   Height: 147.3 cm (58\")         25  1237   Weight: 84.8 kg (187 lb)     Body mass index is 39.08 kg/m².  Class 2 Severe Obesity (BMI >=35 and <=39.9). Obesity-related health conditions include the following: osteoarthritis.       Left Knee Exam     Tenderness   The patient is experiencing tenderness in the patella.    Range of Motion   Extension:  0   Flexion:  120     Tests   Varus: negative Valgus: negative  Lachman:  Anterior - trace    Posterior - negative  Drawer:  Anterior - trace     Posterior - negative    Other   Erythema: absent  Scars: present  Sensation: normal  Pulse: present  Swelling: mild  Effusion: no effusion present             Imaging: 3 views of the left knee were ordered and reviewed by myself in the office today  Indication: left knee replacement  Findings: X-rays demonstrate a cemented left total knee arthroplasty with an resurfaced patella with implants in expected position no signs of loosening fracture, dislocation, subluxation, wear or subsidence.  No new acute osseous abnormality.  Comparative studies: Immediate postop radiographs    Assessment:        1. S/P TKR (total knee replacement), left    2. " Fall from slip, trip, or stumble, initial encounter    3. Contusion of left patella, initial encounter           Plan:          Discussed treatment options at length with patient at today's visit.  I discussed with the patient that I believe she has developed a patellar contusion.  Her x-rays show no signs of implant loosening or subsidence or malposition.  There is no signs of fracture dislocation or subluxation.  I discussed with her that I believe she has developed patellar contusion from a fall.  I would not recommend any further workup at this point in time.  Discussed with her that it may take 6 to 12 months for the pain to fully resolve.  Patient is thankful to hear this and is thankful that she is able to avoid any surgery.  Follow up: as needed      Charleen Barnett was in agreement with plan and had all questions answered.     Medications:  No orders of the defined types were placed in this encounter.      Followup:  No follow-ups on file.    Diagnoses and all orders for this visit:    1. S/P TKR (total knee replacement), left (Primary)  -     XR Knee 3 View Left    2. Fall from slip, trip, or stumble, initial encounter    3. Contusion of left patella, initial encounter          Dictated utilizing Dragon dictation

## 2025-05-06 ENCOUNTER — OFFICE VISIT (OUTPATIENT)
Dept: FAMILY MEDICINE CLINIC | Facility: CLINIC | Age: 75
End: 2025-05-06
Payer: MEDICARE

## 2025-05-06 VITALS
TEMPERATURE: 97.8 F | OXYGEN SATURATION: 99 % | WEIGHT: 182 LBS | DIASTOLIC BLOOD PRESSURE: 68 MMHG | HEIGHT: 58 IN | HEART RATE: 62 BPM | SYSTOLIC BLOOD PRESSURE: 112 MMHG | BODY MASS INDEX: 38.2 KG/M2

## 2025-05-06 DIAGNOSIS — Z12.31 ENCOUNTER FOR SCREENING MAMMOGRAM FOR MALIGNANT NEOPLASM OF BREAST: ICD-10-CM

## 2025-05-06 DIAGNOSIS — E78.2 MIXED HYPERLIPIDEMIA: ICD-10-CM

## 2025-05-06 DIAGNOSIS — N83.201 RIGHT OVARIAN CYST: ICD-10-CM

## 2025-05-06 DIAGNOSIS — E03.9 ACQUIRED HYPOTHYROIDISM: ICD-10-CM

## 2025-05-06 DIAGNOSIS — I10 PRIMARY HYPERTENSION: ICD-10-CM

## 2025-05-06 DIAGNOSIS — E55.9 VITAMIN D DEFICIENCY: ICD-10-CM

## 2025-05-06 DIAGNOSIS — R73.9 HYPERGLYCEMIA: ICD-10-CM

## 2025-05-06 DIAGNOSIS — E66.9 OBESITY (BMI 30-39.9): ICD-10-CM

## 2025-05-06 DIAGNOSIS — Z00.00 ENCOUNTER FOR ANNUAL WELLNESS EXAM IN MEDICARE PATIENT: Primary | ICD-10-CM

## 2025-05-06 NOTE — ASSESSMENT & PLAN NOTE
Patient's (Body mass index is 38.04 kg/m².) indicates that they are  with health conditions that include  . Weight is . BMI  . We discussed .

## 2025-05-06 NOTE — ASSESSMENT & PLAN NOTE
Blood pressure is controlled and she will continue those medications    Orders:    Lipid Panel; Future

## 2025-05-06 NOTE — ASSESSMENT & PLAN NOTE
She was counseled on the need for weight loss.  She was encouraged to get shingles vaccine and consider an updated tetanus vaccine but she said that her last tetanus vaccine caused her arm to swell

## 2025-05-08 ENCOUNTER — HOSPITAL ENCOUNTER (OUTPATIENT)
Dept: ULTRASOUND IMAGING | Facility: HOSPITAL | Age: 75
Discharge: HOME | End: 2025-05-08
Payer: MEDICARE

## 2025-05-08 ENCOUNTER — HOSPITAL ENCOUNTER (OUTPATIENT)
Dept: BONE DENSITY | Facility: HOSPITAL | Age: 75
Discharge: HOME | End: 2025-05-08
Payer: MEDICARE

## 2025-05-08 ENCOUNTER — HOSPITAL ENCOUNTER (OUTPATIENT)
Dept: MRI IMAGING | Facility: HOSPITAL | Age: 75
Discharge: HOME | End: 2025-05-08
Payer: MEDICARE

## 2025-05-08 DIAGNOSIS — N83.201 RIGHT OVARIAN CYST: ICD-10-CM

## 2025-05-08 DIAGNOSIS — M81.0 AGE RELATED OSTEOPOROSIS, UNSPECIFIED PATHOLOGICAL FRACTURE PRESENCE: ICD-10-CM

## 2025-05-08 DIAGNOSIS — M25.511 ACUTE PAIN OF RIGHT SHOULDER: ICD-10-CM

## 2025-05-08 PROCEDURE — 93976 VASCULAR STUDY: CPT

## 2025-05-08 PROCEDURE — 73221 MRI JOINT UPR EXTREM W/O DYE: CPT

## 2025-05-08 PROCEDURE — 76856 US EXAM PELVIC COMPLETE: CPT

## 2025-05-08 PROCEDURE — 76830 TRANSVAGINAL US NON-OB: CPT

## 2025-05-08 PROCEDURE — 77080 DXA BONE DENSITY AXIAL: CPT

## 2025-05-15 ENCOUNTER — OFFICE VISIT (OUTPATIENT)
Dept: ORTHOPEDIC SURGERY | Facility: CLINIC | Age: 75
End: 2025-05-15
Payer: MEDICARE

## 2025-05-15 VITALS — BODY MASS INDEX: 38.2 KG/M2 | RESPIRATION RATE: 16 BRPM | WEIGHT: 182 LBS | HEIGHT: 58 IN

## 2025-05-15 DIAGNOSIS — M25.511 ACUTE PAIN OF RIGHT SHOULDER: Primary | ICD-10-CM

## 2025-05-15 PROCEDURE — 99212 OFFICE O/P EST SF 10 MIN: CPT | Performed by: ORTHOPAEDIC SURGERY

## 2025-05-15 NOTE — PROGRESS NOTES
"     Patient ID: Charleen Barnett is a 75 y.o. female.  Right shoulder pain    Review of Systems:        Objective:    Resp 16   Ht 147.3 cm (58\")   Wt 82.6 kg (182 lb)   BMI 38.04 kg/m²     Physical Examination:      moderate bicep groove tenderness on the right passive elevation 160 on the right abduction 130 external rotation 40 internal rotation S1 with pain and weakness on Speed [place] supraspinatus testing.  Belly press and liftoff are 4/5.      Imaging:   MRI demonstrates small high-grade near full-thickness anterior supraspinatus tear    Assessment:    Right shoulder small full-thickness cuff tear    Plan:   Options discussed at this point symptoms are quite mild she would like to continue observation she can see me as needed left side may require shoulder arthroplasty      Procedures          Disclaimer: Part of this note may be an electronic transcription/translation of spoken language to printed text using the Dragon Dictation System  "

## 2025-06-02 ENCOUNTER — HOSPITAL ENCOUNTER (OUTPATIENT)
Dept: MAMMOGRAPHY | Facility: HOSPITAL | Age: 75
Discharge: HOME | End: 2025-06-02
Admitting: FAMILY MEDICINE
Payer: MEDICARE

## 2025-06-02 DIAGNOSIS — Z12.31 ENCOUNTER FOR SCREENING MAMMOGRAM FOR MALIGNANT NEOPLASM OF BREAST: ICD-10-CM

## 2025-06-02 PROCEDURE — 77063 BREAST TOMOSYNTHESIS BI: CPT

## 2025-06-02 PROCEDURE — 77067 SCR MAMMO BI INCL CAD: CPT

## 2025-06-18 DIAGNOSIS — E78.2 MIXED HYPERLIPIDEMIA: ICD-10-CM

## 2025-06-18 DIAGNOSIS — J45.909 ASTHMA, UNSPECIFIED ASTHMA SEVERITY, UNSPECIFIED WHETHER COMPLICATED, UNSPECIFIED WHETHER PERSISTENT: ICD-10-CM

## 2025-06-18 DIAGNOSIS — F32.A DEPRESSIVE DISORDER: ICD-10-CM

## 2025-06-18 RX ORDER — CITALOPRAM HYDROBROMIDE 20 MG/1
20 TABLET ORAL DAILY
Qty: 90 TABLET | Refills: 0 | Status: SHIPPED | OUTPATIENT
Start: 2025-06-18

## 2025-06-18 RX ORDER — BUSPIRONE HYDROCHLORIDE 10 MG/1
TABLET ORAL
Qty: 270 TABLET | Refills: 0 | Status: SHIPPED | OUTPATIENT
Start: 2025-06-18

## 2025-06-18 RX ORDER — ATORVASTATIN CALCIUM 10 MG/1
10 TABLET, FILM COATED ORAL DAILY
Qty: 90 TABLET | Refills: 0 | Status: SHIPPED | OUTPATIENT
Start: 2025-06-18

## 2025-06-18 RX ORDER — METOPROLOL SUCCINATE 25 MG/1
50 TABLET, EXTENDED RELEASE ORAL DAILY
Qty: 180 TABLET | Refills: 0 | Status: SHIPPED | OUTPATIENT
Start: 2025-06-18

## 2025-06-18 RX ORDER — MONTELUKAST SODIUM 10 MG/1
10 TABLET ORAL DAILY
Qty: 90 TABLET | Refills: 0 | Status: SHIPPED | OUTPATIENT
Start: 2025-06-18

## 2025-06-18 NOTE — TELEPHONE ENCOUNTER
Rx Refill Note  Requested Prescriptions     Pending Prescriptions Disp Refills    metoprolol succinate XL (TOPROL-XL) 25 MG 24 hr tablet [Pharmacy Med Name: METOPROLOL SUCCINATE ER 25 MG TABLET] 180 tablet 0     Sig: TAKE TWO TABLETS BY MOUTH EVERY DAY      Last office visit with prescribing clinician: 10/14/2024   Last telemedicine visit with prescribing clinician: Visit date not found   Next office visit with prescribing clinician: Visit date not found                         Would you like a call back once the refill request has been completed: [] Yes [] No    If the office needs to give you a call back, can they leave a voicemail: [] Yes [] No    Barbara Rogers MA  06/18/25, 14:50 EDT

## 2025-07-15 DIAGNOSIS — E03.9 ACQUIRED HYPOTHYROIDISM: ICD-10-CM

## 2025-07-15 RX ORDER — LEVOTHYROXINE SODIUM 25 UG/1
25 TABLET ORAL EVERY MORNING
Qty: 90 TABLET | Refills: 0 | Status: SHIPPED | OUTPATIENT
Start: 2025-07-15

## 2025-08-07 ENCOUNTER — TELEPHONE (OUTPATIENT)
Dept: FAMILY MEDICINE CLINIC | Facility: CLINIC | Age: 75
End: 2025-08-07
Payer: MEDICARE

## 2025-08-07 RX ORDER — VIBEGRON 75 MG/1
TABLET, FILM COATED ORAL
Qty: 30 TABLET | Refills: 10 | OUTPATIENT
Start: 2025-08-07

## 2025-08-07 RX ORDER — VIBEGRON 75 MG/1
1 TABLET, FILM COATED ORAL EVERY OTHER DAY
Qty: 30 TABLET | Refills: 5 | Status: SHIPPED | OUTPATIENT
Start: 2025-08-07 | End: 2025-08-08 | Stop reason: SDUPTHER

## 2025-08-08 ENCOUNTER — TELEPHONE (OUTPATIENT)
Dept: FAMILY MEDICINE CLINIC | Facility: CLINIC | Age: 75
End: 2025-08-08
Payer: MEDICARE

## 2025-08-08 RX ORDER — VIBEGRON 75 MG/1
1 TABLET, FILM COATED ORAL DAILY
Qty: 30 TABLET | Refills: 5 | Status: SHIPPED | OUTPATIENT
Start: 2025-08-08

## 2025-08-12 ENCOUNTER — OFFICE VISIT (OUTPATIENT)
Dept: ENDOCRINOLOGY | Facility: CLINIC | Age: 75
End: 2025-08-12
Payer: MEDICARE

## 2025-08-12 VITALS
SYSTOLIC BLOOD PRESSURE: 138 MMHG | BODY MASS INDEX: 39.04 KG/M2 | HEART RATE: 64 BPM | DIASTOLIC BLOOD PRESSURE: 80 MMHG | WEIGHT: 186 LBS | HEIGHT: 58 IN | OXYGEN SATURATION: 95 %

## 2025-08-12 DIAGNOSIS — R73.09 BLOOD GLUCOSE ABNORMAL: ICD-10-CM

## 2025-08-12 DIAGNOSIS — K22.719 BARRETT'S ESOPHAGUS WITH DYSPLASIA: ICD-10-CM

## 2025-08-12 DIAGNOSIS — E55.9 VITAMIN D DEFICIENCY: ICD-10-CM

## 2025-08-12 DIAGNOSIS — M81.0 AGE-RELATED OSTEOPOROSIS WITHOUT CURRENT PATHOLOGICAL FRACTURE: Primary | ICD-10-CM

## 2025-08-12 DIAGNOSIS — E03.9 HYPOTHYROIDISM, UNSPECIFIED TYPE: ICD-10-CM

## 2025-08-13 LAB
25(OH)D3+25(OH)D2 SERPL-MCNC: 45.1 NG/ML (ref 30–100)
ALBUMIN SERPL-MCNC: 3.9 G/DL (ref 3.8–4.8)
ALP SERPL-CCNC: 106 IU/L (ref 44–121)
ALT SERPL-CCNC: 11 IU/L (ref 0–32)
AST SERPL-CCNC: 18 IU/L (ref 0–40)
BILIRUB SERPL-MCNC: 0.3 MG/DL (ref 0–1.2)
BUN SERPL-MCNC: 14 MG/DL (ref 8–27)
BUN/CREAT SERPL: 14 (ref 12–28)
CALCIUM SERPL-MCNC: 9.4 MG/DL (ref 8.7–10.3)
CHLORIDE SERPL-SCNC: 102 MMOL/L (ref 96–106)
CO2 SERPL-SCNC: 26 MMOL/L (ref 20–29)
CREAT SERPL-MCNC: 1.02 MG/DL (ref 0.57–1)
EGFRCR SERPLBLD CKD-EPI 2021: 57 ML/MIN/1.73
GLOBULIN SER CALC-MCNC: 2.2 G/DL (ref 1.5–4.5)
GLUCOSE SERPL-MCNC: 77 MG/DL (ref 70–99)
HBA1C MFR BLD: 5.3 % (ref 4.8–5.6)
POTASSIUM SERPL-SCNC: 4.1 MMOL/L (ref 3.5–5.2)
PROT SERPL-MCNC: 6.1 G/DL (ref 6–8.5)
SODIUM SERPL-SCNC: 143 MMOL/L (ref 134–144)
T4 FREE SERPL-MCNC: 1.24 NG/DL (ref 0.82–1.77)
TSH SERPL DL<=0.005 MIU/L-ACNC: 3.71 UIU/ML (ref 0.45–4.5)

## (undated) DEVICE — KT PK ANGIOPLASTY ACC 9 MERIT

## (undated) DEVICE — GW PRESS VERRATA STR 185CM 10185P

## (undated) DEVICE — 6F .070 XB 3.5 100CM: Brand: VISTA BRITE TIP

## (undated) DEVICE — GW DIAG EMERALD HEPCOAT MOVE JTIP STD .035 3MM 150CM

## (undated) DEVICE — TBG NAMIC PRESS MONTR A/ F/M 12IN

## (undated) DEVICE — DEV INFL COMPAK W/ACCESSPLUS IN4530

## (undated) DEVICE — RADIFOCUS OBTURATOR: Brand: RADIFOCUS

## (undated) DEVICE — STPCK 3WY HP ROT

## (undated) DEVICE — BOWL PLSTC MD 16OZ BLU STRL

## (undated) DEVICE — SYR LL TP 10ML STRL

## (undated) DEVICE — CATH DIAG IMPULSE FR4 5F 100CM

## (undated) DEVICE — PK TRY HEART CATH 50

## (undated) DEVICE — PINNACLE INTRODUCER SHEATH: Brand: PINNACLE

## (undated) DEVICE — CATH DIAG IMPULSE PIG 5F 100CM

## (undated) DEVICE — CONTRST ISOVUE300 61PCT 50ML

## (undated) DEVICE — ELECTRD DEFIB M/FUNC PROPADZ RADIOL 2PK

## (undated) DEVICE — MEDICINE CUP, GRADUATED, STER: Brand: MEDLINE

## (undated) DEVICE — CATH DIAG IMPULSE FL4 5F 100CM

## (undated) DEVICE — BALN EUPHORA 3X15MM